# Patient Record
Sex: MALE | Race: WHITE | NOT HISPANIC OR LATINO | Employment: OTHER | URBAN - METROPOLITAN AREA
[De-identification: names, ages, dates, MRNs, and addresses within clinical notes are randomized per-mention and may not be internally consistent; named-entity substitution may affect disease eponyms.]

---

## 2023-02-24 LAB
ERYTHROCYTE DISTRIBUTION WIDTH (RATIO) BY AUTOMATED COUNT: 19.9 % (ref 11.5–14.5)
ERYTHROCYTE MEAN CORPUSCULAR HEMOGLOBIN CONCENTRATION (G/DL) BY AUTOMATED: 28.5 G/DL (ref 32–36)
ERYTHROCYTE MEAN CORPUSCULAR VOLUME (FL) BY AUTOMATED COUNT: 77 FL (ref 80–100)
ERYTHROCYTES (10*6/UL) IN BLOOD BY AUTOMATED COUNT: 4.85 X10E12/L (ref 4.5–5.9)
HEMATOCRIT (%) IN BLOOD BY AUTOMATED COUNT: 37.5 % (ref 41–52)
HEMOGLOBIN (G/DL) IN BLOOD: 10.7 G/DL (ref 13.5–17.5)
LEUKOCYTES (10*3/UL) IN BLOOD BY AUTOMATED COUNT: 5.8 X10E9/L (ref 4.4–11.3)
NRBC (PER 100 WBCS) BY AUTOMATED COUNT: 0 /100 WBC (ref 0–0)
PLATELETS (10*3/UL) IN BLOOD AUTOMATED COUNT: 267 X10E9/L (ref 150–450)

## 2023-09-21 PROBLEM — Z87.11 HISTORY OF GASTRIC ULCER: Status: ACTIVE | Noted: 2023-09-21

## 2023-09-21 PROBLEM — I25.10 CORONARY ATHEROSCLEROSIS: Status: ACTIVE | Noted: 2023-09-21

## 2023-09-21 PROBLEM — Q27.30 ARTERIOVENOUS MALFORMATION (AVM) (HHS-HCC): Status: ACTIVE | Noted: 2023-09-21

## 2023-09-21 PROBLEM — E78.5 DYSLIPIDEMIA: Status: ACTIVE | Noted: 2023-09-21

## 2023-09-21 PROBLEM — Z95.818 PRESENCE OF WATCHMAN LEFT ATRIAL APPENDAGE CLOSURE DEVICE: Status: ACTIVE | Noted: 2023-09-21

## 2023-09-21 PROBLEM — Z91.89 AT RISK FOR STROKE: Status: ACTIVE | Noted: 2023-09-21

## 2023-09-21 PROBLEM — J44.9 COPD (CHRONIC OBSTRUCTIVE PULMONARY DISEASE) (MULTI): Status: ACTIVE | Noted: 2023-09-21

## 2023-09-21 PROBLEM — Z87.19 HISTORY OF GI BLEED: Status: ACTIVE | Noted: 2023-09-21

## 2023-09-21 PROBLEM — Z92.89 HISTORY OF BLOOD TRANSFUSION: Status: ACTIVE | Noted: 2023-09-21

## 2023-09-21 PROBLEM — I48.91 A-FIB (MULTI): Status: ACTIVE | Noted: 2023-09-21

## 2023-09-21 RX ORDER — ALBUTEROL SULFATE 90 UG/1
2 AEROSOL, METERED RESPIRATORY (INHALATION) EVERY 4 HOURS PRN
COMMUNITY
Start: 2020-02-28

## 2023-09-21 RX ORDER — FLUTICASONE PROPIONATE AND SALMETEROL 100; 50 UG/1; UG/1
1 POWDER RESPIRATORY (INHALATION) 2 TIMES DAILY
COMMUNITY
Start: 2020-02-28

## 2023-09-21 RX ORDER — NITROGLYCERIN 0.4 MG/1
0.4 TABLET SUBLINGUAL EVERY 5 MIN PRN
COMMUNITY
Start: 2021-12-16 | End: 2023-12-21 | Stop reason: SDUPTHER

## 2023-09-21 RX ORDER — ROSUVASTATIN CALCIUM 40 MG/1
1 TABLET, COATED ORAL DAILY
COMMUNITY
End: 2023-12-21 | Stop reason: SDUPTHER

## 2023-09-21 RX ORDER — CLOPIDOGREL BISULFATE 75 MG/1
1 TABLET ORAL DAILY
COMMUNITY
Start: 2022-01-21 | End: 2023-12-21 | Stop reason: SDUPTHER

## 2023-09-21 RX ORDER — ISOSORBIDE MONONITRATE 30 MG/1
30 TABLET, EXTENDED RELEASE ORAL EVERY MORNING
COMMUNITY
End: 2023-10-20 | Stop reason: WASHOUT

## 2023-09-21 RX ORDER — ASPIRIN 81 MG/1
1 TABLET ORAL DAILY
COMMUNITY
Start: 2020-07-07 | End: 2023-12-21 | Stop reason: WASHOUT

## 2023-09-21 RX ORDER — PROBENECID AND COLCHICINE .5; 5 MG/1; MG/1
1 TABLET ORAL 2 TIMES DAILY
COMMUNITY

## 2023-09-21 RX ORDER — PANTOPRAZOLE SODIUM 40 MG/1
TABLET, DELAYED RELEASE ORAL
COMMUNITY
Start: 2023-02-22

## 2023-09-21 RX ORDER — METOPROLOL SUCCINATE 100 MG/1
1 TABLET, EXTENDED RELEASE ORAL DAILY
COMMUNITY
End: 2023-12-21 | Stop reason: DRUGHIGH

## 2023-09-21 RX ORDER — DEXTROMETHORPHAN HYDROBROMIDE, GUAIFENESIN 5; 100 MG/5ML; MG/5ML
650 LIQUID ORAL AS NEEDED
COMMUNITY
Start: 2020-06-05 | End: 2023-12-21 | Stop reason: WASHOUT

## 2023-09-21 RX ORDER — ACETAMINOPHEN 160 MG/5ML
1 SUSPENSION, ORAL (FINAL DOSE FORM) ORAL 2 TIMES DAILY
COMMUNITY
Start: 2020-06-05

## 2023-09-21 RX ORDER — LISINOPRIL 10 MG/1
1 TABLET ORAL DAILY
COMMUNITY
End: 2023-12-21 | Stop reason: SDUPTHER

## 2023-09-21 RX ORDER — MULTIVIT-MIN/FA/LYCOPEN/LUTEIN .4-300-25
1 TABLET ORAL DAILY
COMMUNITY
Start: 2020-06-05

## 2023-09-21 RX ORDER — ACETAMINOPHEN 325 MG/1
650 TABLET ORAL EVERY 6 HOURS PRN
COMMUNITY
Start: 2022-01-04

## 2023-09-22 LAB
CHOLESTEROL (MG/DL) IN SER/PLAS: 141 MG/DL (ref 0–199)
CHOLESTEROL IN HDL (MG/DL) IN SER/PLAS: 45.6 MG/DL
CHOLESTEROL/HDL RATIO: 3.1
LDL: 77 MG/DL (ref 0–99)
TRIGLYCERIDE (MG/DL) IN SER/PLAS: 90 MG/DL (ref 0–149)
VLDL: 18 MG/DL (ref 0–40)

## 2023-10-19 NOTE — PROGRESS NOTES
PCP: Magno Babb MD    Subjective   Rafi Lisa Jr. is a 73 y.o. male who is here for follow up of  CAD .  Since last visit, reports:    Mobility has been wobbly in last 3 days.  Denies passing out.   No patterns in onset.   No lightheadedness.    No CP/SOB.    Review of Systems:  Otherwise, limited cardiovascular review of systems is negative.    Past Medical History:  He has a past medical history of Abnormal findings on diagnostic imaging of other specified body structures, Personal history of other medical treatment, and Personal history of other medical treatment.    Surgical History:   He has a past surgical history that includes Other surgical history (2020); Other surgical history (2022); Other surgical history (2022); Other surgical history (2020); Other surgical history (2020); Other surgical history (2020); Other surgical history (2020); Other surgical history (2020); Other surgical history (2020); and Other surgical history (2020).    Family History:   Family History   Problem Relation Name Age of Onset    Other (coronary thrombosis) Father           in 1970s @63     Family History   Problem Relation Name Age of Onset    Other (coronary thrombosis) Father           in 1970s @63       Social History:   Tobacco Use: Not on file       Outpatient Medications:    Current Outpatient Medications:     acetaminophen (Tylenol Arthritis Pain) 650 mg ER tablet, Take 1 tablet (650 mg) by mouth if needed., Disp: , Rfl:     acetaminophen (Tylenol) 325 mg tablet, Take 2 tablets (650 mg) by mouth every 6 hours if needed for mild pain (1 - 3)., Disp: , Rfl:     albuterol 90 mcg/actuation inhaler, Inhale 2 puffs every 4 hours if needed., Disp: , Rfl:     aspirin 81 mg EC tablet, Take 1 tablet (81 mg) by mouth once daily., Disp: , Rfl:     clopidogrel (Plavix) 75 mg tablet, Take 1 tablet (75 mg) by mouth once daily., Disp: , Rfl:     coenzyme Q-10  200 mg capsule, Take 1 capsule (200 mg) by mouth 2 times a day., Disp: , Rfl:     fluticasone propion-salmeteroL (Advair Diskus) 100-50 mcg/dose diskus inhaler, Inhale 1 puff twice a day., Disp: , Rfl:     lisinopril 10 mg tablet, Take 1 tablet (10 mg) by mouth once daily., Disp: , Rfl:     metoprolol succinate XL (Toprol-XL) 100 mg 24 hr tablet, Take 1 tablet (100 mg) by mouth once daily., Disp: , Rfl:     multivitamin with minerals iron-free (Centrum Silver), Take 1 tablet by mouth once daily., Disp: , Rfl:     nitroglycerin (Nitrostat) 0.4 mg SL tablet, Place 1 tablet (0.4 mg) under the tongue every 5 minutes if needed for chest pain (FOR UP TO 3 DOSES AS NEEDED FOR CHEST PAIN.CALL 911 IF PAIN PERSISTS.)., Disp: , Rfl:     pantoprazole (ProtoNix) 40 mg EC tablet, , Disp: , Rfl:     probenecid-colchicine 500-0.5 mg tablet, Take 1 tablet by mouth twice a day., Disp: , Rfl:     rosuvastatin (Crestor) 40 mg tablet, Take 1 tablet (40 mg) by mouth once daily., Disp: , Rfl:      Allergies:  Sulfa (sulfonamide antibiotics) and Adhesive tape-silicones       Objective   Vital Signs:  /70 (BP Location: Left arm, Patient Position: Sitting, BP Cuff Size: Adult)   Pulse 63   Ht 1.829 m (6')   Wt 83.3 kg (183 lb 9.6 oz)   SpO2 96%   BMI 24.90 kg/m²     Physical Exam:  General: no acute distress  HEENT: EOMI, no scleral icterus.  Lungs: Clear to auscultation bilaterally without wheezing, rales, or rhonchi.  Cardiovascular: Regular rhythm and rate. Normal S1 and S2. No murmurs, rubs, or gallops are appreciated. JVP normal.  no bruits  Abdomen: Soft, nontender, nondistended. Bowel sounds present.  Extremities: palpable distal pulses.  Neurologic: abnormality of coordination -- gait unsteady, staggers while walking straight line.    Pertinent Recent Cardiovascular Studies (personally reviewed):  EKG bradycardic    Laboratory values:  CMP:  Recent Labs     01/05/22  0631 01/04/22  1132 01/03/22  0744 05/15/20  0424  "05/11/20  1350 03/04/20  1159 12/31/19  0457 12/30/19  0339 12/29/19  0316    139 138 139 140 140 141 140 137   K 4.8 5.4* 4.6 4.5 4.5 4.7 4.3 4.2 3.4*    103 104 105 107 103 102 101 99   CO2 22 26 23 24 29 28 25 28 27   ANIONGAP 16 15 16 15 9* 14 18 15 14   BUN 25* 30* 35* 24* 24* 23 21 20 16   CREATININE 1.39* 1.57* 1.99* 1.26 1.55* 1.43* 1.25 1.33* 1.17   MG  --   --   --  1.98  --   --  2.07  --  1.63     Recent Labs     01/05/22  0631 01/04/22  1132 05/15/20  0424 12/31/19  0457 12/30/19  0339 12/29/19  0316 12/27/19  1634   ALBUMIN 3.9 4.5 3.8 3.6  3.6 3.7   < > 3.7   ALKPHOS  --  89  --  65  --   --  60   ALT  --  20  --  5*  --   --  18   AST  --  27  --  32  --   --  35   BILITOT  --  0.6  --  0.4  --   --  0.6    < > = values in this interval not displayed.     CBC:  Recent Labs     02/24/23  1158 01/05/22  0631 01/04/22  1132 01/03/22 0744 05/15/20  0424 05/11/20  1350 03/04/20  1159 12/31/19 0457   WBC 5.8 7.6 9.1 9.9 6.3 5.9 6.8 6.5   HGB 10.7* 12.1* 14.2 14.7 12.9* 13.0* 13.3* 11.8*   HCT 37.5* 39.5* 44.6 45.2 40.1* 43.3 43.9 40.3*    154 224 231 178 233 275 181   MCV 77* 101* 98 96 85 91 93 101*     COAG:   Recent Labs     01/03/22  0744 05/15/20  0424 05/11/20  1350 03/04/20  1159 12/31/19  0457 12/29/19  0316 12/28/19  0204 12/27/19  1634   INR 1.0 1.1 1.3* 1.4* 1.1 1.3* 1.2* 1.1     ABO:   Recent Labs     05/11/20  1350   ABO AB     HEME/ENDO:  Recent Labs     01/03/22  0744   HGBA1C 6.4*      CARDIAC:   Recent Labs     12/28/19  1626   *     Recent Labs     09/22/23  1241 02/09/22  1133 01/03/22  0744   CHOL 141 137 194   LDLF 77 75 93   HDL 45.6 41.0 57.1   TRIG 90 104 219*     MICRO: No results for input(s): \"ESR\", \"CRP\", \"PROCAL\" in the last 07148 hours.  No results found for the last 90 days.         I have personally reviewed most recent PCP, cardiology, vascular, and/or podiatry documentation.      Assessment/Plan   73 y.o. male with  CAD s/p multiple stents " (last laser-assisted PCI of Cx 1/22)  in the background of dyslipidemia, hypertension, and AF s/p RFA 12/19 c/b pericardial effusion s/p tap, [facility] 5/2020. HCV, GIB s/p clipping via push endoscopy .    No cardiac complaints but + abnormality in gait and imbalance.   No other neurologic complaints.   + smoking - has not had CT chest for nodules.    Plan:  - MRI head and MRA head/neck to assess for intrinsic neurological cause  - CT chest would be helpful - defer to PCP  - decrease metop in 1/2 to see if bradycardia contributing  - bump PCP follow up to early November - communicated    Follow up with us 3 months.           SIGNATURE: James Hunt MD PATIENT NAME: Rafi Lisa .   DATE/TIME: October 20, 2023 2:31 PM MRN: [id number]

## 2023-10-20 ENCOUNTER — OFFICE VISIT (OUTPATIENT)
Dept: CARDIOLOGY | Facility: CLINIC | Age: 73
End: 2023-10-20
Payer: MEDICARE

## 2023-10-20 VITALS
SYSTOLIC BLOOD PRESSURE: 132 MMHG | BODY MASS INDEX: 24.87 KG/M2 | DIASTOLIC BLOOD PRESSURE: 70 MMHG | HEIGHT: 72 IN | HEART RATE: 63 BPM | WEIGHT: 183.6 LBS | OXYGEN SATURATION: 96 %

## 2023-10-20 DIAGNOSIS — R26.89 IMBALANCE: Primary | ICD-10-CM

## 2023-10-20 DIAGNOSIS — I48.20 CHRONIC ATRIAL FIBRILLATION (MULTI): ICD-10-CM

## 2023-10-20 PROCEDURE — 93000 ELECTROCARDIOGRAM COMPLETE: CPT | Performed by: INTERNAL MEDICINE

## 2023-10-20 PROCEDURE — 1159F MED LIST DOCD IN RCRD: CPT | Performed by: INTERNAL MEDICINE

## 2023-10-20 PROCEDURE — 99215 OFFICE O/P EST HI 40 MIN: CPT | Performed by: INTERNAL MEDICINE

## 2023-10-20 NOTE — LETTER
October 20, 2023     Magno Babb MD  [address] [facility] [address]    Patient: Rafi Lisa Jr.   YOB: 1950   Date of Visit: 10/20/2023       Dear Dr. Magno Babb MD:    Thank you for referring Rafi Lisa to me for evaluation. Below are the relevant portions of my assessment and plan of care.    Assessment / Plan:  73 y.o. male with  CAD s/p multiple stents (last laser-assisted PCI of Cx 1/22)  in the background of dyslipidemia, hypertension, and AF s/p RFA 12/19 c/b pericardial effusion s/p tap, [facility] 5/2020. HCV, GIB s/p clipping via push endoscopy .    No cardiac complaints but + abnormality in gait and imbalance.   No other neurologic complaints.   + smoking - has not had CT chest for nodules.    Plan:  - MRI head and MRA head/neck to assess for intrinsic neurological cause  - CT chest would be helpful - defer to PCP  - decrease metop in 1/2 to see if bradycardia contributing  - bump PCP follow up to early November    Follow up with us 3 months.        If you have questions, please do not hesitate to call me. I look forward to following Rafi along with you.         Sincerely,        James Hunt MD        CC: No Recipients

## 2023-10-20 NOTE — PATIENT INSTRUCTIONS
Cut metop to 1/2 (50 mg daily)  MRI head and neck, follow up with Dr. Babb.  Lung imaging may be useful.

## 2023-12-05 ENCOUNTER — LAB (OUTPATIENT)
Dept: LAB | Facility: LAB | Age: 73
End: 2023-12-05
Payer: MEDICARE

## 2023-12-05 DIAGNOSIS — D64.9 ANEMIA, UNSPECIFIED: Primary | ICD-10-CM

## 2023-12-05 DIAGNOSIS — R26.89 IMBALANCE: ICD-10-CM

## 2023-12-05 LAB
CREAT SERPL-MCNC: 1.6 MG/DL (ref 0.5–1.3)
ERYTHROCYTE [DISTWIDTH] IN BLOOD BY AUTOMATED COUNT: 21.2 % (ref 11.5–14.5)
FERRITIN SERPL-MCNC: 11 NG/ML (ref 20–300)
GFR SERPL CREATININE-BSD FRML MDRD: 45 ML/MIN/1.73M*2
HCT VFR BLD AUTO: 35 % (ref 41–52)
HGB BLD-MCNC: 10 G/DL (ref 13.5–17.5)
IRON SATN MFR SERPL: ABNORMAL %
IRON SERPL-MCNC: 28 UG/DL (ref 35–150)
MCH RBC QN AUTO: 21.6 PG (ref 26–34)
MCHC RBC AUTO-ENTMCNC: 28.6 G/DL (ref 32–36)
MCV RBC AUTO: 76 FL (ref 80–100)
NRBC BLD-RTO: 0 /100 WBCS (ref 0–0)
PLATELET # BLD AUTO: 251 X10*3/UL (ref 150–450)
RBC # BLD AUTO: 4.62 X10*6/UL (ref 4.5–5.9)
TIBC SERPL-MCNC: ABNORMAL UG/DL
UIBC SERPL-MCNC: >450 UG/DL (ref 110–370)
WBC # BLD AUTO: 6.5 X10*3/UL (ref 4.4–11.3)

## 2023-12-05 PROCEDURE — 83550 IRON BINDING TEST: CPT

## 2023-12-05 PROCEDURE — 83540 ASSAY OF IRON: CPT

## 2023-12-05 PROCEDURE — 85027 COMPLETE CBC AUTOMATED: CPT

## 2023-12-05 PROCEDURE — 82728 ASSAY OF FERRITIN: CPT

## 2023-12-05 PROCEDURE — 82565 ASSAY OF CREATININE: CPT

## 2023-12-05 PROCEDURE — 36415 COLL VENOUS BLD VENIPUNCTURE: CPT

## 2023-12-17 ENCOUNTER — HOSPITAL ENCOUNTER (OUTPATIENT)
Dept: RADIOLOGY | Facility: HOSPITAL | Age: 73
Discharge: HOME | End: 2023-12-17
Payer: MEDICARE

## 2023-12-17 DIAGNOSIS — R26.89 IMBALANCE: ICD-10-CM

## 2023-12-17 PROCEDURE — 70547 MR ANGIOGRAPHY NECK W/O DYE: CPT | Performed by: RADIOLOGY

## 2023-12-17 PROCEDURE — 70544 MR ANGIOGRAPHY HEAD W/O DYE: CPT | Performed by: RADIOLOGY

## 2023-12-17 PROCEDURE — 70547 MR ANGIOGRAPHY NECK W/O DYE: CPT

## 2023-12-17 PROCEDURE — 70544 MR ANGIOGRAPHY HEAD W/O DYE: CPT | Mod: 59

## 2023-12-17 PROCEDURE — 70551 MRI BRAIN STEM W/O DYE: CPT | Performed by: RADIOLOGY

## 2023-12-17 PROCEDURE — 70551 MRI BRAIN STEM W/O DYE: CPT

## 2023-12-18 ENCOUNTER — TELEPHONE (OUTPATIENT)
Dept: CARDIOLOGY | Facility: CLINIC | Age: 73
End: 2023-12-18
Payer: MEDICARE

## 2023-12-18 PROBLEM — E88.819 INSULIN RESISTANCE: Status: ACTIVE | Noted: 2023-07-11

## 2023-12-18 PROBLEM — D50.9 IRON DEFICIENCY ANEMIA: Status: ACTIVE | Noted: 2018-02-12

## 2023-12-18 PROBLEM — K31.811 AVM (ARTERIOVENOUS MALFORMATION) OF DUODENUM, ACQUIRED WITH HEMORRHAGE: Status: ACTIVE | Noted: 2019-01-15

## 2023-12-18 PROBLEM — M17.0 LOCALIZED OSTEOARTHRITIS OF KNEES, BILATERAL: Status: ACTIVE | Noted: 2023-07-11

## 2023-12-18 PROBLEM — K57.30 DIVERTICULAR DISEASE OF COLON: Status: ACTIVE | Noted: 2023-07-11

## 2023-12-18 PROBLEM — N52.9 ERECTILE DYSFUNCTION: Status: ACTIVE | Noted: 2018-05-14

## 2023-12-18 PROBLEM — I48.91 ATRIAL FIBRILLATION (MULTI): Status: ACTIVE | Noted: 2018-06-17

## 2023-12-18 PROBLEM — R73.03 PREDIABETES: Status: ACTIVE | Noted: 2018-07-13

## 2023-12-18 PROBLEM — G89.29 CHRONIC NECK PAIN: Status: ACTIVE | Noted: 2019-01-28

## 2023-12-18 PROBLEM — R79.89 LOW TESTOSTERONE IN MALE: Status: ACTIVE | Noted: 2018-10-29

## 2023-12-18 PROBLEM — R42 DIZZINESS: Status: ACTIVE | Noted: 2023-11-20

## 2023-12-18 PROBLEM — E78.5 DYSLIPIDEMIA: Status: RESOLVED | Noted: 2023-09-21 | Resolved: 2023-12-18

## 2023-12-18 PROBLEM — K92.2 GASTROINTESTINAL HEMORRHAGE: Status: ACTIVE | Noted: 2019-03-25

## 2023-12-18 PROBLEM — M54.12 CERVICAL RADICULOPATHY: Status: ACTIVE | Noted: 2017-10-11

## 2023-12-18 PROBLEM — R06.83 SNORING: Status: ACTIVE | Noted: 2018-06-18

## 2023-12-18 PROBLEM — N18.31 STAGE 3A CHRONIC KIDNEY DISEASE (MULTI): Status: ACTIVE | Noted: 2023-07-11

## 2023-12-18 PROBLEM — M54.2 CHRONIC NECK PAIN: Status: ACTIVE | Noted: 2019-01-28

## 2023-12-18 NOTE — TELEPHONE ENCOUNTER
----- Message from James Hunt MD sent at 12/18/2023  4:56 PM EST -----  Regarding: Results reviewed  Please let the patient know the MRI head and neck results are normal.  Nonspecific changes can be expected with age.    Thank you!  ----- Message -----  From: Interface, Radiology Results In  Sent: 12/18/2023   1:42 PM EST  To: James Hunt MD

## 2023-12-21 ENCOUNTER — OFFICE VISIT (OUTPATIENT)
Dept: CARDIOLOGY | Facility: CLINIC | Age: 73
End: 2023-12-21
Payer: MEDICARE

## 2023-12-21 VITALS
BODY MASS INDEX: 25.44 KG/M2 | HEIGHT: 72 IN | SYSTOLIC BLOOD PRESSURE: 140 MMHG | WEIGHT: 187.8 LBS | HEART RATE: 65 BPM | OXYGEN SATURATION: 95 % | DIASTOLIC BLOOD PRESSURE: 80 MMHG

## 2023-12-21 DIAGNOSIS — R26.89 IMBALANCE: ICD-10-CM

## 2023-12-21 DIAGNOSIS — I10 ESSENTIAL HYPERTENSION, BENIGN: ICD-10-CM

## 2023-12-21 DIAGNOSIS — E78.2 MIXED HYPERLIPIDEMIA: ICD-10-CM

## 2023-12-21 DIAGNOSIS — I48.20 CHRONIC ATRIAL FIBRILLATION (MULTI): ICD-10-CM

## 2023-12-21 DIAGNOSIS — I25.10 CORONARY ARTERY DISEASE INVOLVING NATIVE CORONARY ARTERY OF NATIVE HEART WITHOUT ANGINA PECTORIS: Primary | ICD-10-CM

## 2023-12-21 PROCEDURE — 3079F DIAST BP 80-89 MM HG: CPT | Performed by: INTERNAL MEDICINE

## 2023-12-21 PROCEDURE — 99406 BEHAV CHNG SMOKING 3-10 MIN: CPT | Performed by: INTERNAL MEDICINE

## 2023-12-21 PROCEDURE — 99214 OFFICE O/P EST MOD 30 MIN: CPT | Performed by: INTERNAL MEDICINE

## 2023-12-21 PROCEDURE — 3077F SYST BP >= 140 MM HG: CPT | Performed by: INTERNAL MEDICINE

## 2023-12-21 PROCEDURE — 1159F MED LIST DOCD IN RCRD: CPT | Performed by: INTERNAL MEDICINE

## 2023-12-21 RX ORDER — LISINOPRIL 10 MG/1
10 TABLET ORAL DAILY
Qty: 90 TABLET | Refills: 3 | Status: SHIPPED | OUTPATIENT
Start: 2023-12-21 | End: 2024-12-20

## 2023-12-21 RX ORDER — ROSUVASTATIN CALCIUM 40 MG/1
40 TABLET, COATED ORAL DAILY
Qty: 90 TABLET | Refills: 3 | Status: SHIPPED | OUTPATIENT
Start: 2023-12-21 | End: 2024-12-20

## 2023-12-21 RX ORDER — NITROGLYCERIN 0.4 MG/1
0.4 TABLET SUBLINGUAL EVERY 5 MIN PRN
Qty: 90 TABLET | Refills: 3 | Status: SHIPPED | OUTPATIENT
Start: 2023-12-21 | End: 2024-01-20

## 2023-12-21 RX ORDER — METOPROLOL SUCCINATE 50 MG/1
50 TABLET, EXTENDED RELEASE ORAL DAILY
COMMUNITY
End: 2023-12-21 | Stop reason: SDUPTHER

## 2023-12-21 RX ORDER — METOPROLOL SUCCINATE 50 MG/1
50 TABLET, EXTENDED RELEASE ORAL DAILY
Qty: 90 TABLET | Refills: 3 | Status: SHIPPED | OUTPATIENT
Start: 2023-12-21 | End: 2024-12-20

## 2023-12-21 RX ORDER — CLOPIDOGREL BISULFATE 75 MG/1
75 TABLET ORAL DAILY
Qty: 90 TABLET | Refills: 3 | Status: SHIPPED | OUTPATIENT
Start: 2023-12-21 | End: 2024-12-20

## 2023-12-21 RX ORDER — EZETIMIBE 10 MG/1
10 TABLET ORAL DAILY
Qty: 90 TABLET | Refills: 3 | Status: SHIPPED | OUTPATIENT
Start: 2023-12-21 | End: 2024-12-20

## 2023-12-21 NOTE — PATIENT INSTRUCTIONS
For your upper GI schedule for 1/5/24:  - stop Plavix 7 days prior  - keep baby aspirin  - after procedure, switch from aspirin to Plavix ONLY (monotherapy)    If other procedures needed in future, refer to our office for instructions.    Goal Blood pressure at home <130/80, your heart rate is great today.  Add Zetia to bring LDL <55. Repeat cholesterol test with PCP next year.    Follow up with us in 1 year. Call if any changes.

## 2023-12-21 NOTE — PROGRESS NOTES
PCP: Magno Babb MD    Subjective   Rafi Lisa Jr. is a 73 y.o. male who is here for follow up of  CAD .  Since last visit, feels better since last visit. No further issues with walking.    Review of Systems:  Otherwise, limited cardiovascular review of systems is negative.    Past Medical History:  He has a past medical history of Abnormal findings on diagnostic imaging of other specified body structures, Personal history of other medical treatment, and Personal history of other medical treatment.    Surgical History:   He has a past surgical history that includes Other surgical history (2020); Other surgical history (2022); Other surgical history (2022); Other surgical history (2020); Other surgical history (2020); Other surgical history (2020); Other surgical history (2020); Other surgical history (2020); Other surgical history (2020); Other surgical history (2020); MR angio head wo IV contrast (2023); MR angio neck wo IV contrast (2023); MR angio head wo IV contrast (2023); and MR angio neck wo IV contrast (2023).    Family History:   Family History   Problem Relation Name Age of Onset    Other (coronary thrombosis) Father           in 1970s @63     Family History   Problem Relation Name Age of Onset    Other (coronary thrombosis) Father           in 1970s @63       Social History:   Tobacco Use: High Risk (2023)    Patient History     Smoking Tobacco Use: Every Day     Smokeless Tobacco Use: Former     Passive Exposure: Not on file   1/2 ppd currently    Outpatient Medications:    Current Outpatient Medications:     acetaminophen (Tylenol Arthritis Pain) 650 mg ER tablet, Take 1 tablet (650 mg) by mouth if needed., Disp: , Rfl:     acetaminophen (Tylenol) 325 mg tablet, Take 2 tablets (650 mg) by mouth every 6 hours if needed for mild pain (1 - 3)., Disp: , Rfl:     albuterol 90 mcg/actuation inhaler,  Inhale 2 puffs every 4 hours if needed., Disp: , Rfl:     aspirin 81 mg EC tablet, Take 1 tablet (81 mg) by mouth once daily., Disp: , Rfl:     clopidogrel (Plavix) 75 mg tablet, Take 1 tablet (75 mg) by mouth once daily., Disp: , Rfl:     coenzyme Q-10 200 mg capsule, Take 1 capsule (200 mg) by mouth 2 times a day., Disp: , Rfl:     fluticasone propion-salmeteroL (Advair Diskus) 100-50 mcg/dose diskus inhaler, Inhale 1 puff twice a day., Disp: , Rfl:     lisinopril 10 mg tablet, Take 1 tablet (10 mg) by mouth once daily., Disp: , Rfl:     metoprolol succinate XL (Toprol-XL) 50 mg 24 hr tablet, Take 1 tablet (50 mg) by mouth once daily. Do not crush or chew., Disp: , Rfl:     multivitamin with minerals iron-free (Centrum Silver), Take 1 tablet by mouth once daily., Disp: , Rfl:     nitroglycerin (Nitrostat) 0.4 mg SL tablet, Place 1 tablet (0.4 mg) under the tongue every 5 minutes if needed for chest pain (FOR UP TO 3 DOSES AS NEEDED FOR CHEST PAIN.CALL 911 IF PAIN PERSISTS.)., Disp: , Rfl:     pantoprazole (ProtoNix) 40 mg EC tablet, , Disp: , Rfl:     probenecid-colchicine 500-0.5 mg tablet, Take 1 tablet by mouth twice a day., Disp: , Rfl:     rosuvastatin (Crestor) 40 mg tablet, Take 1 tablet (40 mg) by mouth once daily., Disp: , Rfl:      Allergies:  Sulfa (sulfonamide antibiotics) and Adhesive tape-silicones       Objective   Vital Signs:  /80 (BP Location: Left arm, Patient Position: Sitting, BP Cuff Size: Adult)   Pulse 65   Ht 1.829 m (6')   Wt 85.2 kg (187 lb 12.8 oz)   SpO2 95%   BMI 25.47 kg/m²     Physical Exam:  General: no acute distress  HEENT: EOMI, no scleral icterus.  Lungs: Clear to auscultation bilaterally without wheezing, rales, or rhonchi.  Cardiovascular: Regular rhythm and rate. Normal S1 and S2. No murmurs, rubs, or gallops are appreciated. JVP normal.  no bruits  Abdomen: Soft, nontender, nondistended. Bowel sounds present.  Extremities: palpable distal pulses.  Neurologic:  Alert and oriented x3.    Pertinent Recent Cardiovascular Studies (personally reviewed):  N/A    Reviewed MRI/MRA head and neck read -- no acute pathologies.    Laboratory values:  CMP:  Recent Labs     12/05/23  1246 01/05/22  0631 01/04/22  1132 01/03/22  0744 05/15/20  0424 05/11/20  1350 03/04/20  1159 12/31/19 0457 12/30/19 0339 12/29/19 0316   NA  --  138 139 138 139 140 140 141 140 137   K  --  4.8 5.4* 4.6 4.5 4.5 4.7 4.3 4.2 3.4*   CL  --  105 103 104 105 107 103 102 101 99   CO2  --  22 26 23 24 29 28 25 28 27   ANIONGAP  --  16 15 16 15 9* 14 18 15 14   BUN  --  25* 30* 35* 24* 24* 23 21 20 16   CREATININE 1.60* 1.39* 1.57* 1.99* 1.26 1.55* 1.43* 1.25 1.33* 1.17   EGFR 45*  --   --   --   --   --   --   --   --   --    MG  --   --   --   --  1.98  --   --  2.07  --  1.63     Recent Labs     01/05/22  0631 01/04/22  1132 05/15/20  0424 12/31/19  0457 12/30/19  0339 12/29/19  0316 12/27/19  1634   ALBUMIN 3.9 4.5 3.8 3.6  3.6 3.7   < > 3.7   ALKPHOS  --  89  --  65  --   --  60   ALT  --  20  --  5*  --   --  18   AST  --  27  --  32  --   --  35   BILITOT  --  0.6  --  0.4  --   --  0.6    < > = values in this interval not displayed.     CBC:  Recent Labs     12/05/23  1246 02/24/23  1158 01/05/22  0631 01/04/22  1132 01/03/22  0744 05/15/20  0424 05/11/20  1350 03/04/20  1159   WBC 6.5 5.8 7.6 9.1 9.9 6.3 5.9 6.8   HGB 10.0* 10.7* 12.1* 14.2 14.7 12.9* 13.0* 13.3*   HCT 35.0* 37.5* 39.5* 44.6 45.2 40.1* 43.3 43.9    267 154 224 231 178 233 275   MCV 76* 77* 101* 98 96 85 91 93     COAG:   Recent Labs     01/03/22  0744 05/15/20  0424 05/11/20  1350 03/04/20  1159 12/31/19  0457 12/29/19  0316 12/28/19  0204 12/27/19  1634   INR 1.0 1.1 1.3* 1.4* 1.1 1.3* 1.2* 1.1     ABO:   Recent Labs     05/11/20  1350   ABO AB     HEME/ENDO:  Recent Labs     12/05/23  1246 01/03/22  0744   FERRITIN 11*  --    HGBA1C  --  6.4*      CARDIAC:   Recent Labs     12/28/19  1626   *     Recent Labs      "09/22/23  1241 02/09/22  1133 01/03/22  0744   CHOL 141 137 194   LDLF 77 75 93   HDL 45.6 41.0 57.1   TRIG 90 104 219*     MICRO: No results for input(s): \"ESR\", \"CRP\", \"PROCAL\" in the last 90330 hours.  No results found for the last 90 days.         I have personally reviewed most recent PCP, cardiology, vascular, and/or podiatry documentation.      Assessment/Plan   73 y.o. male with  CAD s/p multiple stents (last laser-assisted PCI of Cx 1/22)  in the background of dyslipidemia, hypertension, and AF s/p RFA 12/19 c/b pericardial effusion s/p tap, [facility] 5/2020. HCV, GIB s/p clipping via push endoscopy .    Gait improved since last visit, independent of BB change.  HR is better with BB decrease.  Plan for upper GI repeat 1/5/24.     Plan:  - cont BB as current dose  - discontinue aspirin after upper GI - transition to Plavix as monotherapy d/t risk profile from CAD       - instructions given for Plavix cessation deejay-procedure for upper GI  - added Zetia for goal LDL <55       - FLP with PCP 2024  - smoking cessation counseling 5 minutes  - cardiac medications renewed and sent 1 year supply  - follow up with myself 1 year           SIGNATURE: James Hunt MD PATIENT NAME: Rafi Lisa .   DATE/TIME: December 21, 2023 10:12 AM MRN: [id number]     "

## 2023-12-21 NOTE — LETTER
2023     Magno Babb MD  [address] [facility] [address]    Patient: Rafi Lisa Jr.   YOB: 1950   Date of Visit: 2023       Dear Dr. Magno Babb MD:    Thank you for referring Rafi Lisa to me for evaluation. Below are my notes for this consultation.  If you have questions, please do not hesitate to call me. I look forward to following your patient along with you.       Sincerely,     James Hunt MD      CC: No Recipients  ______________________________________________________________________________________    PCP: Magno Babb MD    Subjective  Rafi Lisa Jr. is a 73 y.o. male who is here for follow up of  CAD .  Since last visit, feels better since last visit. No further issues with walking.    Review of Systems:  Otherwise, limited cardiovascular review of systems is negative.    Past Medical History:  He has a past medical history of Abnormal findings on diagnostic imaging of other specified body structures, Personal history of other medical treatment, and Personal history of other medical treatment.    Surgical History:   He has a past surgical history that includes Other surgical history (2020); Other surgical history (2022); Other surgical history (2022); Other surgical history (2020); Other surgical history (2020); Other surgical history (2020); Other surgical history (2020); Other surgical history (2020); Other surgical history (2020); Other surgical history (2020); MR angio head wo IV contrast (2023); MR angio neck wo IV contrast (2023); MR angio head wo IV contrast (2023); and MR angio neck wo IV contrast (2023).    Family History:   Family History   Problem Relation Name Age of Onset   • Other (coronary thrombosis) Father           in 1970s @63     Family History   Problem Relation Name Age of Onset   • Other (coronary thrombosis) Father           in  1970s @63       Social History:   Tobacco Use: High Risk (12/21/2023)    Patient History    • Smoking Tobacco Use: Every Day    • Smokeless Tobacco Use: Former    • Passive Exposure: Not on file   1/2 ppd currently    Outpatient Medications:    Current Outpatient Medications:   •  acetaminophen (Tylenol Arthritis Pain) 650 mg ER tablet, Take 1 tablet (650 mg) by mouth if needed., Disp: , Rfl:   •  acetaminophen (Tylenol) 325 mg tablet, Take 2 tablets (650 mg) by mouth every 6 hours if needed for mild pain (1 - 3)., Disp: , Rfl:   •  albuterol 90 mcg/actuation inhaler, Inhale 2 puffs every 4 hours if needed., Disp: , Rfl:   •  aspirin 81 mg EC tablet, Take 1 tablet (81 mg) by mouth once daily., Disp: , Rfl:   •  clopidogrel (Plavix) 75 mg tablet, Take 1 tablet (75 mg) by mouth once daily., Disp: , Rfl:   •  coenzyme Q-10 200 mg capsule, Take 1 capsule (200 mg) by mouth 2 times a day., Disp: , Rfl:   •  fluticasone propion-salmeteroL (Advair Diskus) 100-50 mcg/dose diskus inhaler, Inhale 1 puff twice a day., Disp: , Rfl:   •  lisinopril 10 mg tablet, Take 1 tablet (10 mg) by mouth once daily., Disp: , Rfl:   •  metoprolol succinate XL (Toprol-XL) 50 mg 24 hr tablet, Take 1 tablet (50 mg) by mouth once daily. Do not crush or chew., Disp: , Rfl:   •  multivitamin with minerals iron-free (Centrum Silver), Take 1 tablet by mouth once daily., Disp: , Rfl:   •  nitroglycerin (Nitrostat) 0.4 mg SL tablet, Place 1 tablet (0.4 mg) under the tongue every 5 minutes if needed for chest pain (FOR UP TO 3 DOSES AS NEEDED FOR CHEST PAIN.CALL 911 IF PAIN PERSISTS.)., Disp: , Rfl:   •  pantoprazole (ProtoNix) 40 mg EC tablet, , Disp: , Rfl:   •  probenecid-colchicine 500-0.5 mg tablet, Take 1 tablet by mouth twice a day., Disp: , Rfl:   •  rosuvastatin (Crestor) 40 mg tablet, Take 1 tablet (40 mg) by mouth once daily., Disp: , Rfl:      Allergies:  Sulfa (sulfonamide antibiotics) and Adhesive tape-silicones       Objective  Vital  Signs:  /80 (BP Location: Left arm, Patient Position: Sitting, BP Cuff Size: Adult)   Pulse 65   Ht 1.829 m (6')   Wt 85.2 kg (187 lb 12.8 oz)   SpO2 95%   BMI 25.47 kg/m²     Physical Exam:  General: no acute distress  HEENT: EOMI, no scleral icterus.  Lungs: Clear to auscultation bilaterally without wheezing, rales, or rhonchi.  Cardiovascular: Regular rhythm and rate. Normal S1 and S2. No murmurs, rubs, or gallops are appreciated. JVP normal.  no bruits  Abdomen: Soft, nontender, nondistended. Bowel sounds present.  Extremities: palpable distal pulses.  Neurologic: Alert and oriented x3.    Pertinent Recent Cardiovascular Studies (personally reviewed):  N/A    Reviewed MRI/MRA head and neck read -- no acute pathologies.    Laboratory values:  CMP:  Recent Labs     12/05/23  1246 01/05/22  0631 01/04/22  1132 01/03/22  0744 05/15/20  0424 05/11/20  1350 03/04/20  1159 12/31/19  0457 12/30/19  0339 12/29/19  0316   NA  --  138 139 138 139 140 140 141 140 137   K  --  4.8 5.4* 4.6 4.5 4.5 4.7 4.3 4.2 3.4*   CL  --  105 103 104 105 107 103 102 101 99   CO2  --  22 26 23 24 29 28 25 28 27   ANIONGAP  --  16 15 16 15 9* 14 18 15 14   BUN  --  25* 30* 35* 24* 24* 23 21 20 16   CREATININE 1.60* 1.39* 1.57* 1.99* 1.26 1.55* 1.43* 1.25 1.33* 1.17   EGFR 45*  --   --   --   --   --   --   --   --   --    MG  --   --   --   --  1.98  --   --  2.07  --  1.63     Recent Labs     01/05/22  0631 01/04/22  1132 05/15/20  0424 12/31/19  0457 12/30/19  0339 12/29/19  0316 12/27/19  1634   ALBUMIN 3.9 4.5 3.8 3.6  3.6 3.7   < > 3.7   ALKPHOS  --  89  --  65  --   --  60   ALT  --  20  --  5*  --   --  18   AST  --  27  --  32  --   --  35   BILITOT  --  0.6  --  0.4  --   --  0.6    < > = values in this interval not displayed.     CBC:  Recent Labs     12/05/23  1246 02/24/23  1158 01/05/22  0631 01/04/22  1132 01/03/22  0744 05/15/20  0424 05/11/20  1350 03/04/20  1159   WBC 6.5 5.8 7.6 9.1 9.9 6.3 5.9 6.8   HGB 10.0*  "10.7* 12.1* 14.2 14.7 12.9* 13.0* 13.3*   HCT 35.0* 37.5* 39.5* 44.6 45.2 40.1* 43.3 43.9    267 154 224 231 178 233 275   MCV 76* 77* 101* 98 96 85 91 93     COAG:   Recent Labs     01/03/22  0744 05/15/20  0424 05/11/20  1350 03/04/20  1159 12/31/19  0457 12/29/19  0316 12/28/19  0204 12/27/19  1634   INR 1.0 1.1 1.3* 1.4* 1.1 1.3* 1.2* 1.1     ABO:   Recent Labs     05/11/20  1350   ABO AB     HEME/ENDO:  Recent Labs     12/05/23  1246 01/03/22  0744   FERRITIN 11*  --    HGBA1C  --  6.4*      CARDIAC:   Recent Labs     12/28/19  1626   *     Recent Labs     09/22/23  1241 02/09/22  1133 01/03/22  0744   CHOL 141 137 194   LDLF 77 75 93   HDL 45.6 41.0 57.1   TRIG 90 104 219*     MICRO: No results for input(s): \"ESR\", \"CRP\", \"PROCAL\" in the last 82417 hours.  No results found for the last 90 days.         I have personally reviewed most recent PCP, cardiology, vascular, and/or podiatry documentation.      Assessment/Plan  73 y.o. male with  CAD s/p multiple stents (last laser-assisted PCI of Cx 1/22)  in the background of dyslipidemia, hypertension, and AF s/p RFA 12/19 c/b pericardial effusion s/p tap, Watchman 5/2020. HCV, GIB s/p clipping via push endoscopy .    Gait improved since last visit, independent of BB change.  HR is better with BB decrease.  Plan for upper GI repeat 1/5/24.     Plan:  - cont BB as current dose  - discontinue aspirin after upper GI - transition to Plavix as monotherapy d/t risk profile from CAD       - instructions given for Plavix cessation deejay-procedure for upper GI  - added Zetia for goal LDL <55       - FLP with PCP 2024  - cardiac medications renewed and sent 1 year supply  - follow up with myself 1 year           SIGNATURE: James Hunt MD PATIENT NAME: Rafi Lisa .   DATE/TIME: December 21, 2023 10:12 AM MRN: [id number]     "

## 2023-12-27 ENCOUNTER — APPOINTMENT (OUTPATIENT)
Dept: RADIOLOGY | Facility: CLINIC | Age: 73
End: 2023-12-27
Payer: MEDICARE

## 2024-01-17 ENCOUNTER — LAB (OUTPATIENT)
Dept: LAB | Facility: LAB | Age: 74
End: 2024-01-17
Payer: MEDICARE

## 2024-01-17 DIAGNOSIS — D64.9 ANEMIA, UNSPECIFIED: ICD-10-CM

## 2024-01-17 DIAGNOSIS — K90.9 INTESTINAL MALABSORPTION, UNSPECIFIED (HHS-HCC): ICD-10-CM

## 2024-01-17 DIAGNOSIS — D50.9 IRON DEFICIENCY ANEMIA, UNSPECIFIED: Primary | ICD-10-CM

## 2024-01-17 LAB
BASOPHILS # BLD AUTO: 0.03 X10*3/UL (ref 0–0.1)
BASOPHILS NFR BLD AUTO: 0.6 %
DACRYOCYTES BLD QL SMEAR: NORMAL
EOSINOPHIL # BLD AUTO: 0.27 X10*3/UL (ref 0–0.4)
EOSINOPHIL NFR BLD AUTO: 5 %
ERYTHROCYTE [DISTWIDTH] IN BLOOD BY AUTOMATED COUNT: 26.7 % (ref 11.5–14.5)
FERRITIN SERPL-MCNC: 74 NG/ML (ref 20–300)
HCT VFR BLD AUTO: 43 % (ref 41–52)
HGB BLD-MCNC: 13 G/DL (ref 13.5–17.5)
IMM GRANULOCYTES # BLD AUTO: 0.01 X10*3/UL (ref 0–0.5)
IMM GRANULOCYTES NFR BLD AUTO: 0.2 % (ref 0–0.9)
IRON SATN MFR SERPL: 21 % (ref 25–45)
IRON SERPL-MCNC: 78 UG/DL (ref 35–150)
LYMPHOCYTES # BLD AUTO: 1.26 X10*3/UL (ref 0.8–3)
LYMPHOCYTES NFR BLD AUTO: 23.3 %
MCH RBC QN AUTO: 25.6 PG (ref 26–34)
MCHC RBC AUTO-ENTMCNC: 30.2 G/DL (ref 32–36)
MCV RBC AUTO: 85 FL (ref 80–100)
MONOCYTES # BLD AUTO: 0.49 X10*3/UL (ref 0.05–0.8)
MONOCYTES NFR BLD AUTO: 9.1 %
NEUTROPHILS # BLD AUTO: 3.34 X10*3/UL (ref 1.6–5.5)
NEUTROPHILS NFR BLD AUTO: 61.8 %
NRBC BLD-RTO: 0 /100 WBCS (ref 0–0)
OVALOCYTES BLD QL SMEAR: NORMAL
PLATELET # BLD AUTO: 216 X10*3/UL (ref 150–450)
RBC # BLD AUTO: 5.08 X10*6/UL (ref 4.5–5.9)
RBC MORPH BLD: NORMAL
TIBC SERPL-MCNC: 378 UG/DL (ref 240–445)
UIBC SERPL-MCNC: 300 UG/DL (ref 110–370)
WBC # BLD AUTO: 5.4 X10*3/UL (ref 4.4–11.3)

## 2024-01-17 PROCEDURE — 82728 ASSAY OF FERRITIN: CPT

## 2024-01-17 PROCEDURE — 36415 COLL VENOUS BLD VENIPUNCTURE: CPT

## 2024-01-17 PROCEDURE — 83550 IRON BINDING TEST: CPT

## 2024-01-17 PROCEDURE — 85025 COMPLETE CBC W/AUTO DIFF WBC: CPT

## 2024-01-17 PROCEDURE — 83540 ASSAY OF IRON: CPT

## 2024-01-29 ENCOUNTER — APPOINTMENT (OUTPATIENT)
Dept: RADIOLOGY | Facility: HOSPITAL | Age: 74
End: 2024-01-29
Payer: MEDICARE

## 2024-12-08 DIAGNOSIS — I48.20 CHRONIC ATRIAL FIBRILLATION (MULTI): ICD-10-CM

## 2024-12-08 DIAGNOSIS — E78.2 MIXED HYPERLIPIDEMIA: ICD-10-CM

## 2024-12-08 DIAGNOSIS — I10 ESSENTIAL HYPERTENSION, BENIGN: ICD-10-CM

## 2024-12-10 DIAGNOSIS — I25.10 CORONARY ARTERY DISEASE INVOLVING NATIVE CORONARY ARTERY OF NATIVE HEART WITHOUT ANGINA PECTORIS: ICD-10-CM

## 2024-12-11 RX ORDER — METOPROLOL SUCCINATE 50 MG/1
50 TABLET, EXTENDED RELEASE ORAL DAILY
Qty: 30 TABLET | Refills: 0 | Status: SHIPPED | OUTPATIENT
Start: 2024-12-11 | End: 2025-01-10

## 2024-12-11 RX ORDER — EZETIMIBE 10 MG/1
10 TABLET ORAL DAILY
Qty: 30 TABLET | Refills: 0 | Status: SHIPPED | OUTPATIENT
Start: 2024-12-11 | End: 2025-01-10

## 2024-12-19 ENCOUNTER — APPOINTMENT (OUTPATIENT)
Dept: CARDIOLOGY | Facility: CLINIC | Age: 74
End: 2024-12-19
Payer: MEDICARE

## 2024-12-19 VITALS
WEIGHT: 181.4 LBS | SYSTOLIC BLOOD PRESSURE: 150 MMHG | DIASTOLIC BLOOD PRESSURE: 88 MMHG | OXYGEN SATURATION: 94 % | BODY MASS INDEX: 24.57 KG/M2 | HEART RATE: 61 BPM | HEIGHT: 72 IN

## 2024-12-19 DIAGNOSIS — E78.5 DYSLIPIDEMIA: ICD-10-CM

## 2024-12-19 DIAGNOSIS — I73.9 PERIPHERAL VASCULAR DISEASE, UNSPECIFIED (CMS-HCC): ICD-10-CM

## 2024-12-19 DIAGNOSIS — I10 ESSENTIAL HYPERTENSION, BENIGN: ICD-10-CM

## 2024-12-19 DIAGNOSIS — E78.2 MIXED HYPERLIPIDEMIA: ICD-10-CM

## 2024-12-19 DIAGNOSIS — I48.20 CHRONIC ATRIAL FIBRILLATION (MULTI): ICD-10-CM

## 2024-12-19 DIAGNOSIS — I70.1 RENAL ARTERY STENOSIS (CMS-HCC): ICD-10-CM

## 2024-12-19 DIAGNOSIS — I25.10 CORONARY ARTERY DISEASE INVOLVING NATIVE CORONARY ARTERY OF NATIVE HEART WITHOUT ANGINA PECTORIS: ICD-10-CM

## 2024-12-19 LAB
ATRIAL RATE: 49 BPM
P AXIS: 79 DEGREES
P OFFSET: 165 MS
P ONSET: 116 MS
PR INTERVAL: 190 MS
Q ONSET: 211 MS
QRS COUNT: 8 BEATS
QRS DURATION: 96 MS
QT INTERVAL: 442 MS
QTC CALCULATION(BAZETT): 399 MS
QTC FREDERICIA: 413 MS
R AXIS: -76 DEGREES
T AXIS: 61 DEGREES
T OFFSET: 432 MS
VENTRICULAR RATE: 49 BPM

## 2024-12-19 PROCEDURE — 93005 ELECTROCARDIOGRAM TRACING: CPT | Performed by: INTERNAL MEDICINE

## 2024-12-19 PROCEDURE — 1159F MED LIST DOCD IN RCRD: CPT | Performed by: INTERNAL MEDICINE

## 2024-12-19 PROCEDURE — 99214 OFFICE O/P EST MOD 30 MIN: CPT | Performed by: INTERNAL MEDICINE

## 2024-12-19 PROCEDURE — 3079F DIAST BP 80-89 MM HG: CPT | Performed by: INTERNAL MEDICINE

## 2024-12-19 PROCEDURE — 99406 BEHAV CHNG SMOKING 3-10 MIN: CPT | Performed by: INTERNAL MEDICINE

## 2024-12-19 PROCEDURE — 3077F SYST BP >= 140 MM HG: CPT | Performed by: INTERNAL MEDICINE

## 2024-12-19 PROCEDURE — G2211 COMPLEX E/M VISIT ADD ON: HCPCS | Performed by: INTERNAL MEDICINE

## 2024-12-19 RX ORDER — TURMERIC 400 MG
1 CAPSULE ORAL DAILY
COMMUNITY

## 2024-12-19 RX ORDER — LISINOPRIL 20 MG/1
20 TABLET ORAL DAILY
Qty: 90 TABLET | Refills: 3 | Status: SHIPPED | OUTPATIENT
Start: 2024-12-19 | End: 2025-12-19

## 2024-12-19 RX ORDER — CLOPIDOGREL BISULFATE 75 MG/1
75 TABLET ORAL DAILY
Qty: 90 TABLET | Refills: 3 | OUTPATIENT
Start: 2024-12-19

## 2024-12-19 RX ORDER — EZETIMIBE 10 MG/1
10 TABLET ORAL DAILY
Qty: 90 TABLET | Refills: 3 | Status: SHIPPED | OUTPATIENT
Start: 2024-12-19 | End: 2025-12-19

## 2024-12-19 RX ORDER — CLOPIDOGREL BISULFATE 75 MG/1
75 TABLET ORAL DAILY
Qty: 90 TABLET | Refills: 3 | Status: SHIPPED | OUTPATIENT
Start: 2024-12-19 | End: 2025-12-19

## 2024-12-19 RX ORDER — OMEPRAZOLE 40 MG/1
40 CAPSULE, DELAYED RELEASE ORAL
COMMUNITY

## 2024-12-19 RX ORDER — METOPROLOL SUCCINATE 50 MG/1
50 TABLET, EXTENDED RELEASE ORAL DAILY
Qty: 90 TABLET | Refills: 3 | Status: SHIPPED | OUTPATIENT
Start: 2024-12-19 | End: 2025-12-19

## 2024-12-19 RX ORDER — ROSUVASTATIN CALCIUM 40 MG/1
40 TABLET, COATED ORAL DAILY
Qty: 90 TABLET | Refills: 3 | Status: SHIPPED | OUTPATIENT
Start: 2024-12-19 | End: 2025-12-19

## 2024-12-19 RX ORDER — NITROGLYCERIN 0.4 MG/1
0.4 TABLET SUBLINGUAL EVERY 5 MIN PRN
Qty: 90 TABLET | Refills: 3 | Status: SHIPPED | OUTPATIENT
Start: 2024-12-19 | End: 2025-01-18

## 2024-12-19 NOTE — PATIENT INSTRUCTIONS
Increase lisinopril to 20 mg daily.    Your blood pressure is high. Get a blood pressure cuff (upper arm is better).    Keep track of your blood pressure and heart rate.  The best way to check it is 1 hour after you take your morning medications, sit at your kitchen table for 5 minutes, then check it.    The goal blood pressure is <130 mmHg in the top and <80 mmHg in the bottom.   Heart rate goal is about 60-80 beats per minute.     If you're consistently high, give us a call at our office ([phone]) as we may need to think about adjusting your blood pressure or heart rate pill.    Smoking cessation is contingent on HABIT breaking - yours is more associated with coffee. Recognize what triggers you and break that bond. Best for you and wife to quit together.

## 2024-12-19 NOTE — PROGRESS NOTES
PCP: Magno Babb MD    Subjective   Rafi Lisa Jr. is a 74 y.o. male who is here for follow up of  CAD .  Since last visit, feels better since last visit. No further issues with walking. No chest pain.     Review of Systems:  Otherwise, limited cardiovascular review of systems is negative.    Past Medical History:  He has a past medical history of Abnormal findings on diagnostic imaging of other specified body structures, Personal history of other medical treatment, and Personal history of other medical treatment.    Surgical History:   He has a past surgical history that includes Other surgical history (2020); Other surgical history (2022); Other surgical history (2022); Other surgical history (2020); Other surgical history (2020); Other surgical history (2020); Other surgical history (2020); Other surgical history (2020); Other surgical history (2020); Other surgical history (2020); MR angio head wo IV contrast (2023); MR angio neck wo IV contrast (2023); MR angio head wo IV contrast (2023); and MR angio neck wo IV contrast (2023).    Family History:   Family History   Problem Relation Name Age of Onset    Other (coronary thrombosis) Father           in 1970s @63     Family History   Problem Relation Name Age of Onset    Other (coronary thrombosis) Father           in 1970s @63       Social History:   Tobacco Use: High Risk (2024)    Patient History     Smoking Tobacco Use: Every Day     Smokeless Tobacco Use: Former     Passive Exposure: Not on file   1/2 ppd currently    Outpatient Medications:    Current Outpatient Medications:     acetaminophen (Tylenol) 325 mg tablet, Take 2 tablets (650 mg) by mouth every 6 hours if needed for mild pain (1 - 3)., Disp: , Rfl:     albuterol 90 mcg/actuation inhaler, Inhale 2 puffs every 4 hours if needed., Disp: , Rfl:     clopidogrel (Plavix) 75 mg tablet, Take 1 tablet  (75 mg) by mouth once daily., Disp: 90 tablet, Rfl: 3    coenzyme Q-10 200 mg capsule, Take 1 capsule (200 mg) by mouth 2 times a day., Disp: , Rfl:     ezetimibe (Zetia) 10 mg tablet, Take 1 tablet (10 mg) by mouth once daily., Disp: 30 tablet, Rfl: 0    lisinopril 10 mg tablet, Take 1 tablet (10 mg) by mouth once daily., Disp: 90 tablet, Rfl: 3    metoprolol succinate XL (Toprol-XL) 50 mg 24 hr tablet, Take 1 tablet (50 mg) by mouth once daily. DO NOT CRUSH OR CHEW, Disp: 30 tablet, Rfl: 0    multivitamin with minerals iron-free (Centrum Silver), Take 1 tablet by mouth once daily., Disp: , Rfl:     omeprazole (PriLOSEC) 40 mg DR capsule, Take 1 capsule (40 mg) by mouth once daily in the morning. Take before meals., Disp: , Rfl:     pantoprazole (ProtoNix) 40 mg EC tablet, , Disp: , Rfl:     probenecid-colchicine 500-0.5 mg tablet, Take 1 tablet by mouth twice a day., Disp: , Rfl:     rosuvastatin (Crestor) 40 mg tablet, Take 1 tablet (40 mg) by mouth once daily., Disp: 90 tablet, Rfl: 3    turmeric 400 mg capsule, Take 1 capsule by mouth once daily., Disp: , Rfl:     nitroglycerin (Nitrostat) 0.4 mg SL tablet, Place 1 tablet (0.4 mg) under the tongue every 5 minutes if needed for chest pain (FOR UP TO 3 DOSES AS NEEDED FOR CHEST PAIN.CALL 911 IF PAIN PERSISTS.)., Disp: 90 tablet, Rfl: 3     Allergies:  Sulfa (sulfonamide antibiotics) and Adhesive tape-silicones       Objective   Vital Signs:  /88 (BP Location: Left arm, Patient Position: Sitting, BP Cuff Size: Adult)   Pulse 61   Ht 1.829 m (6')   Wt 82.3 kg (181 lb 6.4 oz)   SpO2 94%   BMI 24.60 kg/m²     Physical Exam:  General: no acute distress  HEENT: EOMI, no scleral icterus.  Lungs: Clear to auscultation bilaterally without wheezing, rales, or rhonchi.  Cardiovascular: Regular rhythm and rate. Normal S1 and S2. No murmurs, rubs, or gallops are appreciated. JVP normal.  no bruits  Abdomen: Soft, nontender, nondistended. Bowel sounds  present.  Extremities: palpable distal pulses.  Neurologic: Alert and oriented x3.    Pertinent Recent Cardiovascular Studies (personally reviewed):  EKG 12/19/24: sinus bradycardia 49 bpm with PAC    Laboratory values:  CMP:  Recent Labs     12/05/23  1246 01/05/22  0631 01/04/22  1132 01/03/22  0744 05/15/20  0424 05/11/20  1350 03/04/20  1159 12/31/19  0457 12/30/19  0339 12/29/19  0316   NA  --  138 139 138 139 140 140 141 140 137   K  --  4.8 5.4* 4.6 4.5 4.5 4.7 4.3 4.2 3.4*   CL  --  105 103 104 105 107 103 102 101 99   CO2  --  22 26 23 24 29 28 25 28 27   ANIONGAP  --  16 15 16 15 9* 14 18 15 14   BUN  --  25* 30* 35* 24* 24* 23 21 20 16   CREATININE 1.60* 1.39* 1.57* 1.99* 1.26 1.55* 1.43* 1.25 1.33* 1.17   EGFR 45*  --   --   --   --   --   --   --   --   --    MG  --   --   --   --  1.98  --   --  2.07  --  1.63     Recent Labs     01/05/22  0631 01/04/22 1132 05/15/20  0424 12/31/19  0457 12/30/19  0339 12/29/19  0316 12/27/19  1634   ALBUMIN 3.9 4.5 3.8 3.6  3.6 3.7   < > 3.7   ALKPHOS  --  89  --  65  --   --  60   ALT  --  20  --  5*  --   --  18   AST  --  27  --  32  --   --  35   BILITOT  --  0.6  --  0.4  --   --  0.6    < > = values in this interval not displayed.     CBC:  Recent Labs     01/17/24  1258 12/05/23  1246 02/24/23  1158 01/05/22  0631 01/04/22  1132 01/03/22  0744 05/15/20  0424 05/11/20  1350   WBC 5.4 6.5 5.8 7.6 9.1 9.9 6.3 5.9   HGB 13.0* 10.0* 10.7* 12.1* 14.2 14.7 12.9* 13.0*   HCT 43.0 35.0* 37.5* 39.5* 44.6 45.2 40.1* 43.3    251 267 154 224 231 178 233   MCV 85 76* 77* 101* 98 96 85 91     COAG:   Recent Labs     01/03/22  0744 05/15/20  0424 05/11/20  1350 03/04/20  1159 12/31/19  0457 12/29/19  0316 12/28/19  0204 12/27/19  1634   INR 1.0 1.1 1.3* 1.4* 1.1 1.3* 1.2* 1.1     ABO:   Recent Labs     05/11/20  1350   ABO AB     HEME/ENDO:  Recent Labs     01/17/24  1258 12/05/23  1246 01/03/22  0744   FERRITIN 74 11*  --    IRONSAT 21*  --   --    HGBA1C  --   --   6.4*      CARDIAC:   Recent Labs     12/28/19  1626   *     Recent Labs     09/22/23  1241 02/09/22  1133 01/03/22  0744   CHOL 141 137 194   LDLF 77 75 93   HDL 45.6 41.0 57.1   TRIG 90 104 219*      I have personally reviewed most recent PCP, cardiology, vascular, and/or podiatry documentation.      Assessment/Plan   74 y.o. male with  CAD s/p multiple stents (last laser-assisted PCI of Cx 1/22)  in the background of dyslipidemia, hypertension, and AF s/p RFA 12/19 c/b pericardial effusion s/p tap, Watchman 5/2020. CKD (Cr 1.6), HCV, GIB s/p clipping via push endoscopy .    At previous visits, we decreased BB d/t bradycardia. Has HTN.    Plan:  HTN  Increase lisinopril to 20 mg daily  Check home BP  RFP once BP goal <130/80 reached (may need to increase ACE further)  Would obtain renal artery duplex d/t CKD and known CAD/DLD/HTN  GDMT  Continue Plavix as monotherapy d/t risk profile  Lipids are due this year - can draw with RFP  Smoking cessation counseling 5 minutes  Renewed Plavix, Zetia, metoprolol, rosuvastatin  Follow up 1 year unless if #2 abnormal         James Hunt MD, FACC, FSCAI, RPVI  Co-Director, Vascular Center, and  Co-Director, Pulmonary Embolism Response Team,   [facility] [place]                                 of Medicine,                                                                 [facility] [employer]

## 2024-12-19 NOTE — LETTER
2024     Magno Babb MD  [id number] [facility] [id number]    Patient: Rafi Lisa Jr.   YOB: 1950   Date of Visit: 2024       Dear Dr. Magno Babb MD:    Thank you for referring Rafi Lisa to me for evaluation. Below are my notes for this consultation.  If you have questions, please do not hesitate to call me. I look forward to following your patient along with you.       Sincerely,     James Hunt MD      CC: No Recipients  ______________________________________________________________________________________    PCP: Magno Babb MD    Subjective  Rafi Lisa Jr. is a 74 y.o. male who is here for follow up of  CAD .  Since last visit, feels better since last visit. No further issues with walking. No chest pain.     Review of Systems:  Otherwise, limited cardiovascular review of systems is negative.    Past Medical History:  He has a past medical history of Abnormal findings on diagnostic imaging of other specified body structures, Personal history of other medical treatment, and Personal history of other medical treatment.    Surgical History:   He has a past surgical history that includes Other surgical history (2020); Other surgical history (2022); Other surgical history (2022); Other surgical history (2020); Other surgical history (2020); Other surgical history (2020); Other surgical history (2020); Other surgical history (2020); Other surgical history (2020); Other surgical history (2020); MR angio head wo IV contrast (2023); MR angio neck wo IV contrast (2023); MR angio head wo IV contrast (2023); and MR angio neck wo IV contrast (2023).    Family History:   Family History   Problem Relation Name Age of Onset   • Other (coronary thrombosis) Father           in 1970s @63     Family History   Problem Relation Name Age of Onset   • Other (coronary thrombosis) Father            in 1970s @63       Social History:   Tobacco Use: High Risk (2024)    Patient History    • Smoking Tobacco Use: Every Day    • Smokeless Tobacco Use: Former    • Passive Exposure: Not on file    ppd currently    Outpatient Medications:    Current Outpatient Medications:   •  acetaminophen (Tylenol) 325 mg tablet, Take 2 tablets (650 mg) by mouth every 6 hours if needed for mild pain (1 - 3)., Disp: , Rfl:   •  albuterol 90 mcg/actuation inhaler, Inhale 2 puffs every 4 hours if needed., Disp: , Rfl:   •  clopidogrel (Plavix) 75 mg tablet, Take 1 tablet (75 mg) by mouth once daily., Disp: 90 tablet, Rfl: 3  •  coenzyme Q-10 200 mg capsule, Take 1 capsule (200 mg) by mouth 2 times a day., Disp: , Rfl:   •  ezetimibe (Zetia) 10 mg tablet, Take 1 tablet (10 mg) by mouth once daily., Disp: 30 tablet, Rfl: 0  •  lisinopril 10 mg tablet, Take 1 tablet (10 mg) by mouth once daily., Disp: 90 tablet, Rfl: 3  •  metoprolol succinate XL (Toprol-XL) 50 mg 24 hr tablet, Take 1 tablet (50 mg) by mouth once daily. DO NOT CRUSH OR CHEW, Disp: 30 tablet, Rfl: 0  •  multivitamin with minerals iron-free (Centrum Silver), Take 1 tablet by mouth once daily., Disp: , Rfl:   •  omeprazole (PriLOSEC) 40 mg DR capsule, Take 1 capsule (40 mg) by mouth once daily in the morning. Take before meals., Disp: , Rfl:   •  pantoprazole (ProtoNix) 40 mg EC tablet, , Disp: , Rfl:   •  probenecid-colchicine 500-0.5 mg tablet, Take 1 tablet by mouth twice a day., Disp: , Rfl:   •  rosuvastatin (Crestor) 40 mg tablet, Take 1 tablet (40 mg) by mouth once daily., Disp: 90 tablet, Rfl: 3  •  turmeric 400 mg capsule, Take 1 capsule by mouth once daily., Disp: , Rfl:   •  nitroglycerin (Nitrostat) 0.4 mg SL tablet, Place 1 tablet (0.4 mg) under the tongue every 5 minutes if needed for chest pain (FOR UP TO 3 DOSES AS NEEDED FOR CHEST PAIN.CALL 911 IF PAIN PERSISTS.)., Disp: 90 tablet, Rfl: 3     Allergies:  Sulfa (sulfonamide antibiotics) and  Adhesive tape-silicones       Objective  Vital Signs:  /88 (BP Location: Left arm, Patient Position: Sitting, BP Cuff Size: Adult)   Pulse 61   Ht 1.829 m (6')   Wt 82.3 kg (181 lb 6.4 oz)   SpO2 94%   BMI 24.60 kg/m²     Physical Exam:  General: no acute distress  HEENT: EOMI, no scleral icterus.  Lungs: Clear to auscultation bilaterally without wheezing, rales, or rhonchi.  Cardiovascular: Regular rhythm and rate. Normal S1 and S2. No murmurs, rubs, or gallops are appreciated. JVP normal.  no bruits  Abdomen: Soft, nontender, nondistended. Bowel sounds present.  Extremities: palpable distal pulses.  Neurologic: Alert and oriented x3.    Pertinent Recent Cardiovascular Studies (personally reviewed):  EKG 12/19/24: sinus bradycardia 49 bpm with PAC    Laboratory values:  CMP:  Recent Labs     12/05/23  1246 01/05/22  0631 01/04/22  1132 01/03/22  0744 05/15/20  0424 05/11/20  1350 03/04/20  1159 12/31/19  0457 12/30/19  0339 12/29/19  0316   NA  --  138 139 138 139 140 140 141 140 137   K  --  4.8 5.4* 4.6 4.5 4.5 4.7 4.3 4.2 3.4*   CL  --  105 103 104 105 107 103 102 101 99   CO2  --  22 26 23 24 29 28 25 28 27   ANIONGAP  --  16 15 16 15 9* 14 18 15 14   BUN  --  25* 30* 35* 24* 24* 23 21 20 16   CREATININE 1.60* 1.39* 1.57* 1.99* 1.26 1.55* 1.43* 1.25 1.33* 1.17   EGFR 45*  --   --   --   --   --   --   --   --   --    MG  --   --   --   --  1.98  --   --  2.07  --  1.63     Recent Labs     01/05/22  0631 01/04/22  1132 05/15/20  0424 12/31/19  0457 12/30/19  0339 12/29/19  0316 12/27/19  1634   ALBUMIN 3.9 4.5 3.8 3.6  3.6 3.7   < > 3.7   ALKPHOS  --  89  --  65  --   --  60   ALT  --  20  --  5*  --   --  18   AST  --  27  --  32  --   --  35   BILITOT  --  0.6  --  0.4  --   --  0.6    < > = values in this interval not displayed.     CBC:  Recent Labs     01/17/24  1258 12/05/23  1246 02/24/23  1158 01/05/22  0631 01/04/22  1132 01/03/22  0744 05/15/20  0424 05/11/20  1350   WBC 5.4 6.5 5.8 7.6 9.1  9.9 6.3 5.9   HGB 13.0* 10.0* 10.7* 12.1* 14.2 14.7 12.9* 13.0*   HCT 43.0 35.0* 37.5* 39.5* 44.6 45.2 40.1* 43.3    251 267 154 224 231 178 233   MCV 85 76* 77* 101* 98 96 85 91     COAG:   Recent Labs     01/03/22  0744 05/15/20  0424 05/11/20  1350 03/04/20  1159 12/31/19  0457 12/29/19  0316 12/28/19  0204 12/27/19  1634   INR 1.0 1.1 1.3* 1.4* 1.1 1.3* 1.2* 1.1     ABO:   Recent Labs     05/11/20  1350   ABO AB     HEME/ENDO:  Recent Labs     01/17/24  1258 12/05/23  1246 01/03/22  0744   FERRITIN 74 11*  --    IRONSAT 21*  --   --    HGBA1C  --   --  6.4*      CARDIAC:   Recent Labs     12/28/19  1626   *     Recent Labs     09/22/23  1241 02/09/22  1133 01/03/22  0744   CHOL 141 137 194   LDLF 77 75 93   HDL 45.6 41.0 57.1   TRIG 90 104 219*      I have personally reviewed most recent PCP, cardiology, vascular, and/or podiatry documentation.      Assessment/Plan  74 y.o. male with  CAD s/p multiple stents (last laser-assisted PCI of Cx 1/22)  in the background of dyslipidemia, hypertension, and AF s/p RFA 12/19 c/b pericardial effusion s/p tap, Watchman 5/2020. CKD (Cr 1.6), HCV, GIB s/p clipping via push endoscopy .    At previous visits, we decreased BB d/t bradycardia. Has HTN.    Plan:  HTN  Increase lisinopril to 20 mg daily  Check home BP  RFP once BP goal <130/80 reached (may need to increase ACE further)  Would obtain renal artery duplex d/t CKD and known CAD/DLD/HTN  GDMT  Continue Plavix as monotherapy d/t risk profile  Lipids are due this year - can draw with RFP  Smoking cessation counseling 5 minutes  Renewed Plavix, Zetia, metoprolol, rosuvastatin  Follow up 1 year unless if #2 abnormal         James Hunt MD, FACC, FSCAI, RPVI  Co-Director, Vascular Center, and  Co-Director, Pulmonary Embolism Response Team,   [facility] [place]                                 of Medicine,                                                                  [facility]

## 2024-12-31 ENCOUNTER — TELEPHONE (OUTPATIENT)
Dept: CARDIOLOGY | Facility: CLINIC | Age: 74
End: 2024-12-31
Payer: MEDICARE

## 2024-12-31 NOTE — TELEPHONE ENCOUNTER
Called pt at the request of Dr Hunt. Lisinopril was increased to 20mg once daily at last OV. She would like to see if the increased dose has improved BP. Dr Hunt would like labs (RFP and lipid panel) done once the BP is better controlled ( <130/80).     I called pt and left a detailed VM, requested call back with BP readings.     Per Dr Hunt, IF BP's are usually higher than 130/80, she would like to increase Lisinopril to 40mg once daily.

## 2025-01-06 NOTE — TELEPHONE ENCOUNTER
Pt called back today, he just bought a bp cuff yesterday. He will start monitoring BP today and call Hoa back on Friday with readings. Hold off on blood work for now.

## 2025-01-16 ENCOUNTER — APPOINTMENT (OUTPATIENT)
Dept: VASCULAR MEDICINE | Facility: HOSPITAL | Age: 75
End: 2025-01-16
Payer: MEDICARE

## 2025-01-22 ENCOUNTER — APPOINTMENT (OUTPATIENT)
Dept: VASCULAR MEDICINE | Facility: HOSPITAL | Age: 75
End: 2025-01-22
Payer: MEDICARE

## 2025-03-10 DIAGNOSIS — I10 ESSENTIAL HYPERTENSION, BENIGN: ICD-10-CM

## 2025-03-11 ENCOUNTER — HOSPITAL ENCOUNTER (OUTPATIENT)
Dept: VASCULAR MEDICINE | Facility: HOSPITAL | Age: 75
Discharge: HOME | End: 2025-03-11
Payer: MEDICARE

## 2025-03-11 DIAGNOSIS — I10 ESSENTIAL HYPERTENSION, BENIGN: ICD-10-CM

## 2025-03-11 DIAGNOSIS — I15.0 RENOVASCULAR HYPERTENSION: ICD-10-CM

## 2025-03-11 PROCEDURE — 93975 VASCULAR STUDY: CPT | Performed by: SURGERY

## 2025-03-11 PROCEDURE — 93975 VASCULAR STUDY: CPT

## 2025-03-19 RX ORDER — LISINOPRIL 10 MG/1
10 TABLET ORAL DAILY
Qty: 90 TABLET | Refills: 3 | OUTPATIENT
Start: 2025-03-19

## 2025-03-21 ENCOUNTER — HOSPITAL ENCOUNTER (OUTPATIENT)
Facility: HOSPITAL | Age: 75
Setting detail: OBSERVATION
DRG: 287 | End: 2025-03-21
Attending: GENERAL PRACTICE | Admitting: INTERNAL MEDICINE
Payer: COMMERCIAL

## 2025-03-21 ENCOUNTER — APPOINTMENT (OUTPATIENT)
Dept: CARDIOLOGY | Facility: HOSPITAL | Age: 75
DRG: 287 | End: 2025-03-21
Payer: COMMERCIAL

## 2025-03-21 ENCOUNTER — APPOINTMENT (OUTPATIENT)
Dept: RADIOLOGY | Facility: HOSPITAL | Age: 75
DRG: 287 | End: 2025-03-21
Payer: COMMERCIAL

## 2025-03-21 DIAGNOSIS — R07.89 OTHER CHEST PAIN: ICD-10-CM

## 2025-03-21 DIAGNOSIS — R07.9 CHEST PAIN, UNSPECIFIED TYPE: Primary | ICD-10-CM

## 2025-03-21 DIAGNOSIS — I20.0 UNSTABLE ANGINA (MULTI): ICD-10-CM

## 2025-03-21 DIAGNOSIS — R09.89 OTHER SPECIFIED SYMPTOMS AND SIGNS INVOLVING THE CIRCULATORY AND RESPIRATORY SYSTEMS: ICD-10-CM

## 2025-03-21 DIAGNOSIS — I25.85 CHRONIC CORONARY MICROVASCULAR DYSFUNCTION: ICD-10-CM

## 2025-03-21 LAB
ALBUMIN SERPL BCP-MCNC: 4.7 G/DL (ref 3.4–5)
ALP SERPL-CCNC: 89 U/L (ref 33–136)
ALT SERPL W P-5'-P-CCNC: 18 U/L (ref 10–52)
ANION GAP SERPL CALC-SCNC: 13 MMOL/L (ref 10–20)
AST SERPL W P-5'-P-CCNC: 32 U/L (ref 9–39)
BASOPHILS # BLD AUTO: 0.02 X10*3/UL (ref 0–0.1)
BASOPHILS NFR BLD AUTO: 0.4 %
BILIRUB SERPL-MCNC: 0.7 MG/DL (ref 0–1.2)
BUN SERPL-MCNC: 25 MG/DL (ref 6–23)
CALCIUM SERPL-MCNC: 9.8 MG/DL (ref 8.6–10.3)
CARDIAC TROPONIN I PNL SERPL HS: 6 NG/L (ref 0–20)
CARDIAC TROPONIN I PNL SERPL HS: 6 NG/L (ref 0–20)
CHLORIDE SERPL-SCNC: 101 MMOL/L (ref 98–107)
CO2 SERPL-SCNC: 26 MMOL/L (ref 21–32)
CREAT SERPL-MCNC: 1.63 MG/DL (ref 0.5–1.3)
EGFRCR SERPLBLD CKD-EPI 2021: 44 ML/MIN/1.73M*2
EOSINOPHIL # BLD AUTO: 0.09 X10*3/UL (ref 0–0.4)
EOSINOPHIL NFR BLD AUTO: 2 %
ERYTHROCYTE [DISTWIDTH] IN BLOOD BY AUTOMATED COUNT: 13.4 % (ref 11.5–14.5)
GLUCOSE SERPL-MCNC: 93 MG/DL (ref 74–99)
HCT VFR BLD AUTO: 45.2 % (ref 41–52)
HGB BLD-MCNC: 13.9 G/DL (ref 13.5–17.5)
IMM GRANULOCYTES # BLD AUTO: 0.01 X10*3/UL (ref 0–0.5)
IMM GRANULOCYTES NFR BLD AUTO: 0.2 % (ref 0–0.9)
LYMPHOCYTES # BLD AUTO: 0.53 X10*3/UL (ref 0.8–3)
LYMPHOCYTES NFR BLD AUTO: 11.8 %
MCH RBC QN AUTO: 30.3 PG (ref 26–34)
MCHC RBC AUTO-ENTMCNC: 30.8 G/DL (ref 32–36)
MCV RBC AUTO: 99 FL (ref 80–100)
MONOCYTES # BLD AUTO: 0.75 X10*3/UL (ref 0.05–0.8)
MONOCYTES NFR BLD AUTO: 16.7 %
NEUTROPHILS # BLD AUTO: 3.09 X10*3/UL (ref 1.6–5.5)
NEUTROPHILS NFR BLD AUTO: 68.9 %
NRBC BLD-RTO: 0 /100 WBCS (ref 0–0)
PLATELET # BLD AUTO: 169 X10*3/UL (ref 150–450)
POTASSIUM SERPL-SCNC: 4.7 MMOL/L (ref 3.5–5.3)
PROT SERPL-MCNC: 8.3 G/DL (ref 6.4–8.2)
RBC # BLD AUTO: 4.59 X10*6/UL (ref 4.5–5.9)
SODIUM SERPL-SCNC: 135 MMOL/L (ref 136–145)
WBC # BLD AUTO: 4.5 X10*3/UL (ref 4.4–11.3)

## 2025-03-21 PROCEDURE — 71046 X-RAY EXAM CHEST 2 VIEWS: CPT | Mod: FOREIGN READ | Performed by: RADIOLOGY

## 2025-03-21 PROCEDURE — 99285 EMERGENCY DEPT VISIT HI MDM: CPT | Mod: 25 | Performed by: GENERAL PRACTICE

## 2025-03-21 PROCEDURE — 94640 AIRWAY INHALATION TREATMENT: CPT

## 2025-03-21 PROCEDURE — 2500000002 HC RX 250 W HCPCS SELF ADMINISTERED DRUGS (ALT 637 FOR MEDICARE OP, ALT 636 FOR OP/ED): Performed by: NURSE PRACTITIONER

## 2025-03-21 PROCEDURE — 71046 X-RAY EXAM CHEST 2 VIEWS: CPT

## 2025-03-21 PROCEDURE — 99223 1ST HOSP IP/OBS HIGH 75: CPT

## 2025-03-21 PROCEDURE — 84075 ASSAY ALKALINE PHOSPHATASE: CPT | Performed by: NURSE PRACTITIONER

## 2025-03-21 PROCEDURE — 93005 ELECTROCARDIOGRAM TRACING: CPT

## 2025-03-21 PROCEDURE — G0378 HOSPITAL OBSERVATION PER HR: HCPCS

## 2025-03-21 PROCEDURE — 85025 COMPLETE CBC W/AUTO DIFF WBC: CPT | Performed by: NURSE PRACTITIONER

## 2025-03-21 PROCEDURE — 84484 ASSAY OF TROPONIN QUANT: CPT | Performed by: NURSE PRACTITIONER

## 2025-03-21 PROCEDURE — 36415 COLL VENOUS BLD VENIPUNCTURE: CPT | Performed by: NURSE PRACTITIONER

## 2025-03-21 RX ORDER — CLOPIDOGREL BISULFATE 75 MG/1
75 TABLET ORAL DAILY
Status: DISCONTINUED | OUTPATIENT
Start: 2025-03-22 | End: 2025-03-25 | Stop reason: HOSPADM

## 2025-03-21 RX ORDER — ONDANSETRON HYDROCHLORIDE 2 MG/ML
4 INJECTION, SOLUTION INTRAVENOUS EVERY 8 HOURS PRN
Status: DISCONTINUED | OUTPATIENT
Start: 2025-03-21 | End: 2025-03-25 | Stop reason: HOSPADM

## 2025-03-21 RX ORDER — NITROGLYCERIN 0.4 MG/1
0.4 TABLET SUBLINGUAL EVERY 5 MIN PRN
Status: DISCONTINUED | OUTPATIENT
Start: 2025-03-21 | End: 2025-03-25 | Stop reason: HOSPADM

## 2025-03-21 RX ORDER — ACETAMINOPHEN 160 MG/5ML
650 SOLUTION ORAL EVERY 4 HOURS PRN
Status: DISCONTINUED | OUTPATIENT
Start: 2025-03-21 | End: 2025-03-25 | Stop reason: HOSPADM

## 2025-03-21 RX ORDER — IPRATROPIUM BROMIDE AND ALBUTEROL SULFATE 2.5; .5 MG/3ML; MG/3ML
3 SOLUTION RESPIRATORY (INHALATION) EVERY 20 MIN
Status: COMPLETED | OUTPATIENT
Start: 2025-03-21 | End: 2025-03-21

## 2025-03-21 RX ORDER — IBUPROFEN 200 MG
1 TABLET ORAL DAILY
Status: DISCONTINUED | OUTPATIENT
Start: 2025-03-22 | End: 2025-03-25 | Stop reason: HOSPADM

## 2025-03-21 RX ORDER — ACETAMINOPHEN 650 MG/1
650 SUPPOSITORY RECTAL EVERY 4 HOURS PRN
Status: DISCONTINUED | OUTPATIENT
Start: 2025-03-21 | End: 2025-03-25 | Stop reason: HOSPADM

## 2025-03-21 RX ORDER — ENOXAPARIN SODIUM 100 MG/ML
40 INJECTION SUBCUTANEOUS EVERY 24 HOURS
Status: DISCONTINUED | OUTPATIENT
Start: 2025-03-21 | End: 2025-03-25 | Stop reason: HOSPADM

## 2025-03-21 RX ORDER — GUAIFENESIN 600 MG/1
600 TABLET, EXTENDED RELEASE ORAL 2 TIMES DAILY PRN
Status: DISCONTINUED | OUTPATIENT
Start: 2025-03-21 | End: 2025-03-25 | Stop reason: HOSPADM

## 2025-03-21 RX ORDER — ACETAMINOPHEN 325 MG/1
650 TABLET ORAL EVERY 4 HOURS PRN
Status: DISCONTINUED | OUTPATIENT
Start: 2025-03-21 | End: 2025-03-25 | Stop reason: HOSPADM

## 2025-03-21 RX ORDER — ONDANSETRON 4 MG/1
4 TABLET, FILM COATED ORAL EVERY 8 HOURS PRN
Status: DISCONTINUED | OUTPATIENT
Start: 2025-03-21 | End: 2025-03-25 | Stop reason: HOSPADM

## 2025-03-21 RX ADMIN — IPRATROPIUM BROMIDE AND ALBUTEROL SULFATE 3 ML: .5; 3 SOLUTION RESPIRATORY (INHALATION) at 21:59

## 2025-03-21 RX ADMIN — IPRATROPIUM BROMIDE AND ALBUTEROL SULFATE 3 ML: .5; 3 SOLUTION RESPIRATORY (INHALATION) at 22:09

## 2025-03-21 RX ADMIN — IPRATROPIUM BROMIDE AND ALBUTEROL SULFATE 3 ML: .5; 3 SOLUTION RESPIRATORY (INHALATION) at 21:49

## 2025-03-21 ASSESSMENT — HEART SCORE
RISK FACTORS: >2 RISK FACTORS OR HX OF ATHEROSCLEROTIC DISEASE
HISTORY: SLIGHTLY SUSPICIOUS
TROPONIN: LESS THAN OR EQUAL TO NORMAL LIMIT
ECG: NON-SPECIFIC REPOLARIZATION DISTURBANCE
HEART SCORE: 5
AGE: 65+

## 2025-03-21 ASSESSMENT — COLUMBIA-SUICIDE SEVERITY RATING SCALE - C-SSRS
1. IN THE PAST MONTH, HAVE YOU WISHED YOU WERE DEAD OR WISHED YOU COULD GO TO SLEEP AND NOT WAKE UP?: NO
6. HAVE YOU EVER DONE ANYTHING, STARTED TO DO ANYTHING, OR PREPARED TO DO ANYTHING TO END YOUR LIFE?: NO
2. HAVE YOU ACTUALLY HAD ANY THOUGHTS OF KILLING YOURSELF?: NO

## 2025-03-21 ASSESSMENT — PAIN DESCRIPTION - LOCATION: LOCATION: CHEST

## 2025-03-21 ASSESSMENT — PAIN SCALES - GENERAL: PAINLEVEL_OUTOF10: 5 - MODERATE PAIN

## 2025-03-21 ASSESSMENT — PAIN DESCRIPTION - DIRECTION: RADIATING_TOWARDS: LEFT ARM/SHOULDER

## 2025-03-21 ASSESSMENT — PAIN - FUNCTIONAL ASSESSMENT: PAIN_FUNCTIONAL_ASSESSMENT: 0-10

## 2025-03-21 ASSESSMENT — PAIN DESCRIPTION - DESCRIPTORS: DESCRIPTORS: STABBING

## 2025-03-21 NOTE — ED TRIAGE NOTES
Pt c/o CP and left arm/shoulder pain for a couple months. Called Dr Hunt to set up stress test per pts PCP. When called Dr Hunt office they said to come to ER for evaluation

## 2025-03-21 NOTE — ED TRIAGE NOTES
TRIAGE NOTE   I saw the patient as the Clinician in Triage and performed a brief history and physical exam, established acuity, and ordered appropriate tests to develop basic plan of care. Patient will be seen by an [facility], resident and/or physician who will independently evaluate the patient. Please see subsequent provider notes for further details and disposition.     Brief HPI: In brief, Rafi Lisa Jr. is a 74 y.o. male with significant PMH for HTN, HLD, CAD with 5 stents on Plavix, Watchman placement, GI bleed, hepatitis C, prediabetes, COPD and CKD presenting to ED today from home with family for evaluation of chest pain.  For the last 4 months the patient has had intermittent pain in the left bicep that radiates into the shoulder and neck, also has experienced intermittent sharp stabbing central chest pain.  PCP suggested stress test, while attempting to schedule stress test with cardiology he was advised to come to the ER for further evaluation.  Mild pain in the left arm that radiates into the neck at this time.  Denies fever/chills, cough/cold symptoms, shortness of breath, nausea/vomiting, abdominal pain, urinary symptoms, change in bowel habits or any other complaints.  Smoker, no EtOH or IV drugs.  PCP is Dr. Babb.     Focused Physical exam:   General: 74-year-old male, awake alert, oriented x 3.  Well-nourished and hydrated.  Nontoxic looking.  Skin: Pink, warm and dry.  Cardiac: Regular rate and rhythm.  Pulmonary: Clear bilaterally.    Plan/MDM:     74 y.o. male with significant PMH for HTN, HLD, CAD with 5 stents on Plavix, Watchman placement, GI bleed, hepatitis C, prediabetes, COPD and CKD he is evaluated at the bedside for intermittent left chest pain and left arm pain that radiates into the neck over the last 4 months.  Vital signs within normal limits.  Afebrile.  Lungs clear, abdomen soft and nontender.  IV established, will check basic labs, EKG and chest x-ray in preparation for further  evaluation in the main ED.  Patient is agreeable to this plan.    Please see subsequent provider note for further details and disposition

## 2025-03-22 LAB
CHOLEST SERPL-MCNC: 91 MG/DL (ref 0–199)
CHOLESTEROL/HDL RATIO: 2.5
HDLC SERPL-MCNC: 36.6 MG/DL
HOLD SPECIMEN: NORMAL
LDLC SERPL CALC-MCNC: 39 MG/DL
NON HDL CHOLESTEROL: 54 MG/DL (ref 0–149)
TRIGL SERPL-MCNC: 75 MG/DL (ref 0–149)
VLDL: 15 MG/DL (ref 0–40)

## 2025-03-22 PROCEDURE — 99223 1ST HOSP IP/OBS HIGH 75: CPT | Performed by: INTERNAL MEDICINE

## 2025-03-22 PROCEDURE — 2500000001 HC RX 250 WO HCPCS SELF ADMINISTERED DRUGS (ALT 637 FOR MEDICARE OP)

## 2025-03-22 PROCEDURE — 2500000002 HC RX 250 W HCPCS SELF ADMINISTERED DRUGS (ALT 637 FOR MEDICARE OP, ALT 636 FOR OP/ED): Performed by: PHYSICIAN ASSISTANT

## 2025-03-22 PROCEDURE — 2500000004 HC RX 250 GENERAL PHARMACY W/ HCPCS (ALT 636 FOR OP/ED)

## 2025-03-22 PROCEDURE — 96372 THER/PROPH/DIAG INJ SC/IM: CPT

## 2025-03-22 PROCEDURE — G0378 HOSPITAL OBSERVATION PER HR: HCPCS

## 2025-03-22 PROCEDURE — 36415 COLL VENOUS BLD VENIPUNCTURE: CPT

## 2025-03-22 PROCEDURE — S4991 NICOTINE PATCH NONLEGEND: HCPCS

## 2025-03-22 PROCEDURE — 2500000001 HC RX 250 WO HCPCS SELF ADMINISTERED DRUGS (ALT 637 FOR MEDICARE OP): Performed by: NURSE PRACTITIONER

## 2025-03-22 PROCEDURE — 2500000001 HC RX 250 WO HCPCS SELF ADMINISTERED DRUGS (ALT 637 FOR MEDICARE OP): Performed by: PHYSICIAN ASSISTANT

## 2025-03-22 PROCEDURE — 2500000002 HC RX 250 W HCPCS SELF ADMINISTERED DRUGS (ALT 637 FOR MEDICARE OP, ALT 636 FOR OP/ED)

## 2025-03-22 PROCEDURE — 80061 LIPID PANEL: CPT

## 2025-03-22 PROCEDURE — 2500000002 HC RX 250 W HCPCS SELF ADMINISTERED DRUGS (ALT 637 FOR MEDICARE OP, ALT 636 FOR OP/ED): Performed by: NURSE PRACTITIONER

## 2025-03-22 RX ORDER — LISINOPRIL 20 MG/1
20 TABLET ORAL DAILY
Status: DISCONTINUED | OUTPATIENT
Start: 2025-03-22 | End: 2025-03-25 | Stop reason: HOSPADM

## 2025-03-22 RX ORDER — ROSUVASTATIN CALCIUM 10 MG/1
40 TABLET, COATED ORAL DAILY
Status: DISCONTINUED | OUTPATIENT
Start: 2025-03-22 | End: 2025-03-25 | Stop reason: HOSPADM

## 2025-03-22 RX ORDER — PROBENECID AND COLCHICINE .5; 5 MG/1; MG/1
1 TABLET ORAL 2 TIMES DAILY
Status: DISCONTINUED | OUTPATIENT
Start: 2025-03-22 | End: 2025-03-22

## 2025-03-22 RX ORDER — PROBENECID 500 MG/1
500 TABLET, FILM COATED ORAL 2 TIMES DAILY
Status: DISCONTINUED | OUTPATIENT
Start: 2025-03-22 | End: 2025-03-25 | Stop reason: HOSPADM

## 2025-03-22 RX ORDER — METOPROLOL SUCCINATE 50 MG/1
50 TABLET, EXTENDED RELEASE ORAL DAILY
Status: DISCONTINUED | OUTPATIENT
Start: 2025-03-22 | End: 2025-03-25 | Stop reason: HOSPADM

## 2025-03-22 RX ORDER — COLCHICINE 0.6 MG/1
0.6 TABLET ORAL 2 TIMES DAILY
Status: DISCONTINUED | OUTPATIENT
Start: 2025-03-22 | End: 2025-03-25 | Stop reason: HOSPADM

## 2025-03-22 RX ORDER — PANTOPRAZOLE SODIUM 40 MG/1
40 TABLET, DELAYED RELEASE ORAL
Status: DISCONTINUED | OUTPATIENT
Start: 2025-03-23 | End: 2025-03-25 | Stop reason: HOSPADM

## 2025-03-22 RX ORDER — EZETIMIBE 10 MG/1
10 TABLET ORAL DAILY
Status: DISCONTINUED | OUTPATIENT
Start: 2025-03-22 | End: 2025-03-25 | Stop reason: HOSPADM

## 2025-03-22 RX ADMIN — LISINOPRIL 20 MG: 20 TABLET ORAL at 12:36

## 2025-03-22 RX ADMIN — ENOXAPARIN SODIUM 40 MG: 40 INJECTION SUBCUTANEOUS at 01:41

## 2025-03-22 RX ADMIN — NICOTINE 1 PATCH: 21 PATCH, EXTENDED RELEASE TRANSDERMAL at 09:36

## 2025-03-22 RX ADMIN — CLOPIDOGREL 75 MG: 75 TABLET ORAL at 09:36

## 2025-03-22 RX ADMIN — EZETIMIBE 10 MG: 10 TABLET ORAL at 12:36

## 2025-03-22 RX ADMIN — PROBENECID 500 MG: 500 TABLET, FILM COATED ORAL at 21:39

## 2025-03-22 RX ADMIN — ROSUVASTATIN CALCIUM 40 MG: 10 TABLET, FILM COATED ORAL at 12:35

## 2025-03-22 RX ADMIN — COLCHICINE 0.6 MG: 0.6 TABLET, FILM COATED ORAL at 21:39

## 2025-03-22 SDOH — SOCIAL STABILITY: SOCIAL INSECURITY: HAVE YOU HAD ANY THOUGHTS OF HARMING ANYONE ELSE?: NO

## 2025-03-22 SDOH — SOCIAL STABILITY: SOCIAL INSECURITY
WITHIN THE LAST YEAR, HAVE YOU BEEN KICKED, HIT, SLAPPED, OR OTHERWISE PHYSICALLY HURT BY YOUR PARTNER OR EX-PARTNER?: NO

## 2025-03-22 SDOH — ECONOMIC STABILITY: FOOD INSECURITY: WITHIN THE PAST 12 MONTHS, THE FOOD YOU BOUGHT JUST DIDN'T LAST AND YOU DIDN'T HAVE MONEY TO GET MORE.: NEVER TRUE

## 2025-03-22 SDOH — SOCIAL STABILITY: SOCIAL INSECURITY
WITHIN THE LAST YEAR, HAVE YOU BEEN RAPED OR FORCED TO HAVE ANY KIND OF SEXUAL ACTIVITY BY YOUR PARTNER OR EX-PARTNER?: NO

## 2025-03-22 SDOH — ECONOMIC STABILITY: FOOD INSECURITY: WITHIN THE PAST 12 MONTHS, YOU WORRIED THAT YOUR FOOD WOULD RUN OUT BEFORE YOU GOT THE MONEY TO BUY MORE.: NEVER TRUE

## 2025-03-22 SDOH — SOCIAL STABILITY: SOCIAL INSECURITY: WITHIN THE LAST YEAR, HAVE YOU BEEN AFRAID OF YOUR PARTNER OR EX-PARTNER?: NO

## 2025-03-22 SDOH — SOCIAL STABILITY: SOCIAL INSECURITY: WITHIN THE LAST YEAR, HAVE YOU BEEN HUMILIATED OR EMOTIONALLY ABUSED IN OTHER WAYS BY YOUR PARTNER OR EX-PARTNER?: NO

## 2025-03-22 SDOH — SOCIAL STABILITY: SOCIAL INSECURITY: HAS ANYONE EVER THREATENED TO HURT YOUR FAMILY OR YOUR PETS?: NO

## 2025-03-22 SDOH — ECONOMIC STABILITY: INCOME INSECURITY: IN THE PAST 12 MONTHS HAS THE ELECTRIC, GAS, OIL, OR WATER COMPANY THREATENED TO SHUT OFF SERVICES IN YOUR HOME?: NO

## 2025-03-22 SDOH — SOCIAL STABILITY: SOCIAL INSECURITY: HAVE YOU HAD THOUGHTS OF HARMING ANYONE ELSE?: NO

## 2025-03-22 SDOH — SOCIAL STABILITY: SOCIAL INSECURITY: WERE YOU ABLE TO COMPLETE ALL THE BEHAVIORAL HEALTH SCREENINGS?: YES

## 2025-03-22 SDOH — SOCIAL STABILITY: SOCIAL INSECURITY: DO YOU FEEL UNSAFE GOING BACK TO THE PLACE WHERE YOU ARE LIVING?: NO

## 2025-03-22 SDOH — SOCIAL STABILITY: SOCIAL INSECURITY: DOES ANYONE TRY TO KEEP YOU FROM HAVING/CONTACTING OTHER FRIENDS OR DOING THINGS OUTSIDE YOUR HOME?: NO

## 2025-03-22 SDOH — SOCIAL STABILITY: SOCIAL INSECURITY: ABUSE: ADULT

## 2025-03-22 SDOH — SOCIAL STABILITY: SOCIAL INSECURITY: ARE THERE ANY APPARENT SIGNS OF INJURIES/BEHAVIORS THAT COULD BE RELATED TO ABUSE/NEGLECT?: NO

## 2025-03-22 SDOH — SOCIAL STABILITY: SOCIAL INSECURITY: ARE YOU OR HAVE YOU BEEN THREATENED OR ABUSED PHYSICALLY, EMOTIONALLY, OR SEXUALLY BY ANYONE?: NO

## 2025-03-22 SDOH — SOCIAL STABILITY: SOCIAL INSECURITY: DO YOU FEEL ANYONE HAS EXPLOITED OR TAKEN ADVANTAGE OF YOU FINANCIALLY OR OF YOUR PERSONAL PROPERTY?: NO

## 2025-03-22 ASSESSMENT — LIFESTYLE VARIABLES
AUDIT-C TOTAL SCORE: 0
HOW OFTEN DO YOU HAVE A DRINK CONTAINING ALCOHOL: NEVER
HOW OFTEN DO YOU HAVE 6 OR MORE DRINKS ON ONE OCCASION: NEVER
AUDIT-C TOTAL SCORE: 0
SKIP TO QUESTIONS 9-10: 1
HOW MANY STANDARD DRINKS CONTAINING ALCOHOL DO YOU HAVE ON A TYPICAL DAY: PATIENT DOES NOT DRINK

## 2025-03-22 ASSESSMENT — COGNITIVE AND FUNCTIONAL STATUS - GENERAL
DAILY ACTIVITIY SCORE: 18
TURNING FROM BACK TO SIDE WHILE IN FLAT BAD: A LITTLE
PERSONAL GROOMING: A LITTLE
CLIMB 3 TO 5 STEPS WITH RAILING: A LITTLE
DRESSING REGULAR LOWER BODY CLOTHING: A LITTLE
DRESSING REGULAR LOWER BODY CLOTHING: A LITTLE
STANDING UP FROM CHAIR USING ARMS: A LITTLE
MOBILITY SCORE: 18
HELP NEEDED FOR BATHING: A LITTLE
MOVING TO AND FROM BED TO CHAIR: A LITTLE
MOVING FROM LYING ON BACK TO SITTING ON SIDE OF FLAT BED WITH BEDRAILS: A LITTLE
EATING MEALS: A LITTLE
PERSONAL GROOMING: A LITTLE
DRESSING REGULAR UPPER BODY CLOTHING: A LITTLE
MOVING FROM LYING ON BACK TO SITTING ON SIDE OF FLAT BED WITH BEDRAILS: A LITTLE
PATIENT BASELINE BEDBOUND: NO
MOBILITY SCORE: 18
HELP NEEDED FOR BATHING: A LITTLE
CLIMB 3 TO 5 STEPS WITH RAILING: A LITTLE
TURNING FROM BACK TO SIDE WHILE IN FLAT BAD: A LITTLE
DAILY ACTIVITIY SCORE: 18
WALKING IN HOSPITAL ROOM: A LITTLE
DRESSING REGULAR UPPER BODY CLOTHING: A LITTLE
DAILY ACTIVITIY SCORE: 24
STANDING UP FROM CHAIR USING ARMS: A LITTLE
MOVING TO AND FROM BED TO CHAIR: A LITTLE
MOBILITY SCORE: 24
WALKING IN HOSPITAL ROOM: A LITTLE
TOILETING: A LITTLE
EATING MEALS: A LITTLE
TOILETING: A LITTLE

## 2025-03-22 ASSESSMENT — ACTIVITIES OF DAILY LIVING (ADL)
FEEDING YOURSELF: INDEPENDENT
ADEQUATE_TO_COMPLETE_ADL: YES
DRESSING YOURSELF: INDEPENDENT
LACK_OF_TRANSPORTATION: NO
HEARING - LEFT EAR: FUNCTIONAL
HEARING - RIGHT EAR: FUNCTIONAL
BATHING: INDEPENDENT
PATIENT'S MEMORY ADEQUATE TO SAFELY COMPLETE DAILY ACTIVITIES?: YES
GROOMING: INDEPENDENT
TOILETING: INDEPENDENT
WALKS IN HOME: INDEPENDENT
ASSISTIVE_DEVICE: EYEGLASSES
JUDGMENT_ADEQUATE_SAFELY_COMPLETE_DAILY_ACTIVITIES: YES

## 2025-03-22 ASSESSMENT — PATIENT HEALTH QUESTIONNAIRE - PHQ9
1. LITTLE INTEREST OR PLEASURE IN DOING THINGS: NOT AT ALL
2. FEELING DOWN, DEPRESSED OR HOPELESS: NOT AT ALL
SUM OF ALL RESPONSES TO PHQ9 QUESTIONS 1 & 2: 0

## 2025-03-22 ASSESSMENT — PAIN SCALES - GENERAL
PAINLEVEL_OUTOF10: 0 - NO PAIN

## 2025-03-22 ASSESSMENT — PAIN - FUNCTIONAL ASSESSMENT: PAIN_FUNCTIONAL_ASSESSMENT: 0-10

## 2025-03-22 NOTE — H&P (VIEW-ONLY)
Consults  History Of Present Illness:    Rafi Lisa Jr. is a 74 y.o. male presenting with left arm and chest pain over with past 4 months or so.  He has H/O 5 stents most recently 1/4/22 of Lcx in left dominant system with JAYJAY by Dr. James Hunt.  He describes intermittent ldf chest and left arm pain  which goes up to his cervical spine lasting only seconds.  He called the office yesterday and was told to come to the ER.  He has been pain free since arrival.     Last Recorded Vitals:  Vitals:    03/22/25 0000 03/22/25 0116 03/22/25 0510 03/22/25 1039   BP:   127/63 116/62   BP Location:   Right arm Right arm   Patient Position:   Lying Lying   Pulse: 82 74 53 78   Resp: 20 (!) 23 17 16   Temp:   36.5 °C (97.7 °F) 36.4 °C (97.5 °F)   TempSrc:   Temporal Temporal   SpO2:   90% 94%   Weight:       Height:           Last Labs:  CBC - 3/21/2025:  5:21 PM  4.5 13.9 169    45.2      CMP - 3/21/2025:  5:21 PM  9.8 8.3 32 --- 0.7   _ 4.7 18 89      PTT - No results in last year.  _   _ _     Troponin I, High Sensitivity   Date/Time Value Ref Range Status   03/21/2025 09:37 PM 6 0 - 20 ng/L Final   03/21/2025 05:21 PM 6 0 - 20 ng/L Final     BNP   Date/Time Value Ref Range Status   12/28/2019 04:26  (H) 0 - 99 pg/mL Final     Comment:     .  <100 pg/mL - Heart failure unlikely  100-299 pg/mL - Intermediate probability of acute heart  .               failure exacerbation. Correlate with clinical  .               context and patient history.    >=300 pg/mL - Heart Failure likely. Correlate with clinical  .               context and patient history.  BNP testing is performed using different testing   methodology at [facility] than at other   [facility] hospitals. Direct result comparisons should   only be made within the same method.       Hemoglobin A1C   Date/Time Value Ref Range Status   01/03/2022 07:44 AM 6.4 (A) % Final     Comment:          Diagnosis of Diabetes-Adults   Non-Diabetic: < or = 5.6%    "Increased risk for developing diabetes: 5.7-6.4%   Diagnostic of diabetes: > or = 6.5%  .       Monitoring of Diabetes                Age (y)     Therapeutic Goal (%)   Adults:          >18           <7.0   Pediatrics:    13-18           <7.5                   7-12           <8.0                   0- 6            7.5-8.5   American Diabetes Association. Diabetes Care 33(S1), Jan 2010.       LDL Calculated   Date/Time Value Ref Range Status   03/22/2025 06:35 AM 39 <=99 mg/dL Final     Comment:                                 Near   Borderline      AGE      Desirable  Optimal    High     High     Very High     0-19 Y     0 - 109     ---    110-129   >/= 130     ----    20-24 Y     0 - 119     ---    120-159   >/= 160     ----      >24 Y     0 -  99   100-129  130-159   160-189     >/=190       VLDL   Date/Time Value Ref Range Status   03/22/2025 06:35 AM 15 0 - 40 mg/dL Final   09/22/2023 12:41 PM 18 0 - 40 mg/dL Final   02/09/2022 11:33 AM 21 0 - 40 mg/dL Final   01/03/2022 07:44 AM 44 (H) 0 - 40 mg/dL Final      Last I/O:  No intake/output data recorded.    Past Cardiology Tests (Last 3 Years):  EKG:  ECG 12 lead (Clinic Performed) 12/19/2024      ECG 12 lead (Clinic Performed) 10/20/2023    Echo:  No results found for this or any previous visit from the past 1095 days.    Ejection Fractions:  No results found for: \"EF\"  Cath:  No results found for this or any previous visit from the past 1095 days.    Stress Test:  No results found for this or any previous visit from the past 1095 days.    Cardiac Imaging:  No results found for this or any previous visit from the past 1095 days.      Past Medical History:  He has a past medical history of Abnormal findings on diagnostic imaging of other specified body structures, Personal history of other medical treatment, and Personal history of other medical treatment.    Past Surgical History:  He has a past surgical history that includes Other surgical history (01/30/2020); " Other surgical history (2022); Other surgical history (2022); Other surgical history (2020); Other surgical history (2020); Other surgical history (2020); Other surgical history (2020); Other surgical history (2020); Other surgical history (2020); Other surgical history (2020); MR angio head wo IV contrast (2023); MR angio neck wo IV contrast (2023); MR angio head wo IV contrast (2023); and MR angio neck wo IV contrast (2023).      Social History:  He reports that he has been smoking cigarettes. He has a 64 pack-year smoking history. He has quit using smokeless tobacco. No history on file for alcohol use and drug use.    Family History:  Family History   Problem Relation Name Age of Onset    Other (coronary thrombosis) Father           in 1970s @63        Allergies:  Sulfa (sulfonamide antibiotics) and Adhesive tape-silicones    Inpatient Medications:  Scheduled medications   Medication Dose Route Frequency    clopidogrel  75 mg oral Daily    enoxaparin  40 mg subcutaneous q24h    ezetimibe  10 mg oral Daily    lisinopril  20 mg oral Daily    metoprolol succinate XL  50 mg oral Daily    nicotine  1 patch transdermal Daily    [START ON 3/23/2025] pantoprazole  40 mg oral Daily before breakfast    probenecid-colchicine  1 tablet oral BID    psyllium  1 packet oral Daily    rosuvastatin  40 mg oral Daily     PRN medications   Medication    acetaminophen    Or    acetaminophen    Or    acetaminophen    guaiFENesin    nitroglycerin    ondansetron    Or    ondansetron    oxygen     Continuous Medications   Medication Dose Last Rate     Outpatient Medications:  Current Outpatient Medications   Medication Instructions    acetaminophen (TYLENOL) 650 mg, Every 6 hours PRN    albuterol 90 mcg/actuation inhaler 2 puffs, Every 4 hours PRN    clopidogrel (PLAVIX) 75 mg, oral, Daily    coenzyme Q-10 200 mg capsule 1 capsule, 2 times daily     ezetimibe (ZETIA) 10 mg, oral, Daily    lisinopril 20 mg, oral, Daily    metoprolol succinate XL (TOPROL-XL) 50 mg, oral, Daily, DO NOT CRUSH OR CHEW    multivitamin with minerals iron-free (Centrum Silver) 1 tablet, Daily    nitroglycerin (NITROSTAT) 0.4 mg, sublingual, Every 5 min PRN    omeprazole (PRILOSEC) 40 mg, Daily before breakfast    pantoprazole (ProtoNix) 40 mg EC tablet     probenecid-colchicine 500-0.5 mg tablet 1 tablet, 2 times daily    rosuvastatin (CRESTOR) 40 mg, oral, Daily    turmeric 400 mg capsule 1 capsule, Daily       Physical Exam:  GEN:  WD WN 75 yo wm sitting comfortably in the bedside chair  HEENT: Atraumatic, normocephalic  COR: Reg  LUNGS: Clear  EXT: Warm     Assessment/Plan   1.) Atypical transient chest, neck and left arm pain with negative EKG and troponin.  I offered the patient cardiac catheterization but he prefers Lexiscan which is reasonable  2.) H/O ASHD S/P 5 stents  3.) HTN  4.) HLD  REC:   Could be discharged home to call on Monday to schedule out patient Lexiscan  Thank you for the consultation                              JLS        Code Status:  Full Code    I spent 30 minutes in the professional and overall care of this patient.        Pola Santana MD

## 2025-03-22 NOTE — H&P
History Of Present Illness  Rafi Lisa Jr. is a 74 y.o. male with significant prior medical history of HTN, HLD, CAD s/p multiple stents (last laser-assisted PCI of Cx 1/22) on Plavix, watchman placement, prediabetes, COPD who presented to ED for evaluation of intermittent pain that begins at left biceps and radiates up into trapezius and jaw with occasional associated chest discomfort for the past several months.  Denies having any symptoms today, last episode of chest discomfort was last week that resolved on its own.  He states he had called Dr. Hunt's office to schedule a stress test at which point he was advised to come to the hospital.  He reports arm/jaw symptoms rarely are accompanied by chest comfort, but episodes of chest discomfort always include radiation to left arm.  States symptoms come on at rest and alleviated by NSAID.  Denies symptoms with activity, associated shortness of breath, dizziness, lightheadedness, diaphoresis.  Denies trauma to cervical spine, numbness/tingling sensation, weakness of left arm.  Denies fevers, chills, abdominal pain, changes in urinary/bowel habits.  Continues to smoke a pack of cigarettes a day.    ED course: Vitals within normal range.  Per radiology, imaging shows no acute cardiopulmonary process.  ECG shows no evidence of ischemia.  Labs unremarkable.     Past Medical History  Past Medical History:   Diagnosis Date    Abnormal findings on diagnostic imaging of other specified body structures     Abnormal CT of the chest    Personal history of other medical treatment     History of echocardiogram    Personal history of other medical treatment     History of nuclear stress test       Surgical History  Past Surgical History:   Procedure Laterality Date    MR HEAD ANGIO WO IV CONTRAST  12/17/2023    MR HEAD ANGIO WO IV CONTRAST 12/17/2023 [facility] [place] [facility] MRI    MR HEAD ANGIO WO IV CONTRAST  12/18/2023    MR HEAD ANGIO WO IV CONTRAST    MR NECK ANGIO WO IV CONTRAST  12/17/2023     MR NECK ANGIO WO IV CONTRAST 2023 [facility] MRI    MR NECK ANGIO WO IV CONTRAST  2023    MR NECK ANGIO WO IV CONTRAST    OTHER SURGICAL HISTORY  2020    Radiofrequency ablation    OTHER SURGICAL HISTORY  2022    Cardiac catheterization with stent placement    OTHER SURGICAL HISTORY  2022    Cardiac catheterization    OTHER SURGICAL HISTORY  2020    Atrial appendage closure    OTHER SURGICAL HISTORY  2020    Esophagogastroduodenoscopy    OTHER SURGICAL HISTORY  2020    Colonoscopy    OTHER SURGICAL HISTORY  2020    Cardiac catheterization with stent placement    OTHER SURGICAL HISTORY  2020    Tonsillectomy    OTHER SURGICAL HISTORY  2020    Bunionectomy    OTHER SURGICAL HISTORY  2020    Lithotripsy        Social History  He reports that he has been smoking cigarettes. He has a 64 pack-year smoking history. He has quit using smokeless tobacco. No history on file for alcohol use and drug use.    Family History  Family History   Problem Relation Name Age of Onset    Other (coronary thrombosis) Father           in 1970s @63        Allergies  Sulfa (sulfonamide antibiotics) and Adhesive tape-silicones    Review of Systems  10 point ROS negative except as specified in HPI    Physical Exam  HENT:      Head: Atraumatic.      Nose: Nose normal.   Eyes:      Conjunctiva/sclera: Conjunctivae normal.   Cardiovascular:      Rate and Rhythm: Normal rate and regular rhythm.   Pulmonary:      Effort: Pulmonary effort is normal.      Breath sounds: Wheezing and rhonchi present.      Comments: Wheezing, greater in right fields  Abdominal:      General: Abdomen is flat.      Palpations: Abdomen is soft.   Musculoskeletal:         General: Normal range of motion.      Cervical back: Normal range of motion.      Comments: 5/5 strength in upper extremities   Skin:     General: Skin is warm and dry.   Neurological:      Mental Status: He is alert. Mental status is at  baseline.   Psychiatric:         Behavior: Behavior normal.          Last Recorded Vitals  Blood pressure 109/59, pulse 64, temperature 36.4 °C (97.5 °F), resp. rate 18, height 1.829 m (6'), weight 80.7 kg (178 lb), SpO2 96%.    Relevant Results    Results for orders placed or performed during the hospital encounter of 03/21/25 (from the past 24 hours)   CBC and Auto Differential   Result Value Ref Range    WBC 4.5 4.4 - 11.3 x10*3/uL    nRBC 0.0 0.0 - 0.0 /100 WBCs    RBC 4.59 4.50 - 5.90 x10*6/uL    Hemoglobin 13.9 13.5 - 17.5 g/dL    Hematocrit 45.2 41.0 - 52.0 %    MCV 99 80 - 100 fL    MCH 30.3 26.0 - 34.0 pg    MCHC 30.8 (L) 32.0 - 36.0 g/dL    RDW 13.4 11.5 - 14.5 %    Platelets 169 150 - 450 x10*3/uL    Neutrophils % 68.9 40.0 - 80.0 %    Immature Granulocytes %, Automated 0.2 0.0 - 0.9 %    Lymphocytes % 11.8 13.0 - 44.0 %    Monocytes % 16.7 2.0 - 10.0 %    Eosinophils % 2.0 0.0 - 6.0 %    Basophils % 0.4 0.0 - 2.0 %    Neutrophils Absolute 3.09 1.60 - 5.50 x10*3/uL    Immature Granulocytes Absolute, Automated 0.01 0.00 - 0.50 x10*3/uL    Lymphocytes Absolute 0.53 (L) 0.80 - 3.00 x10*3/uL    Monocytes Absolute 0.75 0.05 - 0.80 x10*3/uL    Eosinophils Absolute 0.09 0.00 - 0.40 x10*3/uL    Basophils Absolute 0.02 0.00 - 0.10 x10*3/uL   Comprehensive metabolic panel   Result Value Ref Range    Glucose 93 74 - 99 mg/dL    Sodium 135 (L) 136 - 145 mmol/L    Potassium 4.7 3.5 - 5.3 mmol/L    Chloride 101 98 - 107 mmol/L    Bicarbonate 26 21 - 32 mmol/L    Anion Gap 13 10 - 20 mmol/L    Urea Nitrogen 25 (H) 6 - 23 mg/dL    Creatinine 1.63 (H) 0.50 - 1.30 mg/dL    eGFR 44 (L) >60 mL/min/1.73m*2    Calcium 9.8 8.6 - 10.3 mg/dL    Albumin 4.7 3.4 - 5.0 g/dL    Alkaline Phosphatase 89 33 - 136 U/L    Total Protein 8.3 (H) 6.4 - 8.2 g/dL    AST 32 9 - 39 U/L    Bilirubin, Total 0.7 0.0 - 1.2 mg/dL    ALT 18 10 - 52 U/L   Troponin I, High Sensitivity   Result Value Ref Range    Troponin I, High Sensitivity 6 0 - 20  ng/L   Troponin I, High Sensitivity   Result Value Ref Range    Troponin I, High Sensitivity 6 0 - 20 ng/L     XR chest 2 views    Result Date: 3/21/2025  STUDY: Chest Radiographs;  3/21/2025 17:44 INDICATION: Chest pain. COMPARISON: 12/27/2019 XR Chest ACCESSION NUMBER(S): [id number] ORDERING CLINICIAN: DIAMOND MARINELLI TECHNIQUE:  Frontal and lateral chest. FINDINGS: CARDIOMEDIASTINAL SILHOUETTE: Cardiomediastinal silhouette is normal in size and configuration.  LUNGS: Lungs are clear.  There is no consolidation, effusion or pneumothorax but there is a large retrocardiac hiatal hernia.  ABDOMEN: No remarkable upper abdominal findings.  BONES: No acute osseous changes.    No acute cardiopulmonary disease.  There is a retrocardiac hiatal hernia but the lungs otherwise appear clear.. Signed by Mikey Dick MD        Assessment/Plan   Assessment & Plan  Chest pain, unspecified type    74-year-old male with past medical history of HTN, HLD and CAD with 5 stents on Plavix presenting to ED for ongoing intermittent chest pain with radiation to arm for past 4 months.  Labs and EKG without evidence of cardiac ischemia/injury.  Patient admitted to observation with telemetry under Dr. Oliveira for further evaluation and care.    #Chest pain  #Tobacco use  - Continue Plavix, statin  - Cardio consult  - Vitals every 4 hours, telemetry  - Cardiac diet  - ECG per ACS symptoms  - Follow lipid panel  - Nicotine patch    Chronic conditions  #CAD  #Hypertension  #HLD  #COPD  #GERD  - Continue home meds when reconciled       I spent 45 minutes in the professional and overall care of this patient.      Jay Alvarado PA-C

## 2025-03-22 NOTE — CARE PLAN
The patient's goals for the shift include pain control.    The clinical goals for the shift include patient will have decreased pain during shift.    Over the shift, the patient did not make progress toward the following goals. Barriers to progression include chest pain, radiates to shoulder neck and jaw. Recommendations to address these barriers include consult cardiology and pain medicine.

## 2025-03-22 NOTE — ED PROVIDER NOTES
Emergency Department Provider Note        History of Present Illness     History provided by: Patient  Limitations to History: None    HPI:  Rafi Lisa Jr. is a 74 y.o. male With past medical history of atrial fibrillation, dyslipidemia, hypertension, CAD with multiple previous stents to the emergency department today due to intermittent episodes of chest pain and left arm pain for the past few months.  Patient states he was trying to call and arrange stress test today and they advised him to come into the emergency department for evaluation.  Patient states he has not had any chest pain today he did note that he is coughing up some increased amount of phlegm that is yellow-tinged.  He is still an everyday smoker.    Physical Exam   Triage vitals:  T 36.4 °C (97.5 °F)  HR 74  /83  RR 18  O2 94 %      Physical Exam  Vitals and nursing note reviewed.   Constitutional:       General: He is not in acute distress.     Appearance: He is well-developed.   HENT:      Head: Normocephalic and atraumatic.   Eyes:      Conjunctiva/sclera: Conjunctivae normal.   Cardiovascular:      Rate and Rhythm: Normal rate and regular rhythm.      Heart sounds: No murmur heard.  Pulmonary:      Effort: Pulmonary effort is normal. No respiratory distress.      Breath sounds: Examination of the right-upper field reveals wheezing and rhonchi. Examination of the left-upper field reveals wheezing and rhonchi. Examination of the right-lower field reveals wheezing and rhonchi. Examination of the left-lower field reveals wheezing and rhonchi. Wheezing and rhonchi present.   Abdominal:      Palpations: Abdomen is soft.      Tenderness: There is no abdominal tenderness.   Musculoskeletal:         General: No swelling.      Cervical back: Neck supple.   Skin:     General: Skin is warm and dry.      Capillary Refill: Capillary refill takes less than 2 seconds.   Neurological:      Mental Status: He is alert.   Psychiatric:         Mood  and Affect: Mood normal.        Medical Decision Making & ED Course   Medical Decision Makin y.o. male With past medical history of atrial fibrillation, dyslipidemia, hypertension, CAD with multiple previous stents to the emergency department today due to intermittent episodes of chest pain and left arm pain for the past few months.  Patient states he was trying to call and arrange stress test today and they advised him to come into the emergency department for evaluation.  Patient states he has not had any chest pain today he did note that he is coughing up some increased amount of phlegm that is yellow-tinged.  He is still an everyday smoker.  Initial troponin was not elevated, repeat troponin also not elevated.  His CMP was stable with chronic kidney disease, no electrolyte abnormality or liver pathology.  CBC showed no leukocytosis, anemia, or thrombocytopenia.  Given patient's elevated heart score and no recent stress testing or catheterization we did discuss possibility of admission.  He would prefer to be evaluated cardiology.  He will be admitted for this.  Did speak with the hospital attending who agreed to accept the patient to the floor.  ----  Scoring Tools Utilized: HEART Score: 5       Differential diagnoses considered include but are not limited to: ACS, NSTEMI, arrhythmia, STEMI, heart failure, electrolyte derangement, pneumonia, PE, anemia.     Social Determinants of Health which Significantly Impact Care: None identified     EKG Independent Interpretation: EKG interpreted by myself. Please see ED Course for full interpretation.    Independent Result Review and Interpretation: Relevant laboratory and radiographic results were reviewed and independently interpreted by myself.  As necessary, they are commented on in the ED Course.    Chronic conditions affecting the patient's care: As documented above in MDM    The patient was discussed with the following consultants/services:  Hospitalist/Admitting Provider who accepted the patient for admission    Care Considerations: As documented above in [facility]    ED Course:  ED Course as of 03/21/25 2257   Fri Mar 21, 2025   2133 XR chest 2 views  No acute cardiopulmonary disease.  There is a retrocardiac hiatal  hernia but the lungs otherwise appear clear..   [WS]   2137 ECG 12 lead  EKG, my read, 1551  Sinus rhythm with sinus arrhythmia, right axis deviation, no acute ST elevation or depression.  Ventricular rate-75 BPM [WS]   2137 Comprehensive metabolic panel(!)  No significant electrolyte abnormalities, BUN and creatinine slightly elevated around his baseline, no evidence of liver pathology [WS]   2137 Troponin I, High Sensitivity  Not elevated, will be repeated. [WS]   2137 CBC and Auto Differential(!)  CBC stable, no leukocytosis, anemia, or thrombocytopenia [WS]   2223 Troponin I, High Sensitivity  No significant elevation [WS]      ED Course User Index  [WS] MILEY Hassan         Diagnoses as of 03/21/25 2257   Chest pain, unspecified type     Disposition   As a result of their workup, the patient will require admission to the hospital.  The patient was informed of his diagnosis.  The patient was given the opportunity to ask questions and I answered them. The patient agreed to be admitted to the hospital.    Procedures   Procedures    This was a shared visit with an ED attending.  The patient was seen and discussed with the ED attending    MILEY Hassan  Emergency Medicine     MILEY Hassan  03/21/25 2258

## 2025-03-22 NOTE — CARE PLAN
The patient's goals for the shift include rest    The clinical goals for the shift include Patient will remain stable during this shift.      Problem: Pain - Adult  Goal: Verbalizes/displays adequate comfort level or baseline comfort level  Outcome: Progressing     Problem: Safety - Adult  Goal: Free from fall injury  Outcome: Progressing     Problem: Discharge Planning  Goal: Discharge to home or other facility with appropriate resources  Outcome: Progressing     Problem: Chronic Conditions and Co-morbidities  Goal: Patient's chronic conditions and co-morbidity symptoms are monitored and maintained or improved  Outcome: Progressing     Problem: Nutrition  Goal: Nutrient intake appropriate for maintaining nutritional needs  Outcome: Progressing

## 2025-03-22 NOTE — H&P
History Of Present Illness  Rafi Lisa Jr. is a 74 y.o. male with significant prior medical history of HTN, HLD, CAD s/p multiple stents (last laser-assisted PCI of Cx 1/22) on Plavix, watchman placement, prediabetes, COPD who presented to ED for evaluation of intermittent pain that begins at left biceps and radiates up into trapezius and jaw with occasional associated chest discomfort for the past several months.  Denies having any symptoms today, last episode of chest discomfort was last week that resolved on its own.  He states he had called Dr. Hunt's office to schedule a stress test at which point he was advised to come to the hospital.  He reports arm/jaw symptoms rarely are accompanied by chest comfort, but episodes of chest discomfort always include radiation to left arm.  States symptoms come on at rest and alleviated by NSAID.  Denies symptoms with activity, associated shortness of breath, dizziness, lightheadedness, diaphoresis.  Denies trauma to cervical spine, numbness/tingling sensation, weakness of left arm.  Denies fevers, chills, abdominal pain, changes in urinary/bowel habits.  Continues to smoke a pack of cigarettes a day.    ED course: Vitals within normal range.  Per radiology, imaging shows no acute cardiopulmonary process.  ECG shows no evidence of ischemia.  Labs unremarkable.     Past Medical History  Past Medical History:   Diagnosis Date    Abnormal findings on diagnostic imaging of other specified body structures     Abnormal CT of the chest    Personal history of other medical treatment     History of echocardiogram    Personal history of other medical treatment     History of nuclear stress test       Surgical History  Past Surgical History:   Procedure Laterality Date    MR HEAD ANGIO WO IV CONTRAST  12/17/2023    MR HEAD ANGIO WO IV CONTRAST 12/17/2023 [facility] [place] [facility]    MR HEAD ANGIO WO IV CONTRAST  12/18/2023    MR HEAD ANGIO WO IV CONTRAST    MR NECK ANGIO WO IV CONTRAST  12/17/2023     MR NECK ANGIO WO IV CONTRAST 2023 [facility] [place]    MR NECK ANGIO WO IV CONTRAST  2023    MR NECK ANGIO WO IV CONTRAST    OTHER SURGICAL HISTORY  2020    Radiofrequency ablation    OTHER SURGICAL HISTORY  2022    Cardiac catheterization with stent placement    OTHER SURGICAL HISTORY  2022    Cardiac catheterization    OTHER SURGICAL HISTORY  2020    Atrial appendage closure    OTHER SURGICAL HISTORY  2020    Esophagogastroduodenoscopy    OTHER SURGICAL HISTORY  2020    Colonoscopy    OTHER SURGICAL HISTORY  2020    Cardiac catheterization with stent placement    OTHER SURGICAL HISTORY  2020    Tonsillectomy    OTHER SURGICAL HISTORY  2020    Bunionectomy    OTHER SURGICAL HISTORY  2020    Lithotripsy        Social History  He reports that he has been smoking cigarettes. He has a 64 pack-year smoking history. He has quit using smokeless tobacco. No history on file for alcohol use and drug use.    Family History  Family History   Problem Relation Name Age of Onset    Other (coronary thrombosis) Father           in 1970s @63        Allergies  Sulfa (sulfonamide antibiotics) and Adhesive tape-silicones    Review of Systems  10 point ROS negative except as specified in HPI    Physical Exam  HENT:      Head: Atraumatic.      Nose: Nose normal.   Eyes:      Conjunctiva/sclera: Conjunctivae normal.   Cardiovascular:      Rate and Rhythm: Normal rate and regular rhythm.   Pulmonary:      Effort: Pulmonary effort is normal.      Breath sounds: Wheezing and rhonchi present.      Comments: Wheezing, greater in right fields  Abdominal:      General: Abdomen is flat.      Palpations: Abdomen is soft.   Musculoskeletal:         General: Normal range of motion.      Cervical back: Normal range of motion.      Comments: 5/5 strength in upper extremities   Skin:     General: Skin is warm and dry.   Neurological:      Mental Status: He is alert. Mental status is at  baseline.   Psychiatric:         Behavior: Behavior normal.          Last Recorded Vitals  Blood pressure 110/66, pulse 65, temperature 35.9 °C (96.6 °F), temperature source Temporal, resp. rate 16, height 1.829 m (6'), weight 80.7 kg (178 lb), SpO2 93%.    Relevant Results    Results for orders placed or performed during the hospital encounter of 03/21/25 (from the past 24 hours)   CBC and Auto Differential   Result Value Ref Range    WBC 4.5 4.4 - 11.3 x10*3/uL    nRBC 0.0 0.0 - 0.0 /100 WBCs    RBC 4.59 4.50 - 5.90 x10*6/uL    Hemoglobin 13.9 13.5 - 17.5 g/dL    Hematocrit 45.2 41.0 - 52.0 %    MCV 99 80 - 100 fL    MCH 30.3 26.0 - 34.0 pg    MCHC 30.8 (L) 32.0 - 36.0 g/dL    RDW 13.4 11.5 - 14.5 %    Platelets 169 150 - 450 x10*3/uL    Neutrophils % 68.9 40.0 - 80.0 %    Immature Granulocytes %, Automated 0.2 0.0 - 0.9 %    Lymphocytes % 11.8 13.0 - 44.0 %    Monocytes % 16.7 2.0 - 10.0 %    Eosinophils % 2.0 0.0 - 6.0 %    Basophils % 0.4 0.0 - 2.0 %    Neutrophils Absolute 3.09 1.60 - 5.50 x10*3/uL    Immature Granulocytes Absolute, Automated 0.01 0.00 - 0.50 x10*3/uL    Lymphocytes Absolute 0.53 (L) 0.80 - 3.00 x10*3/uL    Monocytes Absolute 0.75 0.05 - 0.80 x10*3/uL    Eosinophils Absolute 0.09 0.00 - 0.40 x10*3/uL    Basophils Absolute 0.02 0.00 - 0.10 x10*3/uL   Comprehensive metabolic panel   Result Value Ref Range    Glucose 93 74 - 99 mg/dL    Sodium 135 (L) 136 - 145 mmol/L    Potassium 4.7 3.5 - 5.3 mmol/L    Chloride 101 98 - 107 mmol/L    Bicarbonate 26 21 - 32 mmol/L    Anion Gap 13 10 - 20 mmol/L    Urea Nitrogen 25 (H) 6 - 23 mg/dL    Creatinine 1.63 (H) 0.50 - 1.30 mg/dL    eGFR 44 (L) >60 mL/min/1.73m*2    Calcium 9.8 8.6 - 10.3 mg/dL    Albumin 4.7 3.4 - 5.0 g/dL    Alkaline Phosphatase 89 33 - 136 U/L    Total Protein 8.3 (H) 6.4 - 8.2 g/dL    AST 32 9 - 39 U/L    Bilirubin, Total 0.7 0.0 - 1.2 mg/dL    ALT 18 10 - 52 U/L   Troponin I, High Sensitivity   Result Value Ref Range    Troponin I,  High Sensitivity 6 0 - 20 ng/L   Troponin I, High Sensitivity   Result Value Ref Range    Troponin I, High Sensitivity 6 0 - 20 ng/L   Lipid Panel   Result Value Ref Range    Cholesterol 91 0 - 199 mg/dL    HDL-Cholesterol 36.6 mg/dL    Cholesterol/HDL Ratio 2.5     LDL Calculated 39 <=99 mg/dL    VLDL 15 0 - 40 mg/dL    Triglycerides 75 0 - 149 mg/dL    Non HDL Cholesterol 54 0 - 149 mg/dL   Lavender Top   Result Value Ref Range    Extra Tube Hold for add-ons.      XR chest 2 views    Result Date: 3/21/2025  STUDY: Chest Radiographs;  3/21/2025 17:44 INDICATION: Chest pain. COMPARISON: 12/27/2019 XR Chest ACCESSION NUMBER(S): [id number] ORDERING CLINICIAN: DIAMOND MARINELLI TECHNIQUE:  Frontal and lateral chest. FINDINGS: CARDIOMEDIASTINAL SILHOUETTE: Cardiomediastinal silhouette is normal in size and configuration.  LUNGS: Lungs are clear.  There is no consolidation, effusion or pneumothorax but there is a large retrocardiac hiatal hernia.  ABDOMEN: No remarkable upper abdominal findings.  BONES: No acute osseous changes.    No acute cardiopulmonary disease.  There is a retrocardiac hiatal hernia but the lungs otherwise appear clear.. Signed by Mikey Dick MD        Assessment/Plan   Assessment & Plan  Chest pain, unspecified type    74-year-old male with past medical history of HTN, HLD and CAD with 5 stents on Plavix presenting to ED for ongoing intermittent chest pain with radiation to arm for past 4 months.  Labs and EKG without evidence of cardiac ischemia/injury.  Patient admitted to observation with telemetry under Dr. Oliveira for further evaluation and care.    #Chest pain  #Tobacco use  - Continue Plavix, statin  - Cardio consult  - Vitals every 4 hours, telemetry  - Cardiac diet  - ECG per ACS symptoms  - Follow lipid panel  - Nicotine patch    Chronic conditions  #CAD  #Hypertension  #HLD  #COPD  #GERD  - Continue home meds when reconciled       I spent 60 minutes in the professional and overall care of  this patient.  Patient fully evaluated on March 22 and Long discussion with patient.  Patient is now agreeable to cardiac catheterization.  Will monitor overnight and run it past cardiology.  Recheck labs in AM.    Silas Oliveira MD

## 2025-03-22 NOTE — CONSULTS
Consults  History Of Present Illness:    Rafi Lisa Jr. is a 74 y.o. male presenting with left arm and chest pain over with past 4 months or so.  He has H/O 5 stents most recently 1/4/22 of Lcx in left dominant system with JAYJAY by Dr. James Hunt.  He describes intermittent ldf chest and left arm pain  which goes up to his cervical spine lasting only seconds.  He called the office yesterday and was told to come to the ER.  He has been pain free since arrival.     Last Recorded Vitals:  Vitals:    03/22/25 0000 03/22/25 0116 03/22/25 0510 03/22/25 1039   BP:   127/63 116/62   BP Location:   Right arm Right arm   Patient Position:   Lying Lying   Pulse: 82 74 53 78   Resp: 20 (!) 23 17 16   Temp:   36.5 °C (97.7 °F) 36.4 °C (97.5 °F)   TempSrc:   Temporal Temporal   SpO2:   90% 94%   Weight:       Height:           Last Labs:  CBC - 3/21/2025:  5:21 PM  4.5 13.9 169    45.2      CMP - 3/21/2025:  5:21 PM  9.8 8.3 32 --- 0.7   _ 4.7 18 89      PTT - No results in last year.  _   _ _     Troponin I, High Sensitivity   Date/Time Value Ref Range Status   03/21/2025 09:37 PM 6 0 - 20 ng/L Final   03/21/2025 05:21 PM 6 0 - 20 ng/L Final     BNP   Date/Time Value Ref Range Status   12/28/2019 04:26  (H) 0 - 99 pg/mL Final     Comment:     .  <100 pg/mL - Heart failure unlikely  100-299 pg/mL - Intermediate probability of acute heart  .               failure exacerbation. Correlate with clinical  .               context and patient history.    >=300 pg/mL - Heart Failure likely. Correlate with clinical  .               context and patient history.  BNP testing is performed using different testing   methodology at [facility] than at other   [facility] hospitals. Direct result comparisons should   only be made within the same method.       Hemoglobin A1C   Date/Time Value Ref Range Status   01/03/2022 07:44 AM 6.4 (A) % Final     Comment:          Diagnosis of Diabetes-Adults   Non-Diabetic: < or = 5.6%    "Increased risk for developing diabetes: 5.7-6.4%   Diagnostic of diabetes: > or = 6.5%  .       Monitoring of Diabetes                Age (y)     Therapeutic Goal (%)   Adults:          >18           <7.0   Pediatrics:    13-18           <7.5                   7-12           <8.0                   0- 6            7.5-8.5   American Diabetes Association. Diabetes Care 33(S1), Jan 2010.       LDL Calculated   Date/Time Value Ref Range Status   03/22/2025 06:35 AM 39 <=99 mg/dL Final     Comment:                                 Near   Borderline      AGE      Desirable  Optimal    High     High     Very High     0-19 Y     0 - 109     ---    110-129   >/= 130     ----    20-24 Y     0 - 119     ---    120-159   >/= 160     ----      >24 Y     0 -  99   100-129  130-159   160-189     >/=190       VLDL   Date/Time Value Ref Range Status   03/22/2025 06:35 AM 15 0 - 40 mg/dL Final   09/22/2023 12:41 PM 18 0 - 40 mg/dL Final   02/09/2022 11:33 AM 21 0 - 40 mg/dL Final   01/03/2022 07:44 AM 44 (H) 0 - 40 mg/dL Final      Last I/O:  No intake/output data recorded.    Past Cardiology Tests (Last 3 Years):  EKG:  ECG 12 lead (Clinic Performed) 12/19/2024      ECG 12 lead (Clinic Performed) 10/20/2023    Echo:  No results found for this or any previous visit from the past 1095 days.    Ejection Fractions:  No results found for: \"EF\"  Cath:  No results found for this or any previous visit from the past 1095 days.    Stress Test:  No results found for this or any previous visit from the past 1095 days.    Cardiac Imaging:  No results found for this or any previous visit from the past 1095 days.      Past Medical History:  He has a past medical history of Abnormal findings on diagnostic imaging of other specified body structures, Personal history of other medical treatment, and Personal history of other medical treatment.    Past Surgical History:  He has a past surgical history that includes Other surgical history (01/30/2020); " Other surgical history (2022); Other surgical history (2022); Other surgical history (2020); Other surgical history (2020); Other surgical history (2020); Other surgical history (2020); Other surgical history (2020); Other surgical history (2020); Other surgical history (2020); MR angio head wo IV contrast (2023); MR angio neck wo IV contrast (2023); MR angio head wo IV contrast (2023); and MR angio neck wo IV contrast (2023).      Social History:  He reports that he has been smoking cigarettes. He has a 64 pack-year smoking history. He has quit using smokeless tobacco. No history on file for alcohol use and drug use.    Family History:  Family History   Problem Relation Name Age of Onset    Other (coronary thrombosis) Father           in 1970s @63        Allergies:  Sulfa (sulfonamide antibiotics) and Adhesive tape-silicones    Inpatient Medications:  Scheduled medications   Medication Dose Route Frequency    clopidogrel  75 mg oral Daily    enoxaparin  40 mg subcutaneous q24h    ezetimibe  10 mg oral Daily    lisinopril  20 mg oral Daily    metoprolol succinate XL  50 mg oral Daily    nicotine  1 patch transdermal Daily    [START ON 3/23/2025] pantoprazole  40 mg oral Daily before breakfast    probenecid-colchicine  1 tablet oral BID    psyllium  1 packet oral Daily    rosuvastatin  40 mg oral Daily     PRN medications   Medication    acetaminophen    Or    acetaminophen    Or    acetaminophen    guaiFENesin    nitroglycerin    ondansetron    Or    ondansetron    oxygen     Continuous Medications   Medication Dose Last Rate     Outpatient Medications:  Current Outpatient Medications   Medication Instructions    acetaminophen (TYLENOL) 650 mg, Every 6 hours PRN    albuterol 90 mcg/actuation inhaler 2 puffs, Every 4 hours PRN    clopidogrel (PLAVIX) 75 mg, oral, Daily    coenzyme Q-10 200 mg capsule 1 capsule, 2 times daily     ezetimibe (ZETIA) 10 mg, oral, Daily    lisinopril 20 mg, oral, Daily    metoprolol succinate XL (TOPROL-XL) 50 mg, oral, Daily, DO NOT CRUSH OR CHEW    multivitamin with minerals iron-free (Centrum Silver) 1 tablet, Daily    nitroglycerin (NITROSTAT) 0.4 mg, sublingual, Every 5 min PRN    omeprazole (PRILOSEC) 40 mg, Daily before breakfast    pantoprazole (ProtoNix) 40 mg EC tablet     probenecid-colchicine 500-0.5 mg tablet 1 tablet, 2 times daily    rosuvastatin (CRESTOR) 40 mg, oral, Daily    turmeric 400 mg capsule 1 capsule, Daily       Physical Exam:  GEN:  WD WN 75 yo wm sitting comfortably in the bedside chair  HEENT: Atraumatic, normocephalic  COR: Reg  LUNGS: Clear  EXT: Warm     Assessment/Plan   1.) Atypical transient chest, neck and left arm pain with negative EKG and troponin.  I offered the patient cardiac catheterization but he prefers Lexiscan which is reasonable  2.) H/O ASHD S/P 5 stents  3.) HTN  4.) HLD  REC:   Could be discharged home to call on Monday to schedule out patient Lexiscan  Thank you for the consultation                              JLS        Code Status:  Full Code    I spent 30 minutes in the professional and overall care of this patient.        Pola Santana MD

## 2025-03-23 VITALS
RESPIRATION RATE: 16 BRPM | HEART RATE: 55 BPM | OXYGEN SATURATION: 94 % | BODY MASS INDEX: 24.11 KG/M2 | WEIGHT: 178 LBS | TEMPERATURE: 96.8 F | SYSTOLIC BLOOD PRESSURE: 112 MMHG | HEIGHT: 72 IN | DIASTOLIC BLOOD PRESSURE: 59 MMHG

## 2025-03-23 LAB
ALBUMIN SERPL BCP-MCNC: 3.6 G/DL (ref 3.4–5)
ALP SERPL-CCNC: 67 U/L (ref 33–136)
ALT SERPL W P-5'-P-CCNC: 16 U/L (ref 10–52)
ANION GAP SERPL CALC-SCNC: 10 MMOL/L (ref 10–20)
AST SERPL W P-5'-P-CCNC: 26 U/L (ref 9–39)
BILIRUB SERPL-MCNC: 0.4 MG/DL (ref 0–1.2)
BUN SERPL-MCNC: 30 MG/DL (ref 6–23)
CALCIUM SERPL-MCNC: 9 MG/DL (ref 8.6–10.3)
CHLORIDE SERPL-SCNC: 105 MMOL/L (ref 98–107)
CO2 SERPL-SCNC: 28 MMOL/L (ref 21–32)
CREAT SERPL-MCNC: 1.3 MG/DL (ref 0.5–1.3)
EGFRCR SERPLBLD CKD-EPI 2021: 57 ML/MIN/1.73M*2
ERYTHROCYTE [DISTWIDTH] IN BLOOD BY AUTOMATED COUNT: 13.5 % (ref 11.5–14.5)
GLUCOSE SERPL-MCNC: 89 MG/DL (ref 74–99)
HCT VFR BLD AUTO: 39.5 % (ref 41–52)
HGB BLD-MCNC: 12.2 G/DL (ref 13.5–17.5)
MCH RBC QN AUTO: 30.7 PG (ref 26–34)
MCHC RBC AUTO-ENTMCNC: 30.9 G/DL (ref 32–36)
MCV RBC AUTO: 99 FL (ref 80–100)
NRBC BLD-RTO: 0 /100 WBCS (ref 0–0)
PLATELET # BLD AUTO: 156 X10*3/UL (ref 150–450)
POTASSIUM SERPL-SCNC: 4.3 MMOL/L (ref 3.5–5.3)
PROT SERPL-MCNC: 6.6 G/DL (ref 6.4–8.2)
RBC # BLD AUTO: 3.98 X10*6/UL (ref 4.5–5.9)
SODIUM SERPL-SCNC: 139 MMOL/L (ref 136–145)
WBC # BLD AUTO: 4.4 X10*3/UL (ref 4.4–11.3)

## 2025-03-23 PROCEDURE — G0378 HOSPITAL OBSERVATION PER HR: HCPCS

## 2025-03-23 PROCEDURE — 2500000001 HC RX 250 WO HCPCS SELF ADMINISTERED DRUGS (ALT 637 FOR MEDICARE OP): Performed by: NURSE PRACTITIONER

## 2025-03-23 PROCEDURE — 2500000001 HC RX 250 WO HCPCS SELF ADMINISTERED DRUGS (ALT 637 FOR MEDICARE OP)

## 2025-03-23 PROCEDURE — 80053 COMPREHEN METABOLIC PANEL: CPT | Performed by: INTERNAL MEDICINE

## 2025-03-23 PROCEDURE — 2500000002 HC RX 250 W HCPCS SELF ADMINISTERED DRUGS (ALT 637 FOR MEDICARE OP, ALT 636 FOR OP/ED): Performed by: NURSE PRACTITIONER

## 2025-03-23 PROCEDURE — 2500000002 HC RX 250 W HCPCS SELF ADMINISTERED DRUGS (ALT 637 FOR MEDICARE OP, ALT 636 FOR OP/ED)

## 2025-03-23 PROCEDURE — 85027 COMPLETE CBC AUTOMATED: CPT | Performed by: INTERNAL MEDICINE

## 2025-03-23 PROCEDURE — 2500000001 HC RX 250 WO HCPCS SELF ADMINISTERED DRUGS (ALT 637 FOR MEDICARE OP): Performed by: PHYSICIAN ASSISTANT

## 2025-03-23 PROCEDURE — 99232 SBSQ HOSP IP/OBS MODERATE 35: CPT | Performed by: INTERNAL MEDICINE

## 2025-03-23 PROCEDURE — S4991 NICOTINE PATCH NONLEGEND: HCPCS

## 2025-03-23 PROCEDURE — 36415 COLL VENOUS BLD VENIPUNCTURE: CPT | Performed by: INTERNAL MEDICINE

## 2025-03-23 PROCEDURE — 2500000002 HC RX 250 W HCPCS SELF ADMINISTERED DRUGS (ALT 637 FOR MEDICARE OP, ALT 636 FOR OP/ED): Performed by: PHYSICIAN ASSISTANT

## 2025-03-23 RX ADMIN — PROBENECID 500 MG: 500 TABLET, FILM COATED ORAL at 10:00

## 2025-03-23 RX ADMIN — COLCHICINE 0.6 MG: 0.6 TABLET, FILM COATED ORAL at 21:26

## 2025-03-23 RX ADMIN — CLOPIDOGREL 75 MG: 75 TABLET ORAL at 09:59

## 2025-03-23 RX ADMIN — METOPROLOL SUCCINATE 50 MG: 50 TABLET, EXTENDED RELEASE ORAL at 09:59

## 2025-03-23 RX ADMIN — PANTOPRAZOLE SODIUM 40 MG: 40 TABLET, DELAYED RELEASE ORAL at 06:23

## 2025-03-23 RX ADMIN — LISINOPRIL 20 MG: 20 TABLET ORAL at 10:00

## 2025-03-23 RX ADMIN — ROSUVASTATIN CALCIUM 40 MG: 10 TABLET, FILM COATED ORAL at 10:00

## 2025-03-23 RX ADMIN — COLCHICINE 0.6 MG: 0.6 TABLET, FILM COATED ORAL at 10:00

## 2025-03-23 RX ADMIN — NICOTINE 1 PATCH: 21 PATCH, EXTENDED RELEASE TRANSDERMAL at 09:59

## 2025-03-23 RX ADMIN — PSYLLIUM HUSK 1 PACKET: 3.4 POWDER ORAL at 09:59

## 2025-03-23 RX ADMIN — EZETIMIBE 10 MG: 10 TABLET ORAL at 10:00

## 2025-03-23 RX ADMIN — PROBENECID 500 MG: 500 TABLET, FILM COATED ORAL at 21:25

## 2025-03-23 RX ADMIN — ACETAMINOPHEN 650 MG: 325 TABLET, FILM COATED ORAL at 15:42

## 2025-03-23 ASSESSMENT — COGNITIVE AND FUNCTIONAL STATUS - GENERAL
STANDING UP FROM CHAIR USING ARMS: A LITTLE
DRESSING REGULAR LOWER BODY CLOTHING: A LITTLE
HELP NEEDED FOR BATHING: A LITTLE
PERSONAL GROOMING: A LITTLE
TOILETING: A LITTLE
WALKING IN HOSPITAL ROOM: A LITTLE
DAILY ACTIVITIY SCORE: 18
MOBILITY SCORE: 18
MOVING FROM LYING ON BACK TO SITTING ON SIDE OF FLAT BED WITH BEDRAILS: A LITTLE
CLIMB 3 TO 5 STEPS WITH RAILING: A LITTLE
EATING MEALS: A LITTLE
TURNING FROM BACK TO SIDE WHILE IN FLAT BAD: A LITTLE
MOVING TO AND FROM BED TO CHAIR: A LITTLE
DRESSING REGULAR UPPER BODY CLOTHING: A LITTLE
DAILY ACTIVITIY SCORE: 24
MOBILITY SCORE: 24

## 2025-03-23 ASSESSMENT — PAIN SCALES - GENERAL
PAINLEVEL_OUTOF10: 0 - NO PAIN
PAINLEVEL_OUTOF10: 7
PAINLEVEL_OUTOF10: 7
PAINLEVEL_OUTOF10: 0 - NO PAIN

## 2025-03-23 ASSESSMENT — PAIN - FUNCTIONAL ASSESSMENT
PAIN_FUNCTIONAL_ASSESSMENT: 0-10

## 2025-03-23 ASSESSMENT — PAIN DESCRIPTION - ORIENTATION: ORIENTATION: LEFT

## 2025-03-23 ASSESSMENT — PAIN DESCRIPTION - LOCATION: LOCATION: SHOULDER

## 2025-03-23 ASSESSMENT — PAIN SCALES - PAIN ASSESSMENT IN ADVANCED DEMENTIA (PAINAD): TOTALSCORE: MEDICATION (SEE MAR)

## 2025-03-23 NOTE — PROGRESS NOTES
25 1459   Discharge Planning   Living Arrangements Spouse/significant other   Support Systems Spouse/significant other   Assistance Needed None   Type of Residence Private residence   Number of Stairs to Enter Residence 4   Do you have animals or pets at home? Yes   Type of Animals or Pets 4 dogs   Home or Post Acute Services None   Expected Discharge Disposition Home   Does the patient need discharge transport arranged? No     TCC note:  Met with pt. at bedside, introduced self and role on the Transition of Care Team.  Verified name, , demographics, insurance, PCP is Dr. Richardson.  Emergency contact is Chanda puckett Phone: [phone]. Pt. lives with wife in a home with 2 TRINI. Pt. is Independent with ADLs, drives and is retired.  Pt. denies any recent falls and does not use an assistive device for ambulation. Pt. is not diabetic and does not use any home O2 or any other home services/supplies. Preferred pharmacy is [facility] mail order. No problems affording or obtaining medications. Pt. states that he has no discharge needs at this time. Pt will be having a Heart Cath tomorrow. Wife will transport home at the time of discharge. Transitions of Care will continue to follow until discharge. Teodora Villatoro, MSN, RN, TCC.

## 2025-03-23 NOTE — PROGRESS NOTES
Rafi Lisa Jr. is a 74 y.o. male on day 0 of admission presenting with Chest pain, unspecified type.      Subjective   Patient fully evaluated    for    Problem List Items Addressed This Visit       * (Principal) Chest pain, unspecified type - Primary    Relevant Orders    Transthoracic Echo (TTE) Complete     Patient seen resting in bed with head of bed elevated, no s/s or c/o acute difficulties at this time.  Vital signs for last 24 hours Temp:  [35.9 °C (96.6 °F)-36.6 °C (97.9 °F)] 36 °C (96.8 °F)  Heart Rate:  [56-71] 56  Resp:  [16-18] 18  BP: (101-130)/(64-70) 101/64    No intake/output data recorded.  Patient still requiring frequent cardiac and SPO2 monitoring. Continue aggressive pulmonary hygiene and oral hygiene. Off loading as tolerated for skin integrity. Medications and labs reviewed-   Results for orders placed or performed during the hospital encounter of 03/21/25 (from the past 24 hours)   CBC   Result Value Ref Range    WBC 4.4 4.4 - 11.3 x10*3/uL    nRBC 0.0 0.0 - 0.0 /100 WBCs    RBC 3.98 (L) 4.50 - 5.90 x10*6/uL    Hemoglobin 12.2 (L) 13.5 - 17.5 g/dL    Hematocrit 39.5 (L) 41.0 - 52.0 %    MCV 99 80 - 100 fL    MCH 30.7 26.0 - 34.0 pg    MCHC 30.9 (L) 32.0 - 36.0 g/dL    RDW 13.5 11.5 - 14.5 %    Platelets 156 150 - 450 x10*3/uL   Comprehensive Metabolic Panel   Result Value Ref Range    Glucose 89 74 - 99 mg/dL    Sodium 139 136 - 145 mmol/L    Potassium 4.3 3.5 - 5.3 mmol/L    Chloride 105 98 - 107 mmol/L    Bicarbonate 28 21 - 32 mmol/L    Anion Gap 10 10 - 20 mmol/L    Urea Nitrogen 30 (H) 6 - 23 mg/dL    Creatinine 1.30 0.50 - 1.30 mg/dL    eGFR 57 (L) >60 mL/min/1.73m*2    Calcium 9.0 8.6 - 10.3 mg/dL    Albumin 3.6 3.4 - 5.0 g/dL    Alkaline Phosphatase 67 33 - 136 U/L    Total Protein 6.6 6.4 - 8.2 g/dL    AST 26 9 - 39 U/L    Bilirubin, Total 0.4 0.0 - 1.2 mg/dL    ALT 16 10 - 52 U/L      Patient recently received an antibiotic (last 12 hours)       None           Patient is s/p 5  stent placement and still with chest pain, long discussion on goals of care with patient and family at bedside- patient agreeable to cardiac catheterization. Plan discussed with interdisciplinary team, seen by cardiology, all agree to plan. Smoking cessation educator consult placed, appreciate input.  Will continue current and repeat labs in the AM.     Discharge planning discussed with patient and care team. Therapy evaluations ordered. Anticipate HHC/SNF at discharge. Patient aware and agreeable to current plan, continue plan as above.     I spent a total of 50 minutes on the date of the service which included preparing to see the patient, face-to-face patient care, completing clinical documentation, obtaining and/or reviewing separately obtained history, performing a medically appropriate examination, counseling and educating the patient/family/caregiver, ordering medications, tests, or procedures, communicating with other HCPs (not separately reported), independently interpreting results (not separately reported), communicating results to the patient/family/caregiver, and care coordination (not separately reported).        Objective     Last Recorded Vitals  /64 (BP Location: Right arm, Patient Position: Sitting)   Pulse 56   Temp 36 °C (96.8 °F) (Temporal)   Resp 18   Wt 80.7 kg (178 lb)   SpO2 96%   Intake/Output last 3 Shifts:  No intake or output data in the 24 hours ending 03/23/25 1332    Admission Weight  Weight: 80.7 kg (178 lb) (03/21/25 1554)    Daily Weight  03/21/25 : 80.7 kg (178 lb)    Image Results  XR chest 2 views  Narrative: STUDY:  Chest Radiographs;  3/21/2025 17:44  INDICATION:  Chest pain.  COMPARISON:  12/27/2019 XR Chest  ACCESSION NUMBER(S):  [id number]  ORDERING CLINICIAN:  DIAMOND MARINELLI  TECHNIQUE:  Frontal and lateral chest.   FINDINGS:  CARDIOMEDIASTINAL SILHOUETTE:  Cardiomediastinal silhouette is normal in size and configuration.     LUNGS:  Lungs are clear.  There is  no consolidation, effusion or pneumothorax  but there is a large retrocardiac hiatal hernia.     ABDOMEN:  No remarkable upper abdominal findings.     BONES:  No acute osseous changes.  Impression: No acute cardiopulmonary disease.  There is a retrocardiac hiatal  hernia but the lungs otherwise appear clear..  Signed by Mikey Dick MD      Physical Exam    Relevant Results               Assessment/Plan                  Assessment & Plan  Chest pain, unspecified type          Silas Oliveira MD   Physician  Internal Medicine     H&P      Signed     Date of Service: 3/22/2025  5:19 PM     Signed       Expand All Collapse All    History Of Present Illness  Rafi Lisa Jr. is a 74 y.o. male with significant prior medical history of HTN, HLD, CAD s/p multiple stents (last laser-assisted PCI of Cx 1/22) on Plavix, watchman placement, prediabetes, COPD who presented to ED for evaluation of intermittent pain that begins at left biceps and radiates up into trapezius and jaw with occasional associated chest discomfort for the past several months.  Denies having any symptoms today, last episode of chest discomfort was last week that resolved on its own.  He states he had called Dr. Hunt's office to schedule a stress test at which point he was advised to come to the hospital.  He reports arm/jaw symptoms rarely are accompanied by chest comfort, but episodes of chest discomfort always include radiation to left arm.  States symptoms come on at rest and alleviated by NSAID.  Denies symptoms with activity, associated shortness of breath, dizziness, lightheadedness, diaphoresis.  Denies trauma to cervical spine, numbness/tingling sensation, weakness of left arm.  Denies fevers, chills, abdominal pain, changes in urinary/bowel habits.  Continues to smoke a pack of cigarettes a day.     ED course: Vitals within normal range.  Per radiology, imaging shows no acute cardiopulmonary process.  ECG shows no evidence of ischemia.  Labs  unremarkable.     Past Medical History  Medical History        Past Medical History:   Diagnosis Date    Abnormal findings on diagnostic imaging of other specified body structures       Abnormal CT of the chest    Personal history of other medical treatment       History of echocardiogram    Personal history of other medical treatment       History of nuclear stress test            Surgical History  Surgical History         Past Surgical History:   Procedure Laterality Date    MR HEAD ANGIO WO IV CONTRAST   2023     MR HEAD ANGIO WO IV CONTRAST 2023 [facility]    MR HEAD ANGIO WO IV CONTRAST   2023     MR HEAD ANGIO WO IV CONTRAST    MR NECK ANGIO WO IV CONTRAST   2023     MR NECK ANGIO WO IV CONTRAST 2023 [facility]    MR NECK ANGIO WO IV CONTRAST   2023     MR NECK ANGIO WO IV CONTRAST    OTHER SURGICAL HISTORY   2020     Radiofrequency ablation    OTHER SURGICAL HISTORY   2022     Cardiac catheterization with stent placement    OTHER SURGICAL HISTORY   2022     Cardiac catheterization    OTHER SURGICAL HISTORY   2020     Atrial appendage closure    OTHER SURGICAL HISTORY   2020     Esophagogastroduodenoscopy    OTHER SURGICAL HISTORY   2020     Colonoscopy    OTHER SURGICAL HISTORY   2020     Cardiac catheterization with stent placement    OTHER SURGICAL HISTORY   2020     Tonsillectomy    OTHER SURGICAL HISTORY   2020     Bunionectomy    OTHER SURGICAL HISTORY   2020     Lithotripsy            Social History  He reports that he has been smoking cigarettes. He has a 64 pack-year smoking history. He has quit using smokeless tobacco. No history on file for alcohol use and drug use.     Family History  Family History          Family History   Problem Relation Name Age of Onset    Other (coronary thrombosis) Father              in 1970s @63            Allergies  Sulfa (sulfonamide antibiotics) and Adhesive tape-silicones      Review of Systems  10 point ROS negative except as specified in HPI     Physical Exam  HENT:      Head: Atraumatic.      Nose: Nose normal.   Eyes:      Conjunctiva/sclera: Conjunctivae normal.   Cardiovascular:      Rate and Rhythm: Normal rate and regular rhythm.   Pulmonary:      Effort: Pulmonary effort is normal.      Breath sounds: Wheezing and rhonchi present.      Comments: Wheezing, greater in right fields  Abdominal:      General: Abdomen is flat.      Palpations: Abdomen is soft.   Musculoskeletal:         General: Normal range of motion.      Cervical back: Normal range of motion.      Comments: 5/5 strength in upper extremities   Skin:     General: Skin is warm and dry.   Neurological:      Mental Status: He is alert. Mental status is at baseline.   Psychiatric:         Behavior: Behavior normal.            Last Recorded Vitals  Blood pressure 110/66, pulse 65, temperature 35.9 °C (96.6 °F), temperature source Temporal, resp. rate 16, height 1.829 m (6'), weight 80.7 kg (178 lb), SpO2 93%.     Relevant Results           Results for orders placed or performed during the hospital encounter of 03/21/25 (from the past 24 hours)   CBC and Auto Differential   Result Value Ref Range     WBC 4.5 4.4 - 11.3 x10*3/uL     nRBC 0.0 0.0 - 0.0 /100 WBCs     RBC 4.59 4.50 - 5.90 x10*6/uL     Hemoglobin 13.9 13.5 - 17.5 g/dL     Hematocrit 45.2 41.0 - 52.0 %     MCV 99 80 - 100 fL     MCH 30.3 26.0 - 34.0 pg     MCHC 30.8 (L) 32.0 - 36.0 g/dL     RDW 13.4 11.5 - 14.5 %     Platelets 169 150 - 450 x10*3/uL     Neutrophils % 68.9 40.0 - 80.0 %     Immature Granulocytes %, Automated 0.2 0.0 - 0.9 %     Lymphocytes % 11.8 13.0 - 44.0 %     Monocytes % 16.7 2.0 - 10.0 %     Eosinophils % 2.0 0.0 - 6.0 %     Basophils % 0.4 0.0 - 2.0 %     Neutrophils Absolute 3.09 1.60 - 5.50 x10*3/uL     Immature Granulocytes Absolute, Automated 0.01 0.00 - 0.50 x10*3/uL     Lymphocytes Absolute 0.53 (L) 0.80 - 3.00 x10*3/uL      Monocytes Absolute 0.75 0.05 - 0.80 x10*3/uL     Eosinophils Absolute 0.09 0.00 - 0.40 x10*3/uL     Basophils Absolute 0.02 0.00 - 0.10 x10*3/uL   Comprehensive metabolic panel   Result Value Ref Range     Glucose 93 74 - 99 mg/dL     Sodium 135 (L) 136 - 145 mmol/L     Potassium 4.7 3.5 - 5.3 mmol/L     Chloride 101 98 - 107 mmol/L     Bicarbonate 26 21 - 32 mmol/L     Anion Gap 13 10 - 20 mmol/L     Urea Nitrogen 25 (H) 6 - 23 mg/dL     Creatinine 1.63 (H) 0.50 - 1.30 mg/dL     eGFR 44 (L) >60 mL/min/1.73m*2     Calcium 9.8 8.6 - 10.3 mg/dL     Albumin 4.7 3.4 - 5.0 g/dL     Alkaline Phosphatase 89 33 - 136 U/L     Total Protein 8.3 (H) 6.4 - 8.2 g/dL     AST 32 9 - 39 U/L     Bilirubin, Total 0.7 0.0 - 1.2 mg/dL     ALT 18 10 - 52 U/L   Troponin I, High Sensitivity   Result Value Ref Range     Troponin I, High Sensitivity 6 0 - 20 ng/L   Troponin I, High Sensitivity   Result Value Ref Range     Troponin I, High Sensitivity 6 0 - 20 ng/L   Lipid Panel   Result Value Ref Range     Cholesterol 91 0 - 199 mg/dL     HDL-Cholesterol 36.6 mg/dL     Cholesterol/HDL Ratio 2.5       LDL Calculated 39 <=99 mg/dL     VLDL 15 0 - 40 mg/dL     Triglycerides 75 0 - 149 mg/dL     Non HDL Cholesterol 54 0 - 149 mg/dL   Lavender Top   Result Value Ref Range     Extra Tube Hold for add-ons.        XR chest 2 views     Result Date: 3/21/2025  STUDY: Chest Radiographs;  3/21/2025 17:44 INDICATION: Chest pain. COMPARISON: 12/27/2019 XR Chest ACCESSION NUMBER(S): [id number] ORDERING CLINICIAN: DIAMOND MARINELLI TECHNIQUE:  Frontal and lateral chest. FINDINGS: CARDIOMEDIASTINAL SILHOUETTE: Cardiomediastinal silhouette is normal in size and configuration.  LUNGS: Lungs are clear.  There is no consolidation, effusion or pneumothorax but there is a large retrocardiac hiatal hernia.  ABDOMEN: No remarkable upper abdominal findings.  BONES: No acute osseous changes.     No acute cardiopulmonary disease.  There is a retrocardiac hiatal hernia  but the lungs otherwise appear clear.. Signed by Mikey Dick MD            Assessment/Plan     Assessment & Plan  Chest pain, unspecified type     74-year-old male with past medical history of HTN, HLD and CAD with 5 stents on Plavix presenting to ED for ongoing intermittent chest pain with radiation to arm for past 4 months.  Labs and EKG without evidence of cardiac ischemia/injury.  Patient admitted to observation with telemetry under Dr. Oliveira for further evaluation and care.     #Chest pain  #Tobacco use  - Continue Plavix, statin  - Cardio consult  - Vitals every 4 hours, telemetry  - Cardiac diet  - ECG per ACS symptoms  - Follow lipid panel  - Nicotine patch     Chronic conditions  #CAD  #Hypertension  #HLD  #COPD  #GERD  - Continue home meds when reconciled     I spent 60 minutes in the professional and overall care of this patient.  Patient fully evaluated on March 22 and Long discussion with patient.  Patient is now agreeable to cardiac catheterization.  Will monitor overnight and run it past cardiology.  Recheck labs in AM.     Silas Oliveira MD                 Heart catheterization planned for tomorrow.  No chest pain noted at this time.              Rianna Israel

## 2025-03-23 NOTE — CARE PLAN
The patient's goals for the shift include sleep, comfort and safety    The clinical goals for the shift include patient will not have any complaints of chest pain for entire shift.

## 2025-03-23 NOTE — PROGRESS NOTES
Subjective Data:  Agreeable to cardiac catheterization    Overnight Events:    Non reported     Objective Data:  Last Recorded Vitals:  Vitals:    03/23/25 0119 03/23/25 0557 03/23/25 0900 03/23/25 1046   BP: 104/64 111/66 101/64 101/64   BP Location: Right arm Left arm  Right arm   Patient Position: Lying Lying  Sitting   Pulse: 71 67  56   Resp: 16 16 18 18   Temp: 36.3 °C (97.3 °F) 36.6 °C (97.9 °F)  36 °C (96.8 °F)   TempSrc: Temporal Temporal  Temporal   SpO2: 94% 92%  96%   Weight:       Height:           Last Labs:  CBC - 3/23/2025:  7:05 AM  4.4 12.2 156    39.5      CMP - 3/23/2025:  7:05 AM  9.0 6.6 26 --- 0.4   _ 3.6 16 67      PTT - No results in last year.  _   _ _     TROPHS   Date/Time Value Ref Range Status   03/21/2025 09:37 PM 6 0 - 20 ng/L Final   03/21/2025 05:21 PM 6 0 - 20 ng/L Final     BNP   Date/Time Value Ref Range Status   12/28/2019 04:26  0 - 99 pg/mL Final     Comment:     .  <100 pg/mL - Heart failure unlikely  100-299 pg/mL - Intermediate probability of acute heart  .               failure exacerbation. Correlate with clinical  .               context and patient history.    >=300 pg/mL - Heart Failure likely. Correlate with clinical  .               context and patient history.  BNP testing is performed using different testing   methodology at [facility] than at other   [facility] hospitals. Direct result comparisons should   only be made within the same method.       HGBA1C   Date/Time Value Ref Range Status   01/03/2022 07:44 AM 6.4 % Final     Comment:          Diagnosis of Diabetes-Adults   Non-Diabetic: < or = 5.6%   Increased risk for developing diabetes: 5.7-6.4%   Diagnostic of diabetes: > or = 6.5%  .       Monitoring of Diabetes                Age (y)     Therapeutic Goal (%)   Adults:          >18           <7.0   Pediatrics:    13-18           <7.5                   7-12           <8.0                   0- 6            7.5-8.5   American Diabetes  "Association. Diabetes Care 33(S1), Jan 2010.       LDLCALC   Date/Time Value Ref Range Status   03/22/2025 06:35 AM 39 <=99 mg/dL Final     Comment:                                 Near   Borderline      AGE      Desirable  Optimal    High     High     Very High     0-19 Y     0 - 109     ---    110-129   >/= 130     ----    20-24 Y     0 - 119     ---    120-159   >/= 160     ----      >24 Y     0 -  99   100-129  130-159   160-189     >/=190       VLDL   Date/Time Value Ref Range Status   03/22/2025 06:35 AM 15 0 - 40 mg/dL Final   09/22/2023 12:41 PM 18 0 - 40 mg/dL Final   02/09/2022 11:33 AM 21 0 - 40 mg/dL Final   01/03/2022 07:44 AM 44 0 - 40 mg/dL Final      Last I/O:  No intake/output data recorded.    Past Cardiology Tests (Last 3 Years):  EKG:  ECG 12 lead (Clinic Performed) 12/19/2024      ECG 12 lead (Clinic Performed) 10/20/2023    Echo:  No results found for this or any previous visit from the past 1095 days.    Ejection Fractions:  No results found for: \"EF\"  Cath:  No results found for this or any previous visit from the past 1095 days.    Stress Test:  No results found for this or any previous visit from the past 1095 days.    Cardiac Imaging:  No results found for this or any previous visit from the past 1095 days.      Inpatient Medications:  Scheduled medications   Medication Dose Route Frequency    clopidogrel  75 mg oral Daily    colchicine  0.6 mg oral BID    enoxaparin  40 mg subcutaneous q24h    ezetimibe  10 mg oral Daily    lisinopril  20 mg oral Daily    metoprolol succinate XL  50 mg oral Daily    nicotine  1 patch transdermal Daily    pantoprazole  40 mg oral Daily before breakfast    probenecid  500 mg oral BID    psyllium  1 packet oral Daily    rosuvastatin  40 mg oral Daily     PRN medications   Medication    acetaminophen    Or    acetaminophen    Or    acetaminophen    guaiFENesin    nitroglycerin    ondansetron    Or    ondansetron    oxygen     Continuous Medications "   Medication Dose Last Rate       Physical Exam:  GEN: WD WN 73 yo wm in [place]  HEENT: Atraumatic, normocephalic  COR: Reg  LUNGS: Clear  ABD: Soft, active BS  EXT: Warm     Assessment/Plan   1.) Chest pain  2.) S/P 5 stents  PLAN  1.) Cardiac catheterization which I will discuss with Dr. James Hunt  2.) Continue tele monitor  Peripheral IV 03/21/25 20 G Left Antecubital (Active)   Site Assessment Clean;Dry;Intact 03/23/25 0800   Dressing Type Transparent 03/23/25 0800   Line Status Saline locked 03/23/25 0800   Dressing Status Clean;Dry;Occlusive 03/23/25 0800   Number of days: 2       Code Status:  Full Code    I spent 30 minutes in the professional and overall care of this patient.        Pola Santana MD

## 2025-03-23 NOTE — CARE PLAN
The patient's goals for the shift include rest    The clinical goals for the shift include PATIENT WILL BE FREE OF CHEST PAIN DURING SHIFT.    Over the shift, the patient did not make progress toward the following goals. Barriers to progression include CP RADIATING TO LEFT NECK AND LEFT JAW. Recommendations to address these barriers include MEDICINE AND CONSULTS.

## 2025-03-24 ENCOUNTER — TELEPHONE (OUTPATIENT)
Dept: CARDIOLOGY | Facility: CLINIC | Age: 75
End: 2025-03-24
Payer: MEDICARE

## 2025-03-24 ENCOUNTER — APPOINTMENT (OUTPATIENT)
Dept: RADIOLOGY | Facility: HOSPITAL | Age: 75
DRG: 287 | End: 2025-03-24
Payer: COMMERCIAL

## 2025-03-24 ENCOUNTER — APPOINTMENT (OUTPATIENT)
Dept: CARDIOLOGY | Facility: HOSPITAL | Age: 75
DRG: 287 | End: 2025-03-24
Payer: COMMERCIAL

## 2025-03-24 ENCOUNTER — APPOINTMENT (OUTPATIENT)
Dept: VASCULAR MEDICINE | Facility: HOSPITAL | Age: 75
DRG: 287 | End: 2025-03-24
Payer: COMMERCIAL

## 2025-03-24 PROBLEM — I20.0 UNSTABLE ANGINA (MULTI): Status: ACTIVE | Noted: 2025-03-21

## 2025-03-24 LAB
ALBUMIN SERPL BCP-MCNC: 3.9 G/DL (ref 3.4–5)
ALP SERPL-CCNC: 71 U/L (ref 33–136)
ALT SERPL W P-5'-P-CCNC: 18 U/L (ref 10–52)
ANION GAP SERPL CALC-SCNC: 13 MMOL/L (ref 10–20)
AORTIC VALVE MEAN GRADIENT: 4 MMHG
AORTIC VALVE PEAK VELOCITY: 1.29 M/S
AST SERPL W P-5'-P-CCNC: 27 U/L (ref 9–39)
ATRIAL RATE: 75 BPM
AV PEAK GRADIENT: 7 MMHG
AVA (PEAK VEL): 2.89 CM2
AVA (VTI): 3.06 CM2
BASOPHILS # BLD AUTO: 0.02 X10*3/UL (ref 0–0.1)
BASOPHILS NFR BLD AUTO: 0.4 %
BILIRUB SERPL-MCNC: 0.5 MG/DL (ref 0–1.2)
BUN SERPL-MCNC: 33 MG/DL (ref 6–23)
CALCIUM SERPL-MCNC: 9.1 MG/DL (ref 8.6–10.3)
CHLORIDE SERPL-SCNC: 105 MMOL/L (ref 98–107)
CO2 SERPL-SCNC: 26 MMOL/L (ref 21–32)
CREAT SERPL-MCNC: 1.39 MG/DL (ref 0.5–1.3)
EGFRCR SERPLBLD CKD-EPI 2021: 53 ML/MIN/1.73M*2
EJECTION FRACTION APICAL 4 CHAMBER: 54.5
EJECTION FRACTION: 58 %
EOSINOPHIL # BLD AUTO: 0.3 X10*3/UL (ref 0–0.4)
EOSINOPHIL NFR BLD AUTO: 6.2 %
ERYTHROCYTE [DISTWIDTH] IN BLOOD BY AUTOMATED COUNT: 13.4 % (ref 11.5–14.5)
GLUCOSE SERPL-MCNC: 92 MG/DL (ref 74–99)
HCT VFR BLD AUTO: 42.4 % (ref 41–52)
HGB BLD-MCNC: 12.8 G/DL (ref 13.5–17.5)
IMM GRANULOCYTES # BLD AUTO: 0.01 X10*3/UL (ref 0–0.5)
IMM GRANULOCYTES NFR BLD AUTO: 0.2 % (ref 0–0.9)
LEFT ATRIUM VOLUME AREA LENGTH INDEX BSA: 42.6 ML/M2
LEFT VENTRICLE INTERNAL DIMENSION DIASTOLE: 4 CM (ref 3.5–6)
LEFT VENTRICULAR OUTFLOW TRACT DIAMETER: 2.2 CM
LYMPHOCYTES # BLD AUTO: 1.24 X10*3/UL (ref 0.8–3)
LYMPHOCYTES NFR BLD AUTO: 25.6 %
MCH RBC QN AUTO: 30.1 PG (ref 26–34)
MCHC RBC AUTO-ENTMCNC: 30.2 G/DL (ref 32–36)
MCV RBC AUTO: 100 FL (ref 80–100)
MITRAL VALVE E/A RATIO: 1.48
MONOCYTES # BLD AUTO: 0.62 X10*3/UL (ref 0.05–0.8)
MONOCYTES NFR BLD AUTO: 12.8 %
NEUTROPHILS # BLD AUTO: 2.66 X10*3/UL (ref 1.6–5.5)
NEUTROPHILS NFR BLD AUTO: 54.8 %
NRBC BLD-RTO: 0 /100 WBCS (ref 0–0)
P AXIS: 52 DEGREES
P OFFSET: 182 MS
P ONSET: 121 MS
PLATELET # BLD AUTO: 168 X10*3/UL (ref 150–450)
POTASSIUM SERPL-SCNC: 4.5 MMOL/L (ref 3.5–5.3)
PR INTERVAL: 186 MS
PROT SERPL-MCNC: 7 G/DL (ref 6.4–8.2)
Q ONSET: 214 MS
QRS COUNT: 12 BEATS
QRS DURATION: 98 MS
QT INTERVAL: 382 MS
QTC CALCULATION(BAZETT): 426 MS
QTC FREDERICIA: 411 MS
R AXIS: 265 DEGREES
RBC # BLD AUTO: 4.25 X10*6/UL (ref 4.5–5.9)
RIGHT VENTRICLE FREE WALL PEAK S': 10.2 CM/S
RIGHT VENTRICLE PEAK SYSTOLIC PRESSURE: 29.4 MMHG
SODIUM SERPL-SCNC: 139 MMOL/L (ref 136–145)
T AXIS: 60 DEGREES
T OFFSET: 405 MS
TRICUSPID ANNULAR PLANE SYSTOLIC EXCURSION: 2.9 CM
VENTRICULAR RATE: 75 BPM
WBC # BLD AUTO: 4.9 X10*3/UL (ref 4.4–11.3)

## 2025-03-24 PROCEDURE — 2500000002 HC RX 250 W HCPCS SELF ADMINISTERED DRUGS (ALT 637 FOR MEDICARE OP, ALT 636 FOR OP/ED): Performed by: NURSE PRACTITIONER

## 2025-03-24 PROCEDURE — 7100000009 HC PHASE TWO TIME - INITIAL BASE CHARGE: Performed by: INTERNAL MEDICINE

## 2025-03-24 PROCEDURE — 93880 EXTRACRANIAL BILAT STUDY: CPT

## 2025-03-24 PROCEDURE — C8929 TTE W OR WO FOL WCON,DOPPLER: HCPCS

## 2025-03-24 PROCEDURE — 99222 1ST HOSP IP/OBS MODERATE 55: CPT | Performed by: NURSE PRACTITIONER

## 2025-03-24 PROCEDURE — 4A023N7 MEASUREMENT OF CARDIAC SAMPLING AND PRESSURE, LEFT HEART, PERCUTANEOUS APPROACH: ICD-10-PCS | Performed by: INTERNAL MEDICINE

## 2025-03-24 PROCEDURE — 74176 CT ABD & PELVIS W/O CONTRAST: CPT

## 2025-03-24 PROCEDURE — 2500000004 HC RX 250 GENERAL PHARMACY W/ HCPCS (ALT 636 FOR OP/ED): Performed by: INTERNAL MEDICINE

## 2025-03-24 PROCEDURE — B2111ZZ FLUOROSCOPY OF MULTIPLE CORONARY ARTERIES USING LOW OSMOLAR CONTRAST: ICD-10-PCS | Performed by: INTERNAL MEDICINE

## 2025-03-24 PROCEDURE — 2550000001 HC RX 255 CONTRASTS: Performed by: INTERNAL MEDICINE

## 2025-03-24 PROCEDURE — 2500000002 HC RX 250 W HCPCS SELF ADMINISTERED DRUGS (ALT 637 FOR MEDICARE OP, ALT 636 FOR OP/ED): Performed by: PHYSICIAN ASSISTANT

## 2025-03-24 PROCEDURE — 93306 TTE W/DOPPLER COMPLETE: CPT | Performed by: INTERNAL MEDICINE

## 2025-03-24 PROCEDURE — C1760 CLOSURE DEV, VASC: HCPCS | Performed by: INTERNAL MEDICINE

## 2025-03-24 PROCEDURE — 93458 L HRT ARTERY/VENTRICLE ANGIO: CPT | Performed by: INTERNAL MEDICINE

## 2025-03-24 PROCEDURE — 2500000002 HC RX 250 W HCPCS SELF ADMINISTERED DRUGS (ALT 637 FOR MEDICARE OP, ALT 636 FOR OP/ED)

## 2025-03-24 PROCEDURE — 99152 MOD SED SAME PHYS/QHP 5/>YRS: CPT | Performed by: INTERNAL MEDICINE

## 2025-03-24 PROCEDURE — 2500000001 HC RX 250 WO HCPCS SELF ADMINISTERED DRUGS (ALT 637 FOR MEDICARE OP)

## 2025-03-24 PROCEDURE — C1894 INTRO/SHEATH, NON-LASER: HCPCS | Performed by: INTERNAL MEDICINE

## 2025-03-24 PROCEDURE — 2720000007 HC OR 272 NO HCPCS: Performed by: INTERNAL MEDICINE

## 2025-03-24 PROCEDURE — 2500000001 HC RX 250 WO HCPCS SELF ADMINISTERED DRUGS (ALT 637 FOR MEDICARE OP): Performed by: INTERNAL MEDICINE

## 2025-03-24 PROCEDURE — 2500000001 HC RX 250 WO HCPCS SELF ADMINISTERED DRUGS (ALT 637 FOR MEDICARE OP): Performed by: NURSE PRACTITIONER

## 2025-03-24 PROCEDURE — 2500000005 HC RX 250 GENERAL PHARMACY W/O HCPCS: Performed by: INTERNAL MEDICINE

## 2025-03-24 PROCEDURE — 2500000002 HC RX 250 W HCPCS SELF ADMINISTERED DRUGS (ALT 637 FOR MEDICARE OP, ALT 636 FOR OP/ED): Performed by: INTERNAL MEDICINE

## 2025-03-24 PROCEDURE — 74176 CT ABD & PELVIS W/O CONTRAST: CPT | Performed by: STUDENT IN AN ORGANIZED HEALTH CARE EDUCATION/TRAINING PROGRAM

## 2025-03-24 PROCEDURE — 2500000001 HC RX 250 WO HCPCS SELF ADMINISTERED DRUGS (ALT 637 FOR MEDICARE OP): Performed by: PHYSICIAN ASSISTANT

## 2025-03-24 PROCEDURE — 36415 COLL VENOUS BLD VENIPUNCTURE: CPT | Performed by: INTERNAL MEDICINE

## 2025-03-24 PROCEDURE — 80053 COMPREHEN METABOLIC PANEL: CPT | Performed by: INTERNAL MEDICINE

## 2025-03-24 PROCEDURE — S4991 NICOTINE PATCH NONLEGEND: HCPCS

## 2025-03-24 PROCEDURE — 71250 CT THORAX DX C-: CPT | Performed by: STUDENT IN AN ORGANIZED HEALTH CARE EDUCATION/TRAINING PROGRAM

## 2025-03-24 PROCEDURE — 71250 CT THORAX DX C-: CPT

## 2025-03-24 PROCEDURE — 96373 THER/PROPH/DIAG INJ IA: CPT | Performed by: INTERNAL MEDICINE

## 2025-03-24 PROCEDURE — 1200000002 HC GENERAL ROOM WITH TELEMETRY DAILY

## 2025-03-24 PROCEDURE — 2780000003 HC OR 278 NO HCPCS: Performed by: INTERNAL MEDICINE

## 2025-03-24 PROCEDURE — 7100000010 HC PHASE TWO TIME - EACH INCREMENTAL 1 MINUTE: Performed by: INTERNAL MEDICINE

## 2025-03-24 PROCEDURE — C1887 CATHETER, GUIDING: HCPCS | Performed by: INTERNAL MEDICINE

## 2025-03-24 PROCEDURE — 93880 EXTRACRANIAL BILAT STUDY: CPT | Performed by: STUDENT IN AN ORGANIZED HEALTH CARE EDUCATION/TRAINING PROGRAM

## 2025-03-24 PROCEDURE — 85025 COMPLETE CBC W/AUTO DIFF WBC: CPT | Performed by: INTERNAL MEDICINE

## 2025-03-24 PROCEDURE — B2151ZZ FLUOROSCOPY OF LEFT HEART USING LOW OSMOLAR CONTRAST: ICD-10-PCS | Performed by: INTERNAL MEDICINE

## 2025-03-24 RX ORDER — MIDAZOLAM HYDROCHLORIDE 1 MG/ML
INJECTION, SOLUTION INTRAMUSCULAR; INTRAVENOUS AS NEEDED
Status: DISCONTINUED | OUTPATIENT
Start: 2025-03-24 | End: 2025-03-24 | Stop reason: HOSPADM

## 2025-03-24 RX ORDER — LIDOCAINE HYDROCHLORIDE 20 MG/ML
INJECTION, SOLUTION INFILTRATION; PERINEURAL AS NEEDED
Status: DISCONTINUED | OUTPATIENT
Start: 2025-03-24 | End: 2025-03-24 | Stop reason: HOSPADM

## 2025-03-24 RX ORDER — FENTANYL CITRATE 50 UG/ML
INJECTION, SOLUTION INTRAMUSCULAR; INTRAVENOUS AS NEEDED
Status: DISCONTINUED | OUTPATIENT
Start: 2025-03-24 | End: 2025-03-24 | Stop reason: HOSPADM

## 2025-03-24 RX ORDER — ASPIRIN 325 MG
325 TABLET ORAL ONCE
Status: COMPLETED | OUTPATIENT
Start: 2025-03-24 | End: 2025-03-24

## 2025-03-24 RX ORDER — IODIXANOL 320 MG/ML
INJECTION, SOLUTION INTRAVASCULAR AS NEEDED
Status: DISCONTINUED | OUTPATIENT
Start: 2025-03-24 | End: 2025-03-24 | Stop reason: HOSPADM

## 2025-03-24 RX ORDER — VERAPAMIL HYDROCHLORIDE 2.5 MG/ML
INJECTION, SOLUTION INTRAVENOUS AS NEEDED
Status: DISCONTINUED | OUTPATIENT
Start: 2025-03-24 | End: 2025-03-24 | Stop reason: HOSPADM

## 2025-03-24 RX ORDER — HEPARIN SODIUM 1000 [USP'U]/ML
INJECTION, SOLUTION INTRAVENOUS; SUBCUTANEOUS AS NEEDED
Status: DISCONTINUED | OUTPATIENT
Start: 2025-03-24 | End: 2025-03-24 | Stop reason: HOSPADM

## 2025-03-24 RX ORDER — NITROGLYCERIN 40 MG/100ML
INJECTION INTRAVENOUS AS NEEDED
Status: DISCONTINUED | OUTPATIENT
Start: 2025-03-24 | End: 2025-03-24 | Stop reason: HOSPADM

## 2025-03-24 RX ADMIN — PROBENECID 500 MG: 500 TABLET, FILM COATED ORAL at 09:29

## 2025-03-24 RX ADMIN — LISINOPRIL 20 MG: 20 TABLET ORAL at 09:36

## 2025-03-24 RX ADMIN — ROSUVASTATIN CALCIUM 40 MG: 10 TABLET, FILM COATED ORAL at 09:32

## 2025-03-24 RX ADMIN — PERFLUTREN 2.5 ML OF DILUTION: 6.52 INJECTION, SUSPENSION INTRAVENOUS at 09:25

## 2025-03-24 RX ADMIN — CLOPIDOGREL 75 MG: 75 TABLET ORAL at 08:00

## 2025-03-24 RX ADMIN — COLCHICINE 0.6 MG: 0.6 TABLET, FILM COATED ORAL at 09:31

## 2025-03-24 RX ADMIN — ASPIRIN 325 MG ORAL TABLET 325 MG: 325 PILL ORAL at 10:58

## 2025-03-24 RX ADMIN — COLCHICINE 0.6 MG: 0.6 TABLET, FILM COATED ORAL at 20:04

## 2025-03-24 RX ADMIN — NICOTINE 1 PATCH: 21 PATCH, EXTENDED RELEASE TRANSDERMAL at 09:42

## 2025-03-24 RX ADMIN — PROBENECID 500 MG: 500 TABLET, FILM COATED ORAL at 20:05

## 2025-03-24 RX ADMIN — EZETIMIBE 10 MG: 10 TABLET ORAL at 09:33

## 2025-03-24 ASSESSMENT — COGNITIVE AND FUNCTIONAL STATUS - GENERAL
DAILY ACTIVITIY SCORE: 24
MOBILITY SCORE: 24

## 2025-03-24 ASSESSMENT — PAIN SCALES - GENERAL
PAINLEVEL_OUTOF10: 0 - NO PAIN

## 2025-03-24 ASSESSMENT — PAIN - FUNCTIONAL ASSESSMENT
PAIN_FUNCTIONAL_ASSESSMENT: 0-10

## 2025-03-24 NOTE — PROGRESS NOTES
Occupational Therapy                 Therapy Communication Note    Patient Name: Rafi Lisa Jr.  MRN: [id number]  Department: [facility]  Room: [facility] Card *  Today's Date: 3/24/2025     Discipline: Occupational Therapy        OT SCREEN: Chart reviewed. Spoke with RN and pt. in room. Observed pt. ambulating ind.'ly in room without device. Pt. ind. with adls. No skilled OT needs. Discontinue OT services.

## 2025-03-24 NOTE — CONSULTS
Inpatient consult to Cardiothoracic Surgery  Consult performed by: MAURO Joe-CNP  Consult ordered by: Tony Oneill MD        [facility]   Cardiac Surgery   Inpatient Consult        History Of Present Illness  Rafi Lisa Jr. is a 74 y.o. male with a PMH significant for CAD (s/p PCI to the LAD, laser arthrectomy to the Lcx on 1/2022), HTN, HLD, COPD (Emphysemea), and nicotine use disorder   who presents to [facility] on 3/21/2025 for evaluation of his anginal complaints.    The patient was undergoing workup with cardiologist, Dr. James Hunt in workup for his anginal complaints. He ultimately was scheduled for a stress testing; however during his exam, he was referred to the ED and was subsequently admitted for inpatient workup.     On 3/24/25, he underwent a LHC with interventional cardiologist, Dr. Tony Oneill and his coronary anatomy revealed diffuse multivessel CAD. Cardiac surgery was subsequently consulted for bypass grafting evaluation.      Subjective   Past Medical History  He has a past medical history of Abnormal findings on diagnostic imaging of other specified body structures, Personal history of other medical treatment, and Personal history of other medical treatment.    Surgical History  He has a past surgical history that includes Other surgical history (01/30/2020); Other surgical history (01/20/2022); Other surgical history (01/20/2022); Other surgical history (06/04/2020); Other surgical history (06/04/2020); Other surgical history (06/04/2020); Other surgical history (06/04/2020); Other surgical history (06/04/2020); Other surgical history (06/04/2020); Other surgical history (06/04/2020); MR angio head wo IV contrast (12/17/2023); MR angio neck wo IV contrast (12/17/2023); MR angio head wo IV contrast (12/18/2023); and MR angio neck wo IV contrast (12/18/2023).     Social History  He reports that he has  been smoking cigarettes. He has a 64 pack-year smoking history. He has quit using smokeless tobacco. No history on file for alcohol use and drug use.    Family History  Family History   Problem Relation Name Age of Onset    Other (coronary thrombosis) Father           in 1970s @63        Allergies  Sulfa (sulfonamide antibiotics) and Adhesive tape-silicones    Review of Systems  Review of Systems   Constitutional: Negative for decreased appetite and malaise/fatigue.   HENT:  Negative for congestion.    Eyes:  Negative for blurred vision and double vision.   Cardiovascular:  Negative for chest pain, dyspnea on exertion, irregular heartbeat, leg swelling, orthopnea and palpitations.   Respiratory:  Negative for cough and shortness of breath.    Endocrine: Negative for cold intolerance and heat intolerance.   Skin:  Negative for nail changes, poor wound healing and rash.   Musculoskeletal:  Negative for arthritis, muscle cramps, muscle weakness, myalgias and stiffness.   Gastrointestinal:  Negative for bloating, nausea and vomiting.   Genitourinary:  Negative for frequency, hesitancy and urgency.   Neurological:  Negative for dizziness, focal weakness, light-headedness and weakness.   Psychiatric/Behavioral:  Negative for altered mental status.    Allergic/Immunologic: Negative for environmental allergies.           Objective   Physical Exam  Physical Exam  Vitals and nursing note reviewed.   Constitutional:       General: He is not in acute distress.     Appearance: Normal appearance. He is not ill-appearing.   HENT:      Head: Normocephalic and atraumatic.      Nose: Nose normal.      Mouth/Throat:      Mouth: Mucous membranes are moist.      Pharynx: Oropharynx is clear.   Eyes:      Extraocular Movements: Extraocular movements intact.      Conjunctiva/sclera: Conjunctivae normal.      Pupils: Pupils are equal, round, and reactive to light.   Cardiovascular:      Rate and Rhythm: Normal rate and regular rhythm.       Pulses: Normal pulses.      Heart sounds: Normal heart sounds, S1 normal and S2 normal. No murmur heard.     No systolic murmur is present.      No friction rub. No gallop.   Pulmonary:      Effort: Pulmonary effort is normal. No tachypnea or bradypnea.      Breath sounds: Examination of the right-lower field reveals decreased breath sounds. Examination of the left-lower field reveals decreased breath sounds. Decreased breath sounds present.   Abdominal:      General: Abdomen is flat. Bowel sounds are normal. There is no distension.      Palpations: Abdomen is soft.   Musculoskeletal:         General: Normal range of motion.      Cervical back: Normal range of motion and neck supple.      Right lower leg: No edema.      Left lower leg: No edema.   Skin:     General: Skin is warm and dry.      Capillary Refill: Capillary refill takes less than 2 seconds.      Findings: No lesion.      Nails: There is no clubbing.   Neurological:      General: No focal deficit present.      Mental Status: He is alert and oriented to person, place, and time. Mental status is at baseline.      Cranial Nerves: Cranial nerves 2-12 are intact.      Sensory: Sensation is intact.      Motor: Motor function is intact.   Psychiatric:         Attention and Perception: Attention and perception normal.         Mood and Affect: Mood normal.         Speech: Speech normal.         Behavior: Behavior normal.         Thought Content: Thought content normal.         Cognition and Memory: Cognition and memory normal.         Judgment: Judgment normal.          Last Recorded Vitals  /75 (BP Location: Left arm, Patient Position: Sitting)   Pulse 72   Temp 36 °C (96.8 °F) (Temporal)   Resp 16   Wt 80.7 kg (178 lb)   SpO2 95%     Relevant Results  Last Labs:  CBC - 3/24/2025:  6:40 AM  4.9 12.8 168    42.4      CMP - 3/24/2025:  6:40 AM  9.1 7.0 27 --- 0.5   _ 3.9 18 71      PTT - No results in last year.  _   _ _     Troponin I, High  Sensitivity   Date/Time Value Ref Range Status   03/21/2025 09:37 PM 6 0 - 20 ng/L Final   03/21/2025 05:21 PM 6 0 - 20 ng/L Final     BNP   Date/Time Value Ref Range Status   12/28/2019 04:26  (H) 0 - 99 pg/mL Final     Comment:     .  <100 pg/mL - Heart failure unlikely  100-299 pg/mL - Intermediate probability of acute heart  .               failure exacerbation. Correlate with clinical  .               context and patient history.    >=300 pg/mL - Heart Failure likely. Correlate with clinical  .               context and patient history.  BNP testing is performed using different testing   methodology at [facility] than at other   [facility] hospitals. Direct result comparisons should   only be made within the same method.       Hemoglobin A1C   Date/Time Value Ref Range Status   01/03/2022 07:44 AM 6.4 (A) % Final     Comment:          Diagnosis of Diabetes-Adults   Non-Diabetic: < or = 5.6%   Increased risk for developing diabetes: 5.7-6.4%   Diagnostic of diabetes: > or = 6.5%  .       Monitoring of Diabetes                Age (y)     Therapeutic Goal (%)   Adults:          >18           <7.0   Pediatrics:    13-18           <7.5                   7-12           <8.0                   0- 6            7.5-8.5   American Diabetes Association. Diabetes Care 33(S1), Jan 2010.       LDL Calculated   Date/Time Value Ref Range Status   03/22/2025 06:35 AM 39 <=99 mg/dL Final     Comment:                                 Near   Borderline      AGE      Desirable  Optimal    High     High     Very High     0-19 Y     0 - 109     ---    110-129   >/= 130     ----    20-24 Y     0 - 119     ---    120-159   >/= 160     ----      >24 Y     0 -  99   100-129  130-159   160-189     >/=190       VLDL   Date/Time Value Ref Range Status   03/22/2025 06:35 AM 15 0 - 40 mg/dL Final   09/22/2023 12:41 PM 18 0 - 40 mg/dL Final   02/09/2022 11:33 AM 21 0 - 40 mg/dL Final   01/03/2022 07:44 AM 44 (H) 0 - 40 mg/dL  Final      Last I/O:  No intake/output data recorded.    Radiographic Testing  EKG:  ECG 12 lead 03/21/2025       Echo:    Transthoracic ECHO 03/22/2025  No results found for this or any previous visit from the past 1095 days.  PHYSICIAN INTERPRETATION:  Left Ventricle: The left ventricular systolic function is normal, with a visually estimated ejection fraction of 55-60%. There is mild concentric left ventricular hypertrophy. There are no regional left ventricular wall motion abnormalities. The left ventricular cavity size is normal. There is mildly increased septal and mildly increased posterior left ventricular wall thickness. There is left ventricular concentric remodeling. Spectral Doppler shows a Grade II (pseudonormal pattern) of left ventricular diastolic filling with an elevated left atrial pressure.  Left Atrium: The left atrium is mildly dilated.  Right Ventricle: The right ventricle is mildly enlarged. There is normal right ventricular global systolic function.  Right Atrium: The right atrium is normal in size.  Aortic Valve: The aortic valve is trileaflet. There is mild aortic valve thickening. The aortic valve dimensionless index is 0.81. There is moderate aortic valve regurgitation. The peak instantaneous gradient of the aortic valve is 7 mmHg. The mean gradient of the aortic valve is 4 mmHg.  Mitral Valve: The mitral valve is mildly thickened. The peak instantaneous gradient of the mitral valve is 2 mmHg. There is mild mitral valve regurgitation.  Tricuspid Valve: The tricuspid valve is structurally normal. There is mild tricuspid regurgitation. The Doppler estimated RVSP is within normal limits at 29.4 mmHg.  Pulmonic Valve: The pulmonic valve is structurally normal. There is mild pulmonic valve regurgitation.  Pericardium: There is no pericardial effusion noted.  Aorta: The aortic root is abnormal. There is mild dilatation of the ascending aorta.  In comparison to the previous echocardiogram(s):  Compared with study dated 5/15/2020,.        CONCLUSIONS:   1. The left ventricular systolic function is normal, with a visually estimated ejection fraction of 55-60%.   2. Spectral Doppler shows a Grade II (pseudonormal pattern) of left ventricular diastolic filling with an elevated left atrial pressure.   3. Mildly enlarged right ventricle.   4. The left atrium is mildly dilated.   5. Right ventricular systolic pressure is within normal limits.   6. Moderate aortic valve regurgitation.    Chest Imaging:  Carotid duplex bilateral         Transthoracic Echo (TTE) Complete         XR chest 2 views   Final Result   No acute cardiopulmonary disease.  There is a retrocardiac hiatal   hernia but the lungs otherwise appear clear..   Signed by Mikey Dick MD      Cardiac Catheterization Procedure    (Results Pending)   CT chest wo IV contrast    (Results Pending)   CT abdomen pelvis wo IV contrast    (Results Pending)             Assessment & Plan       Multivessel CAD  - Patient has remote Hx of PCI to the LAD in 1998   --> most recently laser arterectomy to the Lcx on 1/2022  - Has been with anginal complaints & subsequently ordered an OP stress test, however when arrived for stress testing was send to the ED   - [facility] this admission revealed diffuse MV-CAD including ISR   - Cardiac surgery consulted for bypass grafting evaluation   - Plan for pre-op workup for surgical staging  - Continue on Clopidogrel 75mg, metoprolol succinate 50mg every day & rosuvastatin 20mg   --> Will need to hold [id number] five days piror to OR     Above patient and plan discussed with cardiac surgeon, Dr. Juan Luis Kline. Plan as above, will continue to follow and stage surgical intervention.           Anders Aaron, APRN-CNP

## 2025-03-24 NOTE — CARE PLAN
The patient's goals for the shift include rest    The clinical goals for the shift include PATIENT WILL BE FREE OF CHEST PAIN DURING SHIFT.

## 2025-03-24 NOTE — TELEPHONE ENCOUNTER
----- Message from James Hunt sent at 3/19/2025  3:32 PM EDT -----  Regarding: Results reviewed  No kidney artery blockages    Thank you!  ----- Message -----  From: Akil, Syngo - Cardiology Results In  Sent: 3/12/2025  11:56 PM EDT  To: James Hunt MD

## 2025-03-24 NOTE — PROGRESS NOTES
Rafi Lisa Jr. is a 74 y.o. male on day 0 of admission presenting with Chest pain, unspecified type.      Subjective   Patient fully evaluated    for    Problem List Items Addressed This Visit          Cardiac and Vasculature    Coronary artery disease    Relevant Medications    clopidogrel (Plavix) tablet 75 mg    nitroglycerin (Nitrostat) SL tablet 0.4 mg    metoprolol succinate XL (Toprol-XL) 24 hr tablet 50 mg    Other Relevant Orders    Carotid duplex bilateral (Completed)    * (Principal) Chest pain, unspecified type - Primary    Relevant Orders    Transthoracic Echo (TTE) Complete (Completed)    Carotid duplex bilateral (Completed)    Unstable angina (Multi)    Relevant Medications    clopidogrel (Plavix) tablet 75 mg    nitroglycerin (Nitrostat) SL tablet 0.4 mg    metoprolol succinate XL (Toprol-XL) 24 hr tablet 50 mg    Other Relevant Orders    Case Request Cath Lab: Left Heart Cath (Completed)    Cardiac Catheterization Procedure     Other Visit Diagnoses       Other specified symptoms and signs involving the circulatory and respiratory systems        Other chest pain              Patient seen resting in bed with head of bed elevated, no s/s or c/o acute difficulties at this time.  Vital signs for last 24 hours Temp:  [35.6 °C (96.1 °F)-36.3 °C (97.3 °F)] 35.6 °C (96.1 °F)  Heart Rate:  [48-72] 62  Resp:  [16] 16  BP: (111-134)/(59-76) 111/75    I/O this shift:  In: -   Out: 5 [Blood:5]  Patient still requiring frequent cardiac and SPO2 monitoring. Continue aggressive pulmonary hygiene and oral hygiene. Off loading as tolerated for skin integrity. Medications and labs reviewed-   Results for orders placed or performed during the hospital encounter of 03/21/25 (from the past 24 hours)   CBC and Auto Differential   Result Value Ref Range    WBC 4.9 4.4 - 11.3 x10*3/uL    nRBC 0.0 0.0 - 0.0 /100 WBCs    RBC 4.25 (L) 4.50 - 5.90 x10*6/uL    Hemoglobin 12.8 (L) 13.5 - 17.5 g/dL    Hematocrit 42.4 41.0 -  52.0 %     80 - 100 fL    MCH 30.1 26.0 - 34.0 pg    MCHC 30.2 (L) 32.0 - 36.0 g/dL    RDW 13.4 11.5 - 14.5 %    Platelets 168 150 - 450 x10*3/uL    Neutrophils % 54.8 40.0 - 80.0 %    Immature Granulocytes %, Automated 0.2 0.0 - 0.9 %    Lymphocytes % 25.6 13.0 - 44.0 %    Monocytes % 12.8 2.0 - 10.0 %    Eosinophils % 6.2 0.0 - 6.0 %    Basophils % 0.4 0.0 - 2.0 %    Neutrophils Absolute 2.66 1.60 - 5.50 x10*3/uL    Immature Granulocytes Absolute, Automated 0.01 0.00 - 0.50 x10*3/uL    Lymphocytes Absolute 1.24 0.80 - 3.00 x10*3/uL    Monocytes Absolute 0.62 0.05 - 0.80 x10*3/uL    Eosinophils Absolute 0.30 0.00 - 0.40 x10*3/uL    Basophils Absolute 0.02 0.00 - 0.10 x10*3/uL   Comprehensive Metabolic Panel   Result Value Ref Range    Glucose 92 74 - 99 mg/dL    Sodium 139 136 - 145 mmol/L    Potassium 4.5 3.5 - 5.3 mmol/L    Chloride 105 98 - 107 mmol/L    Bicarbonate 26 21 - 32 mmol/L    Anion Gap 13 10 - 20 mmol/L    Urea Nitrogen 33 (H) 6 - 23 mg/dL    Creatinine 1.39 (H) 0.50 - 1.30 mg/dL    eGFR 53 (L) >60 mL/min/1.73m*2    Calcium 9.1 8.6 - 10.3 mg/dL    Albumin 3.9 3.4 - 5.0 g/dL    Alkaline Phosphatase 71 33 - 136 U/L    Total Protein 7.0 6.4 - 8.2 g/dL    AST 27 9 - 39 U/L    Bilirubin, Total 0.5 0.0 - 1.2 mg/dL    ALT 18 10 - 52 U/L   Transthoracic Echo (TTE) Complete   Result Value Ref Range    AV pk tatum 1.29 m/s    AV mn grad 4 mmHg    LVOT diam 2.20 cm    MV E/A ratio 1.48     LA vol index A/L 42.6 ml/m2    Tricuspid annular plane systolic excursion 2.9 cm    LV EF 58 %    RV free wall pk S' 10.20 cm/s    LVIDd 4.00 cm    RVSP 29.4 mmHg    Aortic Valve Area by Continuity of VTI 3.06 cm2    Aortic Valve Area by Continuity of Peak Velocity 2.89 cm2    AV pk grad 7 mmHg    LV A4C EF 54.5    Carotid duplex bilateral   Result Value Ref Range    BSA 2.03 m2      Patient recently received an antibiotic (last 12 hours)       None           Patient is s/p 5 stent placement and still with chest pain,  long discussion on goals of care with patient and family at bedside- patient agreeable to cardiac catheterization. Plan discussed with interdisciplinary team, seen by cardiology, all agree to plan. Smoking cessation educator consult placed, appreciate input.  Will continue current and repeat labs in the AM.     Discharge planning discussed with patient and care team. Therapy evaluations ordered. Anticipate HHC/SNF at discharge. Patient aware and agreeable to current plan, continue plan as above.     I spent a total of 50 minutes on the date of the service which included preparing to see the patient, face-to-face patient care, completing clinical documentation, obtaining and/or reviewing separately obtained history, performing a medically appropriate examination, counseling and educating the patient/family/caregiver, ordering medications, tests, or procedures, communicating with other HCPs (not separately reported), independently interpreting results (not separately reported), communicating results to the patient/family/caregiver, and care coordination (not separately reported).        Objective     Last Recorded Vitals  /75 (BP Location: Left arm, Patient Position: Lying)   Pulse 62   Temp 35.6 °C (96.1 °F) (Temporal)   Resp 16   Wt 80.7 kg (178 lb)   SpO2 94%   Intake/Output last 3 Shifts:    Intake/Output Summary (Last 24 hours) at 3/24/2025 1739  Last data filed at 3/24/2025 1136  Gross per 24 hour   Intake --   Output 5 ml   Net -5 ml       Admission Weight  Weight: 80.7 kg (178 lb) (03/21/25 1554)    Daily Weight  03/21/25 : 80.7 kg (178 lb)    Image Results  Transthoracic Echo (TTE) [facility], [address] [place], [place] [address]            Tel [phone] and Fax [phone]    TRANSTHORACIC ECHOCARDIOGRAM REPORT       Patient Name:       KENYATTA SHY LEAVITT     Reading Physician:    [id number] Lopez Castelan DO  Study  Date:         3/24/2025           Ordering Provider:    [id number] HANNA MORROW  MRN/PID:            [id number]            Fellow:  Accession#:         [id number]        Nurse:  Date of Birth/Age:  1950 / 74      Sonographer:          Gabriel au RDCS  Gender assigned at  M                   Additional Staff:  Birth:  Height:             182.00 cm           Admit Date:           3/21/2025  Weight:             80.01 kg            Admission Status:     Inpatient -                                                                Routine  BSA / BMI:          2.01 m2 / 24.15     Encounter#:           [id number]                      kg/m2  Blood Pressure:     112/59 mmHg         Department Location:  [address]                                                                [facility]    Study Type:    TRANSTHORACIC ECHO (TTE) COMPLETE  Diagnosis/ICD: Other chest pain-R07.89; Chest pain, unspecified-R07.9  Indication:    Chest Pain  CPT Code:      Echo Complete w Full Doppler-74389    Patient History:  Pertinent History: PMH HTN, HLD, CAD s/p multiple stents (last laser-assisted                     PCI of Cx 1/22) on Plavix, watchman placement, prediabetes,                     COPD who presented to ED for evaluation of intermittent pain                     that begins at left biceps and radiates up into trapezius and                     jaw with occasional associated chest discomfort for the past                     several months.    Study Detail: The following Echo studies were performed: 2D, M-Mode, Doppler,                color flow and 3D. Technically challenging study due to prominent                lung artifact. Definity used as a contrast agent for endocardial                border definition. Total contrast used for this procedure was 2.5                mL via IV push. Unable  to obtain suprasternal notch and subcostal                view.       PHYSICIAN INTERPRETATION:  Left Ventricle: The left ventricular systolic function is normal, with a visually estimated ejection fraction of 55-60%. There is mild concentric left ventricular hypertrophy. There are no regional left ventricular wall motion abnormalities. The left ventricular cavity size is normal. There is mildly increased septal and mildly increased posterior left ventricular wall thickness. There is left ventricular concentric remodeling. Spectral Doppler shows a Grade II (pseudonormal pattern) of left ventricular diastolic filling with an elevated left atrial pressure.  Left Atrium: The left atrium is mildly dilated.  Right Ventricle: The right ventricle is mildly enlarged. There is normal right ventricular global systolic function.  Right Atrium: The right atrium is normal in size.  Aortic Valve: The aortic valve is trileaflet. There is mild aortic valve thickening. The aortic valve dimensionless index is 0.81. There is moderate aortic valve regurgitation. The peak instantaneous gradient of the aortic valve is 7 mmHg. The mean gradient of the aortic valve is 4 mmHg.  Mitral Valve: The mitral valve is mildly thickened. The peak instantaneous gradient of the mitral valve is 2 mmHg. There is mild mitral valve regurgitation.  Tricuspid Valve: The tricuspid valve is structurally normal. There is mild tricuspid regurgitation. The Doppler estimated RVSP is within normal limits at 29.4 mmHg.  Pulmonic Valve: The pulmonic valve is structurally normal. There is mild pulmonic valve regurgitation.  Pericardium: There is no pericardial effusion noted.  Aorta: The aortic root is abnormal. There is mild dilatation of the ascending aorta.  In comparison to the previous echocardiogram(s): Compared with study dated 5/15/2020,.       CONCLUSIONS:   1. The left ventricular systolic function is normal, with a visually estimated ejection fraction of  55-60%.   2. Spectral Doppler shows a Grade II (pseudonormal pattern) of left ventricular diastolic filling with an elevated left atrial pressure.   3. Mildly enlarged right ventricle.   4. The left atrium is mildly dilated.   5. Right ventricular systolic pressure is within normal limits.   6. Moderate aortic valve regurgitation.    QUANTITATIVE DATA SUMMARY:     2D MEASUREMENTS:          Normal Ranges:  LAs:             4.50 cm  (2.7-4.0cm)  IVSd:            1.30 cm  (0.6-1.1cm)  LVPWd:           1.20 cm  (0.6-1.1cm)  LVIDd:           4.00 cm  (3.9-5.9cm)  LVIDs:           2.40 cm  LV Mass Index:   87 g/m2  LVEDV Index:     54 ml/m2  LV % FS          40.0 %       LEFT ATRIUM:                  Normal Ranges:  LA Vol A4C:        79.7 ml    (22+/-6mL/m2)  LA Vol A2C:        84.8 ml  LA Vol BP:         85.7 ml  LA Vol Index A4C:  39.6ml/m2  LA Vol Index A2C:  42.1 ml/m2  LA Vol Index BP:   42.6 ml/m2  LA Area A4C:       24.0 cm2  LA Area A2C:       25.8 cm2  LA Major Axis A4C: 6.1 cm  LA Major Axis A2C: 6.7 cm  LA Volume Index:   42.6 ml/m2       RIGHT ATRIUM:                 Normal Ranges:  RA Vol A4C:        37.9 ml    (8.3-19.5ml)  RA Vol Index A4C:  18.8 ml/m2  RA Area A4C:       15.6 cm2  RA Major Axis A4C: 5.5 cm       M-MODE MEASUREMENTS:         Normal Ranges:  Ao Root:             3.60 cm (2.0-3.7cm)  LAs:                 3.70 cm (2.7-4.0cm)       AORTA MEASUREMENTS:         Normal Ranges:  Ao Sinus, d:        3.70 cm (2.1-3.5cm)  Ao STJ, d:          3.40 cm (1.7-3.4cm)  Asc Ao, d:          3.80 cm (2.1-3.4cm)       LV SYSTOLIC FUNCTION:                       Normal Ranges:  EF-A4C View:    54 % (>=55%)  EF-A2C View:    42 %  EF-Biplane:     49 %  EF-Visual:      58 %  LV EF Reported: 58 %       LV DIASTOLIC FUNCTION:             Normal Ranges:  MV Peak E:             0.68 m/s    (0.7-1.2 m/s)  MV Peak A:             0.46 m/s    (0.42-0.7 m/s)  E/A Ratio:             1.48        (1.0-2.2)  MV e'                   0.103 m/s   (>8.0)  MV lateral e'          0.11 m/s  MV medial e'           0.09 m/s  MV A Dur:              121.00 msec  E/e' Ratio:            6.63        (<8.0)  PulmV Sys Cheko:         41.10 cm/s  PulmV Michelle Cheko:        47.40 cm/s  PulmV S/D Cheko:         0.90  PulmV A Revs Cheko:      22.80 cm/s  PulmV A Revs Dur:      124.00 msec       MITRAL VALVE:          Normal Ranges:  MV Vmax:      0.78 m/s (<=1.3m/s)  MV peak P.5 mmHg (<5mmHg)  MV mean P.0 mmHg (<2mmHg)  MV DT:        351 msec (150-240msec)       AORTIC VALVE:                     Normal Ranges:  AoV Vmax:                1.29 m/s (<=1.7m/s)  AoV Peak P.7 mmHg (<20mmHg)  AoV Mean P.0 mmHg (1.7-11.5mmHg)  LVOT Max Cheko:            0.98 m/s (<=1.1m/s)  AoV VTI:                 29.80 cm (18-25cm)  LVOT VTI:                24.00 cm  LVOT Diameter:           2.20 cm  (1.8-2.4cm)  AoV Area, VTI:           3.06 cm2 (2.5-5.5cm2)  AoV Area,Vmax:           2.89 cm2 (2.5-4.5cm2)  AoV Dimensionless Index: 0.81       AORTIC INSUFFICIENCY:  AI Vmax:       4.62 m/s  AI Half-time:  533 msec  AI Decel Rate: 254.00 cm/s2       RIGHT VENTRICLE:  RV Basal 4.10 cm  RV Mid   3.30 cm  RV Major 8.3 cm  TAPSE:   28.7 mm  RV s'    0.10 m/s       TRICUSPID VALVE/RVSP:          Normal Ranges:  Peak TR Velocity:     2.57 m/s  RV Syst Pressure:     29 mmHg  (< 30mmHg)       PULMONIC VALVE:          Normal Ranges:  PV Max Cheko:     0.9 m/s  (0.6-0.9m/s)  PV Max PG:      3.2 mmHg       PULMONARY VEINS:  PulmV A Revs Dur: 124.00 msec  PulmV A Revs Cheko: 22.80 cm/s  PulmV Michelle Cheko:   47.40 cm/s  PulmV S/D Cheko:    0.90  PulmV Sys Cheko:    41.10 cm/s       [id number] Lopez Castelan DO  Electronically signed on 3/24/2025 at 4:52:35 PM       ** Final **  Carotid duplex bilateral  Preliminary Cardiology Report             [facility]  [address] [place] [address]  Tel [phone] and Fax [phone]           Preliminary Vascular Lab  Report     [facility] US CAROTID ARTERY DUPLEX BILATERAL       Patient Name:      KENYATTA LEAVITT Reading Physician:  [id number] Mariah Pate MD  Study Date:        3/24/2025       Ordering Physician: [id number] DREAD CRUZ  MRN/PID:           [id number]        Technologist:       Jennifer Mahoney RVT  Accession#:        [id number]    Technologist 2:  Date of Birth/Age: 1950       Encounter#:         [id number]  Gender:            M  Admission Status:  Inpatient       Location Performed: [facility]       Diagnosis/ICD: Other specified symptoms and signs involving the circulatory and                 respiratory systems-R09.89  Procedure/CPT: 14358 Cerebrovascular Carotid Duplex scan complete       PRELIMINARY CONCLUSIONS:  Right Carotid: Findings are consistent with less than 50% stenosis of the right proximal internal carotid artery. Laminar flow seen by color Doppler. Right external carotid artery appears patent with no evidence of stenosis. No evidence of hemodynamically significant stenosis of the right common carotid artery. The right vertebral artery is patent with antegrade flow. No evidence of hemodynamically significant stenosis in the right subclavian artery.  Left Carotid: Findings are consistent with less than 50% stenosis of the left proximal internal carotid artery. Laminar flow seen by color Doppler. Left external carotid artery appears patent with no evidence of stenosis. No evidence of hemodynamically significant stenosis of the left common carotid artery. The left vertebral artery is patent with antegrade flow. No evidence of hemodynamically significant stenosis in the left subclavian artery.     Imaging & Doppler Findings:  Right Plaque Morph: The proximal right internal carotid artery demonstrates calcified and heterogenous plaque. The distal right common carotid artery demonstrates heterogenous and calcified plaque.  Left Plaque  Morph: The proximal left internal carotid artery demonstrates intimal thickening and calcified plaque. The distal left common carotid artery demonstrates intimal thickening and calcified plaque.      Right                      Left    PSV     EDV                PSV     EDV  96 cm/s           CCA P    99 cm/s  75 cm/s           CCA D    61 cm/s  82 cm/s 30 cm/s   ICA P    69 cm/s 29 cm/s  89 cm/s 28 cm/s   ICA M    82 cm/s 35 cm/s  78 cm/s 38 cm/s   ICA D    80 cm/s 34 cm/s  96 cm/s            ECA     90 cm/s  36 cm/s 15 cm/s Vertebral  31 cm/s 8 cm/s  72 cm/s         Subclavian 94 cm/s                     Right Left  ICA/CCA Ratio  1.1  1.1            VASCULAR PRELIMINARY REPORT  completed by Jennifer Mahoney RVT on 3/24/2025 at 4:16:13 PM       ** Final **  ECG 12 lead  Sinus rhythm with marked sinus arrhythmia  Right superior axis deviation  Pulmonary disease pattern  Septal infarct (cited on or before 15-MAY-2020)  Abnormal ECG  When compared with ECG of 19-DEC-2024 14:01,  Premature atrial complexes are no longer Present  Vent. rate has increased BY  26 BPM      Physical Exam    Relevant Results               Assessment/Plan                  Assessment & Plan  Chest pain, unspecified type    Unstable angina (Multi)          Silas Oliveira MD   Physician  Internal Medicine     H&P      Signed     Date of Service: 3/22/2025  5:19 PM     Signed       Expand All Collapse All    History Of Present Illness  Rafi Lisa Jr. is a 74 y.o. male with significant prior medical history of HTN, HLD, CAD s/p multiple stents (last laser-assisted PCI of Cx 1/22) on Plavix, watchman placement, prediabetes, COPD who presented to ED for evaluation of intermittent pain that begins at left biceps and radiates up into trapezius and jaw with occasional associated chest discomfort for the past several months.  Denies having any symptoms today, last episode of chest discomfort was last week that resolved on its own.  He states he had  called Dr. Hunt's office to schedule a stress test at which point he was advised to come to the hospital.  He reports arm/jaw symptoms rarely are accompanied by chest comfort, but episodes of chest discomfort always include radiation to left arm.  States symptoms come on at rest and alleviated by NSAID.  Denies symptoms with activity, associated shortness of breath, dizziness, lightheadedness, diaphoresis.  Denies trauma to cervical spine, numbness/tingling sensation, weakness of left arm.  Denies fevers, chills, abdominal pain, changes in urinary/bowel habits.  Continues to smoke a pack of cigarettes a day.     ED course: Vitals within normal range.  Per radiology, imaging shows no acute cardiopulmonary process.  ECG shows no evidence of ischemia.  Labs unremarkable.     Past Medical History  Medical History        Past Medical History:   Diagnosis Date    Abnormal findings on diagnostic imaging of other specified body structures       Abnormal CT of the chest    Personal history of other medical treatment       History of echocardiogram    Personal history of other medical treatment       History of nuclear stress test            Surgical History  Surgical History         Past Surgical History:   Procedure Laterality Date    MR HEAD ANGIO WO IV CONTRAST   12/17/2023     MR HEAD ANGIO WO IV CONTRAST 12/17/2023 [facility] MRI    MR HEAD ANGIO WO IV CONTRAST   12/18/2023     MR HEAD ANGIO WO IV CONTRAST    MR NECK ANGIO WO IV CONTRAST   12/17/2023     MR NECK ANGIO WO IV CONTRAST 12/17/2023 [facility] MRI    MR NECK ANGIO WO IV CONTRAST   12/18/2023     MR NECK ANGIO WO IV CONTRAST    OTHER SURGICAL HISTORY   01/30/2020     Radiofrequency ablation    OTHER SURGICAL HISTORY   01/20/2022     Cardiac catheterization with stent placement    OTHER SURGICAL HISTORY   01/20/2022     Cardiac catheterization    OTHER SURGICAL HISTORY   06/04/2020     Atrial appendage closure    OTHER SURGICAL HISTORY   06/04/2020      Esophagogastroduodenoscopy    OTHER SURGICAL HISTORY   2020     Colonoscopy    OTHER SURGICAL HISTORY   2020     Cardiac catheterization with stent placement    OTHER SURGICAL HISTORY   2020     Tonsillectomy    OTHER SURGICAL HISTORY   2020     Bunionectomy    OTHER SURGICAL HISTORY   2020     Lithotripsy            Social History  He reports that he has been smoking cigarettes. He has a 64 pack-year smoking history. He has quit using smokeless tobacco. No history on file for alcohol use and drug use.     Family History  Family History          Family History   Problem Relation Name Age of Onset    Other (coronary thrombosis) Father              in 1970s @63            Allergies  Sulfa (sulfonamide antibiotics) and Adhesive tape-silicones     Review of Systems  10 point ROS negative except as specified in HPI     Physical Exam  HENT:      Head: Atraumatic.      Nose: Nose normal.   Eyes:      Conjunctiva/sclera: Conjunctivae normal.   Cardiovascular:      Rate and Rhythm: Normal rate and regular rhythm.   Pulmonary:      Effort: Pulmonary effort is normal.      Breath sounds: Wheezing and rhonchi present.      Comments: Wheezing, greater in right fields  Abdominal:      General: Abdomen is flat.      Palpations: Abdomen is soft.   Musculoskeletal:         General: Normal range of motion.      Cervical back: Normal range of motion.      Comments: 5/5 strength in upper extremities   Skin:     General: Skin is warm and dry.   Neurological:      Mental Status: He is alert. Mental status is at baseline.   Psychiatric:         Behavior: Behavior normal.            Last Recorded Vitals  Blood pressure 110/66, pulse 65, temperature 35.9 °C (96.6 °F), temperature source Temporal, resp. rate 16, height 1.829 m (6'), weight 80.7 kg (178 lb), SpO2 93%.     Relevant Results           Results for orders placed or performed during the hospital encounter of 25 (from the past 24 hours)   CBC  and Auto Differential   Result Value Ref Range     WBC 4.5 4.4 - 11.3 x10*3/uL     nRBC 0.0 0.0 - 0.0 /100 WBCs     RBC 4.59 4.50 - 5.90 x10*6/uL     Hemoglobin 13.9 13.5 - 17.5 g/dL     Hematocrit 45.2 41.0 - 52.0 %     MCV 99 80 - 100 fL     MCH 30.3 26.0 - 34.0 pg     MCHC 30.8 (L) 32.0 - 36.0 g/dL     RDW 13.4 11.5 - 14.5 %     Platelets 169 150 - 450 x10*3/uL     Neutrophils % 68.9 40.0 - 80.0 %     Immature Granulocytes %, Automated 0.2 0.0 - 0.9 %     Lymphocytes % 11.8 13.0 - 44.0 %     Monocytes % 16.7 2.0 - 10.0 %     Eosinophils % 2.0 0.0 - 6.0 %     Basophils % 0.4 0.0 - 2.0 %     Neutrophils Absolute 3.09 1.60 - 5.50 x10*3/uL     Immature Granulocytes Absolute, Automated 0.01 0.00 - 0.50 x10*3/uL     Lymphocytes Absolute 0.53 (L) 0.80 - 3.00 x10*3/uL     Monocytes Absolute 0.75 0.05 - 0.80 x10*3/uL     Eosinophils Absolute 0.09 0.00 - 0.40 x10*3/uL     Basophils Absolute 0.02 0.00 - 0.10 x10*3/uL   Comprehensive metabolic panel   Result Value Ref Range     Glucose 93 74 - 99 mg/dL     Sodium 135 (L) 136 - 145 mmol/L     Potassium 4.7 3.5 - 5.3 mmol/L     Chloride 101 98 - 107 mmol/L     Bicarbonate 26 21 - 32 mmol/L     Anion Gap 13 10 - 20 mmol/L     Urea Nitrogen 25 (H) 6 - 23 mg/dL     Creatinine 1.63 (H) 0.50 - 1.30 mg/dL     eGFR 44 (L) >60 mL/min/1.73m*2     Calcium 9.8 8.6 - 10.3 mg/dL     Albumin 4.7 3.4 - 5.0 g/dL     Alkaline Phosphatase 89 33 - 136 U/L     Total Protein 8.3 (H) 6.4 - 8.2 g/dL     AST 32 9 - 39 U/L     Bilirubin, Total 0.7 0.0 - 1.2 mg/dL     ALT 18 10 - 52 U/L   Troponin I, High Sensitivity   Result Value Ref Range     Troponin I, High Sensitivity 6 0 - 20 ng/L   Troponin I, High Sensitivity   Result Value Ref Range     Troponin I, High Sensitivity 6 0 - 20 ng/L   Lipid Panel   Result Value Ref Range     Cholesterol 91 0 - 199 mg/dL     HDL-Cholesterol 36.6 mg/dL     Cholesterol/HDL Ratio 2.5       LDL Calculated 39 <=99 mg/dL     VLDL 15 0 - 40 mg/dL     Triglycerides 75 0 -  149 mg/dL     Non HDL Cholesterol 54 0 - 149 mg/dL   Lavender Top   Result Value Ref Range     Extra Tube Hold for add-ons.        XR chest 2 views     Result Date: 3/21/2025  STUDY: Chest Radiographs;  3/21/2025 17:44 INDICATION: Chest pain. COMPARISON: 12/27/2019 XR Chest ACCESSION NUMBER(S): [id number] ORDERING CLINICIAN: DIAMOND MARINELLI TECHNIQUE:  Frontal and lateral chest. FINDINGS: CARDIOMEDIASTINAL SILHOUETTE: Cardiomediastinal silhouette is normal in size and configuration.  LUNGS: Lungs are clear.  There is no consolidation, effusion or pneumothorax but there is a large retrocardiac hiatal hernia.  ABDOMEN: No remarkable upper abdominal findings.  BONES: No acute osseous changes.     No acute cardiopulmonary disease.  There is a retrocardiac hiatal hernia but the lungs otherwise appear clear.. Signed by Mikey Dick MD            Assessment/Plan     Assessment & Plan  Chest pain, unspecified type     74-year-old male with past medical history of HTN, HLD and CAD with 5 stents on Plavix presenting to ED for ongoing intermittent chest pain with radiation to arm for past 4 months.  Labs and EKG without evidence of cardiac ischemia/injury.  Patient admitted to observation with telemetry under Dr. Oliveira for further evaluation and care.     #Chest pain  #Tobacco use  - Continue Plavix, statin  - Cardio consult  - Vitals every 4 hours, telemetry  - Cardiac diet  - ECG per ACS symptoms  - Follow lipid panel  - Nicotine patch     Chronic conditions  #CAD  #Hypertension  #HLD  #COPD  #GERD  - Continue home meds when reconciled     I spent 60 minutes in the professional and overall care of this patient.  Patient fully evaluated on March 22 and Long discussion with patient.  Patient is now agreeable to cardiac catheterization.  Will monitor overnight and run it past cardiology.  Recheck labs in AM.     Silas Oliveira MD                 Heart catheterization planned for tomorrow.  No chest pain noted at this  time.  Patient fully evaluated on March 24.  Cardiac catheterization results noted and patient will require CABG.  Workup in progress for clearance.  Recheck labs in AM.            Silas Oliveira MD

## 2025-03-24 NOTE — PROGRESS NOTES
Physical Therapy                 Therapy Communication Note    Patient Name: Raif Lisa Jr.  MRN: [id number]  Department: [facility]  Room: [facility] *  Today's Date: 3/24/2025     Discipline: Physical Therapy        PT SCREEN: Chart reviewed. Pt IND and has no concerns with managing ADLs/iADLS at home. No skilled PT needs

## 2025-03-24 NOTE — NURSING NOTE
Stable.  Right wrist with no bleeding or hematoma noted.  Transfer report called to RN on 3rd floor.  Patient to be transferred back to room 332 in stable condition with RN in attendance.

## 2025-03-24 NOTE — POST-PROCEDURE NOTE
Physician Transition of Care Summary  Invasive Cardiovascular Lab    Procedure Date: 3/24/2025  Attending:    * Tony Oneill - Primary  Resident/Fellow/Other Assistant: Surgeons and Role:  * No surgeons found with a matching role *    Indications:   Pre-op Diagnosis      * Unstable angina (Multi) [I20.0]    Post-procedure diagnosis:   Post-op Diagnosis     * Unstable angina (Multi) [I20.0]    Procedure(s):   Left Heart Cath  10593 - VT CATH PLMT L HRT & ARTS W/NJX & ANGIO IMG S&I        Procedure Findings:   LMT trifurcation, mild distal calcification.  LAD 80-90% ostial and proximal, severe concentric calcium with prior stent proximally.  Additional calcium and lesion more severe beyond the stent.  Ramus is  with collaterals, appears relatively small.  Dominant LCX with 40% mid ISR.  RCA nondom, severe diffuse disease and calcium 95% stenosis.  LVEDP normal.  LVEF 45-50%.    Conclusions: consider CABG, perhaps off pump LIMA to LAD due to very severe calcification of distal LMT extending into ostial and proximal LAD.  Will consult Dr. Kline.  If CABG is not a good option, high risk PCI/CHIP eval seems appropriate.    Description of the Procedure:   R radial 5/6 slender, tig, pig.    Complications:   None    Stents/Implants:   Implants       No implant documentation for this case.            Anticoagulation/Antiplatelet Plan:   ASA    Estimated Blood Loss:   5 mL    Anesthesia: Moderate Sedation Anesthesia Staff: No anesthesia staff entered.    Any Specimen(s) Removed:   No specimens collected during this procedure.    Disposition:   Pending consult.      Electronically signed by: Tony Oneill MD, 3/24/2025 11:46 AM

## 2025-03-25 VITALS
TEMPERATURE: 96.1 F | DIASTOLIC BLOOD PRESSURE: 65 MMHG | SYSTOLIC BLOOD PRESSURE: 110 MMHG | BODY MASS INDEX: 24.11 KG/M2 | OXYGEN SATURATION: 93 % | HEART RATE: 66 BPM | HEIGHT: 72 IN | WEIGHT: 178 LBS | RESPIRATION RATE: 18 BRPM

## 2025-03-25 LAB
ALBUMIN SERPL BCP-MCNC: 3.8 G/DL (ref 3.4–5)
ALP SERPL-CCNC: 70 U/L (ref 33–136)
ALT SERPL W P-5'-P-CCNC: 20 U/L (ref 10–52)
ANION GAP SERPL CALC-SCNC: 12 MMOL/L (ref 10–20)
APTT PPP: 32 SECONDS (ref 26–36)
ARTERIAL PATENCY WRIST A: ABNORMAL
AST SERPL W P-5'-P-CCNC: 28 U/L (ref 9–39)
BASE EXCESS BLDA CALC-SCNC: 1.5 MMOL/L (ref -2–3)
BASOPHILS # BLD AUTO: 0.03 X10*3/UL (ref 0–0.1)
BASOPHILS NFR BLD AUTO: 0.7 %
BILIRUB SERPL-MCNC: 0.5 MG/DL (ref 0–1.2)
BODY TEMPERATURE: 37 DEGREES CELSIUS
BUN SERPL-MCNC: 26 MG/DL (ref 6–23)
CALCIUM SERPL-MCNC: 9 MG/DL (ref 8.6–10.3)
CHLORIDE SERPL-SCNC: 107 MMOL/L (ref 98–107)
CO2 SERPL-SCNC: 25 MMOL/L (ref 21–32)
COHGB MFR BLDA: 1.4 %
CREAT SERPL-MCNC: 1.18 MG/DL (ref 0.5–1.3)
DO-HGB MFR BLDA: 4 % (ref 0–5)
EGFRCR SERPLBLD CKD-EPI 2021: 64 ML/MIN/1.73M*2
EOSINOPHIL # BLD AUTO: 0.25 X10*3/UL (ref 0–0.4)
EOSINOPHIL NFR BLD AUTO: 5.4 %
ERYTHROCYTE [DISTWIDTH] IN BLOOD BY AUTOMATED COUNT: 13.3 % (ref 11.5–14.5)
EST. AVERAGE GLUCOSE BLD GHB EST-MCNC: 131 MG/DL
GLUCOSE SERPL-MCNC: 85 MG/DL (ref 74–99)
HBA1C MFR BLD: 6.2 %
HCO3 BLDA-SCNC: 25.9 MMOL/L (ref 22–26)
HCT VFR BLD AUTO: 41.7 % (ref 41–52)
HGB BLD-MCNC: 12.8 G/DL (ref 13.5–17.5)
HGB BLDA-MCNC: 13.5 G/DL (ref 13.5–17.5)
IMM GRANULOCYTES # BLD AUTO: 0.01 X10*3/UL (ref 0–0.5)
IMM GRANULOCYTES NFR BLD AUTO: 0.2 % (ref 0–0.9)
INHALED O2 CONCENTRATION: 21 %
INR PPP: 1 (ref 0.9–1.1)
LYMPHOCYTES # BLD AUTO: 1.03 X10*3/UL (ref 0.8–3)
LYMPHOCYTES NFR BLD AUTO: 22.4 %
MCH RBC QN AUTO: 30.4 PG (ref 26–34)
MCHC RBC AUTO-ENTMCNC: 30.7 G/DL (ref 32–36)
MCV RBC AUTO: 99 FL (ref 80–100)
METHGB MFR BLDA: 0.4 % (ref 0–1.5)
MONOCYTES # BLD AUTO: 0.51 X10*3/UL (ref 0.05–0.8)
MONOCYTES NFR BLD AUTO: 11.1 %
NEUTROPHILS # BLD AUTO: 2.77 X10*3/UL (ref 1.6–5.5)
NEUTROPHILS NFR BLD AUTO: 60.2 %
NRBC BLD-RTO: 0 /100 WBCS (ref 0–0)
OXYHGB MFR BLDA: 94.2 % (ref 94–98)
PCO2 BLDA: 39 MM HG (ref 38–42)
PH BLDA: 7.43 PH (ref 7.38–7.42)
PLATELET # BLD AUTO: 165 X10*3/UL (ref 150–450)
PO2 BLDA: 72 MM HG (ref 85–95)
POTASSIUM SERPL-SCNC: 4.6 MMOL/L (ref 3.5–5.3)
PROT SERPL-MCNC: 7 G/DL (ref 6.4–8.2)
PROTHROMBIN TIME: 11.4 SECONDS (ref 9.8–12.4)
RBC # BLD AUTO: 4.21 X10*6/UL (ref 4.5–5.9)
SAO2 % BLDA: 96 % (ref 94–100)
SITE OF ARTERIAL PUNCTURE: ABNORMAL
SODIUM SERPL-SCNC: 139 MMOL/L (ref 136–145)
TSH SERPL-ACNC: 3.65 MIU/L (ref 0.44–3.98)
WBC # BLD AUTO: 4.6 X10*3/UL (ref 4.4–11.3)

## 2025-03-25 PROCEDURE — 94060 EVALUATION OF WHEEZING: CPT

## 2025-03-25 PROCEDURE — 82728 ASSAY OF FERRITIN: CPT | Mod: PARLAB | Performed by: NURSE PRACTITIONER

## 2025-03-25 PROCEDURE — 85610 PROTHROMBIN TIME: CPT | Performed by: NURSE PRACTITIONER

## 2025-03-25 PROCEDURE — 2500000001 HC RX 250 WO HCPCS SELF ADMINISTERED DRUGS (ALT 637 FOR MEDICARE OP): Performed by: INTERNAL MEDICINE

## 2025-03-25 PROCEDURE — 83036 HEMOGLOBIN GLYCOSYLATED A1C: CPT | Mod: PARLAB | Performed by: NURSE PRACTITIONER

## 2025-03-25 PROCEDURE — 82810 BLOOD GASES O2 SAT ONLY: CPT

## 2025-03-25 PROCEDURE — 85025 COMPLETE CBC W/AUTO DIFF WBC: CPT | Performed by: INTERNAL MEDICINE

## 2025-03-25 PROCEDURE — 2500000002 HC RX 250 W HCPCS SELF ADMINISTERED DRUGS (ALT 637 FOR MEDICARE OP, ALT 636 FOR OP/ED): Performed by: INTERNAL MEDICINE

## 2025-03-25 PROCEDURE — 80053 COMPREHEN METABOLIC PANEL: CPT | Performed by: INTERNAL MEDICINE

## 2025-03-25 PROCEDURE — 36600 WITHDRAWAL OF ARTERIAL BLOOD: CPT

## 2025-03-25 PROCEDURE — 2500000001 HC RX 250 WO HCPCS SELF ADMINISTERED DRUGS (ALT 637 FOR MEDICARE OP): Performed by: NURSE PRACTITIONER

## 2025-03-25 PROCEDURE — 99233 SBSQ HOSP IP/OBS HIGH 50: CPT | Performed by: NURSE PRACTITIONER

## 2025-03-25 PROCEDURE — 36415 COLL VENOUS BLD VENIPUNCTURE: CPT | Performed by: NURSE PRACTITIONER

## 2025-03-25 PROCEDURE — S4991 NICOTINE PATCH NONLEGEND: HCPCS | Performed by: INTERNAL MEDICINE

## 2025-03-25 PROCEDURE — 36415 COLL VENOUS BLD VENIPUNCTURE: CPT | Performed by: INTERNAL MEDICINE

## 2025-03-25 PROCEDURE — 84443 ASSAY THYROID STIM HORMONE: CPT | Performed by: NURSE PRACTITIONER

## 2025-03-25 RX ORDER — NAPROXEN SODIUM 220 MG/1
81 TABLET, FILM COATED ORAL DAILY
Start: 2025-03-25

## 2025-03-25 RX ORDER — NAPROXEN SODIUM 220 MG/1
81 TABLET, FILM COATED ORAL DAILY
Status: DISCONTINUED | OUTPATIENT
Start: 2025-03-25 | End: 2025-03-25 | Stop reason: HOSPADM

## 2025-03-25 RX ORDER — METOPROLOL TARTRATE 25 MG/1
12.5 TABLET, FILM COATED ORAL 2 TIMES DAILY
Status: DISCONTINUED | OUTPATIENT
Start: 2025-03-25 | End: 2025-03-25

## 2025-03-25 RX ORDER — IBUPROFEN 200 MG
1 TABLET ORAL DAILY
Qty: 30 PATCH | Refills: 0 | Status: SHIPPED | OUTPATIENT
Start: 2025-03-26 | End: 2025-04-25

## 2025-03-25 RX ADMIN — METOPROLOL SUCCINATE 50 MG: 50 TABLET, EXTENDED RELEASE ORAL at 09:12

## 2025-03-25 RX ADMIN — PSYLLIUM HUSK 1 PACKET: 3.4 POWDER ORAL at 09:12

## 2025-03-25 RX ADMIN — CLOPIDOGREL 75 MG: 75 TABLET ORAL at 09:12

## 2025-03-25 RX ADMIN — COLCHICINE 0.6 MG: 0.6 TABLET, FILM COATED ORAL at 09:12

## 2025-03-25 RX ADMIN — ROSUVASTATIN CALCIUM 40 MG: 10 TABLET, FILM COATED ORAL at 09:12

## 2025-03-25 RX ADMIN — PANTOPRAZOLE SODIUM 40 MG: 40 TABLET, DELAYED RELEASE ORAL at 05:44

## 2025-03-25 RX ADMIN — ASPIRIN 81 MG CHEWABLE TABLET 81 MG: 81 TABLET CHEWABLE at 18:03

## 2025-03-25 RX ADMIN — LISINOPRIL 20 MG: 20 TABLET ORAL at 09:12

## 2025-03-25 RX ADMIN — NICOTINE 1 PATCH: 21 PATCH, EXTENDED RELEASE TRANSDERMAL at 09:12

## 2025-03-25 RX ADMIN — EZETIMIBE 10 MG: 10 TABLET ORAL at 09:12

## 2025-03-25 RX ADMIN — PROBENECID 500 MG: 500 TABLET, FILM COATED ORAL at 09:13

## 2025-03-25 ASSESSMENT — ENCOUNTER SYMPTOMS
WEAKNESS: 0
FREQUENCY: 0
SHORTNESS OF BREATH: 0
BLURRED VISION: 0
PALPITATIONS: 0
POOR WOUND HEALING: 0
MYALGIAS: 0
STIFFNESS: 0
DECREASED APPETITE: 0
VOMITING: 0
MUSCLE CRAMPS: 0
DYSPNEA ON EXERTION: 0
BLOATING: 0
DIZZINESS: 0
NAUSEA: 0
IRREGULAR HEARTBEAT: 0
FOCAL WEAKNESS: 0
LIGHT-HEADEDNESS: 0
ORTHOPNEA: 0
ALTERED MENTAL STATUS: 0
COUGH: 0
NAIL CHANGES: 0
DOUBLE VISION: 0

## 2025-03-25 ASSESSMENT — PAIN SCALES - GENERAL: PAINLEVEL_OUTOF10: 0 - NO PAIN

## 2025-03-25 NOTE — CONSULTS
[facility]   Cardiac / Thoracic Surgery   Inpatient Consult        History Of Present Illness  Rafi Lisa Jr. is a 74 y.o. male with a PMH significant for HTN, HLD, CAD s/p stents x5 (on Plavix; last placed 01/03/2022), afib s/p LAAC, Hep C, COPD, CKD -St III (baseline 1.3-1.6), prediabetes, gout, 1pack/day smoker x64yr who presents to [facility] on 3/21/2025 with episodes of unstable angina x past several months(?). Reports pain originating in left bicep and radiating into trapezius, chest, and jaw. Chest pain self resolving but occurring at rest. Notes alleviation of pain w/ NSAID use. Thoracic consulted for CABG vs High-risk PCI evaluation.     Labs in ED significant for PT 15.6, INR 1.4 Hospital course largely unremarkable. Chest CT w/o contrast revealed extensive bilateral centrilobular emphysematous and fibrotic changes, hiatal hernia contained in stomach, and 4.2 cm aneurysmal dilatation of TAA. US of carotids revealed Right/Left pICA with <50% stenosis.  SPENCER on 3/22/2025 reveals LVEF 55-60% w/ grade II LV diastolic filling, mildly enlarged RV and LA, and moderate AVR. LHC performed on 3/24/25 showing LAD ostial and proximal 80-90%, Ramus  w/ collaterals, RCA 95%, LVEF 45-50%.      Subjective   Past Medical History  He has a past medical history of Abnormal findings on diagnostic imaging of other specified body structures, Personal history of other medical treatment, and Personal history of other medical treatment.    Surgical History  He has a past surgical history that includes Other surgical history (01/30/2020); Other surgical history (01/20/2022); Other surgical history (01/20/2022); Other surgical history (06/04/2020); Other surgical history (06/04/2020); Other surgical history (06/04/2020); Other surgical history (06/04/2020); Other surgical history (06/04/2020); Other surgical history (06/04/2020); Other surgical history  (2020); MR angio head wo IV contrast (2023); MR angio neck wo IV contrast (2023); MR angio head wo IV contrast (2023); and MR angio neck wo IV contrast (2023).     Social History  He reports that he has been smoking cigarettes. He has a 64 pack-year smoking history. He has quit using smokeless tobacco. No history on file for alcohol use and drug use.    Family History  Family History   Problem Relation Name Age of Onset    Other (coronary thrombosis) Father           in 1970s @63        Allergies  Sulfa (sulfonamide antibiotics) and Adhesive tape-silicones    Review of Systems  Review of Systems   All other systems reviewed and are negative.          Objective   Physical Exam  Physical Exam  Constitutional:       General: He is not in acute distress.     Appearance: Normal appearance. He is normal weight. He is not ill-appearing or diaphoretic.   HENT:      Mouth/Throat:      Mouth: Mucous membranes are dry.   Eyes:      Pupils: Pupils are equal, round, and reactive to light.   Cardiovascular:      Rate and Rhythm: Normal rate. Rhythm irregular.      Pulses: Normal pulses.      Heart sounds: Murmur heard.   Pulmonary:      Effort: Pulmonary effort is normal.      Breath sounds: Examination of the left-middle field reveals wheezing. Examination of the right-lower field reveals wheezing. Examination of the left-lower field reveals wheezing. Wheezing present.   Abdominal:      General: Abdomen is flat. Bowel sounds are normal.      Palpations: Abdomen is soft.   Musculoskeletal:         General: Normal range of motion.   Skin:     General: Skin is warm and dry.      Capillary Refill: Capillary refill takes 2 to 3 seconds.      Comments: Discoloration of lower extremities   Neurological:      General: No focal deficit present.      Mental Status: He is alert and oriented to person, place, and time.   Psychiatric:         Mood and Affect: Mood normal.         Behavior: Behavior normal.          Thought Content: Thought content normal.          Last Recorded Vitals  /67 (BP Location: Left arm, Patient Position: Lying)   Pulse 73   Temp 35.6 °C (96.1 °F) (Temporal)   Resp 18   Wt 80.7 kg (178 lb)   SpO2 95%     Relevant Results  Last Labs:  CBC - 3/25/2025:  7:20 AM  4.6 12.8 165    41.7      CMP - 3/25/2025:  7:20 AM  9.0 7.0 28 --- 0.5   _ 3.8 20 70      PTT - 3/25/2025: 12:48 PM  1.0   11.4 32     Troponin I, High Sensitivity   Date/Time Value Ref Range Status   03/21/2025 09:37 PM 6 0 - 20 ng/L Final   03/21/2025 05:21 PM 6 0 - 20 ng/L Final     BNP   Date/Time Value Ref Range Status   12/28/2019 04:26  (H) 0 - 99 pg/mL Final     Comment:     .  <100 pg/mL - Heart failure unlikely  100-299 pg/mL - Intermediate probability of acute heart  .               failure exacerbation. Correlate with clinical  .               context and patient history.    >=300 pg/mL - Heart Failure likely. Correlate with clinical  .               context and patient history.  BNP testing is performed using different testing   methodology at [facility] than at other   [facility] hospitals. Direct result comparisons should   only be made within the same method.       Hemoglobin A1C   Date/Time Value Ref Range Status   01/03/2022 07:44 AM 6.4 (A) % Final     Comment:          Diagnosis of Diabetes-Adults   Non-Diabetic: < or = 5.6%   Increased risk for developing diabetes: 5.7-6.4%   Diagnostic of diabetes: > or = 6.5%  .       Monitoring of Diabetes                Age (y)     Therapeutic Goal (%)   Adults:          >18           <7.0   Pediatrics:    13-18           <7.5                   7-12           <8.0                   0- 6            7.5-8.5   American Diabetes Association. Diabetes Care 33(S1), Jan 2010.       LDL Calculated   Date/Time Value Ref Range Status   03/22/2025 06:35 AM 39 <=99 mg/dL Final     Comment:                                 Near   Borderline      AGE      Desirable   Optimal    High     High     Very High     0-19 Y     0 - 109     ---    110-129   >/= 130     ----    20-24 Y     0 - 119     ---    120-159   >/= 160     ----      >24 Y     0 -  99   100-129  130-159   160-189     >/=190       VLDL   Date/Time Value Ref Range Status   03/22/2025 06:35 AM 15 0 - 40 mg/dL Final   09/22/2023 12:41 PM 18 0 - 40 mg/dL Final   02/09/2022 11:33 AM 21 0 - 40 mg/dL Final   01/03/2022 07:44 AM 44 (H) 0 - 40 mg/dL Final      Last I/O:  I/O last 3 completed shifts:  In: 590 (7.3 mL/kg) [P.O.:590]  Out: 5 (0.1 mL/kg) [Blood:5]  Weight: 80.7 kg     Radiographic Testing  EKG:  ECG 12 lead 03/21/2025      ECG 12 lead (Clinic Performed) 12/19/2024      ECG 12 lead (Clinic Performed) 10/20/2023    Echo:  Transthoracic Echo (TTE) Complete 03/24/2025    Ejection Fractions:  EF   Date/Time Value Ref Range Status   03/24/2025 09:25 AM 58 %      Cath:  No results found for this or any previous visit from the past 1095 days.    Stress Test:  No results found for this or any previous visit from the past 1095 days.    Cardiac Imaging:  No results found for this or any previous visit from the past 1095 days.    Chest Imaging:  CT chest wo IV contrast   Final Result   1. Extensive bilateral centrilobular emphysematous changes and   subpleural fibrotic changes. Advise pulmonology referral.   2. Few subcentimeter pulmonary nodules noted along with right lower   lobe ill-defined nodular density measuring 1.1 cm which warrant   attention on follow-up.   3. Borderline cardiomegaly. Severe coronary artery calcifications.   Aneurysmal dilatation of the ascending thoracic aorta measuring 4.2   cm.   4. Large stomach containing hiatal hernia.   5. Uncomplicated colonic diverticulosis.        MACRO:   None        Signed by: Abdias Samano 3/25/2025 7:37 AM   Dictation workstation:   [id number]      CT abdomen pelvis wo IV contrast   Final Result   1. Extensive bilateral centrilobular emphysematous changes and    subpleural fibrotic changes. Advise pulmonology referral.   2. Few subcentimeter pulmonary nodules noted along with right lower   lobe ill-defined nodular density measuring 1.1 cm which warrant   attention on follow-up.   3. Borderline cardiomegaly. Severe coronary artery calcifications.   Aneurysmal dilatation of the ascending thoracic aorta measuring 4.2   cm.   4. Large stomach containing hiatal hernia.   5. Uncomplicated colonic diverticulosis.        MACRO:   None        Signed by: Abdias Samano 3/25/2025 7:37 AM   Dictation workstation:   [id number]      Carotid duplex bilateral         Transthoracic Echo (TTE) Complete   Final Result      XR chest 2 views   Final Result   No acute cardiopulmonary disease.  There is a retrocardiac hiatal   hernia but the lungs otherwise appear clear..   Signed by Mikey Dick MD      Cardiac Catheterization Procedure    (Results Pending)   Vascular US lower extremity vein mapping bilateral    (Results Pending)   Vascular US upper extremity arterial duplex bilateral    (Results Pending)     Daily Interval Events  3/26/25- Patient received ultrasound of carotids and extremities and CT Scan. Plan for outpatient follow-up. Added 81mg Aspirin.     Assessment & Plan  # Acute Coronary Syndrome/UA  - MVD (LAD ostial and proximal 80-90%, Ramus  w/ collaterals, RCA 95%, LVEF 45-50%)  - History of CAD s/p Stents x5  - 81mg Aspirin added daily  - Continue Plavix, Metoprolol, and high-dose Rosuvastatin  - Nitroglycerin tablet SL x3 doses q5m if onset of chest pain.  - EKG PRN if experiencing chest pain   - Plan for outpatient follow-up with CABG pending evaluation of hiatal hernia.    #Hyperlipidemia  Home Medications: 40mg Rosuvastatin; 10mg Ezetimibe   - Continue as ordered    #Hypertension  Home Medications: Lisinopril 20mg  -Continue as ordered    #Hiatal Hernia    -Outpatient follow-up with Dr. Dominguez on 4/2/25    OZIEL MCMAHON, RN, APRN Student

## 2025-03-25 NOTE — DISCHARGE SUMMARY
Discharge Diagnosis  Chest pain, unspecified type    Issues Requiring Follow-Up  Patient fully evaluated  for   Assessment & Plan  Chest pain, unspecified type    Unstable angina (Multi)    Patient fully evaluated today. No acute complaints at this time. Vitals today include Temp:  [35.5 °C (95.9 °F)-36.9 °C (98.4 °F)] 35.6 °C (96.1 °F), Heart Rate:  [62-94] 94, Resp:  [16-18] 18, and BP: ()/(56-75) 101/66. Pertinent labs include WBC 4.6, Hbg 12.8, plts 165, Na 139, K 4.6, Cl 107, HCO3- 25, BUN 26, Cr 1.18, glucose 85.  PT/OT assessed and patient has no skilled needs. Patient recommend for CABG per cardiology in early April. Pt. Ok to discharge.     Patient with significant clinical improvement noted, patient medically cleared for discharge today. Patient seen resting in bed with head of bed elevated, no s/s or c/o acute difficulties at this time. Vital signs for last 24 hours:  Temp:  [35.5 °C (95.9 °F)-36.9 °C (98.4 °F)] 35.6 °C (96.1 °F)  Heart Rate:  [62-94] 73  Resp:  [16-18] 18  BP: ()/(56-75) 105/67 Medications and labs reviewed-   Results for orders placed or performed during the hospital encounter of 03/21/25 (from the past 24 hours)   Carotid duplex bilateral   Result Value Ref Range    BSA 2.03 m2   CBC and Auto Differential   Result Value Ref Range    WBC 4.6 4.4 - 11.3 x10*3/uL    nRBC 0.0 0.0 - 0.0 /100 WBCs    RBC 4.21 (L) 4.50 - 5.90 x10*6/uL    Hemoglobin 12.8 (L) 13.5 - 17.5 g/dL    Hematocrit 41.7 41.0 - 52.0 %    MCV 99 80 - 100 fL    MCH 30.4 26.0 - 34.0 pg    MCHC 30.7 (L) 32.0 - 36.0 g/dL    RDW 13.3 11.5 - 14.5 %    Platelets 165 150 - 450 x10*3/uL    Neutrophils % 60.2 40.0 - 80.0 %    Immature Granulocytes %, Automated 0.2 0.0 - 0.9 %    Lymphocytes % 22.4 13.0 - 44.0 %    Monocytes % 11.1 2.0 - 10.0 %    Eosinophils % 5.4 0.0 - 6.0 %    Basophils % 0.7 0.0 - 2.0 %    Neutrophils Absolute 2.77 1.60 - 5.50 x10*3/uL    Immature Granulocytes Absolute, Automated 0.01 0.00 - 0.50  x10*3/uL    Lymphocytes Absolute 1.03 0.80 - 3.00 x10*3/uL    Monocytes Absolute 0.51 0.05 - 0.80 x10*3/uL    Eosinophils Absolute 0.25 0.00 - 0.40 x10*3/uL    Basophils Absolute 0.03 0.00 - 0.10 x10*3/uL   Comprehensive Metabolic Panel   Result Value Ref Range    Glucose 85 74 - 99 mg/dL    Sodium 139 136 - 145 mmol/L    Potassium 4.6 3.5 - 5.3 mmol/L    Chloride 107 98 - 107 mmol/L    Bicarbonate 25 21 - 32 mmol/L    Anion Gap 12 10 - 20 mmol/L    Urea Nitrogen 26 (H) 6 - 23 mg/dL    Creatinine 1.18 0.50 - 1.30 mg/dL    eGFR 64 >60 mL/min/1.73m*2    Calcium 9.0 8.6 - 10.3 mg/dL    Albumin 3.8 3.4 - 5.0 g/dL    Alkaline Phosphatase 70 33 - 136 U/L    Total Protein 7.0 6.4 - 8.2 g/dL    AST 28 9 - 39 U/L    Bilirubin, Total 0.5 0.0 - 1.2 mg/dL    ALT 20 10 - 52 U/L   Pulmonary function testing   Result Value Ref Range    FVC - Predicted 4.21     FEV1 - Predicted 3.10     FVC - PRE 3.60     FEV1 - Pre 1.87     FVC - Post 4.28     FEV1 - Post 2.25    Blood Gas Arterial, COOX Unsolicited   Result Value Ref Range    POCT pH, Arterial 7.43 (H) 7.38 - 7.42 pH    POCT pCO2, Arterial 39 38 - 42 mm Hg    POCT pO2, Arterial 72 (L) 85 - 95 mm Hg    POCT SO2, Arterial 96 94 - 100 %    POCT Oxy Hemoglobin, Arterial 94.2 94.0 - 98.0 %    POCT Base Excess, Arterial 1.5 -2.0 - 3.0 mmol/L    POCT HCO3 Calculated, Arterial 25.9 22.0 - 26.0 mmol/L    POCT Hemoglobin, Arterial 13.5 13.5 - 17.5 g/dL    POCT Carboxyhemoglobin, Arterial 1.4 %    POCT Methemoglobin, Arterial 0.4 0.0 - 1.5 %    POCT Deoxy Hemoglobin, Arterial 4.0 0.0 - 5.0 %    Patient Temperature 37.0 degrees Celsius    FiO2 21 %    Site of Arterial Puncture LT RAD     Arturo's Test POS    Coagulation Screen   Result Value Ref Range    Protime 11.4 9.8 - 12.4 seconds    INR 1.0 0.9 - 1.1    aPTT 32 26 - 36 seconds      Patient recently received an antibiotic (last 12 hours)       None           No results found for the last 90 days.       Continue aggressive pulmonary  hygiene and oral hygiene. Off loading as tolerated for skin integrity.  Plan discussed with interdisciplinary team, ok to discharge, will continue current and repeat labs in the AM if patient still hospitalized. Patient aware and agreeable to current plan, continue plan as above.     I spent 30 minutes on the date of the service which included preparing to see the patient, face-to-face patient care, completing clinical documentation, obtaining and/or reviewing separately obtained history, performing a medically appropriate examination, counseling and educating the patient/family/caregiver, ordering medications, tests, or procedures, communicating with other HCPs (not separately reported), independently interpreting results (not separately reported), communicating results to the patient/family/caregiver, and care coordination (not separately reported    Discharge Meds     Medication List      ASK your doctor about these medications     acetaminophen 325 mg tablet; Commonly known as: Tylenol   albuterol 90 mcg/actuation inhaler   Centrum Silver 0.4 mg-300 mcg- 250 mcg; Generic drug: multivitamin with   minerals iron-free   clopidogrel 75 mg tablet; Commonly known as: Plavix; Take 1 tablet (75   mg) by mouth once daily.   coenzyme Q-10 200 mg capsule   ezetimibe 10 mg tablet; Commonly known as: Zetia; Take 1 tablet (10 mg)   by mouth once daily.   lisinopril 20 mg tablet; Take 1 tablet (20 mg) by mouth once daily.   metoprolol succinate XL 50 mg 24 hr tablet; Commonly known as:   Toprol-XL; Take 1 tablet (50 mg) by mouth once daily. DO NOT CRUSH OR CHEW   nitroglycerin 0.4 mg SL tablet; Commonly known as: Nitrostat; Place 1   tablet (0.4 mg) under the tongue every 5 minutes if needed for chest pain   (FOR UP TO 3 DOSES AS NEEDED FOR CHEST PAIN.CALL 911 IF PAIN PERSISTS.).   omeprazole 40 mg DR capsule; Commonly known as: PriLOSEC   pantoprazole 40 mg EC tablet; Commonly known as: ProtoNix   probenecid-colchicine  500-0.5 mg tablet   rosuvastatin 40 mg tablet; Commonly known as: Crestor; Take 1 tablet (40   mg) by mouth once daily.   turmeric 400 mg capsule       Test Results Pending At Discharge  Pending Labs       Order Current Status    Hemoglobin A1C In process            Hospital Course         Silas Oliveira MD   Physician  Internal Medicine     Progress Notes      Signed     Date of Service: 3/24/2025  5:38 PM     Signed       Expand All Collapse All    Rafi Lisa Jr. is a 74 y.o. male on day 0 of admission presenting with Chest pain, unspecified type.           Subjective  Patient fully evaluated    for    Problem List Items Addressed This Visit                  Cardiac and Vasculature     Coronary artery disease     Relevant Medications     clopidogrel (Plavix) tablet 75 mg     nitroglycerin (Nitrostat) SL tablet 0.4 mg     metoprolol succinate XL (Toprol-XL) 24 hr tablet 50 mg     Other Relevant Orders     Carotid duplex bilateral (Completed)     * (Principal) Chest pain, unspecified type - Primary     Relevant Orders     Transthoracic Echo (TTE) Complete (Completed)     Carotid duplex bilateral (Completed)     Unstable angina (Multi)     Relevant Medications     clopidogrel (Plavix) tablet 75 mg     nitroglycerin (Nitrostat) SL tablet 0.4 mg     metoprolol succinate XL (Toprol-XL) 24 hr tablet 50 mg     Other Relevant Orders     Case Request Cath Lab: Left Heart Cath (Completed)     Cardiac Catheterization Procedure      Other Visit Diagnoses         Other specified symptoms and signs involving the circulatory and respiratory systems         Other chest pain                 Patient seen resting in bed with head of bed elevated, no s/s or c/o acute difficulties at this time.  Vital signs for last 24 hours Temp:  [35.6 °C (96.1 °F)-36.3 °C (97.3 °F)] 35.6 °C (96.1 °F)  Heart Rate:  [48-72] 62  Resp:  [16] 16  BP: (111-134)/(59-76) 111/75    I/O this shift:  In: -   Out: 5 [Blood:5]  Patient still requiring  frequent cardiac and SPO2 monitoring. Continue aggressive pulmonary hygiene and oral hygiene. Off loading as tolerated for skin integrity. Medications and labs reviewed-         Results for orders placed or performed during the hospital encounter of 03/21/25 (from the past 24 hours)   CBC and Auto Differential   Result Value Ref Range     WBC 4.9 4.4 - 11.3 x10*3/uL     nRBC 0.0 0.0 - 0.0 /100 WBCs     RBC 4.25 (L) 4.50 - 5.90 x10*6/uL     Hemoglobin 12.8 (L) 13.5 - 17.5 g/dL     Hematocrit 42.4 41.0 - 52.0 %      80 - 100 fL     MCH 30.1 26.0 - 34.0 pg     MCHC 30.2 (L) 32.0 - 36.0 g/dL     RDW 13.4 11.5 - 14.5 %     Platelets 168 150 - 450 x10*3/uL     Neutrophils % 54.8 40.0 - 80.0 %     Immature Granulocytes %, Automated 0.2 0.0 - 0.9 %     Lymphocytes % 25.6 13.0 - 44.0 %     Monocytes % 12.8 2.0 - 10.0 %     Eosinophils % 6.2 0.0 - 6.0 %     Basophils % 0.4 0.0 - 2.0 %     Neutrophils Absolute 2.66 1.60 - 5.50 x10*3/uL     Immature Granulocytes Absolute, Automated 0.01 0.00 - 0.50 x10*3/uL     Lymphocytes Absolute 1.24 0.80 - 3.00 x10*3/uL     Monocytes Absolute 0.62 0.05 - 0.80 x10*3/uL     Eosinophils Absolute 0.30 0.00 - 0.40 x10*3/uL     Basophils Absolute 0.02 0.00 - 0.10 x10*3/uL   Comprehensive Metabolic Panel   Result Value Ref Range     Glucose 92 74 - 99 mg/dL     Sodium 139 136 - 145 mmol/L     Potassium 4.5 3.5 - 5.3 mmol/L     Chloride 105 98 - 107 mmol/L     Bicarbonate 26 21 - 32 mmol/L     Anion Gap 13 10 - 20 mmol/L     Urea Nitrogen 33 (H) 6 - 23 mg/dL     Creatinine 1.39 (H) 0.50 - 1.30 mg/dL     eGFR 53 (L) >60 mL/min/1.73m*2     Calcium 9.1 8.6 - 10.3 mg/dL     Albumin 3.9 3.4 - 5.0 g/dL     Alkaline Phosphatase 71 33 - 136 U/L     Total Protein 7.0 6.4 - 8.2 g/dL     AST 27 9 - 39 U/L     Bilirubin, Total 0.5 0.0 - 1.2 mg/dL     ALT 18 10 - 52 U/L   Transthoracic Echo (TTE) Complete   Result Value Ref Range     AV pk tatum 1.29 m/s     AV mn grad 4 mmHg     LVOT diam 2.20 cm     MV  E/A ratio 1.48       LA vol index A/L 42.6 ml/m2     Tricuspid annular plane systolic excursion 2.9 cm     LV EF 58 %     RV free wall pk S' 10.20 cm/s     LVIDd 4.00 cm     RVSP 29.4 mmHg     Aortic Valve Area by Continuity of VTI 3.06 cm2     Aortic Valve Area by Continuity of Peak Velocity 2.89 cm2     AV pk grad 7 mmHg     LV A4C EF 54.5     Carotid duplex bilateral   Result Value Ref Range     BSA 2.03 m2      Patient recently received an antibiotic (last 12 hours)         None             Patient is s/p 5 stent placement and still with chest pain, long discussion on goals of care with patient and family at bedside- patient agreeable to cardiac catheterization. Plan discussed with interdisciplinary team, seen by cardiology, all agree to plan. Smoking cessation educator consult placed, appreciate input.  Will continue current and repeat labs in the AM.      Discharge planning discussed with patient and care team. Therapy evaluations ordered. Anticipate HHC/SNF at discharge. Patient aware and agreeable to current plan, continue plan as above.      I spent a total of 50 minutes on the date of the service which included preparing to see the patient, face-to-face patient care, completing clinical documentation, obtaining and/or reviewing separately obtained history, performing a medically appropriate examination, counseling and educating the patient/family/caregiver, ordering medications, tests, or procedures, communicating with other HCPs (not separately reported), independently interpreting results (not separately reported), communicating results to the patient/family/caregiver, and care coordination (not separately reported).               Objective  Last Recorded Vitals  /75 (BP Location: Left arm, Patient Position: Lying)   Pulse 62   Temp 35.6 °C (96.1 °F) (Temporal)   Resp 16   Wt 80.7 kg (178 lb)   SpO2 94%   Intake/Output last 3 Shifts:     Intake/Output Summary (Last 24 hours) at 3/24/2025  1739  Last data filed at 3/24/2025 1136      Gross per 24 hour   Intake --   Output 5 ml   Net -5 ml         Admission Weight  Weight: 80.7 kg (178 lb) (03/21/25 1554)     Daily Weight  03/21/25 : 80.7 kg (178 lb)     Image Results  Transthoracic Echo (TTE) Complete     [facility], [address] [place] [address]            Tel [phone] and Fax [phone]     TRANSTHORACIC ECHOCARDIOGRAM REPORT        Patient Name:       KENYATTA LEAVITT     Reading Physician:    [id number] Lopez Castelan DO  Study Date:         3/24/2025           Ordering Provider:    [id number] HANNA MORROW  MRN/PID:            [id number]            Fellow:  Accession#:         [id number]        Nurse:  Date of Birth/Age:  1950 / 74      Sonographer:          Gabriel au RDCS  Gender assigned at  M                   Additional Staff:  Birth:  Height:             182.00 cm           Admit Date:           3/21/2025  Weight:             80.01 kg            Admission Status:     Inpatient -                                                                Routine  BSA / BMI:          2.01 m2 / 24.15     Encounter#:           [id number]                      kg/m2  Blood Pressure:     112/59 mmHg         Department Location:  [facility]     Study Type:    TRANSTHORACIC ECHO (TTE) COMPLETE  Diagnosis/ICD: Other chest pain-R07.89; Chest pain, unspecified-R07.9  Indication:    Chest Pain  CPT Code:      Echo Complete w Full Doppler-89521     Patient History:  Pertinent History: PMH HTN, HLD, CAD s/p multiple stents (last laser-assisted                     PCI of Cx 1/22) on Plavix, watchman placement, prediabetes,                     COPD who presented to ED for  evaluation of intermittent pain                     that begins at left biceps and radiates up into trapezius and                     jaw with occasional associated chest discomfort for the past                     several months.     Study Detail: The following Echo studies were performed: 2D, M-Mode, Doppler,                color flow and 3D. Technically challenging study due to prominent                lung artifact. Definity used as a contrast agent for endocardial                border definition. Total contrast used for this procedure was 2.5                mL via IV push. Unable to obtain suprasternal notch and subcostal                view.        PHYSICIAN INTERPRETATION:  Left Ventricle: The left ventricular systolic function is normal, with a visually estimated ejection fraction of 55-60%. There is mild concentric left ventricular hypertrophy. There are no regional left ventricular wall motion abnormalities. The left ventricular cavity size is normal. There is mildly increased septal and mildly increased posterior left ventricular wall thickness. There is left ventricular concentric remodeling. Spectral Doppler shows a Grade II (pseudonormal pattern) of left ventricular diastolic filling with an elevated left atrial pressure.  Left Atrium: The left atrium is mildly dilated.  Right Ventricle: The right ventricle is mildly enlarged. There is normal right ventricular global systolic function.  Right Atrium: The right atrium is normal in size.  Aortic Valve: The aortic valve is trileaflet. There is mild aortic valve thickening. The aortic valve dimensionless index is 0.81. There is moderate aortic valve regurgitation. The peak instantaneous gradient of the aortic valve is 7 mmHg. The mean gradient of the aortic valve is 4 mmHg.  Mitral Valve: The mitral valve is mildly thickened. The peak instantaneous gradient of the mitral valve is 2 mmHg. There is mild mitral valve regurgitation.  Tricuspid Valve: The  tricuspid valve is structurally normal. There is mild tricuspid regurgitation. The Doppler estimated RVSP is within normal limits at 29.4 mmHg.  Pulmonic Valve: The pulmonic valve is structurally normal. There is mild pulmonic valve regurgitation.  Pericardium: There is no pericardial effusion noted.  Aorta: The aortic root is abnormal. There is mild dilatation of the ascending aorta.  In comparison to the previous echocardiogram(s): Compared with study dated 5/15/2020,.        CONCLUSIONS:   1. The left ventricular systolic function is normal, with a visually estimated ejection fraction of 55-60%.   2. Spectral Doppler shows a Grade II (pseudonormal pattern) of left ventricular diastolic filling with an elevated left atrial pressure.   3. Mildly enlarged right ventricle.   4. The left atrium is mildly dilated.   5. Right ventricular systolic pressure is within normal limits.   6. Moderate aortic valve regurgitation.     QUANTITATIVE DATA SUMMARY:     2D MEASUREMENTS:          Normal Ranges:  LAs:             4.50 cm  (2.7-4.0cm)  IVSd:            1.30 cm  (0.6-1.1cm)  LVPWd:           1.20 cm  (0.6-1.1cm)  LVIDd:           4.00 cm  (3.9-5.9cm)  LVIDs:           2.40 cm  LV Mass Index:   87 g/m2  LVEDV Index:     54 ml/m2  LV % FS          40.0 %        LEFT ATRIUM:                  Normal Ranges:  LA Vol A4C:        79.7 ml    (22+/-6mL/m2)  LA Vol A2C:        84.8 ml  LA Vol BP:         85.7 ml  LA Vol Index A4C:  39.6ml/m2  LA Vol Index A2C:  42.1 ml/m2  LA Vol Index BP:   42.6 ml/m2  LA Area A4C:       24.0 cm2  LA Area A2C:       25.8 cm2  LA Major Axis A4C: 6.1 cm  LA Major Axis A2C: 6.7 cm  LA Volume Index:   42.6 ml/m2        RIGHT ATRIUM:                 Normal Ranges:  RA Vol A4C:        37.9 ml    (8.3-19.5ml)  RA Vol Index A4C:  18.8 ml/m2  RA Area A4C:       15.6 cm2  RA Major Axis A4C: 5.5 cm        M-MODE MEASUREMENTS:         Normal Ranges:  Ao Root:             3.60 cm (2.0-3.7cm)  LAs:                  3.70 cm (2.7-4.0cm)        AORTA MEASUREMENTS:         Normal Ranges:  Ao Sinus, d:        3.70 cm (2.1-3.5cm)  Ao STJ, d:          3.40 cm (1.7-3.4cm)  Asc Ao, d:          3.80 cm (2.1-3.4cm)        LV SYSTOLIC FUNCTION:                       Normal Ranges:  EF-A4C View:    54 % (>=55%)  EF-A2C View:    42 %  EF-Biplane:     49 %  EF-Visual:      58 %  LV EF Reported: 58 %        LV DIASTOLIC FUNCTION:             Normal Ranges:  MV Peak E:             0.68 m/s    (0.7-1.2 m/s)  MV Peak A:             0.46 m/s    (0.42-0.7 m/s)  E/A Ratio:             1.48        (1.0-2.2)  MV e'                  0.103 m/s   (>8.0)  MV lateral e'          0.11 m/s  MV medial e'           0.09 m/s  MV A Dur:              121.00 msec  E/e' Ratio:            6.63        (<8.0)  PulmV Sys Cheko:         41.10 cm/s  PulmV Michelle Cheko:        47.40 cm/s  PulmV S/D Cheko:         0.90  PulmV A Revs Cheko:      22.80 cm/s  PulmV A Revs Dur:      124.00 msec        MITRAL VALVE:          Normal Ranges:  MV Vmax:      0.78 m/s (<=1.3m/s)  MV peak P.5 mmHg (<5mmHg)  MV mean P.0 mmHg (<2mmHg)  MV DT:        351 msec (150-240msec)        AORTIC VALVE:                     Normal Ranges:  AoV Vmax:                1.29 m/s (<=1.7m/s)  AoV Peak P.7 mmHg (<20mmHg)  AoV Mean P.0 mmHg (1.7-11.5mmHg)  LVOT Max Cheko:            0.98 m/s (<=1.1m/s)  AoV VTI:                 29.80 cm (18-25cm)  LVOT VTI:                24.00 cm  LVOT Diameter:           2.20 cm  (1.8-2.4cm)  AoV Area, VTI:           3.06 cm2 (2.5-5.5cm2)  AoV Area,Vmax:           2.89 cm2 (2.5-4.5cm2)  AoV Dimensionless Index: 0.81        AORTIC INSUFFICIENCY:  AI Vmax:       4.62 m/s  AI Half-time:  533 msec  AI Decel Rate: 254.00 cm/s2        RIGHT VENTRICLE:  RV Basal 4.10 cm  RV Mid   3.30 cm  RV Major 8.3 cm  TAPSE:   28.7 mm  RV s'    0.10 m/s        TRICUSPID VALVE/RVSP:          Normal Ranges:  Peak TR Velocity:     2.57 m/s  RV Syst Pressure:      29 mmHg  (< 30mmHg)        PULMONIC VALVE:          Normal Ranges:  PV Max Cheko:     0.9 m/s  (0.6-0.9m/s)  PV Max PG:      3.2 mmHg        PULMONARY VEINS:  PulmV A Revs Dur: 124.00 msec  PulmV A Revs Cheko: 22.80 cm/s  PulmV Michelle Cheko:   47.40 cm/s  PulmV S/D Cheko:    0.90  PulmV Sys Cheko:    41.10 cm/s        [id number] Lopez Castelan DO  Electronically signed on 3/24/2025 at 4:52:35 PM        ** Final **  Carotid duplex bilateral  Preliminary Cardiology Report             [facility]  [address] [address]  Tel [phone] and Fax [phone]            Preliminary Vascular Lab Report     [facility] US CAROTID ARTERY DUPLEX BILATERAL        Patient Name:      KENYATTA BEGUM TREE Reading Physician:  [id number] Mariah Pate MD  Study Date:        3/24/2025       Ordering Physician: [id number] DREAD CRUZ  MRN/PID:           [id number]        Technologist:       Jennfier Mahoney T  Accession#:        [id number]    Technologist 2:  Date of Birth/Age: 1950       Encounter#:         [id number]  Gender:            M  Admission Status:  Inpatient       [facility] Performed: [facility]        Diagnosis/ICD: Other specified symptoms and signs involving the circulatory and                 respiratory systems-R09.89  Procedure/CPT: 42805 Cerebrovascular Carotid Duplex scan complete        PRELIMINARY CONCLUSIONS:  Right Carotid: Findings are consistent with less than 50% stenosis of the right proximal internal carotid artery. Laminar flow seen by color Doppler. Right external carotid artery appears patent with no evidence of stenosis. No evidence of hemodynamically significant stenosis of the right common carotid artery. The right vertebral artery is patent with antegrade flow. No evidence of hemodynamically significant stenosis in the right subclavian artery.  Left Carotid: Findings are consistent with less than 50% stenosis of the left  Quality 110: Preventive Care And Screening: Influenza Immunization: Influenza Immunization not Administered for Documented Reasons. proximal internal carotid artery. Laminar flow seen by color Doppler. Left external carotid artery appears patent with no evidence of stenosis. No evidence of hemodynamically significant stenosis of the left common carotid artery. The left vertebral artery is patent with antegrade flow. No evidence of hemodynamically significant stenosis in the left subclavian artery.     Imaging & Doppler Findings:  Right Plaque Morph: The proximal right internal carotid artery demonstrates calcified and heterogenous plaque. The distal right common carotid artery demonstrates heterogenous and calcified plaque.  Left Plaque Morph: The proximal left internal carotid artery demonstrates intimal thickening and calcified plaque. The distal left common carotid artery demonstrates intimal thickening and calcified plaque.      Right                      Left    PSV     EDV                PSV     EDV  96 cm/s           CCA P    99 cm/s  75 cm/s           CCA D    61 cm/s  82 cm/s 30 cm/s   ICA P    69 cm/s 29 cm/s  89 cm/s 28 cm/s   ICA M    82 cm/s 35 cm/s  78 cm/s 38 cm/s   ICA D    80 cm/s 34 cm/s  96 cm/s            ECA     90 cm/s  36 cm/s 15 cm/s Vertebral  31 cm/s 8 cm/s  72 cm/s         Subclavian 94 cm/s                      Right Left  ICA/CCA Ratio  1.1  1.1              VASCULAR PRELIMINARY REPORT  completed by Jennifer Mahoney T on 3/24/2025 at 4:16:13 PM        ** Final **  ECG 12 lead  Sinus rhythm with marked sinus arrhythmia  Right superior axis deviation  Pulmonary disease pattern  Septal infarct (cited on or before 15-MAY-2020)  Abnormal ECG  When compared with ECG of 19-DEC-2024 14:01,  Premature atrial complexes are no longer Present  Vent. rate has increased BY  26 BPM        Physical Exam     Relevant Results                       Assessment/Plan                       Assessment & Plan  Chest pain, unspecified type     Unstable angina (Multi)           Silas Oliveira MD   Physician  Internal Medicine     H&P       Signed     Date of Service: 3/22/2025  5:19 PM      Signed        Expand All Collapse All    History Of Present Illness  Rafi Lisa Jr. is a 74 y.o. male with significant prior medical history of HTN, HLD, CAD s/p multiple stents (last laser-assisted PCI of Cx 1/22) on Plavix, watchman placement, prediabetes, COPD who presented to ED for evaluation of intermittent pain that begins at left biceps and radiates up into trapezius and jaw with occasional associated chest discomfort for the past several months.  Denies having any symptoms today, last episode of chest discomfort was last week that resolved on its own.  He states he had called Dr. Hunt's office to schedule a stress test at which point he was advised to come to the hospital.  He reports arm/jaw symptoms rarely are accompanied by chest comfort, but episodes of chest discomfort always include radiation to left arm.  States symptoms come on at rest and alleviated by NSAID.  Denies symptoms with activity, associated shortness of breath, dizziness, lightheadedness, diaphoresis.  Denies trauma to cervical spine, numbness/tingling sensation, weakness of left arm.  Denies fevers, chills, abdominal pain, changes in urinary/bowel habits.  Continues to smoke a pack of cigarettes a day.     ED course: Vitals within normal range.  Per radiology, imaging shows no acute cardiopulmonary process.  ECG shows no evidence of ischemia.  Labs unremarkable.     Past Medical History  Medical History           Past Medical History:   Diagnosis Date    Abnormal findings on diagnostic imaging of other specified body structures       Abnormal CT of the chest    Personal history of other medical treatment       History of echocardiogram    Personal history of other medical treatment       History of nuclear stress test            Surgical History  Surgical History             Past Surgical History:   Procedure Laterality Date    MR HEAD ANGIO WO IV CONTRAST   12/17/2023     MR HEAD  Quality 130: Documentation Of Current Medications In The Medical Record: Current Medications Documented ANGIO WO IV CONTRAST 2023 [facility] MRI    MR HEAD ANGIO WO IV CONTRAST   2023     MR HEAD ANGIO WO IV CONTRAST    MR NECK ANGIO WO IV CONTRAST   2023     MR NECK ANGIO WO IV CONTRAST 2023 [facility] MRI    MR NECK ANGIO WO IV CONTRAST   2023     MR NECK ANGIO WO IV CONTRAST    OTHER SURGICAL HISTORY   2020     Radiofrequency ablation    OTHER SURGICAL HISTORY   2022     Cardiac catheterization with stent placement    OTHER SURGICAL HISTORY   2022     Cardiac catheterization    OTHER SURGICAL HISTORY   2020     Atrial appendage closure    OTHER SURGICAL HISTORY   2020     Esophagogastroduodenoscopy    OTHER SURGICAL HISTORY   2020     Colonoscopy    OTHER SURGICAL HISTORY   2020     Cardiac catheterization with stent placement    OTHER SURGICAL HISTORY   2020     Tonsillectomy    OTHER SURGICAL HISTORY   2020     Bunionectomy    OTHER SURGICAL HISTORY   2020     Lithotripsy            Social History  He reports that he has been smoking cigarettes. He has a 64 pack-year smoking history. He has quit using smokeless tobacco. No history on file for alcohol use and drug use.     Family History  Family History               Family History   Problem Relation Name Age of Onset    Other (coronary thrombosis) Father              in 1970s @63            Allergies  Sulfa (sulfonamide antibiotics) and Adhesive tape-silicones     Review of Systems  10 point ROS negative except as specified in HPI     Physical Exam  HENT:      Head: Atraumatic.      Nose: Nose normal.   Eyes:      Conjunctiva/sclera: Conjunctivae normal.   Cardiovascular:      Rate and Rhythm: Normal rate and regular rhythm.   Pulmonary:      Effort: Pulmonary effort is normal.      Breath sounds: Wheezing and rhonchi present.      Comments: Wheezing, greater in right fields  Abdominal:      General: Abdomen is flat.      Palpations: Abdomen is soft.   Musculoskeletal:          Detail Level: Detailed General: Normal range of motion.      Cervical back: Normal range of motion.      Comments: 5/5 strength in upper extremities   Skin:     General: Skin is warm and dry.   Neurological:      Mental Status: He is alert. Mental status is at baseline.   Psychiatric:         Behavior: Behavior normal.            Last Recorded Vitals  Blood pressure 110/66, pulse 65, temperature 35.9 °C (96.6 °F), temperature source Temporal, resp. rate 16, height 1.829 m (6'), weight 80.7 kg (178 lb), SpO2 93%.     Relevant Results               Results for orders placed or performed during the hospital encounter of 03/21/25 (from the past 24 hours)   CBC and Auto Differential   Result Value Ref Range     WBC 4.5 4.4 - 11.3 x10*3/uL     nRBC 0.0 0.0 - 0.0 /100 WBCs     RBC 4.59 4.50 - 5.90 x10*6/uL     Hemoglobin 13.9 13.5 - 17.5 g/dL     Hematocrit 45.2 41.0 - 52.0 %     MCV 99 80 - 100 fL     MCH 30.3 26.0 - 34.0 pg     MCHC 30.8 (L) 32.0 - 36.0 g/dL     RDW 13.4 11.5 - 14.5 %     Platelets 169 150 - 450 x10*3/uL     Neutrophils % 68.9 40.0 - 80.0 %     Immature Granulocytes %, Automated 0.2 0.0 - 0.9 %     Lymphocytes % 11.8 13.0 - 44.0 %     Monocytes % 16.7 2.0 - 10.0 %     Eosinophils % 2.0 0.0 - 6.0 %     Basophils % 0.4 0.0 - 2.0 %     Neutrophils Absolute 3.09 1.60 - 5.50 x10*3/uL     Immature Granulocytes Absolute, Automated 0.01 0.00 - 0.50 x10*3/uL     Lymphocytes Absolute 0.53 (L) 0.80 - 3.00 x10*3/uL     Monocytes Absolute 0.75 0.05 - 0.80 x10*3/uL     Eosinophils Absolute 0.09 0.00 - 0.40 x10*3/uL     Basophils Absolute 0.02 0.00 - 0.10 x10*3/uL   Comprehensive metabolic panel   Result Value Ref Range     Glucose 93 74 - 99 mg/dL     Sodium 135 (L) 136 - 145 mmol/L     Potassium 4.7 3.5 - 5.3 mmol/L     Chloride 101 98 - 107 mmol/L     Bicarbonate 26 21 - 32 mmol/L     Anion Gap 13 10 - 20 mmol/L     Urea Nitrogen 25 (H) 6 - 23 mg/dL     Creatinine 1.63 (H) 0.50 - 1.30 mg/dL     eGFR 44 (L) >60 mL/min/1.73m*2     Calcium  Quality 131: Pain Assessment And Follow-Up: Pain assessment documented as positive using a standardized tool AND a follow-up plan is documented 9.8 8.6 - 10.3 mg/dL     Albumin 4.7 3.4 - 5.0 g/dL     Alkaline Phosphatase 89 33 - 136 U/L     Total Protein 8.3 (H) 6.4 - 8.2 g/dL     AST 32 9 - 39 U/L     Bilirubin, Total 0.7 0.0 - 1.2 mg/dL     ALT 18 10 - 52 U/L   Troponin I, High Sensitivity   Result Value Ref Range     Troponin I, High Sensitivity 6 0 - 20 ng/L   Troponin I, High Sensitivity   Result Value Ref Range     Troponin I, High Sensitivity 6 0 - 20 ng/L   Lipid Panel   Result Value Ref Range     Cholesterol 91 0 - 199 mg/dL     HDL-Cholesterol 36.6 mg/dL     Cholesterol/HDL Ratio 2.5       LDL Calculated 39 <=99 mg/dL     VLDL 15 0 - 40 mg/dL     Triglycerides 75 0 - 149 mg/dL     Non HDL Cholesterol 54 0 - 149 mg/dL   Lavender Top   Result Value Ref Range     Extra Tube Hold for add-ons.        XR chest 2 views     Result Date: 3/21/2025  STUDY: Chest Radiographs;  3/21/2025 17:44 INDICATION: Chest pain. COMPARISON: 12/27/2019 XR Chest ACCESSION NUMBER(S): [id number] ORDERING CLINICIAN: DIAMOND MARINELLI TECHNIQUE:  Frontal and lateral chest. FINDINGS: CARDIOMEDIASTINAL SILHOUETTE: Cardiomediastinal silhouette is normal in size and configuration.  LUNGS: Lungs are clear.  There is no consolidation, effusion or pneumothorax but there is a large retrocardiac hiatal hernia.  ABDOMEN: No remarkable upper abdominal findings.  BONES: No acute osseous changes.     No acute cardiopulmonary disease.  There is a retrocardiac hiatal hernia but the lungs otherwise appear clear.. Signed by Mikey Dick MD            Assessment/Plan     Assessment & Plan  Chest pain, unspecified type     74-year-old male with past medical history of HTN, HLD and CAD with 5 stents on Plavix presenting to ED for ongoing intermittent chest pain with radiation to arm for past 4 months.  Labs and EKG without evidence of cardiac ischemia/injury.  Patient admitted to observation with telemetry under Dr. Oliveira for further evaluation and care.     #Chest pain  #Tobacco use  - Continue  Plavix, statin  - Cardio consult  - Vitals every 4 hours, telemetry  - Cardiac diet  - ECG per ACS symptoms  - Follow lipid panel  - Nicotine patch     Chronic conditions  #CAD  #Hypertension  #HLD  #COPD  #GERD  - Continue home meds when reconciled     I spent 60 minutes in the professional and overall care of this patient.  Patient fully evaluated on March 22 and Long discussion with patient.  Patient is now agreeable to cardiac catheterization.  Will monitor overnight and run it past cardiology.  Recheck labs in AM.     Silas Oliveira MD                  Heart catheterization planned for tomorrow.  No chest pain noted at this time.  Patient fully evaluated on March 24.  Cardiac catheterization results noted and patient will require CABG.  Workup in progress for clearance.  Recheck labs in AM.        Silas Oliveira MD                      Pertinent Physical Exam At Time of Discharge  Physical Exam    Outpatient Follow-Up  Future Appointments   Date Time Provider Department Center   12/18/2025  1:00 PM James Hunt MD [id number] [place]         [facility]

## 2025-03-25 NOTE — CARE PLAN
The patient's goals for the shift include rest    The clinical goals for the shift include pt will have no chest pain throughout the shift

## 2025-03-25 NOTE — CARE PLAN
The patient's goals for the shift include rest    The clinical goals for the shift include pt will have no chest pain throughout the shift    Over the shift, the patient did not make progress toward the following goals. Recommendations to address these barriers include continue to monitor.

## 2025-03-25 NOTE — PROGRESS NOTES
[facility]   Cardiothoracic Surgery   Progress Note        Rafi Lisa Jr. is a 74 y.o. male on day 1 of admission presenting with Chest pain, unspecified type.    Subjective     Interval HPI: Seen and assessed patient this afternoon while they were laying in bed; in no acute distress and without anginal complaints.     Review of Systems   Constitutional: Negative for decreased appetite and malaise/fatigue.   HENT:  Negative for congestion.    Eyes:  Negative for blurred vision and double vision.   Cardiovascular:  Negative for chest pain, dyspnea on exertion, irregular heartbeat, leg swelling, orthopnea and palpitations.   Respiratory:  Negative for cough and shortness of breath.    Endocrine: Negative for cold intolerance and heat intolerance.   Skin:  Negative for nail changes, poor wound healing and rash.   Musculoskeletal:  Negative for arthritis, muscle cramps, muscle weakness, myalgias and stiffness.   Gastrointestinal:  Negative for bloating, nausea and vomiting.   Genitourinary:  Negative for frequency, hesitancy and urgency.   Neurological:  Negative for dizziness, focal weakness, light-headedness and weakness.   Psychiatric/Behavioral:  Negative for altered mental status.    Allergic/Immunologic: Negative for environmental allergies.         Objective   Physical Exam  Physical Exam  Vitals and nursing note reviewed.   Constitutional:       General: He is not in acute distress.     Appearance: Normal appearance. He is not ill-appearing.   HENT:      Head: Normocephalic and atraumatic.      Nose: Nose normal.      Mouth/Throat:      Mouth: Mucous membranes are moist.      Pharynx: Oropharynx is clear.   Eyes:      Extraocular Movements: Extraocular movements intact.      Conjunctiva/sclera: Conjunctivae normal.      Pupils: Pupils are equal, round, and reactive to light.   Cardiovascular:      Rate and Rhythm: Normal rate and regular rhythm.      Pulses: Normal pulses.       Heart sounds: Normal heart sounds, S1 normal and S2 normal. No murmur heard.     No systolic murmur is present.      No friction rub. No gallop.   Pulmonary:      Effort: Pulmonary effort is normal.      Breath sounds: Examination of the right-lower field reveals decreased breath sounds. Examination of the left-lower field reveals decreased breath sounds. Decreased breath sounds present.   Abdominal:      General: Abdomen is flat. Bowel sounds are normal. There is no distension.      Palpations: Abdomen is soft.   Musculoskeletal:         General: Normal range of motion.      Cervical back: Normal range of motion and neck supple.      Right lower leg: No edema.      Left lower leg: No edema.   Skin:     General: Skin is warm and dry.      Capillary Refill: Capillary refill takes less than 2 seconds.      Findings: No lesion.      Nails: There is no clubbing.   Neurological:      General: No focal deficit present.      Mental Status: He is alert and oriented to person, place, and time. Mental status is at baseline.      Cranial Nerves: Cranial nerves 2-12 are intact.      Sensory: Sensation is intact.      Motor: Motor function is intact.   Psychiatric:         Attention and Perception: Attention and perception normal.         Mood and Affect: Mood normal.         Speech: Speech normal.         Behavior: Behavior normal.         Thought Content: Thought content normal.         Cognition and Memory: Cognition and memory normal.         Judgment: Judgment normal.         Last Recorded Vitals  Vitals:    03/25/25 0050 03/25/25 0455 03/25/25 1000 03/25/25 1406   BP: 93/56 106/68 101/66 105/67   BP Location: Left arm Left arm Left arm Left arm   Patient Position: Lying Lying Lying Lying   Pulse: 72 70 94 73   Resp:  18 18 18   Temp: 36.3 °C (97.3 °F) 36.9 °C (98.4 °F) 35.6 °C (96.1 °F)    TempSrc: Temporal Temporal Temporal    SpO2: 93% 90% 95% 95%   Weight:       Height:            Intake/Output last 3 Shifts:  I/O  last 3 completed shifts:  In: 590 (7.3 mL/kg) [P.O.:590]  Out: 5 (0.1 mL/kg) [Blood:5]  Weight: 80.7 kg     Inpatient Medications  aspirin, 81 mg, oral, Daily  clopidogrel, 75 mg, oral, Daily  colchicine, 0.6 mg, oral, BID  enoxaparin, 40 mg, subcutaneous, q24h  ezetimibe, 10 mg, oral, Daily  lisinopril, 20 mg, oral, Daily  metoprolol succinate XL, 50 mg, oral, Daily  nicotine, 1 patch, transdermal, Daily  pantoprazole, 40 mg, oral, Daily before breakfast  perflutren protein A microsphere, 0.5 mL, intravenous, Once in imaging  probenecid, 500 mg, oral, BID  psyllium, 1 packet, oral, Daily  rosuvastatin, 40 mg, oral, Daily      Continuous medications     PRN medications  PRN medications: acetaminophen **OR** acetaminophen **OR** acetaminophen, guaiFENesin, nitroglycerin, ondansetron **OR** ondansetron, oxygen     LDA:       Relevant Results  Lab Review  Results from last 7 days   Lab Units 03/25/25  0720   WBC AUTO x10*3/uL 4.6   HEMOGLOBIN g/dL 12.8*   HEMATOCRIT % 41.7   PLATELETS AUTO x10*3/uL 165     Results from last 7 days   Lab Units 03/25/25  0720   SODIUM mmol/L 139   POTASSIUM mmol/L 4.6   CHLORIDE mmol/L 107   CO2 mmol/L 25   BUN mg/dL 26*   CREATININE mg/dL 1.18   CALCIUM mg/dL 9.0   PROTEIN TOTAL g/dL 7.0   BILIRUBIN TOTAL mg/dL 0.5   ALK PHOS U/L 70   ALT U/L 20   AST U/L 28   GLUCOSE mg/dL 85         Results from last 7 days   Lab Units 03/25/25  0947   POCT PH, ARTERIAL pH 7.43*   POCT PCO2, ARTERIAL mm Hg 39   POCT PO2, ARTERIAL mm Hg 72*   POCT HCO3 CALCULATED, ARTERIAL mmol/L 25.9   POCT BASE EXCESS, ARTERIAL mmol/L 1.5         Radiographic Testing  EKG:  ECG 12 lead 03/21/2025        Echo:    Transthoracic ECHO 03/22/2025  No results found for this or any previous visit from the past 1095 days.  PHYSICIAN INTERPRETATION:  Left Ventricle: The left ventricular systolic function is normal, with a visually estimated ejection fraction of 55-60%. There is mild concentric left ventricular hypertrophy.  There are no regional left ventricular wall motion abnormalities. The left ventricular cavity size is normal. There is mildly increased septal and mildly increased posterior left ventricular wall thickness. There is left ventricular concentric remodeling. Spectral Doppler shows a Grade II (pseudonormal pattern) of left ventricular diastolic filling with an elevated left atrial pressure.  Left Atrium: The left atrium is mildly dilated.  Right Ventricle: The right ventricle is mildly enlarged. There is normal right ventricular global systolic function.  Right Atrium: The right atrium is normal in size.  Aortic Valve: The aortic valve is trileaflet. There is mild aortic valve thickening. The aortic valve dimensionless index is 0.81. There is moderate aortic valve regurgitation. The peak instantaneous gradient of the aortic valve is 7 mmHg. The mean gradient of the aortic valve is 4 mmHg.  Mitral Valve: The mitral valve is mildly thickened. The peak instantaneous gradient of the mitral valve is 2 mmHg. There is mild mitral valve regurgitation.  Tricuspid Valve: The tricuspid valve is structurally normal. There is mild tricuspid regurgitation. The Doppler estimated RVSP is within normal limits at 29.4 mmHg.  Pulmonic Valve: The pulmonic valve is structurally normal. There is mild pulmonic valve regurgitation.  Pericardium: There is no pericardial effusion noted.  Aorta: The aortic root is abnormal. There is mild dilatation of the ascending aorta.  In comparison to the previous echocardiogram(s): Compared with study dated 5/15/2020,.        CONCLUSIONS:   1. The left ventricular systolic function is normal, with a visually estimated ejection fraction of 55-60%.   2. Spectral Doppler shows a Grade II (pseudonormal pattern) of left ventricular diastolic filling with an elevated left atrial pressure.   3. Mildly enlarged right ventricle.   4. The left atrium is mildly dilated.   5. Right ventricular systolic pressure is  within normal limits.   6. Moderate aortic valve regurgitation.    Chest Imaging:  CT chest wo IV contrast   Final Result   1. Extensive bilateral centrilobular emphysematous changes and   subpleural fibrotic changes. Advise pulmonology referral.   2. Few subcentimeter pulmonary nodules noted along with right lower   lobe ill-defined nodular density measuring 1.1 cm which warrant   attention on follow-up.   3. Borderline cardiomegaly. Severe coronary artery calcifications.   Aneurysmal dilatation of the ascending thoracic aorta measuring 4.2   cm.   4. Large stomach containing hiatal hernia.   5. Uncomplicated colonic diverticulosis.        MACRO:   None        Signed by: Abdias Samano 3/25/2025 7:37 AM   Dictation workstation:   [id number]      CT abdomen pelvis wo IV contrast   Final Result   1. Extensive bilateral centrilobular emphysematous changes and   subpleural fibrotic changes. Advise pulmonology referral.   2. Few subcentimeter pulmonary nodules noted along with right lower   lobe ill-defined nodular density measuring 1.1 cm which warrant   attention on follow-up.   3. Borderline cardiomegaly. Severe coronary artery calcifications.   Aneurysmal dilatation of the ascending thoracic aorta measuring 4.2   cm.   4. Large stomach containing hiatal hernia.   5. Uncomplicated colonic diverticulosis.        MACRO:   None        Signed by: Abdias Samano 3/25/2025 7:37 AM   Dictation workstation:   [id number]      Carotid duplex bilateral         Transthoracic Echo (TTE) Complete   Final Result      XR chest 2 views   Final Result   No acute cardiopulmonary disease.  There is a retrocardiac hiatal   hernia but the lungs otherwise appear clear..   Signed by Mikey Dick MD      Cardiac Catheterization Procedure    (Results Pending)   Vascular US lower extremity vein mapping bilateral    (Results Pending)   Vascular US upper extremity arterial duplex bilateral    (Results Pending)             History of Present  Illness: Rafi Lisa Jr. is a 74 y.o. male with a PMH significant for CAD (s/p PCI to the LAD, laser arthrectomy to the Lcx on 1/2022), HTN, HLD, COPD (Emphysemea), and nicotine use disorder   who presents to [facility] on 3/21/2025 for evaluation of his anginal complaints.     The patient was undergoing workup with cardiologist, Dr. James Hunt in workup for his anginal complaints. He ultimately was scheduled for a stress testing; however during his exam, he was referred to the ED and was subsequently admitted for inpatient workup.      On 3/24/25, he underwent a LHC with interventional cardiologist, Dr. Tony Oneill and his coronary anatomy revealed diffuse multivessel CAD. Cardiac surgery was subsequently consulted for bypass grafting evaluation.     Daily Events    3/25/25: No acute events overnight; patient remains without anginal complaints. CT imaging confirmed a large hiatal hernia that was appreciated on patients' CXR. Patient did notate that he is positionally symptomatic with his hiatal hernia.     Given stability with his coronary system, okay to discharge the patient to home. Will arrange for OP thoracic surgery evaluation & stage bypass grafting following eval.     Both patient and his wife were updated with this plan and agreeable.      Assessment & Plan       Multivessel CAD  - Patient has remote Hx of PCI to the LAD in 1998   --> most recently laser arterectomy to the Lcx on 1/2022  - Has been with anginal complaints & subsequently ordered an OP stress test, however when arrived for stress testing was send to the ED   - C this admission revealed diffuse MV-CAD including ISR   - Cardiac surgery consulted for bypass grafting evaluation   - Plan for surgical staging following thoracic surgery evaluation   - Continue on Clopidogrel 75mg, metoprolol succinate 50mg every day & rosuvastatin 20mg   --> Will need to hold [id number] five days piror to OR         Hyperlipidemia  - Home Medications: 40mg Rosuvastatin; 10mg Ezetimibe   - Continue as ordered        Hypertension  - Home Medications: Lisinopril 20mg  - Continue as ordered       Hiatal Hernia     GERD  - Home Medications: Pantoprazole 40mg  - Patient has a large, symptomatic hiatal hernia   --> Plan for outpatient thoracic surgery evaluation   - Continue on home PPI        COPD     Nicotine Use Disorder  - Home Medications: Albuterol PRN  - Home O2: N/A  - PFTs completed on 3/25 (see below) show COPD with reversible component   --> FEV1/FVC = 0.52 pre / 0.53 post (FEV1 improved 15% / FVC improved 16%)         Pulmonary Function Lab - Spirometry Only      03/25/25      Above patient seen and plan discussed with Dr. Juan Luis Kline; plan as above. Will arrange for OP evaluation with thoracic surgery to evaluate hiatal hernia. Will stage bypass grafting shortly after thoracic surgery evaluation.     Anders Aaron, APRN-CNP

## 2025-03-26 LAB — FERRITIN SERPL-MCNC: 32 NG/ML (ref 20–300)

## 2025-03-27 NOTE — PROGRESS NOTES
"Subjective   Rafi Lisa Jr.  is a 74 y.o. male who presents for evaluation of a large hiatal hernia.    In brief, Rafi Lisa Jr. is a 74 y.o. male with significant PMH for HTN, HLD, CAD with 5 stents on Plavix, Watchman placement, GI bleed, hepatitis C, prediabetes, COPD and CKD.  He recently presented to medical attention on 3/21/2025 with chest pain and was found to have coronary artery disease.  His workup is included multiple radiographic studies which also included a CT scan of the chest abdomen and pelvis on 3/24/2025.  This incidentally detected a large hiatal hernia.  The purpose of this visit is to evaluate his hiatal hernia in the setting of planned surgical intervention for this coronary artery disease.    Currently the patient is in their usual state of health. He reports that he has had this hernia for \"50 years\" and this was worked up \"extensively\" in the past. His most recent EGD was in 2023.  He denies the following symptoms: chest pain, shortness of breath at rest, shortness of breath with activity, cough, hemoptysis, fevers, chills, weight loss, abdominal pain, difficulty swallowing, vomiting, changes to bowel function, and reflux symptoms.  He has chronic GERD controlled with meds.     He  has a past medical history of Abnormal findings on diagnostic imaging of other specified body structures, Personal history of other medical treatment, and Personal history of other medical treatment.  He  has a past surgical history that includes Other surgical history (01/30/2020); Other surgical history (01/20/2022); Other surgical history (01/20/2022); Other surgical history (06/04/2020); Other surgical history (06/04/2020); Other surgical history (06/04/2020); Other surgical history (06/04/2020); Other surgical history (06/04/2020); Other surgical history (06/04/2020); Other surgical history (06/04/2020); MR angio head wo IV contrast (12/17/2023); MR angio neck wo IV contrast (12/17/2023); MR angio " "head wo IV contrast (2023); MR angio neck wo IV contrast (2023); and Cardiac catheterization (N/A, 3/24/2025).  He   Family History   Problem Relation Name Age of Onset    Other (coronary thrombosis) Father           in 1970s @63       He  reports that he has quit smoking. His smoking use included cigarettes. He has a 64 pack-year smoking history. He has quit using smokeless tobacco. He reports that he does not currently use alcohol. He reports that he does not currently use drugs.    Objective   Physical Exam  The patient is well-appearing and in no acute distress. The trachea is midline and there is no crepitus. The lungs were clear to auscultation grossly. There was good effort and excursion. The heart had a regular rate and rhythm. The abdomen was soft, nontender and nondistended. The extremities had no edema or gross deformities. Mood and affect are appropriate.  Diagnostic Studies  I reviewed the test results described in the HPI    Assessment/Plan   I believe that the patient has a diagnosis of large hiatal hernia .     I reviewed this patient's radiographic imaging.  He does have a large hiatal hernia.  I believe this could be relatively easily managed perioperatively with nasogastric tube decompression.  Currently, his gastrointestinal symptoms are not worrisome to me, and I believe any intervention of his hiatal hernia could be safely delayed until after his coronary disease has been corrected.  An alternative to nasogastric decompression perioperatively would be to place a percutaneous gastrostomy, which could act as a \"pexy\" of his stomach, but I believe this is likely not required.    I recommend  proceeding with planned coronary artery bypass surgery, consideration of elective surgical repair after correction of his coronary artery disease.    Patent will contact PMD about getting a yearly screening CT for lung cancer.     I discussed this in detail with the patient, including a " discussion of alternatives. They were comfortable with this approach.     Juan Luis Dominguez MD  [phone]

## 2025-03-28 LAB
MGC ASCENT PFT - FEV1 - POST: 2.25
MGC ASCENT PFT - FEV1 - PRE: 1.87
MGC ASCENT PFT - FEV1 - PREDICTED: 3.1
MGC ASCENT PFT - FVC - POST: 4.28
MGC ASCENT PFT - FVC - PRE: 3.6
MGC ASCENT PFT - FVC - PREDICTED: 4.21

## 2025-04-02 ENCOUNTER — OFFICE VISIT (OUTPATIENT)
Dept: SURGERY | Facility: CLINIC | Age: 75
End: 2025-04-02
Payer: COMMERCIAL

## 2025-04-02 VITALS
BODY MASS INDEX: 24.46 KG/M2 | HEIGHT: 72 IN | TEMPERATURE: 97.5 F | WEIGHT: 180.6 LBS | SYSTOLIC BLOOD PRESSURE: 104 MMHG | OXYGEN SATURATION: 96 % | DIASTOLIC BLOOD PRESSURE: 67 MMHG | HEART RATE: 71 BPM

## 2025-04-02 DIAGNOSIS — K44.9 HIATAL HERNIA: Primary | ICD-10-CM

## 2025-04-02 PROCEDURE — 1126F AMNT PAIN NOTED NONE PRSNT: CPT | Performed by: THORACIC SURGERY (CARDIOTHORACIC VASCULAR SURGERY)

## 2025-04-02 PROCEDURE — 99215 OFFICE O/P EST HI 40 MIN: CPT | Performed by: THORACIC SURGERY (CARDIOTHORACIC VASCULAR SURGERY)

## 2025-04-02 PROCEDURE — 3078F DIAST BP <80 MM HG: CPT | Performed by: THORACIC SURGERY (CARDIOTHORACIC VASCULAR SURGERY)

## 2025-04-02 PROCEDURE — 99205 OFFICE O/P NEW HI 60 MIN: CPT | Performed by: THORACIC SURGERY (CARDIOTHORACIC VASCULAR SURGERY)

## 2025-04-02 PROCEDURE — 1111F DSCHRG MED/CURRENT MED MERGE: CPT | Performed by: THORACIC SURGERY (CARDIOTHORACIC VASCULAR SURGERY)

## 2025-04-02 PROCEDURE — 1159F MED LIST DOCD IN RCRD: CPT | Performed by: THORACIC SURGERY (CARDIOTHORACIC VASCULAR SURGERY)

## 2025-04-02 PROCEDURE — 3074F SYST BP LT 130 MM HG: CPT | Performed by: THORACIC SURGERY (CARDIOTHORACIC VASCULAR SURGERY)

## 2025-04-02 RX ORDER — UBIDECARENONE 100 MG
100 CAPSULE ORAL 2 TIMES DAILY
COMMUNITY

## 2025-04-02 RX ORDER — VARENICLINE TARTRATE 1 MG/1
1 TABLET, FILM COATED ORAL 2 TIMES DAILY
COMMUNITY

## 2025-04-02 ASSESSMENT — ENCOUNTER SYMPTOMS
DEPRESSION: 0
OCCASIONAL FEELINGS OF UNSTEADINESS: 1
LOSS OF SENSATION IN FEET: 0

## 2025-04-02 ASSESSMENT — PAIN SCALES - GENERAL: PAINLEVEL_OUTOF10: 0-NO PAIN

## 2025-04-03 ENCOUNTER — HOSPITAL ENCOUNTER (OUTPATIENT)
Facility: HOSPITAL | Age: 75
Setting detail: SURGERY ADMIT
DRG: 236 | End: 2025-04-03
Attending: THORACIC SURGERY (CARDIOTHORACIC VASCULAR SURGERY) | Admitting: THORACIC SURGERY (CARDIOTHORACIC VASCULAR SURGERY)
Payer: COMMERCIAL

## 2025-04-03 ENCOUNTER — TELEPHONE (OUTPATIENT)
Dept: CARDIAC SURGERY | Facility: CLINIC | Age: 75
End: 2025-04-03
Payer: MEDICARE

## 2025-04-03 ENCOUNTER — PREP FOR PROCEDURE (OUTPATIENT)
Dept: CARDIAC SURGERY | Facility: CLINIC | Age: 75
End: 2025-04-03
Payer: MEDICARE

## 2025-04-03 DIAGNOSIS — J42 CHRONIC BRONCHITIS, UNSPECIFIED CHRONIC BRONCHITIS TYPE (MULTI): ICD-10-CM

## 2025-04-03 DIAGNOSIS — E78.5 HYPERLIPIDEMIA, UNSPECIFIED HYPERLIPIDEMIA TYPE: ICD-10-CM

## 2025-04-03 DIAGNOSIS — E78.2 MIXED HYPERLIPIDEMIA: ICD-10-CM

## 2025-04-03 DIAGNOSIS — R07.9 CHEST PAIN, UNSPECIFIED TYPE: ICD-10-CM

## 2025-04-03 DIAGNOSIS — Z95.1 S/P CABG X 3: ICD-10-CM

## 2025-04-03 DIAGNOSIS — N18.31 STAGE 3A CHRONIC KIDNEY DISEASE (MULTI): ICD-10-CM

## 2025-04-03 DIAGNOSIS — I25.85 CHRONIC CORONARY MICROVASCULAR DYSFUNCTION: Primary | ICD-10-CM

## 2025-04-03 DIAGNOSIS — R07.9 CHEST PAIN, UNSPECIFIED: ICD-10-CM

## 2025-04-03 DIAGNOSIS — I48.20 CHRONIC ATRIAL FIBRILLATION (MULTI): ICD-10-CM

## 2025-04-03 DIAGNOSIS — Z95.5 S/P CORONARY ARTERY STENT PLACEMENT: ICD-10-CM

## 2025-04-03 DIAGNOSIS — I10 ESSENTIAL HYPERTENSION, BENIGN: ICD-10-CM

## 2025-04-03 DIAGNOSIS — I25.10 CORONARY ARTERY DISEASE INVOLVING NATIVE CORONARY ARTERY OF NATIVE HEART WITHOUT ANGINA PECTORIS: ICD-10-CM

## 2025-04-03 RX ORDER — CHLORHEXIDINE GLUCONATE ORAL RINSE 1.2 MG/ML
15 SOLUTION DENTAL 2 TIMES DAILY
OUTPATIENT
Start: 2025-04-03 | End: 2025-04-04

## 2025-04-03 RX ORDER — CEFAZOLIN SODIUM 2 G/100ML
2 INJECTION, SOLUTION INTRAVENOUS ONCE
OUTPATIENT
Start: 2025-04-03 | End: 2025-04-03

## 2025-04-03 NOTE — TELEPHONE ENCOUNTER
Rafi informed that his surgery date with Dr. Kline is 4/22/25 at 0730.  I informed him to stop taking his Lisinopril for 48 hours prior to surgery and his last dose should be taken on 4/19/25. I informed him to stop taking his Plavix for 5 days prior to surgery and his last dose should be taken on 4/17/25.  He is aware to take his Aspirin and Metoprolol with just a sip of water prior to leaving his home the day of surgery to come to the hospital. He is aware that he should have nothing to eat or drink after midnight the night before his surgery.

## 2025-04-03 NOTE — TELEPHONE ENCOUNTER
Left a voicemail regarding a surgical date with Dr. Kline and when to stop taking two medications. I left the office number and asked for a return call.

## 2025-04-08 ENCOUNTER — HOSPITAL ENCOUNTER (OUTPATIENT)
Dept: VASCULAR MEDICINE | Facility: CLINIC | Age: 75
Discharge: HOME | End: 2025-04-08
Payer: COMMERCIAL

## 2025-04-08 ENCOUNTER — DOCUMENTATION (OUTPATIENT)
Dept: CARDIAC SURGERY | Facility: CLINIC | Age: 75
End: 2025-04-08
Payer: MEDICARE

## 2025-04-08 ENCOUNTER — PRE-ADMISSION TESTING (OUTPATIENT)
Dept: PREADMISSION TESTING | Facility: HOSPITAL | Age: 75
End: 2025-04-08
Payer: COMMERCIAL

## 2025-04-08 VITALS
HEART RATE: 61 BPM | TEMPERATURE: 95 F | OXYGEN SATURATION: 97 % | WEIGHT: 176.37 LBS | DIASTOLIC BLOOD PRESSURE: 78 MMHG | SYSTOLIC BLOOD PRESSURE: 132 MMHG | RESPIRATION RATE: 18 BRPM | BODY MASS INDEX: 23.89 KG/M2 | HEIGHT: 72 IN

## 2025-04-08 DIAGNOSIS — Z01.818 PRE-OP TESTING: Primary | ICD-10-CM

## 2025-04-08 DIAGNOSIS — Z01.818 PREOPERATIVE TESTING: ICD-10-CM

## 2025-04-08 DIAGNOSIS — Z01.810 ENCOUNTER FOR PREPROCEDURAL CARDIOVASCULAR EXAMINATION: ICD-10-CM

## 2025-04-08 DIAGNOSIS — R07.9 CHEST PAIN, UNSPECIFIED: ICD-10-CM

## 2025-04-08 DIAGNOSIS — I25.85 CHRONIC CORONARY MICROVASCULAR DYSFUNCTION: ICD-10-CM

## 2025-04-08 LAB
ABO GROUP (TYPE) IN BLOOD: NORMAL
ABO GROUP (TYPE) IN BLOOD: NORMAL
ANTIBODY SCREEN: NORMAL
APPEARANCE UR: CLEAR
BILIRUB UR STRIP.AUTO-MCNC: NEGATIVE MG/DL
COLOR UR: YELLOW
FIBRINOGEN PPP-MCNC: 389 MG/DL (ref 200–400)
GLUCOSE UR STRIP.AUTO-MCNC: NORMAL MG/DL
KETONES UR STRIP.AUTO-MCNC: NEGATIVE MG/DL
LEUKOCYTE ESTERASE UR QL STRIP.AUTO: NEGATIVE
MUCOUS THREADS #/AREA URNS AUTO: NORMAL /LPF
NITRITE UR QL STRIP.AUTO: NEGATIVE
PH UR STRIP.AUTO: 5.5 [PH]
PROT UR STRIP.AUTO-MCNC: ABNORMAL MG/DL
RBC # UR STRIP.AUTO: NEGATIVE MG/DL
RBC #/AREA URNS AUTO: NORMAL /HPF
RH FACTOR (ANTIGEN D): NORMAL
RH FACTOR (ANTIGEN D): NORMAL
SP GR UR STRIP.AUTO: 1.02
UROBILINOGEN UR STRIP.AUTO-MCNC: NORMAL MG/DL
WBC #/AREA URNS AUTO: NORMAL /HPF

## 2025-04-08 PROCEDURE — 36415 COLL VENOUS BLD VENIPUNCTURE: CPT

## 2025-04-08 PROCEDURE — 81001 URINALYSIS AUTO W/SCOPE: CPT

## 2025-04-08 PROCEDURE — 85384 FIBRINOGEN ACTIVITY: CPT

## 2025-04-08 PROCEDURE — 86901 BLOOD TYPING SEROLOGIC RH(D): CPT

## 2025-04-08 PROCEDURE — 93970 EXTREMITY STUDY: CPT

## 2025-04-08 PROCEDURE — 86923 COMPATIBILITY TEST ELECTRIC: CPT

## 2025-04-08 PROCEDURE — 93970 EXTREMITY STUDY: CPT | Performed by: SURGERY

## 2025-04-08 PROCEDURE — 87081 CULTURE SCREEN ONLY: CPT | Mod: PARLAB | Performed by: NURSE PRACTITIONER

## 2025-04-08 RX ORDER — CHLORHEXIDINE GLUCONATE 40 MG/ML
SOLUTION TOPICAL DAILY
Qty: 118 ML | Refills: 0 | Status: SHIPPED | OUTPATIENT
Start: 2025-04-08 | End: 2025-04-08 | Stop reason: ALTCHOICE

## 2025-04-08 RX ORDER — CHLORHEXIDINE GLUCONATE 40 MG/ML
SOLUTION TOPICAL DAILY
Qty: 118 ML | Refills: 0 | Status: SHIPPED | OUTPATIENT
Start: 2025-04-08 | End: 2025-04-13

## 2025-04-08 RX ORDER — CHLORHEXIDINE GLUCONATE ORAL RINSE 1.2 MG/ML
15 SOLUTION DENTAL DAILY
Qty: 30 ML | Refills: 0 | Status: SHIPPED | OUTPATIENT
Start: 2025-04-08 | End: 2025-04-10

## 2025-04-08 RX ORDER — CHLORHEXIDINE GLUCONATE ORAL RINSE 1.2 MG/ML
15 SOLUTION DENTAL DAILY
Qty: 30 ML | Refills: 0 | Status: SHIPPED | OUTPATIENT
Start: 2025-04-08 | End: 2025-04-08 | Stop reason: ALTCHOICE

## 2025-04-08 ASSESSMENT — DUKE ACTIVITY SCORE INDEX (DASI)
CAN YOU DO YARD WORK LIKE RAKING LEAVES, WEEDING OR PUSHING A MOWER: YES
CAN YOU RUN A SHORT DISTANCE: NO
DASI METS SCORE: 5.6
CAN YOU HAVE SEXUAL RELATIONS: NO
CAN YOU PARTICIPATE IN STRENOUS SPORTS LIKE SWIMMING, SINGLES TENNIS, FOOTBALL, BASKETBALL, OR SKIING: NO
TOTAL_SCORE: 23.45
CAN YOU TAKE CARE OF YOURSELF (EAT, DRESS, BATHE, OR USE TOILET): YES
CAN YOU WALK A BLOCK OR TWO ON LEVEL GROUND: YES
CAN YOU DO HEAVY WORK AROUND THE HOUSE LIKE SCRUBBING FLOORS OR LIFTING AND MOVING HEAVY FURNITURE: NO
CAN YOU CLIMB A FLIGHT OF STAIRS OR WALK UP A HILL: YES
CAN YOU PARTICIPATE IN MODERATE RECREATIONAL ACTIVITIES LIKE GOLF, BOWLING, DANCING, DOUBLES TENNIS OR THROWING A BASEBALL OR FOOTBALL: NO
CAN YOU WALK INDOORS, SUCH AS AROUND YOUR HOUSE: YES
CAN YOU DO LIGHT WORK AROUND THE HOUSE LIKE DUSTING OR WASHING DISHES: YES
CAN YOU DO MODERATE WORK AROUND THE HOUSE LIKE VACUUMING, SWEEPING FLOORS OR CARRYING GROCERIES: YES

## 2025-04-08 ASSESSMENT — PAIN DESCRIPTION - DESCRIPTORS: DESCRIPTORS: SORE;PRESSURE

## 2025-04-08 NOTE — PREPROCEDURE INSTRUCTIONS
Medication List            Accurate as of April 8, 2025  9:58 AM. Always use your most recent med list.                acetaminophen 325 mg tablet  Commonly known as: Tylenol  Medication Adjustments for Surgery: Do Not take on the morning of surgery     aspirin 81 mg chewable tablet  Chew 1 tablet (81 mg) once daily.  Medication Adjustments for Surgery: Take on the morning of surgery     Centrum Silver 0.4 mg-300 mcg- 250 mcg  Generic drug: multivitamin with minerals iron-free  Additional Medication Adjustments for Surgery: Take last dose 7 days before surgery     clopidogrel 75 mg tablet  Commonly known as: Plavix  Take 1 tablet (75 mg) by mouth once daily.     coenzyme Q-10 100 mg capsule     ezetimibe 10 mg tablet  Commonly known as: Zetia  Take 1 tablet (10 mg) by mouth once daily.  Medication Adjustments for Surgery: Take last dose 1 day (24 hours) before surgery     lisinopril 20 mg tablet  Take 1 tablet (20 mg) by mouth once daily.     metoprolol succinate XL 50 mg 24 hr tablet  Commonly known as: Toprol-XL  Take 1 tablet (50 mg) by mouth once daily. DO NOT CRUSH OR CHEW  Medication Adjustments for Surgery: Take on the morning of surgery     nicotine 21 mg/24 hr patch  Commonly known as: Nicoderm CQ  Place 1 patch over 24 hours on the skin once daily.  Additional Medication Adjustments for Surgery: Other (Comment)  Notes to patient: Remove patch the night before sugery     nitroglycerin 0.4 mg SL tablet  Commonly known as: Nitrostat  Place 1 tablet (0.4 mg) under the tongue every 5 minutes if needed for chest pain (FOR UP TO 3 DOSES AS NEEDED FOR CHEST PAIN.CALL 911 IF PAIN PERSISTS.).  Medication Adjustments for Surgery: Take/Use as prescribed     omeprazole 40 mg DR capsule  Commonly known as: PriLOSEC  Medication Adjustments for Surgery: Do Not take on the morning of surgery     probenecid-colchicine 500-0.5 mg tablet  Medication Adjustments for Surgery: Do Not take on the morning of surgery      rosuvastatin 40 mg tablet  Commonly known as: Crestor  Take 1 tablet (40 mg) by mouth once daily.  Medication Adjustments for Surgery: Do Not take on the morning of surgery     TURMERIC ORAL  Additional Medication Adjustments for Surgery: Take last dose 7 days before surgery                              NPO Instructions:    Do not eat any food after midnight the night before your surgery/procedure.    Additional Instructions:     Day of Surgery:  [facility] patients, drink your Carbohydrate drink TWO hours before surgery  Wear  comfortable loose fitting clothing  Do not use moisturizers, creams, lotions or perfume  All jewelry and valuables should be left at home      PRE-OPERATIVE INSTRUCTIONS FOR SURGERY    *Do not eat anything after midnight the night of surgery.  This includes food of any kind (including hard candy, cough drops, mints).     You may have up to 13 ounces of clear liquid  until TWO hours prior to your scheduled surgery time  Clear liquids include water, black tea/coffee, (no milk or cream) apple juice and electrolyte drinks (GATORADE).  You may chew gum until TWO hours prior you your surgery/procedure.       *IF you are ORDERED the [facility] protocol: follow as instructed.  DO not drink an additional 13 ounces as noted above.  Follow hand out given in PAT  -----------    *One of our staff members will call you ONE business day before your surgery, between 11am-2 pm to let you know the time to arrive.  If you have not received a call by 2 pm, call [phone]  This is the surgery scheduler that will call you on 4/21/25    *When you arrive at the hospital-->GO TO Registration on the ground floor  *Stop smoking 24 hours prior to surgery.  No Marijuana, CBD Oil or Vaping for 48 hours  *No alcohol 24 hours prior to surgery  *You will need a responsible adult to drive you home  -No acrylic nails or nail polish on at least one fingernail, NO polish on toes for foot surgery  -You may be asked to remove your  dentures, partial plate, eyeglasses or contact lenses before going to surgery.  Please bring a case for these items.  -Body piercings need to be removed.  Jewelry and valuables should be left at home.  -Put on loose,  comfortable, clean clothing, that will accommodate bandages    *If you have any further questions about your pre-op instructions,  not mentioned in this handout, then call [phone]* this is the nurse line for medical questions    This is the heart navigator Bee [phone]    Follow [facility] instructions reviewed in PAT     Stop lisinopril 48 hours prior surgery    Stop plavix 5 days prior surgery    TAKE ON DAY OF SURGERY Aspirin and Metorprol

## 2025-04-08 NOTE — PROGRESS NOTES
Met with patient after PAT his visit.  He's scheduled for a CABG for 4/22/25  Heart Surgery pre-op teaching education given to patient by Nurse Navigator with a good understanding.  Family present no .    Topics discussed:    1.  What to expect day of surgery and post-surgery  2.  CHG shower  3.  Intubation/Extubation  4. Chest tubes  5. Pain management   6.  Hospital medication  7. Daily weight education   8. Nutrition education   9.  Physical therapy   10.  IS teaching, (good effort noted)   11.  Domonique videos sent.   12. Eras Kit discussed  non-diabetic instructions reviewed and given to patient.       Patient verbalized understanding of open heart education, all questions answered.

## 2025-04-09 DIAGNOSIS — I10 ESSENTIAL HYPERTENSION, BENIGN: ICD-10-CM

## 2025-04-09 DIAGNOSIS — E78.2 MIXED HYPERLIPIDEMIA: ICD-10-CM

## 2025-04-09 DIAGNOSIS — I25.10 CORONARY ARTERY DISEASE INVOLVING NATIVE CORONARY ARTERY OF NATIVE HEART WITHOUT ANGINA PECTORIS: ICD-10-CM

## 2025-04-09 DIAGNOSIS — I48.20 CHRONIC ATRIAL FIBRILLATION (MULTI): ICD-10-CM

## 2025-04-09 LAB — STAPHYLOCOCCUS SPEC CULT: ABNORMAL

## 2025-04-11 ENCOUNTER — TELEPHONE (OUTPATIENT)
Dept: CARDIOLOGY | Facility: CLINIC | Age: 75
End: 2025-04-11

## 2025-04-11 NOTE — TELEPHONE ENCOUNTER
Patient was told by CT surgery he needs to follow up with  after his bypass in May does she want to follow up or send to General Cardiology

## 2025-04-21 ENCOUNTER — ANESTHESIA EVENT (OUTPATIENT)
Dept: OPERATING ROOM | Facility: HOSPITAL | Age: 75
End: 2025-04-21
Payer: COMMERCIAL

## 2025-04-21 RX ORDER — EZETIMIBE 10 MG/1
10 TABLET ORAL DAILY
Qty: 30 TABLET | Refills: 0 | Status: ON HOLD | OUTPATIENT
Start: 2025-04-21 | End: 2025-04-27

## 2025-04-21 RX ORDER — METOPROLOL SUCCINATE 50 MG/1
50 TABLET, EXTENDED RELEASE ORAL DAILY
Qty: 30 TABLET | Refills: 0 | Status: ON HOLD | OUTPATIENT
Start: 2025-04-21 | End: 2025-04-27

## 2025-04-21 RX ORDER — LISINOPRIL 20 MG/1
20 TABLET ORAL DAILY
Qty: 30 TABLET | Refills: 0 | Status: ON HOLD | OUTPATIENT
Start: 2025-04-21 | End: 2025-04-27

## 2025-04-21 RX ORDER — ROSUVASTATIN CALCIUM 40 MG/1
40 TABLET, COATED ORAL DAILY
Qty: 30 TABLET | Refills: 0 | Status: ON HOLD | OUTPATIENT
Start: 2025-04-21 | End: 2025-04-27

## 2025-04-21 RX ORDER — CLOPIDOGREL BISULFATE 75 MG/1
75 TABLET ORAL DAILY
Qty: 30 TABLET | Refills: 0 | Status: ON HOLD | OUTPATIENT
Start: 2025-04-21 | End: 2025-04-27

## 2025-04-21 NOTE — PROGRESS NOTES
Cardiac Surgery:     Mr. Lisa is a 74-year-old man who presents with chest pain and underwent left heart catheterization.  I have personally seen and evaluated him, his laboratory and radiographic database.  In summary:    His left heart catheterization shows multivessel coronary artery disease.  This is a left dominant coronary circulation.  He has a stent in the left anterior descending coronary artery with in-stent stenosis that is approximately 85%, the distal LAD is a good target.  The circumflex has a 50-60% mid stenosis, with a reasonable distal 2nd or 3rd marginal circumflex branch.  The right coronary artery is small, diminutive, and nondominant.  The ramus intermedius branch is 100% occluded with no collateral circulation.     Echocardiogram shows of normal ventricular function at 55-60%.  There is mild left ventricular hypertrophy.  Aortic valve appears to be trileaflet, there is mild to moderate aortic insufficiency.  The mitral valve is mildly thickened, there is mild mitral regurgitation.  There is also mild tricuspid regurgitation.  The right ventricular systolic pressure is normal at 29 mmHg.  The left atrium is mildly dilated there is a Watchman device in the left atrial appendage.    Computed tomography of the chest shows a large hiatal hernia with most of the stomach in the chest.    Carotid duplex shows bilateral less than 50% stenosis of carotid arteries, vertebral arteries are antegrade and patent bilaterally, there is no significant subclavian stenosis in the right or the left.    I discussed with him the need for surgical coronary revascularization.  We discussed the specifics of this procedure, with the possible benefits of resolution of chest pain, prevention of future myocardial infarction, and preservation of left ventricular function.  This specific risks discussed were bleeding, infection, stroke, renal failure, myocardial infarction, up to including death.  I discussed also the  possibility of prolonged nasogastric tube decompression postoperatively if he were to have any issues with his hiatal hernia post procedure.    Pk Kline MD

## 2025-04-22 ENCOUNTER — PREP FOR PROCEDURE (OUTPATIENT)
Dept: CARDIAC SURGERY | Facility: CLINIC | Age: 75
End: 2025-04-22

## 2025-04-22 ENCOUNTER — ANESTHESIA (OUTPATIENT)
Dept: OPERATING ROOM | Facility: HOSPITAL | Age: 75
End: 2025-04-22
Payer: COMMERCIAL

## 2025-04-22 ENCOUNTER — APPOINTMENT (OUTPATIENT)
Dept: RADIOLOGY | Facility: HOSPITAL | Age: 75
DRG: 236 | End: 2025-04-22
Payer: COMMERCIAL

## 2025-04-22 ENCOUNTER — APPOINTMENT (OUTPATIENT)
Dept: CARDIOLOGY | Facility: HOSPITAL | Age: 75
DRG: 236 | End: 2025-04-22
Payer: COMMERCIAL

## 2025-04-22 PROBLEM — I25.85 CHRONIC CORONARY MICROVASCULAR DYSFUNCTION: Status: ACTIVE | Noted: 2025-04-22

## 2025-04-22 LAB
ALBUMIN SERPL BCP-MCNC: 3.1 G/DL (ref 3.4–5)
ANION GAP BLDA CALCULATED.4IONS-SCNC: 10 MMO/L (ref 10–25)
ANION GAP BLDA CALCULATED.4IONS-SCNC: 12 MMO/L (ref 10–25)
ANION GAP BLDA CALCULATED.4IONS-SCNC: 6 MMO/L (ref 10–25)
ANION GAP BLDA CALCULATED.4IONS-SCNC: 7 MMO/L (ref 10–25)
ANION GAP BLDA CALCULATED.4IONS-SCNC: 7 MMO/L (ref 10–25)
ANION GAP BLDA CALCULATED.4IONS-SCNC: 8 MMO/L (ref 10–25)
ANION GAP BLDA CALCULATED.4IONS-SCNC: 9 MMO/L (ref 10–25)
ANION GAP BLDV CALCULATED.4IONS-SCNC: 6 MMOL/L (ref 10–25)
ANION GAP SERPL CALC-SCNC: 10 MMOL/L (ref 10–20)
APTT PPP: 28 SECONDS (ref 26–36)
APTT PPP: 42 SECONDS (ref 26–36)
BASE EXCESS BLDA CALC-SCNC: -1 MMOL/L (ref -2–3)
BASE EXCESS BLDA CALC-SCNC: -2.4 MMOL/L (ref -2–3)
BASE EXCESS BLDA CALC-SCNC: -3.2 MMOL/L (ref -2–3)
BASE EXCESS BLDA CALC-SCNC: -5 MMOL/L (ref -2–3)
BASE EXCESS BLDA CALC-SCNC: 0.2 MMOL/L (ref -2–3)
BASE EXCESS BLDA CALC-SCNC: 1.2 MMOL/L (ref -2–3)
BASE EXCESS BLDA CALC-SCNC: 2.4 MMOL/L (ref -2–3)
BASE EXCESS BLDA CALC-SCNC: 3.1 MMOL/L (ref -2–3)
BASE EXCESS BLDA CALC-SCNC: 5 MMOL/L (ref -2–3)
BASE EXCESS BLDA CALC-SCNC: 5.2 MMOL/L (ref -2–3)
BASE EXCESS BLDV CALC-SCNC: 4.3 MMOL/L (ref -2–3)
BODY TEMPERATURE: 37 DEGREES CELSIUS
BUN SERPL-MCNC: 24 MG/DL (ref 6–23)
CA-I BLDA-SCNC: 1.02 MMOL/L (ref 1.1–1.33)
CA-I BLDA-SCNC: 1.06 MMOL/L (ref 1.1–1.33)
CA-I BLDA-SCNC: 1.07 MMOL/L (ref 1.1–1.33)
CA-I BLDA-SCNC: 1.13 MMOL/L (ref 1.1–1.33)
CA-I BLDA-SCNC: 1.15 MMOL/L (ref 1.1–1.33)
CA-I BLDA-SCNC: 1.19 MMOL/L (ref 1.1–1.33)
CA-I BLDA-SCNC: 1.19 MMOL/L (ref 1.1–1.33)
CA-I BLDA-SCNC: 1.22 MMOL/L (ref 1.1–1.33)
CA-I BLDA-SCNC: 1.36 MMOL/L (ref 1.1–1.33)
CA-I BLDA-SCNC: 1.57 MMOL/L (ref 1.1–1.33)
CA-I BLDV-SCNC: 1.06 MMOL/L (ref 1.1–1.33)
CALCIUM SERPL-MCNC: 8 MG/DL (ref 8.6–10.3)
CHLORIDE BLDA-SCNC: 105 MMOL/L (ref 98–107)
CHLORIDE BLDA-SCNC: 106 MMOL/L (ref 98–107)
CHLORIDE BLDA-SCNC: 106 MMOL/L (ref 98–107)
CHLORIDE BLDA-SCNC: 107 MMOL/L (ref 98–107)
CHLORIDE BLDA-SCNC: 108 MMOL/L (ref 98–107)
CHLORIDE BLDA-SCNC: 109 MMOL/L (ref 98–107)
CHLORIDE BLDA-SCNC: 110 MMOL/L (ref 98–107)
CHLORIDE BLDA-SCNC: 110 MMOL/L (ref 98–107)
CHLORIDE BLDV-SCNC: 107 MMOL/L (ref 98–107)
CHLORIDE SERPL-SCNC: 111 MMOL/L (ref 98–107)
CO2 SERPL-SCNC: 25 MMOL/L (ref 21–32)
COHGB MFR BLDA: 0.9 %
COHGB MFR BLDA: 1.3 %
COHGB MFR BLDA: 1.5 %
COHGB MFR BLDA: 1.7 %
COHGB MFR BLDA: 1.7 %
COHGB MFR BLDA: 1.9 %
COHGB MFR BLDA: 1.9 %
COHGB MFR BLDA: 2.3 %
COHGB MFR BLDV: 2.3 %
CREAT SERPL-MCNC: 1.32 MG/DL (ref 0.5–1.3)
CRITICAL CALL TIME: 1056
CRITICAL CALL TIME: 1056
CRITICAL CALL TIME: 1059
CRITICAL CALL TIME: 1059
CRITICAL CALL TIME: 1125
CRITICAL CALL TIME: 1125
CRITICAL CALLED BY: ABNORMAL
CRITICAL CALLED BY: NORMAL
CRITICAL CALLED TO: ABNORMAL
CRITICAL CALLED TO: NORMAL
CRITICAL READ BACK: ABNORMAL
CRITICAL READ BACK: NORMAL
DO-HGB MFR BLDA: 1 % (ref 0–5)
DO-HGB MFR BLDA: 1.1 % (ref 0–5)
DO-HGB MFR BLDA: 1.2 % (ref 0–5)
DO-HGB MFR BLDA: 1.3 % (ref 0–5)
DO-HGB MFR BLDA: 1.5 % (ref 0–5)
DO-HGB MFR BLDA: 1.9 % (ref 0–5)
DO-HGB MFR BLDA: 2 % (ref 0–5)
DO-HGB MFR BLDA: 2.9 % (ref 0–5)
EGFRCR SERPLBLD CKD-EPI 2021: 56 ML/MIN/1.73M*2
ERYTHROCYTE [DISTWIDTH] IN BLOOD BY AUTOMATED COUNT: 13.7 % (ref 11.5–14.5)
FIBRINOGEN PPP-MCNC: 212 MG/DL (ref 200–400)
FIBRINOGEN PPP-MCNC: 219 MG/DL (ref 200–400)
FREQUENCY (BPM): 18 BPM
GLUCOSE BLD MANUAL STRIP-MCNC: 103 MG/DL (ref 74–99)
GLUCOSE BLD MANUAL STRIP-MCNC: 137 MG/DL (ref 74–99)
GLUCOSE BLD MANUAL STRIP-MCNC: 137 MG/DL (ref 74–99)
GLUCOSE BLD MANUAL STRIP-MCNC: 214 MG/DL (ref 74–99)
GLUCOSE BLDA-MCNC: 102 MG/DL (ref 74–99)
GLUCOSE BLDA-MCNC: 102 MG/DL (ref 74–99)
GLUCOSE BLDA-MCNC: 104 MG/DL (ref 74–99)
GLUCOSE BLDA-MCNC: 108 MG/DL (ref 74–99)
GLUCOSE BLDA-MCNC: 114 MG/DL (ref 74–99)
GLUCOSE BLDA-MCNC: 122 MG/DL (ref 74–99)
GLUCOSE BLDA-MCNC: 134 MG/DL (ref 74–99)
GLUCOSE BLDA-MCNC: 154 MG/DL (ref 74–99)
GLUCOSE BLDA-MCNC: 195 MG/DL (ref 74–99)
GLUCOSE BLDA-MCNC: 86 MG/DL (ref 74–99)
GLUCOSE BLDV-MCNC: 103 MG/DL (ref 74–99)
GLUCOSE SERPL-MCNC: 129 MG/DL (ref 74–99)
HCO3 BLDA-SCNC: 21.2 MMOL/L (ref 22–26)
HCO3 BLDA-SCNC: 23.6 MMOL/L (ref 22–26)
HCO3 BLDA-SCNC: 24.4 MMOL/L (ref 22–26)
HCO3 BLDA-SCNC: 25.4 MMOL/L (ref 22–26)
HCO3 BLDA-SCNC: 25.6 MMOL/L (ref 22–26)
HCO3 BLDA-SCNC: 26 MMOL/L (ref 22–26)
HCO3 BLDA-SCNC: 26.9 MMOL/L (ref 22–26)
HCO3 BLDA-SCNC: 28.7 MMOL/L (ref 22–26)
HCO3 BLDA-SCNC: 29.1 MMOL/L (ref 22–26)
HCO3 BLDA-SCNC: 31.4 MMOL/L (ref 22–26)
HCO3 BLDV-SCNC: 29.7 MMOL/L (ref 22–26)
HCT VFR BLD AUTO: 33.5 % (ref 41–52)
HCT VFR BLD EST: 24 % (ref 41–52)
HCT VFR BLD EST: 25 % (ref 41–52)
HCT VFR BLD EST: 26 % (ref 41–52)
HCT VFR BLD EST: 31 % (ref 41–52)
HCT VFR BLD EST: 32 % (ref 41–52)
HCT VFR BLD EST: 32 % (ref 41–52)
HCT VFR BLD EST: 33 % (ref 41–52)
HGB BLD-MCNC: 10.3 G/DL (ref 13.5–17.5)
HGB BLDA-MCNC: 10.4 G/DL (ref 13.5–17.5)
HGB BLDA-MCNC: 10.5 G/DL (ref 13.5–17.5)
HGB BLDA-MCNC: 11.1 G/DL (ref 13.5–17.5)
HGB BLDA-MCNC: 11.1 G/DL (ref 13.5–17.5)
HGB BLDA-MCNC: 8.1 G/DL (ref 13.5–17.5)
HGB BLDA-MCNC: 8.1 G/DL (ref 13.5–17.5)
HGB BLDA-MCNC: 8.2 G/DL (ref 13.5–17.5)
HGB BLDA-MCNC: 8.3 G/DL (ref 13.5–17.5)
HGB BLDA-MCNC: 8.3 G/DL (ref 13.5–17.5)
HGB BLDA-MCNC: 8.5 G/DL (ref 13.5–17.5)
HGB BLDA-MCNC: 8.5 G/DL (ref 13.5–17.5)
HGB BLDA-MCNC: 8.8 G/DL (ref 13.5–17.5)
HGB BLDA-MCNC: 8.8 G/DL (ref 13.5–17.5)
HGB BLDV-MCNC: 8.5 G/DL (ref 13.5–17.5)
INHALED O2 CONCENTRATION: 100 %
INHALED O2 CONCENTRATION: 40 %
INHALED O2 CONCENTRATION: 50 %
INHALED O2 CONCENTRATION: 70 %
INHALED O2 CONCENTRATION: 75 %
INHALED O2 CONCENTRATION: 80 %
INR PPP: 1.1 (ref 0.9–1.1)
INR PPP: 1.7 (ref 0.9–1.1)
LACTATE BLDA-SCNC: 0.5 MMOL/L (ref 0.4–2)
LACTATE BLDA-SCNC: 0.8 MMOL/L (ref 0.4–2)
LACTATE BLDA-SCNC: 0.9 MMOL/L (ref 0.4–2)
LACTATE BLDA-SCNC: 1 MMOL/L (ref 0.4–2)
LACTATE BLDA-SCNC: 1.1 MMOL/L (ref 0.4–2)
LACTATE BLDA-SCNC: 1.4 MMOL/L (ref 0.4–2)
LACTATE BLDA-SCNC: 1.7 MMOL/L (ref 0.4–2)
LACTATE BLDA-SCNC: 1.9 MMOL/L (ref 0.4–2)
LACTATE BLDA-SCNC: 2.1 MMOL/L (ref 0.4–2)
LACTATE BLDA-SCNC: 2.5 MMOL/L (ref 0.4–2)
LACTATE BLDV-SCNC: 0.7 MMOL/L (ref 0.4–2)
MAGNESIUM SERPL-MCNC: 2.95 MG/DL (ref 1.6–2.4)
MAGNESIUM SERPL-MCNC: 3.37 MG/DL (ref 1.6–2.4)
MCH RBC QN AUTO: 30.6 PG (ref 26–34)
MCHC RBC AUTO-ENTMCNC: 30.7 G/DL (ref 32–36)
MCV RBC AUTO: 99 FL (ref 80–100)
METHGB MFR BLDA: 0.2 % (ref 0–1.5)
METHGB MFR BLDA: 0.4 % (ref 0–1.5)
METHGB MFR BLDA: 0.5 % (ref 0–1.5)
METHGB MFR BLDA: 0.5 % (ref 0–1.5)
METHGB MFR BLDA: 0.7 % (ref 0–1.5)
METHGB MFR BLDA: 0.9 % (ref 0–1.5)
METHGB MFR BLDA: 1.2 % (ref 0–1.5)
METHGB MFR BLDA: 1.2 % (ref 0–1.5)
METHGB MFR BLDV: 0.4 % (ref 0–1.5)
NRBC BLD-RTO: 0 /100 WBCS (ref 0–0)
OXYHGB MFR BLDA: 94.8 % (ref 94–98)
OXYHGB MFR BLDA: 94.8 % (ref 94–98)
OXYHGB MFR BLDA: 95.2 % (ref 94–98)
OXYHGB MFR BLDA: 95.3 % (ref 94–98)
OXYHGB MFR BLDA: 95.3 % (ref 94–98)
OXYHGB MFR BLDA: 95.9 % (ref 94–98)
OXYHGB MFR BLDA: 96 % (ref 94–98)
OXYHGB MFR BLDA: 96 % (ref 94–98)
OXYHGB MFR BLDA: 96.4 % (ref 94–98)
OXYHGB MFR BLDA: 96.4 % (ref 94–98)
OXYHGB MFR BLDA: 96.8 % (ref 94–98)
OXYHGB MFR BLDA: 97.1 % (ref 94–98)
OXYHGB MFR BLDA: 97.1 % (ref 94–98)
OXYHGB MFR BLDV: 80.9 % (ref 45–75)
PCO2 BLDA: 37 MM HG (ref 38–42)
PCO2 BLDA: 40 MM HG (ref 38–42)
PCO2 BLDA: 41 MM HG (ref 38–42)
PCO2 BLDA: 43 MM HG (ref 38–42)
PCO2 BLDA: 43 MM HG (ref 38–42)
PCO2 BLDA: 49 MM HG (ref 38–42)
PCO2 BLDA: 52 MM HG (ref 38–42)
PCO2 BLDA: 53 MM HG (ref 38–42)
PCO2 BLDV: 48 MM HG (ref 41–51)
PEEP CMH2O: 5 CM H2O
PEEP CMH2O: 8 CM H2O
PH BLDA: 7.28 PH (ref 7.38–7.42)
PH BLDA: 7.29 PH (ref 7.38–7.42)
PH BLDA: 7.3 PH (ref 7.38–7.42)
PH BLDA: 7.3 PH (ref 7.38–7.42)
PH BLDA: 7.35 PH (ref 7.38–7.42)
PH BLDA: 7.38 PH (ref 7.38–7.42)
PH BLDA: 7.38 PH (ref 7.38–7.42)
PH BLDA: 7.41 PH (ref 7.38–7.42)
PH BLDA: 7.47 PH (ref 7.38–7.42)
PH BLDA: 7.47 PH (ref 7.38–7.42)
PH BLDV: 7.4 PH (ref 7.33–7.43)
PHOSPHATE SERPL-MCNC: 4.2 MG/DL (ref 2.5–4.9)
PLATELET # BLD AUTO: 133 X10*3/UL (ref 150–450)
PLATELET # BLD AUTO: 93 X10*3/UL (ref 150–450)
PO2 BLDA: 119 MM HG (ref 85–95)
PO2 BLDA: 253 MM HG (ref 85–95)
PO2 BLDA: 256 MM HG (ref 85–95)
PO2 BLDA: 264 MM HG (ref 85–95)
PO2 BLDA: 295 MM HG (ref 85–95)
PO2 BLDA: 337 MM HG (ref 85–95)
PO2 BLDA: 383 MM HG (ref 85–95)
PO2 BLDA: 404 MM HG (ref 85–95)
PO2 BLDA: 88 MM HG (ref 85–95)
PO2 BLDA: 88 MM HG (ref 85–95)
PO2 BLDV: 52 MM HG (ref 35–45)
POTASSIUM BLDA-SCNC: 4.1 MMOL/L (ref 3.5–5.3)
POTASSIUM BLDA-SCNC: 4.5 MMOL/L (ref 3.5–5.3)
POTASSIUM BLDA-SCNC: 4.8 MMOL/L (ref 3.5–5.3)
POTASSIUM BLDA-SCNC: 4.8 MMOL/L (ref 3.5–5.3)
POTASSIUM BLDA-SCNC: 5 MMOL/L (ref 3.5–5.3)
POTASSIUM BLDA-SCNC: 5.3 MMOL/L (ref 3.5–5.3)
POTASSIUM BLDA-SCNC: 5.4 MMOL/L (ref 3.5–5.3)
POTASSIUM BLDA-SCNC: 6 MMOL/L (ref 3.5–5.3)
POTASSIUM BLDA-SCNC: 6.2 MMOL/L (ref 3.5–5.3)
POTASSIUM BLDA-SCNC: 6.3 MMOL/L (ref 3.5–5.3)
POTASSIUM BLDV-SCNC: 6.1 MMOL/L (ref 3.5–5.3)
POTASSIUM SERPL-SCNC: 4.8 MMOL/L (ref 3.5–5.3)
PRESSURE SUPPORT: 5 CM H2O
PROTHROMBIN TIME: 12.7 SECONDS (ref 9.8–12.4)
PROTHROMBIN TIME: 18.5 SECONDS (ref 9.8–12.4)
RBC # BLD AUTO: 3.37 X10*6/UL (ref 4.5–5.9)
SAO2 % BLDA: 97 % (ref 94–100)
SAO2 % BLDA: 97 % (ref 94–100)
SAO2 % BLDA: 98 % (ref 94–100)
SAO2 % BLDA: 98 % (ref 94–100)
SAO2 % BLDA: 99 % (ref 94–100)
SAO2 % BLDV: 83 % (ref 45–75)
SODIUM BLDA-SCNC: 134 MMOL/L (ref 136–145)
SODIUM BLDA-SCNC: 136 MMOL/L (ref 136–145)
SODIUM BLDA-SCNC: 136 MMOL/L (ref 136–145)
SODIUM BLDA-SCNC: 137 MMOL/L (ref 136–145)
SODIUM BLDA-SCNC: 137 MMOL/L (ref 136–145)
SODIUM BLDA-SCNC: 138 MMOL/L (ref 136–145)
SODIUM BLDA-SCNC: 139 MMOL/L (ref 136–145)
SODIUM BLDA-SCNC: 139 MMOL/L (ref 136–145)
SODIUM BLDV-SCNC: 137 MMOL/L (ref 136–145)
SODIUM SERPL-SCNC: 141 MMOL/L (ref 136–145)
SPECIMEN DRAWN FROM PATIENT: ABNORMAL
SPECIMEN DRAWN FROM PATIENT: ABNORMAL
SPONTANEOUS TIDAL VOLUME: 564 ML
TIDAL VOLUME: 550 ML
TOTAL MINUTE VOLUME: 10.4 LITER
VENTILATOR MODE: ABNORMAL
VENTILATOR MODE: ABNORMAL
VENTILATOR RATE: 14 BPM
WBC # BLD AUTO: 10.2 X10*3/UL (ref 4.4–11.3)

## 2025-04-22 PROCEDURE — 71045 X-RAY EXAM CHEST 1 VIEW: CPT | Performed by: STUDENT IN AN ORGANIZED HEALTH CARE EDUCATION/TRAINING PROGRAM

## 2025-04-22 PROCEDURE — 83735 ASSAY OF MAGNESIUM: CPT | Performed by: THORACIC SURGERY (CARDIOTHORACIC VASCULAR SURGERY)

## 2025-04-22 PROCEDURE — 82435 ASSAY OF BLOOD CHLORIDE: CPT | Performed by: NURSE PRACTITIONER

## 2025-04-22 PROCEDURE — 94002 VENT MGMT INPAT INIT DAY: CPT

## 2025-04-22 PROCEDURE — 3600000012 HC PERFUSION TIME - EACH INCREMENTAL 1 MINUTE: Performed by: THORACIC SURGERY (CARDIOTHORACIC VASCULAR SURGERY)

## 2025-04-22 PROCEDURE — 93010 ELECTROCARDIOGRAM REPORT: CPT | Performed by: INTERNAL MEDICINE

## 2025-04-22 PROCEDURE — 3700000001 HC GENERAL ANESTHESIA TIME - INITIAL BASE CHARGE: Performed by: THORACIC SURGERY (CARDIOTHORACIC VASCULAR SURGERY)

## 2025-04-22 PROCEDURE — 82435 ASSAY OF BLOOD CHLORIDE: CPT

## 2025-04-22 PROCEDURE — 2020000001 HC ICU ROOM DAILY

## 2025-04-22 PROCEDURE — 82375 ASSAY CARBOXYHB QUANT: CPT

## 2025-04-22 PROCEDURE — 36620 INSERTION CATHETER ARTERY: CPT

## 2025-04-22 PROCEDURE — 71045 X-RAY EXAM CHEST 1 VIEW: CPT

## 2025-04-22 PROCEDURE — P9017 PLASMA 1 DONOR FRZ W/IN 8 HR: HCPCS

## 2025-04-22 PROCEDURE — 2500000001 HC RX 250 WO HCPCS SELF ADMINISTERED DRUGS (ALT 637 FOR MEDICARE OP): Performed by: NURSE PRACTITIONER

## 2025-04-22 PROCEDURE — 85610 PROTHROMBIN TIME: CPT | Performed by: NURSE PRACTITIONER

## 2025-04-22 PROCEDURE — 021109W BYPASS CORONARY ARTERY, TWO ARTERIES FROM AORTA WITH AUTOLOGOUS VENOUS TISSUE, OPEN APPROACH: ICD-10-PCS | Performed by: THORACIC SURGERY (CARDIOTHORACIC VASCULAR SURGERY)

## 2025-04-22 PROCEDURE — 85610 PROTHROMBIN TIME: CPT | Performed by: THORACIC SURGERY (CARDIOTHORACIC VASCULAR SURGERY)

## 2025-04-22 PROCEDURE — 85384 FIBRINOGEN ACTIVITY: CPT | Performed by: THORACIC SURGERY (CARDIOTHORACIC VASCULAR SURGERY)

## 2025-04-22 PROCEDURE — 82947 ASSAY GLUCOSE BLOOD QUANT: CPT

## 2025-04-22 PROCEDURE — A33535 PR CABG, ARTERIAL, THREE: Performed by: ANESTHESIOLOGY

## 2025-04-22 PROCEDURE — A4312 CATH W/O BAG 2-WAY SILICONE: HCPCS | Performed by: THORACIC SURGERY (CARDIOTHORACIC VASCULAR SURGERY)

## 2025-04-22 PROCEDURE — 99100 ANES PT EXTEME AGE<1 YR&>70: CPT | Performed by: ANESTHESIOLOGY

## 2025-04-22 PROCEDURE — 85049 AUTOMATED PLATELET COUNT: CPT | Performed by: THORACIC SURGERY (CARDIOTHORACIC VASCULAR SURGERY)

## 2025-04-22 PROCEDURE — 85730 THROMBOPLASTIN TIME PARTIAL: CPT | Performed by: THORACIC SURGERY (CARDIOTHORACIC VASCULAR SURGERY)

## 2025-04-22 PROCEDURE — 36430 TRANSFUSION BLD/BLD COMPNT: CPT

## 2025-04-22 PROCEDURE — A33535 PR CABG, ARTERIAL, THREE

## 2025-04-22 PROCEDURE — 5A1221Z PERFORMANCE OF CARDIAC OUTPUT, CONTINUOUS: ICD-10-PCS | Performed by: THORACIC SURGERY (CARDIOTHORACIC VASCULAR SURGERY)

## 2025-04-22 PROCEDURE — 36620 INSERTION CATHETER ARTERY: CPT | Mod: 25 | Performed by: NURSE PRACTITIONER

## 2025-04-22 PROCEDURE — 06BP4ZZ EXCISION OF RIGHT SAPHENOUS VEIN, PERCUTANEOUS ENDOSCOPIC APPROACH: ICD-10-PCS | Performed by: THORACIC SURGERY (CARDIOTHORACIC VASCULAR SURGERY)

## 2025-04-22 PROCEDURE — 02100Z9 BYPASS CORONARY ARTERY, ONE ARTERY FROM LEFT INTERNAL MAMMARY, OPEN APPROACH: ICD-10-PCS | Performed by: THORACIC SURGERY (CARDIOTHORACIC VASCULAR SURGERY)

## 2025-04-22 PROCEDURE — 3600000017 HC OR TIME - EACH INCREMENTAL 1 MINUTE - PROCEDURE LEVEL SIX: Performed by: THORACIC SURGERY (CARDIOTHORACIC VASCULAR SURGERY)

## 2025-04-22 PROCEDURE — 2500000005 HC RX 250 GENERAL PHARMACY W/O HCPCS: Performed by: THORACIC SURGERY (CARDIOTHORACIC VASCULAR SURGERY)

## 2025-04-22 PROCEDURE — 2500000004 HC RX 250 GENERAL PHARMACY W/ HCPCS (ALT 636 FOR OP/ED): Performed by: NURSE PRACTITIONER

## 2025-04-22 PROCEDURE — 37799 UNLISTED PX VASCULAR SURGERY: CPT | Performed by: NURSE PRACTITIONER

## 2025-04-22 PROCEDURE — 3600000011 HC PERFUSION TIME - INITIAL BASE CHARGE: Performed by: THORACIC SURGERY (CARDIOTHORACIC VASCULAR SURGERY)

## 2025-04-22 PROCEDURE — C1751 CATH, INF, PER/CENT/MIDLINE: HCPCS | Performed by: THORACIC SURGERY (CARDIOTHORACIC VASCULAR SURGERY)

## 2025-04-22 PROCEDURE — 2500000002 HC RX 250 W HCPCS SELF ADMINISTERED DRUGS (ALT 637 FOR MEDICARE OP, ALT 636 FOR OP/ED): Performed by: NURSE PRACTITIONER

## 2025-04-22 PROCEDURE — 2500000004 HC RX 250 GENERAL PHARMACY W/ HCPCS (ALT 636 FOR OP/ED): Mod: JZ | Performed by: THORACIC SURGERY (CARDIOTHORACIC VASCULAR SURGERY)

## 2025-04-22 PROCEDURE — 3600000018 HC OR TIME - INITIAL BASE CHARGE - PROCEDURE LEVEL SIX: Performed by: THORACIC SURGERY (CARDIOTHORACIC VASCULAR SURGERY)

## 2025-04-22 PROCEDURE — 0W9D00Z DRAINAGE OF PERICARDIAL CAVITY WITH DRAINAGE DEVICE, OPEN APPROACH: ICD-10-PCS | Performed by: THORACIC SURGERY (CARDIOTHORACIC VASCULAR SURGERY)

## 2025-04-22 PROCEDURE — 2500000004 HC RX 250 GENERAL PHARMACY W/ HCPCS (ALT 636 FOR OP/ED): Performed by: THORACIC SURGERY (CARDIOTHORACIC VASCULAR SURGERY)

## 2025-04-22 PROCEDURE — 85027 COMPLETE CBC AUTOMATED: CPT | Performed by: NURSE PRACTITIONER

## 2025-04-22 PROCEDURE — 2780000003 HC OR 278 NO HCPCS: Performed by: THORACIC SURGERY (CARDIOTHORACIC VASCULAR SURGERY)

## 2025-04-22 PROCEDURE — 2500000004 HC RX 250 GENERAL PHARMACY W/ HCPCS (ALT 636 FOR OP/ED): Mod: JZ | Performed by: NURSE PRACTITIONER

## 2025-04-22 PROCEDURE — 64450 NJX AA&/STRD OTHER PN/BRANCH: CPT | Performed by: ANESTHESIOLOGY

## 2025-04-22 PROCEDURE — 3700000002 HC GENERAL ANESTHESIA TIME - EACH INCREMENTAL 1 MINUTE: Performed by: THORACIC SURGERY (CARDIOTHORACIC VASCULAR SURGERY)

## 2025-04-22 PROCEDURE — 85384 FIBRINOGEN ACTIVITY: CPT | Performed by: NURSE PRACTITIONER

## 2025-04-22 PROCEDURE — 82330 ASSAY OF CALCIUM: CPT | Performed by: NURSE PRACTITIONER

## 2025-04-22 PROCEDURE — 99223 1ST HOSP IP/OBS HIGH 75: CPT | Performed by: NURSE PRACTITIONER

## 2025-04-22 PROCEDURE — 36556 INSERT NON-TUNNEL CV CATH: CPT

## 2025-04-22 PROCEDURE — 2500000002 HC RX 250 W HCPCS SELF ADMINISTERED DRUGS (ALT 637 FOR MEDICARE OP, ALT 636 FOR OP/ED)

## 2025-04-22 PROCEDURE — B246ZZ4 ULTRASONOGRAPHY OF RIGHT AND LEFT HEART, TRANSESOPHAGEAL: ICD-10-PCS | Performed by: THORACIC SURGERY (CARDIOTHORACIC VASCULAR SURGERY)

## 2025-04-22 PROCEDURE — 2500000004 HC RX 250 GENERAL PHARMACY W/ HCPCS (ALT 636 FOR OP/ED): Mod: JZ

## 2025-04-22 PROCEDURE — 33518 CABG ARTERY-VEIN TWO: CPT | Performed by: THORACIC SURGERY (CARDIOTHORACIC VASCULAR SURGERY)

## 2025-04-22 PROCEDURE — 2500000005 HC RX 250 GENERAL PHARMACY W/O HCPCS: Performed by: NURSE PRACTITIONER

## 2025-04-22 PROCEDURE — 2500000002 HC RX 250 W HCPCS SELF ADMINISTERED DRUGS (ALT 637 FOR MEDICARE OP, ALT 636 FOR OP/ED): Mod: JZ

## 2025-04-22 PROCEDURE — 83735 ASSAY OF MAGNESIUM: CPT | Performed by: NURSE PRACTITIONER

## 2025-04-22 PROCEDURE — 37799 UNLISTED PX VASCULAR SURGERY: CPT | Performed by: THORACIC SURGERY (CARDIOTHORACIC VASCULAR SURGERY)

## 2025-04-22 PROCEDURE — P9045 ALBUMIN (HUMAN), 5%, 250 ML: HCPCS | Mod: JZ | Performed by: NURSE PRACTITIONER

## 2025-04-22 PROCEDURE — 33508 ENDOSCOPIC VEIN HARVEST: CPT | Performed by: THORACIC SURGERY (CARDIOTHORACIC VASCULAR SURGERY)

## 2025-04-22 PROCEDURE — A4649 SURGICAL SUPPLIES: HCPCS | Performed by: THORACIC SURGERY (CARDIOTHORACIC VASCULAR SURGERY)

## 2025-04-22 PROCEDURE — 76942 ECHO GUIDE FOR BIOPSY: CPT | Performed by: ANESTHESIOLOGY

## 2025-04-22 PROCEDURE — 99291 CRITICAL CARE FIRST HOUR: CPT

## 2025-04-22 PROCEDURE — 2720000007 HC OR 272 NO HCPCS: Performed by: THORACIC SURGERY (CARDIOTHORACIC VASCULAR SURGERY)

## 2025-04-22 PROCEDURE — 2500000005 HC RX 250 GENERAL PHARMACY W/O HCPCS

## 2025-04-22 PROCEDURE — 33533 CABG ARTERIAL SINGLE: CPT | Performed by: THORACIC SURGERY (CARDIOTHORACIC VASCULAR SURGERY)

## 2025-04-22 PROCEDURE — 93005 ELECTROCARDIOGRAM TRACING: CPT

## 2025-04-22 RX ORDER — POLYETHYLENE GLYCOL 3350 17 G/17G
17 POWDER, FOR SOLUTION ORAL DAILY PRN
Status: DISCONTINUED | OUTPATIENT
Start: 2025-04-22 | End: 2025-04-27 | Stop reason: HOSPADM

## 2025-04-22 RX ORDER — POTASSIUM CHLORIDE 14.9 MG/ML
20 INJECTION INTRAVENOUS
Status: DISCONTINUED | OUTPATIENT
Start: 2025-04-22 | End: 2025-04-25

## 2025-04-22 RX ORDER — VANCOMYCIN HYDROCHLORIDE 1 G/20ML
INJECTION, POWDER, LYOPHILIZED, FOR SOLUTION INTRAVENOUS AS NEEDED
Status: DISCONTINUED | OUTPATIENT
Start: 2025-04-22 | End: 2025-04-22 | Stop reason: HOSPADM

## 2025-04-22 RX ORDER — MAGNESIUM SULFATE HEPTAHYDRATE 40 MG/ML
4 INJECTION, SOLUTION INTRAVENOUS EVERY 6 HOURS PRN
Status: DISCONTINUED | OUTPATIENT
Start: 2025-04-22 | End: 2025-04-27 | Stop reason: HOSPADM

## 2025-04-22 RX ORDER — PROPOFOL 10 MG/ML
INJECTION, EMULSION INTRAVENOUS AS NEEDED
Status: DISCONTINUED | OUTPATIENT
Start: 2025-04-22 | End: 2025-04-22

## 2025-04-22 RX ORDER — HEPARIN SODIUM 1000 [USP'U]/ML
INJECTION, SOLUTION INTRAVENOUS; SUBCUTANEOUS AS NEEDED
Status: DISCONTINUED | OUTPATIENT
Start: 2025-04-22 | End: 2025-04-22

## 2025-04-22 RX ORDER — DEXMEDETOMIDINE HYDROCHLORIDE 4 UG/ML
INJECTION, SOLUTION INTRAVENOUS CONTINUOUS PRN
Status: DISCONTINUED | OUTPATIENT
Start: 2025-04-22 | End: 2025-04-22

## 2025-04-22 RX ORDER — ETOMIDATE 2 MG/ML
INJECTION INTRAVENOUS AS NEEDED
Status: DISCONTINUED | OUTPATIENT
Start: 2025-04-22 | End: 2025-04-22

## 2025-04-22 RX ORDER — NORETHINDRONE AND ETHINYL ESTRADIOL 0.5-0.035
KIT ORAL AS NEEDED
Status: DISCONTINUED | OUTPATIENT
Start: 2025-04-22 | End: 2025-04-22

## 2025-04-22 RX ORDER — POTASSIUM CHLORIDE 1.5 G/1.58G
40 POWDER, FOR SOLUTION ORAL EVERY 6 HOURS PRN
Status: DISCONTINUED | OUTPATIENT
Start: 2025-04-22 | End: 2025-04-27 | Stop reason: HOSPADM

## 2025-04-22 RX ORDER — ENOXAPARIN SODIUM 100 MG/ML
40 INJECTION SUBCUTANEOUS DAILY
Status: DISCONTINUED | OUTPATIENT
Start: 2025-04-23 | End: 2025-04-25

## 2025-04-22 RX ORDER — INSULIN LISPRO 100 [IU]/ML
0-15 INJECTION, SOLUTION INTRAVENOUS; SUBCUTANEOUS EVERY 4 HOURS
Status: DISCONTINUED | OUTPATIENT
Start: 2025-04-22 | End: 2025-04-24

## 2025-04-22 RX ORDER — ALBUTEROL SULFATE 0.83 MG/ML
2.5 SOLUTION RESPIRATORY (INHALATION) EVERY 6 HOURS PRN
Status: DISCONTINUED | OUTPATIENT
Start: 2025-04-22 | End: 2025-04-24

## 2025-04-22 RX ORDER — CEFAZOLIN SODIUM 2 G/100ML
2 INJECTION, SOLUTION INTRAVENOUS ONCE
Status: COMPLETED | OUTPATIENT
Start: 2025-04-22 | End: 2025-04-22

## 2025-04-22 RX ORDER — SODIUM CHLORIDE, SODIUM GLUCONATE, SODIUM ACETATE, POTASSIUM CHLORIDE AND MAGNESIUM CHLORIDE 30; 37; 368; 526; 502 MG/100ML; MG/100ML; MG/100ML; MG/100ML; MG/100ML
1000 INJECTION, SOLUTION INTRAVENOUS AS NEEDED
Status: DISCONTINUED | OUTPATIENT
Start: 2025-04-22 | End: 2025-04-24

## 2025-04-22 RX ORDER — OXYCODONE HYDROCHLORIDE 5 MG/1
5 TABLET ORAL EVERY 4 HOURS PRN
Status: DISCONTINUED | OUTPATIENT
Start: 2025-04-22 | End: 2025-04-25

## 2025-04-22 RX ORDER — NITROGLYCERIN 40 MG/100ML
INJECTION INTRAVENOUS AS NEEDED
Status: DISCONTINUED | OUTPATIENT
Start: 2025-04-22 | End: 2025-04-22

## 2025-04-22 RX ORDER — ALBUMIN HUMAN 50 G/1000ML
12.5 SOLUTION INTRAVENOUS
Status: DISPENSED | OUTPATIENT
Start: 2025-04-22 | End: 2025-04-22

## 2025-04-22 RX ORDER — CHLORHEXIDINE GLUCONATE ORAL RINSE 1.2 MG/ML
15 SOLUTION DENTAL 2 TIMES DAILY
Status: DISCONTINUED | OUTPATIENT
Start: 2025-04-22 | End: 2025-04-22 | Stop reason: HOSPADM

## 2025-04-22 RX ORDER — ALBUMIN HUMAN 50 G/1000ML
12.5 SOLUTION INTRAVENOUS AS NEEDED
Status: DISCONTINUED | OUTPATIENT
Start: 2025-04-22 | End: 2025-04-24

## 2025-04-22 RX ORDER — MANNITOL 20 G/100ML
INJECTION, SOLUTION INTRAVENOUS AS NEEDED
Status: DISCONTINUED | OUTPATIENT
Start: 2025-04-22 | End: 2025-04-22

## 2025-04-22 RX ORDER — DEXTROSE 50 % IN WATER (D50W) INTRAVENOUS SYRINGE
25
Status: DISCONTINUED | OUTPATIENT
Start: 2025-04-22 | End: 2025-04-27 | Stop reason: HOSPADM

## 2025-04-22 RX ORDER — PANTOPRAZOLE SODIUM 40 MG/1
40 TABLET, DELAYED RELEASE ORAL
Status: DISCONTINUED | OUTPATIENT
Start: 2025-04-23 | End: 2025-04-27 | Stop reason: HOSPADM

## 2025-04-22 RX ORDER — PROTAMINE SULFATE 10 MG/ML
INJECTION, SOLUTION INTRAVENOUS AS NEEDED
Status: DISCONTINUED | OUTPATIENT
Start: 2025-04-22 | End: 2025-04-22

## 2025-04-22 RX ORDER — ACETAMINOPHEN 325 MG/1
650 TABLET ORAL EVERY 6 HOURS
Status: DISCONTINUED | OUTPATIENT
Start: 2025-04-22 | End: 2025-04-23

## 2025-04-22 RX ORDER — CEFAZOLIN SODIUM 2 G/100ML
2 INJECTION, SOLUTION INTRAVENOUS EVERY 8 HOURS
Status: DISPENSED | OUTPATIENT
Start: 2025-04-22 | End: 2025-04-24

## 2025-04-22 RX ORDER — FENTANYL CITRATE 50 UG/ML
50 INJECTION, SOLUTION INTRAMUSCULAR; INTRAVENOUS
Status: DISCONTINUED | OUTPATIENT
Start: 2025-04-22 | End: 2025-04-23

## 2025-04-22 RX ORDER — POTASSIUM CHLORIDE 20 MEQ/1
40 TABLET, EXTENDED RELEASE ORAL EVERY 6 HOURS PRN
Status: DISCONTINUED | OUTPATIENT
Start: 2025-04-22 | End: 2025-04-27 | Stop reason: HOSPADM

## 2025-04-22 RX ORDER — FENTANYL CITRATE 50 UG/ML
INJECTION, SOLUTION INTRAMUSCULAR; INTRAVENOUS AS NEEDED
Status: DISCONTINUED | OUTPATIENT
Start: 2025-04-22 | End: 2025-04-22

## 2025-04-22 RX ORDER — LIDOCAINE HYDROCHLORIDE 10 MG/ML
INJECTION, SOLUTION INFILTRATION; PERINEURAL AS NEEDED
Status: DISCONTINUED | OUTPATIENT
Start: 2025-04-22 | End: 2025-04-22

## 2025-04-22 RX ORDER — METHOCARBAMOL 500 MG/1
500 TABLET, FILM COATED ORAL EVERY 8 HOURS SCHEDULED
Status: DISCONTINUED | OUTPATIENT
Start: 2025-04-22 | End: 2025-04-23

## 2025-04-22 RX ORDER — MAGNESIUM SULFATE HEPTAHYDRATE 40 MG/ML
2 INJECTION, SOLUTION INTRAVENOUS EVERY 6 HOURS PRN
Status: DISCONTINUED | OUTPATIENT
Start: 2025-04-22 | End: 2025-04-27 | Stop reason: HOSPADM

## 2025-04-22 RX ORDER — NAPROXEN SODIUM 220 MG/1
81 TABLET, FILM COATED ORAL DAILY
Status: DISCONTINUED | OUTPATIENT
Start: 2025-04-23 | End: 2025-04-27 | Stop reason: HOSPADM

## 2025-04-22 RX ORDER — MIDAZOLAM HYDROCHLORIDE 1 MG/ML
INJECTION, SOLUTION INTRAMUSCULAR; INTRAVENOUS AS NEEDED
Status: DISCONTINUED | OUTPATIENT
Start: 2025-04-22 | End: 2025-04-22

## 2025-04-22 RX ORDER — EPINEPHRINE IN 0.9 % SOD CHLOR 4MG/250ML
0-.2 PLASTIC BAG, INJECTION (ML) INTRAVENOUS CONTINUOUS
Status: DISCONTINUED | OUTPATIENT
Start: 2025-04-22 | End: 2025-04-22

## 2025-04-22 RX ORDER — ROCURONIUM BROMIDE 10 MG/ML
INJECTION, SOLUTION INTRAVENOUS AS NEEDED
Status: DISCONTINUED | OUTPATIENT
Start: 2025-04-22 | End: 2025-04-22

## 2025-04-22 RX ORDER — EZETIMIBE 10 MG/1
10 TABLET ORAL DAILY
Status: DISCONTINUED | OUTPATIENT
Start: 2025-04-22 | End: 2025-04-27 | Stop reason: HOSPADM

## 2025-04-22 RX ORDER — DEXMEDETOMIDINE HYDROCHLORIDE 4 UG/ML
.1-1.5 INJECTION, SOLUTION INTRAVENOUS CONTINUOUS
Status: DISCONTINUED | OUTPATIENT
Start: 2025-04-22 | End: 2025-04-22

## 2025-04-22 RX ORDER — LIDOCAINE 560 MG/1
1 PATCH PERCUTANEOUS; TOPICAL; TRANSDERMAL EVERY 24 HOURS
Status: DISCONTINUED | OUTPATIENT
Start: 2025-04-22 | End: 2025-04-27 | Stop reason: HOSPADM

## 2025-04-22 RX ORDER — AMOXICILLIN 250 MG
2 CAPSULE ORAL 2 TIMES DAILY
Status: DISCONTINUED | OUTPATIENT
Start: 2025-04-22 | End: 2025-04-27 | Stop reason: HOSPADM

## 2025-04-22 RX ORDER — OXYCODONE HYDROCHLORIDE 5 MG/1
10 TABLET ORAL EVERY 4 HOURS PRN
Status: DISCONTINUED | OUTPATIENT
Start: 2025-04-22 | End: 2025-04-25

## 2025-04-22 RX ORDER — POTASSIUM CHLORIDE 29.8 MG/ML
40 INJECTION INTRAVENOUS EVERY 2 HOUR PRN
Status: DISCONTINUED | OUTPATIENT
Start: 2025-04-22 | End: 2025-04-25

## 2025-04-22 RX ORDER — LANOLIN ALCOHOL/MO/W.PET/CERES
400 CREAM (GRAM) TOPICAL DAILY
Status: DISCONTINUED | OUTPATIENT
Start: 2025-04-22 | End: 2025-04-27 | Stop reason: HOSPADM

## 2025-04-22 RX ORDER — NALOXONE HYDROCHLORIDE 1 MG/ML
0.2 INJECTION INTRAMUSCULAR; INTRAVENOUS; SUBCUTANEOUS EVERY 5 MIN PRN
Status: DISCONTINUED | OUTPATIENT
Start: 2025-04-22 | End: 2025-04-27 | Stop reason: HOSPADM

## 2025-04-22 RX ORDER — PAPAVERINE HYDROCHLORIDE 30 MG/ML
INJECTION INTRAMUSCULAR; INTRAVENOUS AS NEEDED
Status: DISCONTINUED | OUTPATIENT
Start: 2025-04-22 | End: 2025-04-22 | Stop reason: HOSPADM

## 2025-04-22 RX ORDER — POTASSIUM CHLORIDE 1.5 G/1.58G
20 POWDER, FOR SOLUTION ORAL EVERY 6 HOURS PRN
Status: DISCONTINUED | OUTPATIENT
Start: 2025-04-22 | End: 2025-04-27 | Stop reason: HOSPADM

## 2025-04-22 RX ORDER — MAGNESIUM SULFATE HEPTAHYDRATE 500 MG/ML
INJECTION, SOLUTION INTRAMUSCULAR; INTRAVENOUS AS NEEDED
Status: DISCONTINUED | OUTPATIENT
Start: 2025-04-22 | End: 2025-04-22

## 2025-04-22 RX ORDER — IPRATROPIUM BROMIDE AND ALBUTEROL SULFATE 2.5; .5 MG/3ML; MG/3ML
SOLUTION RESPIRATORY (INHALATION)
Status: COMPLETED
Start: 2025-04-22 | End: 2025-04-22

## 2025-04-22 RX ORDER — PHENYLEPHRINE 10 MG/250 ML(40 MCG/ML)IN 0.9 % SOD.CHLORIDE INTRAVENOUS
0-2 CONTINUOUS
Status: DISCONTINUED | OUTPATIENT
Start: 2025-04-22 | End: 2025-04-22

## 2025-04-22 RX ORDER — ROSUVASTATIN CALCIUM 40 MG/1
40 TABLET, COATED ORAL DAILY
Status: DISCONTINUED | OUTPATIENT
Start: 2025-04-22 | End: 2025-04-27 | Stop reason: HOSPADM

## 2025-04-22 RX ORDER — DEXTROSE 50 % IN WATER (D50W) INTRAVENOUS SYRINGE
AS NEEDED
Status: DISCONTINUED | OUTPATIENT
Start: 2025-04-22 | End: 2025-04-22

## 2025-04-22 RX ORDER — FENTANYL CITRATE 50 UG/ML
25 INJECTION, SOLUTION INTRAMUSCULAR; INTRAVENOUS
Status: DISCONTINUED | OUTPATIENT
Start: 2025-04-22 | End: 2025-04-23

## 2025-04-22 RX ORDER — POTASSIUM CHLORIDE 20 MEQ/1
20 TABLET, EXTENDED RELEASE ORAL EVERY 6 HOURS PRN
Status: DISCONTINUED | OUTPATIENT
Start: 2025-04-22 | End: 2025-04-27 | Stop reason: HOSPADM

## 2025-04-22 RX ORDER — SODIUM CHLORIDE 0.9 G/100ML
INJECTION, SOLUTION IRRIGATION AS NEEDED
Status: DISCONTINUED | OUTPATIENT
Start: 2025-04-22 | End: 2025-04-22 | Stop reason: HOSPADM

## 2025-04-22 RX ORDER — LIDOCAINE HCL/PF 100 MG/5ML
SYRINGE (ML) INTRAVENOUS AS NEEDED
Status: DISCONTINUED | OUTPATIENT
Start: 2025-04-22 | End: 2025-04-22

## 2025-04-22 RX ORDER — NOREPINEPHRINE BITARTRATE 0.03 MG/ML
0-.2 INJECTION, SOLUTION INTRAVENOUS CONTINUOUS
Status: DISCONTINUED | OUTPATIENT
Start: 2025-04-22 | End: 2025-04-24

## 2025-04-22 RX ORDER — GABAPENTIN 100 MG/1
100 CAPSULE ORAL 3 TIMES DAILY
Status: DISCONTINUED | OUTPATIENT
Start: 2025-04-22 | End: 2025-04-27 | Stop reason: HOSPADM

## 2025-04-22 RX ORDER — PHENYLEPHRINE HCL IN 0.9% NACL 1 MG/10 ML
SYRINGE (ML) INTRAVENOUS AS NEEDED
Status: DISCONTINUED | OUTPATIENT
Start: 2025-04-22 | End: 2025-04-22

## 2025-04-22 RX ORDER — IPRATROPIUM BROMIDE AND ALBUTEROL SULFATE 2.5; .5 MG/3ML; MG/3ML
3 SOLUTION RESPIRATORY (INHALATION)
Status: DISCONTINUED | OUTPATIENT
Start: 2025-04-22 | End: 2025-04-22 | Stop reason: HOSPADM

## 2025-04-22 RX ORDER — CALCIUM CHLORIDE INJECTION 100 MG/ML
INJECTION, SOLUTION INTRAVENOUS AS NEEDED
Status: DISCONTINUED | OUTPATIENT
Start: 2025-04-22 | End: 2025-04-22

## 2025-04-22 RX ADMIN — MIDAZOLAM 2 MG: 1 INJECTION INTRAMUSCULAR; INTRAVENOUS at 10:21

## 2025-04-22 RX ADMIN — Medication 50 PERCENT: at 14:30

## 2025-04-22 RX ADMIN — PROPOFOL 60 MG: 10 INJECTION, EMULSION INTRAVENOUS at 09:10

## 2025-04-22 RX ADMIN — MAGNESIUM OXIDE TAB 400 MG (241.3 MG ELEMENTAL MG) 400 MG: 400 (241.3 MG) TAB at 15:42

## 2025-04-22 RX ADMIN — MANNITOL 25 ML: 20 INJECTION, SOLUTION INTRAVENOUS at 11:11

## 2025-04-22 RX ADMIN — FENTANYL CITRATE 25 MCG: 50 INJECTION INTRAMUSCULAR; INTRAVENOUS at 17:47

## 2025-04-22 RX ADMIN — ROCURONIUM BROMIDE 20 MG: 10 INJECTION INTRAVENOUS at 08:51

## 2025-04-22 RX ADMIN — EZETIMIBE 10 MG: 10 TABLET ORAL at 15:42

## 2025-04-22 RX ADMIN — ROCURONIUM BROMIDE 5 MG: 10 INJECTION INTRAVENOUS at 08:09

## 2025-04-22 RX ADMIN — Medication 3 L/MIN: at 20:46

## 2025-04-22 RX ADMIN — PROPOFOL 40 MG: 10 INJECTION, EMULSION INTRAVENOUS at 08:56

## 2025-04-22 RX ADMIN — FENTANYL CITRATE 50 MCG: 50 INJECTION, SOLUTION INTRAMUSCULAR; INTRAVENOUS at 10:21

## 2025-04-22 RX ADMIN — METHOCARBAMOL 500 MG: 500 TABLET ORAL at 15:42

## 2025-04-22 RX ADMIN — FENTANYL CITRATE 100 MCG: 50 INJECTION, SOLUTION INTRAMUSCULAR; INTRAVENOUS at 08:55

## 2025-04-22 RX ADMIN — ROCURONIUM BROMIDE 20 MG: 10 INJECTION INTRAVENOUS at 09:43

## 2025-04-22 RX ADMIN — Medication 50 MCG: at 10:28

## 2025-04-22 RX ADMIN — LIDOCAINE HYDROCHLORIDE 100 MG: 20 INJECTION INTRAVENOUS at 08:09

## 2025-04-22 RX ADMIN — SODIUM CHLORIDE, SODIUM LACTATE, POTASSIUM CHLORIDE, AND CALCIUM CHLORIDE: .6; .31; .03; .02 INJECTION, SOLUTION INTRAVENOUS at 07:46

## 2025-04-22 RX ADMIN — EPHEDRINE SULFATE 10 MG: 50 INJECTION, SOLUTION INTRAVENOUS at 13:38

## 2025-04-22 RX ADMIN — ETOMIDATE 12 MG: 2 INJECTION INTRAVENOUS at 08:09

## 2025-04-22 RX ADMIN — HEPARIN SODIUM 32000 UNITS: 1000 INJECTION, SOLUTION INTRAVENOUS; SUBCUTANEOUS at 10:12

## 2025-04-22 RX ADMIN — ROCURONIUM BROMIDE 20 MG: 10 INJECTION INTRAVENOUS at 10:20

## 2025-04-22 RX ADMIN — LIDOCAINE 1 PATCH: 4 PATCH TOPICAL at 15:42

## 2025-04-22 RX ADMIN — EPHEDRINE SULFATE 10 MG: 50 INJECTION, SOLUTION INTRAVENOUS at 12:12

## 2025-04-22 RX ADMIN — DEXTROSE MONOHYDRATE 12.5 G: 25 INJECTION, SOLUTION INTRAVENOUS at 11:36

## 2025-04-22 RX ADMIN — OXYCODONE HYDROCHLORIDE 5 MG: 5 TABLET ORAL at 15:42

## 2025-04-22 RX ADMIN — FENTANYL CITRATE 25 MCG: 50 INJECTION INTRAMUSCULAR; INTRAVENOUS at 19:28

## 2025-04-22 RX ADMIN — Medication 50 MCG: at 12:54

## 2025-04-22 RX ADMIN — HEPARIN SODIUM 5000 UNITS: 1000 INJECTION, SOLUTION INTRAVENOUS; SUBCUTANEOUS at 09:49

## 2025-04-22 RX ADMIN — ACETAMINOPHEN 650 MG: 325 TABLET, FILM COATED ORAL at 15:42

## 2025-04-22 RX ADMIN — NITROGLYCERIN 50 MCG: 10 INJECTION INTRAVENOUS at 10:30

## 2025-04-22 RX ADMIN — GABAPENTIN 100 MG: 100 CAPSULE ORAL at 15:42

## 2025-04-22 RX ADMIN — CEFAZOLIN SODIUM 2 G: 2 INJECTION, SOLUTION INTRAVENOUS at 20:45

## 2025-04-22 RX ADMIN — CALCIUM CHLORIDE 1 G: 100 INJECTION, SOLUTION INTRAVENOUS at 12:10

## 2025-04-22 RX ADMIN — PROPOFOL 30 MG: 10 INJECTION, EMULSION INTRAVENOUS at 09:43

## 2025-04-22 RX ADMIN — SODIUM CHLORIDE: 9 INJECTION, SOLUTION INTRAVENOUS at 11:05

## 2025-04-22 RX ADMIN — MIDAZOLAM 1 MG: 1 INJECTION INTRAMUSCULAR; INTRAVENOUS at 08:02

## 2025-04-22 RX ADMIN — DEXMEDETOMIDINE HYDROCHLORIDE 0.2 MCG/KG/HR: 400 INJECTION INTRAVENOUS at 15:22

## 2025-04-22 RX ADMIN — Medication 100 MCG: at 12:21

## 2025-04-22 RX ADMIN — NITROGLYCERIN 100 MCG: 10 INJECTION INTRAVENOUS at 12:25

## 2025-04-22 RX ADMIN — PROTAMINE SULFATE 350 MG: 10 INJECTION, SOLUTION INTRAVENOUS at 12:23

## 2025-04-22 RX ADMIN — CEFAZOLIN SODIUM 2 G: 2 INJECTION, SOLUTION INTRAVENOUS at 12:00

## 2025-04-22 RX ADMIN — PROPOFOL 40 MG: 10 INJECTION, EMULSION INTRAVENOUS at 10:21

## 2025-04-22 RX ADMIN — SODIUM CHLORIDE: 9 INJECTION, SOLUTION INTRAVENOUS at 08:30

## 2025-04-22 RX ADMIN — FENTANYL CITRATE 50 MCG: 50 INJECTION, SOLUTION INTRAMUSCULAR; INTRAVENOUS at 10:05

## 2025-04-22 RX ADMIN — NITROGLYCERIN 100 MCG: 10 INJECTION INTRAVENOUS at 10:03

## 2025-04-22 RX ADMIN — LIDOCAINE HYDROCHLORIDE 3 ML: 10 INJECTION, SOLUTION INFILTRATION; PERINEURAL at 11:53

## 2025-04-22 RX ADMIN — NITROGLYCERIN 100 MCG: 10 INJECTION INTRAVENOUS at 10:01

## 2025-04-22 RX ADMIN — SODIUM CHLORIDE 20 G: 900 INJECTION, SOLUTION INTRAVENOUS at 08:45

## 2025-04-22 RX ADMIN — POVIDONE-IODINE 1 APPLICATION: 5 SOLUTION TOPICAL at 06:35

## 2025-04-22 RX ADMIN — NITROGLYCERIN 50 MCG: 10 INJECTION INTRAVENOUS at 12:30

## 2025-04-22 RX ADMIN — CEFAZOLIN SODIUM 2 G: 2 INJECTION, SOLUTION INTRAVENOUS at 08:00

## 2025-04-22 RX ADMIN — ALBUMIN HUMAN 12.5 G: 0.05 INJECTION, SOLUTION INTRAVENOUS at 15:00

## 2025-04-22 RX ADMIN — FENTANYL CITRATE 50 MCG: 50 INJECTION, SOLUTION INTRAMUSCULAR; INTRAVENOUS at 07:55

## 2025-04-22 RX ADMIN — ROSUVASTATIN 40 MG: 40 TABLET, FILM COATED ORAL at 15:42

## 2025-04-22 RX ADMIN — DEXMEDETOMIDINE HYDROCHLORIDE 0.2 MCG/KG/HR: 4 INJECTION, SOLUTION INTRAVENOUS at 11:55

## 2025-04-22 RX ADMIN — MIDAZOLAM 2 MG: 1 INJECTION INTRAMUSCULAR; INTRAVENOUS at 07:55

## 2025-04-22 RX ADMIN — ROCURONIUM BROMIDE 55 MG: 10 INJECTION INTRAVENOUS at 08:10

## 2025-04-22 RX ADMIN — OXYCODONE HYDROCHLORIDE 10 MG: 5 TABLET ORAL at 20:45

## 2025-04-22 RX ADMIN — NITROGLYCERIN 50 MCG: 10 INJECTION INTRAVENOUS at 10:34

## 2025-04-22 RX ADMIN — NITROGLYCERIN 50 MCG: 10 INJECTION INTRAVENOUS at 09:01

## 2025-04-22 RX ADMIN — FENTANYL CITRATE 50 MCG: 50 INJECTION, SOLUTION INTRAMUSCULAR; INTRAVENOUS at 13:25

## 2025-04-22 RX ADMIN — MAGNESIUM SULFATE HEPTAHYDRATE 2 G: 500 INJECTION, SOLUTION INTRAMUSCULAR; INTRAVENOUS at 11:53

## 2025-04-22 RX ADMIN — ACETAMINOPHEN 650 MG: 325 TABLET, FILM COATED ORAL at 20:45

## 2025-04-22 RX ADMIN — MIDAZOLAM 2 MG: 1 INJECTION INTRAMUSCULAR; INTRAVENOUS at 11:30

## 2025-04-22 RX ADMIN — ROCURONIUM BROMIDE 10 MG: 10 INJECTION INTRAVENOUS at 13:00

## 2025-04-22 RX ADMIN — GABAPENTIN 100 MG: 100 CAPSULE ORAL at 20:45

## 2025-04-22 RX ADMIN — NOREPINEPHRINE BITARTRATE 0.02 MCG/KG/MIN: 32 SOLUTION INTRAVENOUS at 12:56

## 2025-04-22 RX ADMIN — SENNOSIDES AND DOCUSATE SODIUM 2 TABLET: 50; 8.6 TABLET ORAL at 20:45

## 2025-04-22 RX ADMIN — PROPOFOL 30 MG: 10 INJECTION, EMULSION INTRAVENOUS at 10:02

## 2025-04-22 RX ADMIN — FENTANYL CITRATE 100 MCG: 50 INJECTION, SOLUTION INTRAMUSCULAR; INTRAVENOUS at 08:09

## 2025-04-22 RX ADMIN — EPHEDRINE SULFATE 10 MG: 50 INJECTION, SOLUTION INTRAVENOUS at 11:55

## 2025-04-22 RX ADMIN — FENTANYL CITRATE 150 MCG: 50 INJECTION, SOLUTION INTRAMUSCULAR; INTRAVENOUS at 08:51

## 2025-04-22 RX ADMIN — INSULIN HUMAN 5 UNITS: 100 INJECTION, SOLUTION PARENTERAL at 11:40

## 2025-04-22 RX ADMIN — ROCURONIUM BROMIDE 20 MG: 10 INJECTION INTRAVENOUS at 11:20

## 2025-04-22 RX ADMIN — IPRATROPIUM BROMIDE AND ALBUTEROL SULFATE 3 ML: 2.5; .5 SOLUTION RESPIRATORY (INHALATION) at 07:09

## 2025-04-22 RX ADMIN — IPRATROPIUM BROMIDE AND ALBUTEROL SULFATE 3 ML: .5; 3 SOLUTION RESPIRATORY (INHALATION) at 07:09

## 2025-04-22 RX ADMIN — FENTANYL CITRATE 50 MCG: 50 INJECTION, SOLUTION INTRAMUSCULAR; INTRAVENOUS at 13:43

## 2025-04-22 SDOH — HEALTH STABILITY: MENTAL HEALTH: CURRENT SMOKER: 1

## 2025-04-22 SDOH — SOCIAL STABILITY: SOCIAL INSECURITY: DO YOU FEEL UNSAFE GOING BACK TO THE PLACE WHERE YOU ARE LIVING?: NO

## 2025-04-22 SDOH — SOCIAL STABILITY: SOCIAL INSECURITY: ARE YOU OR HAVE YOU BEEN THREATENED OR ABUSED PHYSICALLY, EMOTIONALLY, OR SEXUALLY BY ANYONE?: NO

## 2025-04-22 SDOH — SOCIAL STABILITY: SOCIAL INSECURITY: WITHIN THE LAST YEAR, HAVE YOU BEEN AFRAID OF YOUR PARTNER OR EX-PARTNER?: NO

## 2025-04-22 SDOH — ECONOMIC STABILITY: INCOME INSECURITY: IN THE PAST 12 MONTHS HAS THE ELECTRIC, GAS, OIL, OR WATER COMPANY THREATENED TO SHUT OFF SERVICES IN YOUR HOME?: NO

## 2025-04-22 SDOH — SOCIAL STABILITY: SOCIAL INSECURITY: DO YOU FEEL ANYONE HAS EXPLOITED OR TAKEN ADVANTAGE OF YOU FINANCIALLY OR OF YOUR PERSONAL PROPERTY?: NO

## 2025-04-22 SDOH — ECONOMIC STABILITY: FOOD INSECURITY: WITHIN THE PAST 12 MONTHS, THE FOOD YOU BOUGHT JUST DIDN'T LAST AND YOU DIDN'T HAVE MONEY TO GET MORE.: NEVER TRUE

## 2025-04-22 SDOH — SOCIAL STABILITY: SOCIAL INSECURITY: DOES ANYONE TRY TO KEEP YOU FROM HAVING/CONTACTING OTHER FRIENDS OR DOING THINGS OUTSIDE YOUR HOME?: NO

## 2025-04-22 SDOH — SOCIAL STABILITY: SOCIAL INSECURITY: WITHIN THE LAST YEAR, HAVE YOU BEEN HUMILIATED OR EMOTIONALLY ABUSED IN OTHER WAYS BY YOUR PARTNER OR EX-PARTNER?: NO

## 2025-04-22 SDOH — SOCIAL STABILITY: SOCIAL INSECURITY: ABUSE: ADULT

## 2025-04-22 SDOH — ECONOMIC STABILITY: FOOD INSECURITY: WITHIN THE PAST 12 MONTHS, YOU WORRIED THAT YOUR FOOD WOULD RUN OUT BEFORE YOU GOT THE MONEY TO BUY MORE.: NEVER TRUE

## 2025-04-22 SDOH — SOCIAL STABILITY: SOCIAL INSECURITY: HAVE YOU HAD THOUGHTS OF HARMING ANYONE ELSE?: NO

## 2025-04-22 SDOH — SOCIAL STABILITY: SOCIAL INSECURITY: HAVE YOU HAD ANY THOUGHTS OF HARMING ANYONE ELSE?: NO

## 2025-04-22 SDOH — SOCIAL STABILITY: SOCIAL INSECURITY: HAS ANYONE EVER THREATENED TO HURT YOUR FAMILY OR YOUR PETS?: NO

## 2025-04-22 SDOH — SOCIAL STABILITY: SOCIAL INSECURITY: ARE THERE ANY APPARENT SIGNS OF INJURIES/BEHAVIORS THAT COULD BE RELATED TO ABUSE/NEGLECT?: NO

## 2025-04-22 ASSESSMENT — COGNITIVE AND FUNCTIONAL STATUS - GENERAL
PATIENT BASELINE BEDBOUND: NO
DAILY ACTIVITIY SCORE: 24
MOBILITY SCORE: 24

## 2025-04-22 ASSESSMENT — PATIENT HEALTH QUESTIONNAIRE - PHQ9
SUM OF ALL RESPONSES TO PHQ9 QUESTIONS 1 & 2: 0
2. FEELING DOWN, DEPRESSED OR HOPELESS: NOT AT ALL
1. LITTLE INTEREST OR PLEASURE IN DOING THINGS: NOT AT ALL

## 2025-04-22 ASSESSMENT — COLUMBIA-SUICIDE SEVERITY RATING SCALE - C-SSRS
2. HAVE YOU ACTUALLY HAD ANY THOUGHTS OF KILLING YOURSELF?: NO
1. IN THE PAST MONTH, HAVE YOU WISHED YOU WERE DEAD OR WISHED YOU COULD GO TO SLEEP AND NOT WAKE UP?: NO
6. HAVE YOU EVER DONE ANYTHING, STARTED TO DO ANYTHING, OR PREPARED TO DO ANYTHING TO END YOUR LIFE?: NO

## 2025-04-22 ASSESSMENT — PAIN DESCRIPTION - ORIENTATION: ORIENTATION: MID

## 2025-04-22 ASSESSMENT — ACTIVITIES OF DAILY LIVING (ADL)
ASSISTIVE_DEVICE: EYEGLASSES
DRESSING YOURSELF: INDEPENDENT
FEEDING YOURSELF: INDEPENDENT
JUDGMENT_ADEQUATE_SAFELY_COMPLETE_DAILY_ACTIVITIES: YES
BATHING: INDEPENDENT
GROOMING: INDEPENDENT
HEARING - RIGHT EAR: FUNCTIONAL
TOILETING: INDEPENDENT
LACK_OF_TRANSPORTATION: NO
ADEQUATE_TO_COMPLETE_ADL: YES
WALKS IN HOME: INDEPENDENT
PATIENT'S MEMORY ADEQUATE TO SAFELY COMPLETE DAILY ACTIVITIES?: YES
HEARING - LEFT EAR: FUNCTIONAL

## 2025-04-22 ASSESSMENT — PAIN - FUNCTIONAL ASSESSMENT
PAIN_FUNCTIONAL_ASSESSMENT: 0-10

## 2025-04-22 ASSESSMENT — LIFESTYLE VARIABLES
HOW OFTEN DO YOU HAVE A DRINK CONTAINING ALCOHOL: NEVER
HOW MANY STANDARD DRINKS CONTAINING ALCOHOL DO YOU HAVE ON A TYPICAL DAY: PATIENT DOES NOT DRINK
SKIP TO QUESTIONS 9-10: 1
AUDIT-C TOTAL SCORE: 0
AUDIT-C TOTAL SCORE: 0
HOW OFTEN DO YOU HAVE 6 OR MORE DRINKS ON ONE OCCASION: NEVER

## 2025-04-22 ASSESSMENT — PAIN DESCRIPTION - LOCATION: LOCATION: CHEST

## 2025-04-22 ASSESSMENT — PAIN SCALES - GENERAL
PAINLEVEL_OUTOF10: 3
PAINLEVEL_OUTOF10: 10 - WORST POSSIBLE PAIN
PAINLEVEL_OUTOF10: 9
PAINLEVEL_OUTOF10: 5 - MODERATE PAIN
PAIN_LEVEL: 0
PAINLEVEL_OUTOF10: 10 - WORST POSSIBLE PAIN
PAINLEVEL_OUTOF10: 3
PAINLEVEL_OUTOF10: 0 - NO PAIN
PAINLEVEL_OUTOF10: 1

## 2025-04-22 ASSESSMENT — PAIN DESCRIPTION - DESCRIPTORS
DESCRIPTORS: ACHING

## 2025-04-22 NOTE — ANESTHESIA POSTPROCEDURE EVALUATION
Patient: Rafi Lisa Jr.    Procedure Summary       Date: 04/22/25 Room / Location: [facility] OR 11 / Virtual [facility] OR    Anesthesia Start: 0746 Anesthesia Stop: 1416    Procedure: CORONARY ARTERY BYPASS GRAFT x3/ LEFT INTERNAL MAMMARY ARTERY & VEIN (Chest) Diagnosis:       Chronic coronary microvascular dysfunction      (Chronic coronary microvascular dysfunction [I25.85])    Surgeons: Pk Kline MD Responsible Provider: Maverick Delgado MD    Anesthesia Type: general ASA Status: Not recorded            Anesthesia Type: general    Vitals Value Taken Time   /60 04/22/25 14:17   Temp 36.0 04/22/25 14:17   Pulse 88 04/22/25 14:15   Resp 17 04/22/25 14:15   SpO2 100% 04/22/25 14:17   Vitals shown include unfiled device data.    Anesthesia Post Evaluation    Patient location during evaluation: ICU  Patient participation: complete - patient cannot participate  Level of consciousness: sedated  Pain score: 0  Pain management: adequate  Airway patency: patent  Cardiovascular status: acceptable  Respiratory status: acceptable, nonlabored ventilation, ETT, intubated, ventilator and spontaneous ventilation  Hydration status: acceptable  Postoperative Nausea and Vomiting: none        There were no known notable events for this encounter.

## 2025-04-22 NOTE — CARE PLAN
The patient's goals for the shift include      The clinical goals for the shift include pt will remain hemodynamically stable      Problem: Pain - Adult  Goal: Verbalizes/displays adequate comfort level or baseline comfort level  Outcome: Progressing     Problem: Safety - Adult  Goal: Free from fall injury  Outcome: Progressing     Problem: Discharge Planning  Goal: Discharge to home or other facility with appropriate resources  Outcome: Progressing     Problem: Chronic Conditions and Co-morbidities  Goal: Patient's chronic conditions and co-morbidity symptoms are monitored and maintained or improved  Outcome: Progressing     Problem: Nutrition  Goal: Nutrient intake appropriate for maintaining nutritional needs  Outcome: Progressing     Problem: Pain  Goal: Takes deep breaths with improved pain control throughout the shift  Outcome: Progressing  Goal: Turns in bed with improved pain control throughout the shift  Outcome: Progressing  Goal: Walks with improved pain control throughout the shift  Outcome: Progressing  Goal: Performs ADL's with improved pain control throughout shift  Outcome: Progressing  Goal: Participates in PT with improved pain control throughout the shift  Outcome: Progressing  Goal: Free from opioid side effects throughout the shift  Outcome: Progressing  Goal: Free from acute confusion related to pain meds throughout the shift  Outcome: Progressing     Problem: Fall/Injury  Goal: Not fall by end of shift  Outcome: Progressing  Goal: Be free from injury by end of the shift  Outcome: Progressing  Goal: Verbalize understanding of personal risk factors for fall in the hospital  Outcome: Progressing  Goal: Verbalize understanding of risk factor reduction measures to prevent injury from fall in the home  Outcome: Progressing  Goal: Use assistive devices by end of the shift  Outcome: Progressing  Goal: Pace activities to prevent fatigue by end of the shift  Outcome: Progressing     Problem: Skin  Goal:  Decreased wound size/increased tissue granulation at next dressing change  Outcome: Progressing  Flowsheets (Taken 4/22/2025 1642)  Decreased wound size/increased tissue granulation at next dressing change: Promote sleep for wound healing  Goal: Participates in plan/prevention/treatment measures  Outcome: Progressing  Flowsheets (Taken 4/22/2025 1642)  Participates in plan/prevention/treatment measures: Discuss with provider PT/OT consult  Goal: Prevent/manage excess moisture  Outcome: Progressing  Flowsheets (Taken 4/22/2025 1642)  Prevent/manage excess moisture: Cleanse incontinence/protect with barrier cream  Goal: Prevent/minimize sheer/friction injuries  Outcome: Progressing  Flowsheets (Taken 4/22/2025 1642)  Prevent/minimize sheer/friction injuries: Complete micro-shifts as needed if patient unable. Adjust patient position to relieve pressure points, not a full turn  Goal: Promote/optimize nutrition  Outcome: Progressing  Flowsheets (Taken 4/22/2025 1642)  Promote/optimize nutrition: Monitor/record intake including meals  Goal: Promote skin healing  Outcome: Progressing  Flowsheets (Taken 4/22/2025 1642)  Promote skin healing: Protective dressings over bony prominences

## 2025-04-22 NOTE — ANESTHESIA PROCEDURE NOTES
Airway  Date/Time: 4/22/2025 8:12 AM  Reason: elective    Airway not difficult    Staffing  Performed: SRNA   Authorized by: Maverick Delgado MD    Performed by: TIMUR Barboza  Patient location during procedure: OR    Patient Condition  Indications for airway management: anesthesia  Patient position: sniffing  MILS maintained throughout  Sedation level: deep     Final Airway Details   Preoxygenated: yes  Final airway type: endotracheal airway  Successful airway: ETT  Cuffed: yes   Successful intubation technique: video laryngoscopy  Adjuncts used in placement: intubating stylet  Endotracheal tube insertion site: oral  Blade: Melanie  Blade size: #4  ETT size (mm): 8.0  Cormack-Lehane Classification: grade I - full view of glottis  Placement verified by: chest auscultation and capnometry   Measured from: gums  ETT to gums (cm): 22  Number of attempts at approach: 1

## 2025-04-22 NOTE — PROCEDURES
Arterial Line Insertion    Date/Time: 4/22/2025 5:19 PM    Performed by: MILEY Joe DNP  Authorized by: MILEY Joe DNP    Consent:     Consent obtained:  Emergent situation    Consent given by:  Patient    Risks, benefits, and alternatives were discussed: yes      Risks discussed:  Bleeding, ischemia, repeat procedure, infection and pain  Universal protocol:     Procedure explained and questions answered to patient or proxy's satisfaction: yes      Relevant documents present and verified: yes      Test results available: yes      Imaging studies available: yes      Required blood products, implants, devices, and special equipment available: yes      Site/side marked: yes      Immediately prior to procedure, a time out was called: yes      Patient identity confirmed:  Verbally with patient, hospital-assigned identification number and arm band  Indications:     Indications: hemodynamic monitoring and multiple ABGs    Pre-procedure details:     Skin preparation:  Chlorhexidine  Sedation:     Sedation type:  Anxiolysis  Anesthesia:     Anesthesia method:  Local infiltration    Local anesthetic:  Lidocaine 2% w/o epi  Procedure details:     Location: L Brachial.    Arturo's test performed: yes      Arturo's test abnormal: no      Needle gauge:  20 G    Placement technique:  Ultrasound guided    Number of attempts:  1    Transducer: waveform confirmed    Post-procedure details:     Post-procedure:  Biopatch applied, sterile dressing applied and sutured    CMS:  Normal    Procedure completion:  Tolerated well, no immediate complications  Comments:      Briefly, Mr. Rafi Lisa is a 75 y/o M who is now s/p CABG x3. In the immediate post-op period, his R radial arterial line was with a dampened amplitude and no longer drawing back blood.     Given the patient had been started on norepinephrine, to continue close hemodynamic monitoring, a L brachial arterial line was placed under ultrasound  guidance under sterile technique.

## 2025-04-22 NOTE — H&P
Cardiac Surgery   History of Presenting Illness       History Of Present Illness  Rafi Lisa Jr. is a 74 y.o. male with a PMH significant for CAD (s/p PCI to the LAD, laser arthrectomy to the Lcx on 1/2022), HTN, HLD, COPD (Emphysemea), and nicotine use disorder who presents to [facility] on 4/22/25 for a staged coronary.     The patient was undergoing workup with cardiologist, Dr. James Hunt in workup for his anginal complaints. He ultimately was scheduled for a stress testing; however during his exam, he was referred to the ED and was subsequently admitted for inpatient workup. On 3/24/25, he underwent a LHC with interventional cardiologist, Dr. Tony Oneill and his coronary anatomy revealed diffuse multivessel CAD. Cardiac surgery was subsequently consulted for bypass grafting evaluation.      Subjective    Past Medical History  He has a past medical history of Abnormal findings on diagnostic imaging of other specified body structures, Arthritis, COPD (chronic obstructive pulmonary disease) (Multi), Coronary artery disease, GERD (gastroesophageal reflux disease), HL (hearing loss), Hyperlipidemia, Hypertension, Joint pain, Nephrolithiasis, Personal history of other medical treatment, and Personal history of other medical treatment.    Surgical History  He has a past surgical history that includes Other surgical history (01/30/2020); Other surgical history (01/20/2022); Other surgical history (01/20/2022); Other surgical history (06/04/2020); Other surgical history (06/04/2020); Other surgical history (06/04/2020); Other surgical history (06/04/2020); Other surgical history (06/04/2020); Other surgical history (06/04/2020); Other surgical history (06/04/2020); MR angio head wo IV contrast (12/17/2023); MR angio neck wo IV contrast (12/17/2023); MR angio head wo IV contrast (12/18/2023); MR angio neck wo IV contrast (12/18/2023); Cardiac catheterization (N/A, 03/24/2025);  Tonsillectomy; Upper gastrointestinal endoscopy; Colonoscopy; ORIF wrist fracture; LASIK; Cystoscopy; Lithotripsy; and Bunionectomy.     Social History  He reports that he has quit smoking. His smoking use included cigarettes. He has a 64 pack-year smoking history. He has quit using smokeless tobacco. He reports that he does not currently use drugs. He reports that he does not drink alcohol.    Family History  Family History[1]     Allergies  Sulfa (sulfonamide antibiotics) and Adhesive tape-silicones    Review of Systems   Unable to perform ROS: Intubated          Objective    Physical Exam  Vitals and nursing note reviewed.   Constitutional:       General: He is not in acute distress.     Appearance: He is ill-appearing. He is not diaphoretic.      Interventions: He is sedated and intubated.   HENT:      Head: Normocephalic and atraumatic.      Nose: Nose normal.      Mouth/Throat:      Mouth: Mucous membranes are dry.      Pharynx: No oropharyngeal exudate or posterior oropharyngeal erythema.   Eyes:      Extraocular Movements: Extraocular movements intact.      Pupils: Pupils are equal, round, and reactive to light.   Neck:      Comments: Right internal jugular triple lumen catheter    Cardiovascular:      Rate and Rhythm: Normal rate and regular rhythm.      Pulses: Normal pulses.      Heart sounds: Normal heart sounds, S1 normal and S2 normal. No murmur heard.     No friction rub. No gallop.      Comments: A-Ventricular Wires     Pulmonary:      Effort: Pulmonary effort is normal. No tachypnea or bradypnea. He is intubated.      Breath sounds: Examination of the right-lower field reveals decreased breath sounds. Examination of the left-lower field reveals decreased breath sounds. Decreased breath sounds present.   Chest:      Chest wall: Tenderness present.      Comments: Bilateral + Mediastinal Chest Tubes   Abdominal:      General: Bowel sounds are decreased. There is no distension.      Tenderness: There is  no abdominal tenderness.   Genitourinary:     Comments: Saldana Catheter     Musculoskeletal:      Cervical back: Normal range of motion and neck supple. No edema.   Skin:     General: Skin is cool and dry.      Capillary Refill: Capillary refill takes less than 2 seconds.      Comments: Mediastinal Incision w/ Post-op Dressing, C/D/I    Neurological:      GCS: GCS eye subscore is 1. GCS verbal subscore is 1. GCS motor subscore is 1.      Cranial Nerves: Cranial nerves 2-12 are intact.         Home Medications  Current Outpatient Medications   Medication Instructions    acetaminophen (TYLENOL) 650 mg, Every 6 hours PRN    aspirin 81 mg, oral, Daily    clopidogrel (PLAVIX) 75 mg, oral, Daily, To stop 4/17/25    coenzyme Q-10 100 mg, 2 times daily    ezetimibe (ZETIA) 10 mg, oral, Daily    lisinopril 20 mg, oral, Daily, Stop 4/19/25    metoprolol succinate XL (TOPROL-XL) 50 mg, oral, Daily    multivitamin with minerals iron-free (Centrum Silver) 1 tablet, Daily    nitroglycerin (NITROSTAT) 0.4 mg, sublingual, Every 5 min PRN    omeprazole (PRILOSEC) 40 mg, Daily before breakfast    probenecid-colchicine 500-0.5 mg tablet 1 tablet, 2 times daily    rosuvastatin (CRESTOR) 40 mg, oral, Daily    TURMERIC ORAL 1,500 mg, 2 times daily        Inpatient Medications  Scheduled medications  Scheduled Medications[2]  Continuous medications  Continuous Medications[3]  PRN medications  PRN Medications[4]     LDA:  CVC 04/22/25 Triple lumen Right Internal jugular (Active)   Placement Date/Time: 04/22/25 (c) 0825   Hand Hygiene Performed Prior to CVC Insertion: Yes  Site Prep: Chlorhexidine   Site Prep Agent has Completely Dried Before Insertion: Yes  All 5 Sterile Barriers Used (Gloves, Gown, Cap, Mask, Large Sterile Rowena...   Number of days: 0       Arterial Line 04/22/25 Right Radial (Active)   Placement Date/Time: 04/22/25 (c) 0805   Size: 20 G  Orientation: Right  Location: Radial  Securement Method: Transparent dressing  Patient  Tolerance: Tolerated well   Number of days: 0       ETT  8 mm (Active)   Placement Date/Time: 04/22/25 (c) 0812   Mask Ventilation: Vent by mask + OA or adjuvant +/- NMBA  Technique: Video laryngoscopy  ETT Type: ETT - single  Single Lumen Tube Size: 8 mm  Cuffed: Yes  Laryngoscope: Melanie  Blade Size: 4  Location: Oral...   Number of days: 0       Urethral Catheter Temperature probe;Non-latex 14 Fr. (Active)   Placement Date/Time: 04/22/25 0817   Placed by: Trang Reyes  Hand Hygiene Completed: Yes  Catheter Type: Temperature probe;Non-latex  Tube Size (Fr.): 14 Fr.  Catheter Balloon Size: 10 mL  Urine Returned: Yes   Number of days: 0        Relevant Results  Vitals  Vitals:    04/22/25 1600   BP: 102/68   Pulse: 69   Resp: (!) 37   Temp: 35.9 °C (96.6 °F)   SpO2: 100%       Lab Review  Results from last 7 days   Lab Units 04/22/25  1420   WBC AUTO x10*3/uL 10.2   HEMOGLOBIN g/dL 10.3*   HEMATOCRIT % 33.5*   PLATELETS AUTO x10*3/uL 133*     Results from last 7 days   Lab Units 04/22/25  1420   SODIUM mmol/L 141   POTASSIUM mmol/L 4.8   CHLORIDE mmol/L 111*   CO2 mmol/L 25   BUN mg/dL 24*   CREATININE mg/dL 1.32*   CALCIUM mg/dL 8.0*   GLUCOSE mg/dL 129*     Results from last 7 days   Lab Units 04/22/25  1420   MAGNESIUM mg/dL 2.95*     Results from last 7 days   Lab Units 04/22/25  1422   POCT PH, ARTERIAL pH 7.29*   POCT PCO2, ARTERIAL mm Hg 49*   POCT PO2, ARTERIAL mm Hg 119*   POCT HCO3 CALCULATED, ARTERIAL mmol/L 23.6   POCT BASE EXCESS, ARTERIAL mmol/L -3.2*       I/O:  No intake/output data recorded.        Radiographic Testing  EKG:  ECG 12 lead 03/21/2025        Echo:  Transthoracic Echo (TTE) Complete 03/24/2025  PHYSICIAN INTERPRETATION:  Left Ventricle: The left ventricular systolic function is normal, with a visually estimated ejection fraction of 55-60%. There is mild concentric left ventricular hypertrophy. There are no regional left ventricular wall motion abnormalities. The left ventricular  cavity size is normal. There is mildly increased septal and mildly increased posterior left ventricular wall thickness. There is left ventricular concentric remodeling. Spectral Doppler shows a Grade II (pseudonormal pattern) of left ventricular diastolic filling with an elevated left atrial pressure.  Left Atrium: The left atrium is mildly dilated.  Right Ventricle: The right ventricle is mildly enlarged. There is normal right ventricular global systolic function.  Right Atrium: The right atrium is normal in size.  Aortic Valve: The aortic valve is trileaflet. There is mild aortic valve thickening. The aortic valve dimensionless index is 0.81. There is moderate aortic valve regurgitation. The peak instantaneous gradient of the aortic valve is 7 mmHg. The mean gradient of the aortic valve is 4 mmHg.  Mitral Valve: The mitral valve is mildly thickened. The peak instantaneous gradient of the mitral valve is 2 mmHg. There is mild mitral valve regurgitation.  Tricuspid Valve: The tricuspid valve is structurally normal. There is mild tricuspid regurgitation. The Doppler estimated RVSP is within normal limits at 29.4 mmHg.  Pulmonic Valve: The pulmonic valve is structurally normal. There is mild pulmonic valve regurgitation.  Pericardium: There is no pericardial effusion noted.  Aorta: The aortic root is abnormal. There is mild dilatation of the ascending aorta.  In comparison to the previous echocardiogram(s): Compared with study dated 5/15/2020,.        CONCLUSIONS:   1. The left ventricular systolic function is normal, with a visually estimated ejection fraction of 55-60%.   2. Spectral Doppler shows a Grade II (pseudonormal pattern) of left ventricular diastolic filling with an elevated left atrial pressure.   3. Mildly enlarged right ventricle.   4. The left atrium is mildly dilated.   5. Right ventricular systolic pressure is within normal limits.   6. Moderate aortic valve regurgitation.    Ejection Fractions:  EF    Date/Time Value Ref Range Status   03/24/2025 09:25 AM 58 %      Cath:  Cardiac Catheterization Procedure 03/24/2025  Coronary Angiography:  The coronary circulation is left dominant.     Left Main Coronary Artery:  The left main coronary artery is a normal caliber vessel. The left main arises normally from the left coronary sinus of Valsalva and trifurcation. The left main coronary artery showed no significant disease or stenosis greater than 30%.     Left Anterior Descending Coronary Artery Distribution:  The left anterior descending coronary artery is a normal caliber vessel. The LAD arises normally from the left main coronary artery. The LAD demonstrated dense calcification in the proximal to mid segment. The ostial and proximal to mid left anterior descending coronary artery showed 90% stenosis. This lesion was heavily calcified.     Circumflex Coronary Artery Distribution:  The circumflex coronary artery is a large caliber vessel. The circumflex arises normally from the left main coronary artery. The circumflex revealed mild distal atherosclerotic disease. The mid circumflex coronary artery showed 50% stenosis.     Ramus Intermedius:  The ramus intermedius is a medium-sized caliber vessel. The proximal ramus intermedius showed 100% stenosis.  with collaterals.     Right Coronary Artery Distribution:     The right coronary artery is a medium-sized caliber vessel. The RCA showed dense calcification. The entire right coronary artery showed 95% stenosis.        Left Ventriculography:  The size of the left ventricle is normal. The LV ejection fraction was 50 to 55%. All left ventricular regional wall segments contract normally. There is no LV diastolic dysfunction noted.    Chest Imaging:  XR chest 1 view   Final Result   NG/OG tube is coiled in the midesophagus but then continues distally   with the tip terminating in the supradiaphragmatic portion of the   hiatal hernia. Other lines and tubes as above.         Bibasilar atelectasis and pleural effusions.        MACRO:   None.        Signed by: Meet Dee 4/22/2025 3:57 PM   Dictation workstation:   [id number]        Pulmonary Function Testing:   Pulmonary Function Lab - Spirometry Only      03/25/25           Daily Event     04/22/25: Patient arrived to the ICU in critically ill but stable condition. S/p CABG x3. Patient arrived hemodynamically stable without the need for continuous vasopressors. Flowtrack noting decreased hemodynamics, however concerned that this is not accurate as patient is not with signs of decreased perfusion.     Plan to recover in the immediate post-op period and wean mechanical ventilation towards extubation.     - Vital Signs: HR 65, BP 91/59, SpO2 100%   - Hemodynamics: CVP 10, CO 3, CI 1.5, SVR 1725 (consider accuracy of #s)  - Ventilator Settings: PRVC-AC / FiO2 50% / PEEP 8 / RR 16    - Pump time: 99 min / x-clamp 76 min     Intake & Output:   - Initial R Pleural Chest Tube: 120mL   - Initial L Pleural Chest Tube: 100mL   - Initial Mediastinal Chest Tube: 150mL       Assessment & Plan       Multivessel Coronary Artery Disease  - Patient has remote Hx of PCI to the LAD in 1998   --> most recently laser arterectomy to the Lcx on 1/2022  - S/p CABG x 3 on 4/22/25 with Dr. Juan Luis Kline   --> LIMA-LAD, SVG-Ramus, SVG-OM2  - Continue on ASA 81mg and Rosuvastatin 40mg    - Holding beta-blocker in initial post op period  - Maintain MS/pl chest tubes to -20cm H20 continuous suction   - Daily CXR while chest tube intact        Mediastinal Incision  - Remove post-op dressing on POD #2  - Dressing C/D/I       Acute Post-Op Pain  - As expected   - Multimodal pain control   --> Scheduled: Acetaminophen, magnesium oxide 400mg QD, gabapentin 100mg TID, methocarbamol 500mg q6h   --> PRN: oxycodone & fentanyl  - Bowel regimen while taking narcotics        Risk for Post-Op Arrhythmia  - Ventricular wires placed  - Discontinue V-wires prior to DC  -  Telemetry until discharged       Acute Postoperative Respiratory Insufficiency   - As expected following CABG with post-op atelectasis  - Currently maintained on mechanical ventilator   - Coughing and deep breathing exercises with Incentive spirometry  - Out of bed, early and aggressive mobilization with assistance  - Oxygen as needed, wean to keep saturation above 92%  - ICU intensivist/pulmonologist consulted  - Albuterol nebulizers as needed       COPD     Nicotine Use Disorder  - Home Medications: Albuterol PRN  - Home O2: N/A  - PFTs completed on 3/25 (see below) show COPD with reversible component   --> FEV1/FVC = 0.52 pre / 0.53 post (FEV1 improved 15% / FVC improved 16%)  - Consider addition of tiotropium once extubated          Risk for Fluid Volume Overload  - Pre-Op Weight: 80 kg   - Strict I& Os and daily weights    - Monitor CVP and hemodynamics        Acute Post-Op Blood-Loss Anemia  - Pre-Op H/H:  12.8/41.7  - Monitor h/h in the initial post op period  - Monitor chest tubes for post op hemorrhage   - Multivitamin/iron tablet   - Daily CBC while in hospital       Post-Op Leukocytosis  - Pre-Op WBC: 4.6  - Afebrile, consider elevations as reactive to surgery   - Post-op ATB Q12 for 48hrs  - Daily cbc while in hospital        Essential HTN  - Home Medications: Lisinopril 20mg   -  Hold in the immediate post-op period        Pre-diabetic   - Home Medications: None   - HbA1c: 6.4 --> 6.2  - Goal for BG <150 in the post-operative period  - SSI q4h       Dyslipidemia  - Home Medications: Rosuvastatin 40mg, Ezetimibe 10mg  - Continue on statin therapy as above        Hiatal Hernia     GERD  - Home Medications: Pantoprazole 40mg  - Patient has a large, symptomatic hiatal hernia   --> Maintain NG tube placement in the post-op period   - Continue on home PPI     Risk for Electrolyte Disturbances  - Optimize electrolytes per [facility] protocol      Bowel Regimen  - Senna-S BID   - PRN suppositories and  miralax    Prophylaxis  - GI: PPI  - DVT: Chadd-Hose & starting enoxaparin on POD #1  - MRSA: Negative (MSSA positive)   - PT/OT     Disposition  - CVICU    Patient seen and plan discussed with Dr. Juan Luis Kline.        Anders Aaron, APRN-CNP, DNP         [1]   Family History  Problem Relation Name Age of Onset    No Known Problems Mother      Other (coronary thrombosis) Father           in 1970s @63   [2] acetaminophen, 650 mg, oral, q6h  [START ON 2025] aspirin, 81 mg, oral, Daily  ceFAZolin, 2 g, intravenous, q8h  [START ON 2025] enoxaparin, 40 mg, subcutaneous, Daily  ezetimibe, 10 mg, oral, Daily  gabapentin, 100 mg, oral, TID  insulin lispro, 0-15 Units, subcutaneous, q4h  lidocaine, 1 patch, transdermal, q24h  magnesium oxide, 400 mg, oral, Daily  methocarbamol, 500 mg, oral, q8h BANDAR  oxygen, , inhalation, Continuous - Inhalation  [START ON 2025] pantoprazole, 40 mg, oral, Daily before breakfast  rosuvastatin, 40 mg, oral, Daily  sennosides-docusate sodium, 2 tablet, oral, BID     [3] aminocaproic acid, 1 g/hr  dexmedeTOMIDine, 0.1-1.5 mcg/kg/hr, Last Rate: 0.2 mcg/kg/hr (25 1522)  EPINEPHrine, 0-0.2 mcg/kg/min  norepinephrine, 0-0.2 mcg/kg/min, Last Rate: 0.04 mcg/kg/min (25 1527)  phenylephrine, 0-2 mcg/kg/min     [4] PRN medications: albumin human, alteplase, calcium chloride, calcium chloride, dextrose **OR** glucagon, electrolyte-A, fentaNYL **OR** fentaNYL, magnesium sulfate, magnesium sulfate, naloxone, oxyCODONE, oxyCODONE, polyethylene glycol, potassium chloride CR **OR** potassium chloride, potassium chloride CR **OR** potassium chloride, potassium chloride, potassium chloride

## 2025-04-22 NOTE — ANESTHESIA PROCEDURE NOTES
Arterial Line:    Date/Time: 4/22/2025 8:05 AM    Staffing  Performed: SRNA   Authorized by: Maverick Delgado MD    Performed by: Sebastian Hoffman    An arterial line was placed. Procedure performed using surface landmarks.in the ICU for the following indication(s): continuous blood pressure monitoring and blood sampling needed.    A 20 gauge (size), 1 and 3/4 inch (length), Arrow (type) catheter was placed into the Right radial artery, secured by Tegaderm,   Seldinger technique not used.  Events:  patient tolerated procedure well with no complications.

## 2025-04-22 NOTE — BRIEF OP NOTE
Date: 2025  OR Location: [facility] OR    Name: Rafi Lisa Jr., : 1950, Age: 74 y.o., MRN: [id number], Sex: male    Diagnosis  Pre-op Diagnosis      * Chronic coronary microvascular dysfunction [I25.85] Post-op Diagnosis     * Chronic coronary microvascular dysfunction [I25.85]     Procedures  CORONARY ARTERY BYPASS GRAFT x3/ LEFT INTERNAL MAMMARY ARTERY & VEIN  [address]117 - [place] CORONARY ARTERY BYP W/VEIN & ARTERY GRAFT 2 VEIN  Median sternotomy  2. Right thigh endoscopic saphenous vein harvest  3.  Central cannulation for cardiopulmonary bypass  4.  CABG x 3: SVG sequenced to Ramus and OM2, and LIMA to LAD  5.  Posterior pericardial window  6.  Sternal approximation with regular sternal wires    Chest Tubes/Drains: Bilateral pleural/ 1 mediastinal       Temporary Pacing Wires: Ventricular    -Settings:NA   -Underlying Rhythm:NSR    Permanent pacer/ICD: No   -Preoperative settings:    -Intra-op/ Postoperative settings:    Sternotomy performed by: Pesek CSA     Conduit Harvested by: Clara SA-C - right thigh SVH    Sternal Wires placed by: Pesek CSA     Arm/Leg/Groin Closure/Cutdown performed by: Right leg incision closure: Clara SA-C     Cardio Pulmonary Bypass Time: 98 minutes  Cross-clamp Time: 76 minutesNo Time:     Is patient candidate for Emergency Re-sternotomy? No   -If yes, POD #10 is -      Surgeons      * Pk Kline - Primary    Resident/Fellow/Other Assistant:  Surgeons and Role:  * No surgeons found with a matching role *  Pesek CSA Clara SA-C   Staff:   Circulator: Carla Soria Person: Jaymie  Surgical Assistant: Trang  Surgical Assistant: Artis Recinos Scrub: Rena Recinos Circulator: Gloria    Anesthesia Staff: Anesthesiologist: Maverick Delgado MD  CRNA: MAURO Barboza-CRNA  SRNA: Sebastian Hoffman  Perfusionist: Jessica Galeano    Procedure Summary  Anesthesia: Anesthesia type not filed in the log.  ASA: ASA status not filed in the log.  Estimated Blood Loss: 250mL  Intra-op  Medications: * Intraprocedure medication information is unavailable because the case start and end events have not been set *           Anesthesia Record               Intraprocedure I/O Totals          Intake    PLASMA 206.00 mL    Dexmedetomidine 0.00 mL    The total shown is the total volume documented since Anesthesia Start was filed.    LR bolus 1000.00 mL    NaCl 0.9 % bolus 2000.00 mL    Norepinephrine Drip 0.00 mL    The total shown is the total volume documented since Anesthesia Start was filed.    Aminocaproic Acid Drip 0.00 mL    The total shown is the total volume documented since Anesthesia Start was filed.    Phenylephrine Drip 0.00 mL    The total shown is the total volume documented since Anesthesia Start was filed.    Cell Saver 550 mL    Total Intake 3756 mL       Output    Urine 850 mL    Total Output 850 mL       Net    Net Volume 2906 mL          Specimen: No specimens collected               Findings: CAD    Complications:  None; patient tolerated the procedure well.     Disposition: ICU - intubated and hemodynamically stable.  Condition: stable  Specimens Collected: No specimens collected  Attending Attestation: I was present for the entire procedure.    Pk Kline  Phone Number: [phone]

## 2025-04-22 NOTE — ANESTHESIA PREPROCEDURE EVALUATION
Patient: Rafi Lisa Jr.    Procedure Information       Date/Time: 04/22/25 0730    Procedure: CORONARY ARTERY BYPASS GRAFT x3-4/ LEFT INTERNAL MAMMARY ARTERY & VEIN - Surgery start time of 0730. Surgery length of 5 hours.    Location: [facility] OR  / [facility] OR    Surgeons: Pk Kline MD            Relevant Problems   Anesthesia (within normal limits)      Cardiac   (+) Atrial fibrillation (Multi)   (+) Chest pain, unspecified type   (+) Chronic coronary microvascular dysfunction   (+) Coronary artery disease   (+) Essential hypertension, benign   (+) Hyperlipidemia   (+) Unstable angina (Multi)      Pulmonary   (+) COPD (chronic obstructive pulmonary disease) (Multi)      Neuro   (+) Cervical radiculopathy      GI   (+) Gastrointestinal hemorrhage      /Renal   (+) Uric acid nephrolithiasis      Hematology   (+) History of blood transfusion   (+) Iron deficiency anemia      Musculoskeletal   (+) Chronic neck pain   (+) Localized osteoarthritis of knees, bilateral      Skin   (+) Eczema       Clinical information reviewed:    Allergies  Meds               NPO Detail:  NPO/Void Status  Date of Last Liquid: 04/22/25  Time of Last Liquid: 0500  Date of Last Solid: 04/21/25  Time of Last Solid: 1800         Physical Exam    Airway  Mallampati: I  TM distance: >3 FB  Mouth opening: 3 or more finger widths     Cardiovascular - normal exam   Dental     (+) upper dentures, lower dentures     Pulmonary (+) wheezes     Abdominal - normal exam           Anesthesia Plan    History of general anesthesia?: yes  History of complications of general anesthesia?: no    ASA 4     general     The patient is a current smoker.  Patient was previously instructed to abstain from smoking on day of procedure.  Patient smoked on day of procedure.    intravenous induction   Postoperative pain plan includes opioids.  Anesthetic plan and risks discussed with patient and spouse.  Use of blood products discussed with patient and  spouse who consented to blood products.    Plan discussed with CRNA.

## 2025-04-22 NOTE — CONSULTS
[facility] Critical Care Medicine   Consult Note      Date:  4/22/2025  Patient:  Rafi Lisa Jr.  YOB: 1950  MRN:  [id number]   Admit Date:  4/22/2025  ========================================================================================================    No chief complaint on file.    History of Present Illness:  Rafi Lisa Jr. is a 74 y.o. year old male patient with Past Medical History of atrial fibrillation, CAD, HTN, HLD, COPD, cervical radiculopathy, osteoarthritis of the knees, and anemia, presenting to the ICU after CABG.     On 4/22 patient underwent CABG x 3 with Dr. Kline ([facility], [id number]).      Intra-Op Details:     Pump Time: 98 minutes     Cross Clamp Time: 76 minutes     EBL: 250 mL      Crystalloids: 3L      Colloids: --      Blood: --     Cell Saver: 550 mL      UOP: 850 mL      Chest Tubes: Left pleural, right pleural, mediastinal      Pacing Wires: AV wires     Intra-op Complications: --       Interval ICU Events:  POD 0: Patient admitted to ICU s/p CABG x 3. Intubated and mechanically ventilated. Vent settings rate 14, , FiO2 50%, P8. Has been started on Levophed for blood pressure support. Plan for Pressure Support trials and extubation as able this afternoon.    Medical History:  Medical History[1]  Surgical History[2]  Prescriptions Prior to Admission[3]  Sulfa (sulfonamide antibiotics) and Adhesive tape-silicones  Social History[4]  Family History[5]    Hospital Medications:    Continuous Medications[6]    Current Medications[7]    Review of Systems:  14 point review of systems was completed and negative except for those specially mention in my HPI    Physical Exam:    Heart Rate:  [62]   Temp:  [36.4 °C (97.5 °F)]   Resp:  [18]   BP: (166)/(83)   Height:  [182.9 cm (6')]   Weight:  [80 kg (176 lb 5.9 oz)]   SpO2:  [65 %-96 %]     Physical Exam  Constitutional:       Comments: Intubated and sedated   Cardiovascular:      Rate and Rhythm: Normal rate and  regular rhythm.      Pulses: Normal pulses.      Heart sounds:      Friction rub present.      Comments: Sternal dressings, chest tubes x3  Pulmonary:      Breath sounds: No wheezing.      Comments: ETT tube.   Diminished breath sounds in the lower bases.   Abdominal:      General: Bowel sounds are normal.      Palpations: Abdomen is soft.      Tenderness: There is no abdominal tenderness.   Musculoskeletal:      Comments: RLE wrapped  LLE, no edema   Skin:     General: Skin is warm and dry.      Capillary Refill: Capillary refill takes less than 2 seconds.   Neurological:      Comments: Sedated with precedex  Follow commands.          Objective:    I have reviewed all medications, laboratory results, and imaging pertinent for today's encounter.    Vent Mode: Volume control/assist control  S RR:  [14] 14  S VT:  [550 mL] 550 mL  PEEP/CPAP (cm H2O):  [8 cm H20] 8 cm H20  MAP (cm H2O):  [10] 10      Intake/Output Summary (Last 24 hours) at 4/22/2025 1450  Last data filed at 4/22/2025 1416  Gross per 24 hour   Intake 3756 ml   Output 1250 ml   Net 2506 ml         Assessment/Plan    Neuro/Psych/Pain Ctrl/Sedation:  #Post-Op Pain  Arrived to ICU intubated and sedated post operatively  -- Neuro checks, CAM Assessment, Delirium precautions  -- Sedation with precedex, RASS goal -1 to 0  -- Plan for SAT/SBT today  -- Scheduled tylenol, mag-ox, gabapentin and robaxin  -- PRN Oxycodone & Fentanyl  -- Bowel regimen while on narcotics     Respiratory/ENT:  #Post-Op Respiratory Insufficiency  #COPD  -- Arrived to ICU intubated, mechanically ventilated  -- Plan for SAT/SBT and extubation today as able  -- Wean supplemental oxygen for spO2 goal > 92%  -- albuterol nebulizer PRN   -- ABG as needed    Cardiovascular:  #Multivessel CAD s/p CABG x 3  #Hx atrial fibrillation, HTN, HLD   Patient underwent CABG x 3 ([facility], [id number])  -- AV wires in place, settings: back up rate of 50  -- 2 pleural, 1 mediastinal chest tube, maintain  to wall suction, monitor output, notify if > 100cc/hr  -- on Levophed  -- Continue rosuvastatin and ezetimibe   -- Hold lisinopril     GI:  #Hiatal Hernia  -- Protonix   -- NG tube placed   -- should he require NIPPV, NGT should be placed to suction d/t size of hernia   -- diet: NPO    Renal/Volume Status (Intra & Extravascular):  Baseline Cr ~ 1.2-1.39 pre-operatively, likely has some degree of CKD 3a  -- Cr today 1.32  -- Saldana catheter in place, strict I/O  -- Daily BMP and electrolyte repletion    Endocrine  #Hyperglycemia  #Pre-diabetes   -- A1C(3/25/25): 6.2  -- Strict blood glucose control post-operatively, goal glucose <150  -- Cardiac SSI every 4 hours    Infectious Disease:  No concern for active infection  -- Perioperative Ancef x 48 hours  -- Monitor SIRS criteria    Heme/Onc:  #Acute Blood Loss Anemia Post-Op  Baseline Hgb ~12-13  -- Hgb today 10.3  -- Strict chest tube output monitoring  -- Daily CBC, transfuse for Hgb goal > 7 or signs of active bleeding with hemodynamic instablity    ORTHO/MSK:  -- PT/OT, Cardiac Rehab POD 1    Ethics/Code Status:  FULL CODE    :  DVT Prophylaxis: SCD on LLE, lovenox tomorrow   GI Prophylaxis: protonix  Bowel Regimen: senna, Miralax  Diet: NPO  CVC: RIJ CVC   Martha: Yes  Saldana: Yes  Restraints: Yes  Dispo: ICU    Critical Care Time:  40 minutes spent in preparing to see patient (I.e. review of medical records), evaluation of diagnostics (I.e. labs, imaging, etc.), documentation, discussing plan of care with patient/ family/ caregiver, and/ or coordination of care with multidisciplinary team. Time does not include completion of procedure time.     Mariza Diaz PA-C  Pulmonary & Critical Care Medicine  [facility]         [1]   Past Medical History:  Diagnosis Date    Abnormal findings on diagnostic imaging of other specified body structures     Abnormal CT of the chest    Arthritis     COPD (chronic obstructive pulmonary disease) (Multi)      Coronary artery disease     GERD (gastroesophageal reflux disease)     HL (hearing loss)     Hyperlipidemia     Hypertension     Joint pain     Nephrolithiasis     Personal history of other medical treatment     History of echocardiogram    Personal history of other medical treatment     History of nuclear stress test   [2]   Past Surgical History:  Procedure Laterality Date    BUNIONECTOMY      CARDIAC CATHETERIZATION N/A 03/24/2025    Procedure: Left Heart Cath;  Surgeon: Tony Oneill MD;  Location: [facility] Cardiac Cath Lab;  Service: Cardiovascular;  Laterality: N/A;    COLONOSCOPY      CYSTOSCOPY      LASIK      LITHOTRIPSY      MR HEAD ANGIO WO IV CONTRAST  12/17/2023    MR HEAD ANGIO WO IV CONTRAST 12/17/2023 [facility] MRI    MR HEAD ANGIO WO IV CONTRAST  12/18/2023    MR HEAD ANGIO WO IV CONTRAST    MR NECK ANGIO WO IV CONTRAST  12/17/2023    MR NECK ANGIO WO IV CONTRAST 12/17/2023 [facility] MRI    MR NECK ANGIO WO IV CONTRAST  12/18/2023    MR NECK ANGIO WO IV CONTRAST    ORIF WRIST FRACTURE      OTHER SURGICAL HISTORY  01/30/2020    Radiofrequency ablation    OTHER SURGICAL HISTORY  01/20/2022    Cardiac catheterization with stent placement    OTHER SURGICAL HISTORY  01/20/2022    Cardiac catheterization    OTHER SURGICAL HISTORY  06/04/2020    Atrial appendage closure    OTHER SURGICAL HISTORY  06/04/2020    Esophagogastroduodenoscopy    OTHER SURGICAL HISTORY  06/04/2020    Colonoscopy    OTHER SURGICAL HISTORY  06/04/2020    Cardiac catheterization with stent placement    OTHER SURGICAL HISTORY  06/04/2020    Tonsillectomy    OTHER SURGICAL HISTORY  06/04/2020    Bunionectomy    OTHER SURGICAL HISTORY  06/04/2020    Lithotripsy    TONSILLECTOMY      UPPER GASTROINTESTINAL ENDOSCOPY     [3]   Medications Prior to Admission   Medication Sig Dispense Refill Last Dose/Taking    acetaminophen (Tylenol) 325 mg tablet Take 2 tablets (650 mg) by mouth every 6 hours if needed for mild pain (1 - 3).   Past Week     aspirin 81 mg chewable tablet Chew 1 tablet (81 mg) once daily.   2025 Morning    coenzyme Q-10 100 mg capsule Take 1 capsule (100 mg) by mouth 2 times a day. Takes two capsules in am and one capsule in pm   Past Week    ezetimibe (Zetia) 10 mg tablet Take 1 tablet (10 mg) by mouth once daily. 30 tablet 0 2025    lisinopril 20 mg tablet Take 1 tablet (20 mg) by mouth once daily. Stop 25 30 tablet 0 2025    metoprolol succinate XL (Toprol-XL) 50 mg 24 hr tablet Take 1 tablet (50 mg) by mouth once daily. 30 tablet 0 2025 Morning    multivitamin with minerals iron-free (Centrum Silver) Take 1 tablet by mouth once daily. (Patient taking differently: Take 1 tablet by mouth 2 times a day.)   Past Week    probenecid-colchicine 500-0.5 mg tablet Take 1 tablet by mouth twice a day.   Past Week    TURMERIC ORAL Take 1,500 mg by mouth 2 times a day.   Past Week    clopidogrel (Plavix) 75 mg tablet Take 1 tablet (75 mg) by mouth once daily. To stop 25 30 tablet 0 2025    nitroglycerin (Nitrostat) 0.4 mg SL tablet Place 1 tablet (0.4 mg) under the tongue every 5 minutes if needed for chest pain (FOR UP TO 3 DOSES AS NEEDED FOR CHEST PAIN.CALL 911 IF PAIN PERSISTS.). 90 tablet 3     omeprazole (PriLOSEC) 40 mg DR capsule Take 1 capsule (40 mg) by mouth once daily in the morning. Take before meals.   2025    rosuvastatin (Crestor) 40 mg tablet Take 1 tablet (40 mg) by mouth once daily. 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    Drug use: Not Currently   [5]   Family History  Problem Relation Name Age of Onset    No Known Problems Mother      Other (coronary thrombosis) Father           in 1970s @63   [6] aminocaproic acid, 1  g/hr  EPINEPHrine, 0-0.2 mcg/kg/min  norepinephrine, 0-0.2 mcg/kg/min, Last Rate: Stopped (04/22/25 1357)  phenylephrine, 0-2 mcg/kg/min     [7]   Current Facility-Administered Medications:     acetaminophen (Tylenol) tablet 650 mg, 650 mg, oral, q6h, Anders R Patchen, APRN-CNP, DNP    albumin human 5 % infusion 12.5 g, 12.5 g, intravenous, PRN, Anders R Patchen, APRN-CNP, DNP    alteplase (Cathflo Activase) injection 2 mg, 2 mg, intra-catheter, PRN, Anders R Patchen, APRN-CNP, DNP    aminocaproic acid (Amicar) 25 g in sodium chloride 0.9% 250 mL (0.1 g/mL) infusion, 1 g/hr, intravenous, Continuous, Pk Kline MD, 20 g at 04/22/25 0845    [START ON 4/23/2025] aspirin chewable tablet 81 mg, 81 mg, oral, Daily, Anders R Patchdanna, APRN-CNP, DNP    calcium chloride 1 g in dextrose 5% 50 mL IV, 1 g, intravenous, q8h PRN, Anders R Patchen, APRN-CNP, DNP    calcium chloride 500 mg in dextrose 5%-water (Ordered as: calcium chloride), 0.5 g, intravenous, q8h PRN, Anders R Patchdanna, APRN-CNP, DNP    ceFAZolin (Ancef) 2 g in dextrose (iso)  mL, 2 g, intravenous, q8h, Anders R Patchdanna, APRN-CNP, DNP    dextrose 50 % injection 25 g, 25 g, intravenous, q15 min PRN **OR** glucagon (Glucagen) injection 1 mg, 1 mg, intramuscular, q15 min PRN, Anders R Patchdanna, APRN-CNP, DNP    electrolyte-A (Plasmalyte-A) solution 1,000 mL, 1,000 mL, intravenous, PRN, Anders R Patchen, APRN-CNP, DNP    [START ON 4/23/2025] enoxaparin (Lovenox) syringe 40 mg, 40 mg, subcutaneous, Daily, Anders R Patchdanna, APRN-CNP, DNP    EPINEPHrine (Adrenalin) 4 mg in sodium chloride 0.9% 250 mL (16 mcg/mL) infusion (premix), 0-0.2 mcg/kg/min, intravenous, Continuous, Pk Kline MD    ezetimibe (Zetia) tablet 10 mg, 10 mg, oral, Daily, MILEY Joe, AFSANEH    fentaNYL PF (Sublimaze) injection 25 mcg, 25 mcg, intravenous, q1h PRN **OR** fentaNYL PF (Sublimaze) injection 50 mcg, 50 mcg, intravenous, q1h PRN, MILEY Joe, AFSANEH    gabapentin  (Neurontin) capsule 100 mg, 100 mg, oral, TID, Anders R Patchen, APRN-CNP, DNP    insulin lispro injection 0-15 Units, 0-15 Units, subcutaneous, q4h, Anders R Patchen, APRN-CNP, DNP    lidocaine 4 % patch 1 patch, 1 patch, transdermal, q24h, Anders R Patchen, APRN-CNP, DNP    magnesium oxide (Mag-Ox) tablet 400 mg, 400 mg, oral, Daily, Anders R Patchen, APRN-CNP, DNP    magnesium sulfate 2 g in sterile water for injection 50 mL, 2 g, intravenous, q6h PRN, Anders R Patchen, APRN-CNP, DNP    magnesium sulfate 4 g in sterile water for injection 100 mL, 4 g, intravenous, q6h PRN, Anders R Patchen, APRN-CNP, DNP    methocarbamol (Robaxin) tablet 500 mg, 500 mg, oral, q8h BANDAR, Anders R Patchen, APRN-CNP, DNP    naloxone (Narcan) injection 0.2 mg, 0.2 mg, intravenous, q5 min PRN, Anders R Patchdanna, APRN-CNP, DNP    norepinephrine (Levophed) 8 mg in sodium chloride 0.9% 250 mL (0.032 mg/mL) infusion (premix), 0-0.2 mcg/kg/min, intravenous, Continuous, Pk Kline MD, Stopped at 04/22/25 1357    oxyCODONE (Roxicodone) immediate release tablet 10 mg, 10 mg, oral, q4h PRN, Anders R Patchdanna, APRN-CNP, DNP    oxyCODONE (Roxicodone) immediate release tablet 5 mg, 5 mg, oral, q4h PRN, Anders R Patchen, APRN-CNP, DNP    oxygen (O2) therapy, , inhalation, Continuous - Inhalation, Anders R Patchen, APRN-CNP, DNP, 50 percent at 04/22/25 1430    [START ON 4/23/2025] pantoprazole (ProtoNix) EC tablet 40 mg, 40 mg, oral, Daily before breakfast, Anders R Patchdanna, APRN-CNP, DNP    phenylephrine ([place]-Synephrine) 10 mg in sodium chloride 0.9% 250 mL (0.04 mg/mL) infusion (premix), 0-2 mcg/kg/min, intravenous, Continuous, Pk Kline MD    polyethylene glycol (Glycolax, Miralax) packet 17 g, 17 g, oral, Daily PRN, Anders Aaron, APRN-CNP, DNP    potassium chloride CR (Klor-Con M20) ER tablet 20 mEq, 20 mEq, oral, q6h PRN **OR** potassium chloride (Klor-Con) packet 20 mEq, 20 mEq, oral, q6h PRN, Anders Aaron, APRN-CNP, DNP    potassium chloride CR  (Klor-Con M20) ER tablet 40 mEq, 40 mEq, oral, q6h PRN **OR** potassium chloride (Klor-Con) packet 40 mEq, 40 mEq, oral, q6h PRN, Anders R Patchen, APRN-CNP, DNP    potassium chloride 20 mEq in sterile water for injection 100 mL, 20 mEq, intravenous, q1h PRN, Anders R Patchen, APRN-CNP, DNP    potassium chloride 40 mEq in sterile water for injection 100 mL, 40 mEq, intravenous, q2h PRN, Anders R Patchen, APRN-CNP, DNP    rosuvastatin (Crestor) tablet 40 mg, 40 mg, oral, Daily, Anders R Patchen, APRN-CNP, DNP    sennosides-docusate sodium (Lyudmila-Colace) 8.6-50 mg per tablet 2 tablet, 2 tablet, oral, BID, Anders R Patchen, APRN-CNP, DNP

## 2025-04-22 NOTE — OP NOTE
CORONARY ARTERY BYPASS GRAFT x3/ LEFT INTERNAL MAMMARY ARTERY & VEIN Operative Note     Date: 2025  OR Location: [facility]    Name: Rafi Lisa Jr., : 1950, Age: 74 y.o., MRN: [id number], Sex: male    Diagnosis  Pre-op Diagnosis      * Chronic coronary microvascular dysfunction [I25.85] Post-op Diagnosis     * Chronic coronary microvascular dysfunction [I25.85]     Procedures  CORONARY ARTERY BYPASS GRAFT x3/ LEFT INTERNAL MAMMARY ARTERY & VEIN  [id number] - [place] CORONARY ARTERY BYP W/VEIN & ARTERY GRAFT 2 VEIN  1. Coronary artery bypass grafting x 3:               In situ left internal mammary artery to left anterior descending coronary artery bypass.               Aorto to ramus intermedius sequenced to second marginal circumflex, reversed saphenous vein coronary artery bypass.  2. Ligation, Ligament of Kirt.  3. Left posterior pericardial window creation.  4. Cannulation and initiation of cardiopulmonary oxygenator.   5. Medistim flow probe analysis of bypass grafts.   6. Endoscopic harvest of reversed saphenous vein      Surgeons      * Pk Kline - Primary    Resident/Fellow/Other Assistant:  Artis Merritt SA;  RUDOLPH Snider    Staff:   Circulator: Carla Rutledgeub Person: Jaymie  Surgical Assistant: Trang  Surgical Assistant: Artis Recinos Scrub: Rena Recinos Circulator: Gloria    Anesthesia Staff: Anesthesiologist: Maverick Delgado MD  CRNA: MAURO Barboza-MIQUEL  SRNA: Sebastian Hoffman  Perfusionist: Jessica Galeano    Procedure Summary  Anesthesia: General  ASA: ASA status not filed in the log.  Estimated Blood Loss: 250 mL  Intra-op Medications: * Intraprocedure medication information is unavailable because the case start and end events have not been set *           Anesthesia Record               Intraprocedure I/O Totals          Intake    PLASMA 206.00 mL    Dexmedetomidine 0.00 mL    The total shown is the total volume documented since Anesthesia Start was filed.    LR bolus  1000.00 mL    NaCl 0.9 % bolus 2000.00 mL    Norepinephrine Drip 0.00 mL    The total shown is the total volume documented since Anesthesia Start was filed.    Aminocaproic Acid Drip 0.00 mL    The total shown is the total volume documented since Anesthesia Start was filed.    Cell Saver 550 mL    Total Intake 3756 mL       Output    Urine 850 mL    Est. Blood Loss 400 mL    Total Output 1250 mL       Net    Net Volume 2506 mL          Specimen: No specimens collected       Drains and/or Catheters:   Chest Tube 1 Left Pleural 28 Fr (Active)       Chest Tube 2 Mediastinal 28 Fr (Active)       Chest Tube 3 Right Pleural 28 Fr (Active)       Urethral Catheter Temperature probe;Non-latex 14 Fr. (Active)       Findings:   The ascending aorta was free of significant calcification.  The left atrial appendage of the previously closed with a Watchman device.  There was significant adhesions throughout the pericardium and the entire cartilage surface.  I was able to dissect out the right atrial free wall as well as the ascending aorta for cannulation.  I dissected out the remainder of the heart while on cardiopulmonary oxygenator support.  I created a left posterior pericardial window a bit more lateral but still was able to manage and avoid the nerve.  I did this because of the large hiatal hernia posterior containing much of his stomach.    The left internal mammary artery was 3 mm in diameter and was excellent quality, flow was excellent.  The saphenous vein was 4 mm in diameter and was good quality. The left anterior descending coronary artery was 2.25  mm in diameter and had severe calcification in the walls, I grafted in the middle third.  The ramus intermedius was 1.25  mm in diameter and had severe diffuse calcification in the walls, was completely occluded proximally.   The second marginal circumflex artery was 1 there is no significant calcification there was no thrombus in the left atrium or left atrial appendage.   Post-procedure intra-operative SPENCER showed normal right left ventricular function.  There was no significant valvular disease.  The patient  from the cardiopulmonary oxygenator on 0 inotrope and 0 vasopressor agents.    Cardiopulmonary bypass time: 98 min  Cross-clamp time: 76 min     Medistem flows:   LIIMA to LAD:  53 ml/min with a PI of 1.3  SVG to RI to OM3: 44 ml/min with a PI of 1.5      Indications: Rafi Lisa Jr. is an 74 y.o. male who is having surgery for Chronic coronary microvascular dysfunction [I25.85]. Mr. Lisa is a 74-year-old man who presents with chest pain and underwent left heart catheterization.  I have personally seen and evaluated him, his laboratory and radiographic database.  In summary:     His left heart catheterization shows multivessel coronary artery disease.  This is a left dominant coronary circulation.  He has a stent in the left anterior descending coronary artery with in-stent stenosis that is approximately 85%, the distal LAD is a good target.  The circumflex has a 50-60% mid stenosis, with a reasonable distal 2nd or 3rd marginal circumflex branch.  The right coronary artery is small, diminutive, and nondominant.  The ramus intermedius branch is 100% occluded with no collateral circulation.      Echocardiogram shows of normal ventricular function at 55-60%.  There is mild left ventricular hypertrophy.  Aortic valve appears to be trileaflet, there is mild to moderate aortic insufficiency.  The mitral valve is mildly thickened, there is mild mitral regurgitation.  There is also mild tricuspid regurgitation.  The right ventricular systolic pressure is normal at 29 mmHg.  The left atrium is mildly dilated there is a Watchman device in the left atrial appendage.     Computed tomography of the chest shows a large hiatal hernia with most of the stomach in the chest.     Carotid duplex shows bilateral less than 50% stenosis of carotid arteries, vertebral arteries are  antegrade and patent bilaterally, there is no significant subclavian stenosis in the right or the left.     I discussed with him the need for surgical coronary revascularization.  We discussed the specifics of this procedure, with the possible benefits of resolution of chest pain, prevention of future myocardial infarction, and preservation of left ventricular function.  This specific risks discussed were bleeding, infection, stroke, renal failure, myocardial infarction, up to including death.  I discussed also the possibility of prolonged nasogastric tube decompression postoperatively if he were to have any issues with his hiatal hernia post procedure.    The patient was seen in the preoperative area. The risks, benefits, complications, treatment options, non-operative alternatives, expected recovery and outcomes were discussed with the patient. The possibilities of reaction to medication, pulmonary aspiration, injury to surrounding structures, bleeding, recurrent infection, the need for additional procedures, failure to diagnose a condition, and creating a complication requiring transfusion or operation were discussed with the patient. The patient concurred with the proposed plan, giving informed consent.  The site of surgery was properly noted/marked if necessary per policy. The patient has been actively warmed in preoperative area. Preoperative antibiotics have been ordered and given within 1 hours of incision. Venous thrombosis prophylaxis have been ordered including chemical prophylaxis    Procedure Details: After informed consent was obtained and all questions asked and answered to the patient's satisfaction and after positive identification, they were brought to the operative theatre.  They were placed on the operating room table in the supine position, had an arterial monitoring line placed, and induction of anesthesia with orotracheal intubation.  An internal jugular central venous line was placed.  A  transesophageal echocardiography probe was placed in the stomach without event.  They were then prepped and draped in a standard sterile surgical fashion.    A surgical timeout was performed with the correct patient, the correct procedure, availability of blood, timing and dosage of antibiotic administration, correct position, laterality, allergies, and availability of needed equipment were all verified prior to beginning the case.    A midline incision was made on the chest, a sternotomy was performed using a reciprocating sternal saw. The left internal mammary artery was harvested off the chest wall in a skeletonized fashion, while concomitantly using endoscopic technique, the Right reversed saphenous vein was harvested.  The pericardium was incised and reflected laterally, full dose heparin administration was performed.  The patient was then cannulated in the ascending aorta with a 20 Frisian EOPA  and  in the right atrium a 32/26 Fr two-stage venous cannula.  A nonselective antegrade cardioplegia needle was placed in the ascending aorta.  A retrograde coronary sinus catheter was placed.  The patient then had initiation of cardiopulmonary bypass 2.2 L/min/m².    An aortic cross-clamp was applied, and cold blood cardioplegia was delivered antegrade via the root non-selectively.  Initially 1000 mL was delivered, and then we switched to retrograde cardioplegia via the coronary sinus catheter for a total of 1400 mL of cardioplegia delivered.  Throughout the case, every 15 minutes, the patient had intermittent cardioplegia administered retrograde via the coronary sinus and via conduits.    LIGATION, LIGAMENT OF KIRT  The ligament of Kirt was identified and divided with electrocautery.      LEFT POSTERIOR PERICARDIAL WINDOW:  The heart was manually lifted, and the pericardium inspected. The left phrenic nerve was identified. A 3 cm elipitical excision of pericardium was taken with bovie electrocautery, below the  phrenic nerve into the left pleural space.     CORONARY REVASCULARIZATION:  Attention was then placed to the second marginal circumflex coronary artery.  Using a 15 blade scalpel, the coronary artery was entered; an arteriotomy was completed with Stevens scissors.  The reversed saphenous vein conduit was anastomosed end-to-side, with a 7-0 polypropylene suture in a running fashion.  This was found to be hemostatic.  Next, attention was placed to the ramus intermedius coronary artery. Using a 15 blade scalpel, the coronary artery was entered, an arteriotomy was completed with Stevens scissors.  The reversed saphenous vein conduit was anastomosed side-to-side with a 7-0 polypropylene suture in a running fashion.  This was found to be hemostatic.   Next attention was placed to the left anterior descending coronary artery.  A 15 blade scalpel was used to make an arteriotomy, this was completed with Stevens scissors.  The left internal mammary artery was anastomosed to the left anterior descending coronary artery, end-to-side using a 7-0 polypropylene suture.  This was performed in a running fashion.  This anastomosis was also found to be hemostatic.      Attention was then placed to the ascending aorta, an arteriotomy was performed with a 4 mm aortic punch.  The proximal end of the reversed saphenous vein was anastomosed in an end-to-side fashion using a 6-0 polypropylene suture in a running fashion.    The patient was then placed in Trendelenburg, the heart was fully de-aired, and the aortic cross-clamp was removed with the root vent on. The retrograde coronary sinus catheter was removed from the free wall of the right atrium. The coronary sinus atriotomy was closed with a 4-0 polypropylene.   Ventricular unipolar pacing wires were inserted.  The heart was fully de-aired.  The patient was weaned from cardiopulmonary bypass without any difficulties.  The venous cannula was removed, and the site was repaired with 4-0  polypropylene suture.  The root vent was removed and the site repaired with 4-0 polypropylene suture.  Test dose of protamine, followed by full reversal of Protamine was administered.  The ascending aorta perfusion cannula, was removed, and the site repaired with 4-0 polypropylene pledgeted suture.  Hemostasis was performed.  When hemostasis was satisfactory, the field was irrigated with saline.  All the surgical sites were checked, and they were hemostatic.  Three  #28 Armenian Gokul drains were placed. One was placed in the anterior mediastinum, the second was placed in the posterior pericardial well, through the left pericardial window, into the left pleural space. The third was placed in the right pleural space. The pericardium was reapproximated using interrupted 3-0 silk suture.  The sternum was closed with #7 stainless steel surgical wire.  The fascia was closed with 0 Polysorb suture.  The subcutaneous tissue was closed with 2-0 Polysorb suture in a running fashion.  The skin was closed with 3-0 Polysorb suture in a running fashion.  Epidermal glue was placed as a biologic dressing.     The needle instrument and sponge count were correct x2, the patient tolerated this procedure well, and was transferred to the intensive care unit in stable condition.    Complications:  None; patient tolerated the procedure well.    Disposition: ICU - intubated and hemodynamically stable.  Condition: stable         Task Performed by RNFA or Surgical Assistant:  Placement of bladder catheter.   Surgical Prep and draping of patient.   Saphenous vein endoscopic harvest.  Assistance with grafting.   Sternal closure  Monitoring of patient and transport to ICU    Attending Attestation: I performed the procedure.    Pk Kline  Phone Number: [phone]

## 2025-04-22 NOTE — PROGRESS NOTES
[facility]   Cardiothoracic Surgery - Clinical Update Note    Interval HPI: Seen and assessed patient in the immediate post-op period; S/p CABG x3. While recovering in the ICU, the patient was started on a norepinephrine gtt to maintain adequate hemodynamics. This was started after the patient had a limited response to fluid resuscitation (CVP 12, given 1L plasmalyte & 12.5g 5% albumin).     Patient's cardiac index/output remained stable and he was without signs of decreased output.    Regarding mechanical ventilation, the patient passed a SBT trial without issue and was subsequently extubated to NC.     Will continue to monitor in the immediate post-op period.     Objective   Visit Vitals  /68   Pulse 70   Temp 35.9 °C (96.6 °F) (Temporal)   Resp (!) 28     Medications Ordered Prior to Encounter[1]       Anders Aaron, APRN-CNP, DNP     Critical Care Billing Statement    I have reviewed and evaluated the most recent data and results, personally examined the patient, and formulated the plan of care as presented above. This patient was critically ill and required continued critical care treatment. Teaching and any separately billable procedures are not included in the time calculation.    Billing Provider Critical Care Time: 35 minutes         [1]   No current facility-administered medications on file prior to encounter.     Current Outpatient Medications on File Prior to Encounter   Medication Sig Dispense Refill    acetaminophen (Tylenol) 325 mg tablet Take 2 tablets (650 mg) by mouth every 6 hours if needed for mild pain (1 - 3).      aspirin 81 mg chewable tablet Chew 1 tablet (81 mg) once daily.      coenzyme Q-10 100 mg capsule Take 1 capsule (100 mg) by mouth 2 times a day. Takes two capsules in am and one capsule in pm      multivitamin with minerals iron-free (Centrum Silver) Take 1 tablet by mouth once daily. (Patient taking differently: Take 1 tablet by mouth 2 times a day.)       probenecid-colchicine 500-0.5 mg tablet Take 1 tablet by mouth twice a day.      TURMERIC ORAL Take 1,500 mg by mouth 2 times a day.      nitroglycerin (Nitrostat) 0.4 mg SL tablet Place 1 tablet (0.4 mg) under the tongue every 5 minutes if needed for chest pain (FOR UP TO 3 DOSES AS NEEDED FOR CHEST PAIN.CALL 911 IF PAIN PERSISTS.). 90 tablet 3    omeprazole (PriLOSEC) 40 mg DR capsule Take 1 capsule (40 mg) by mouth once daily in the morning. Take before meals.      [DISCONTINUED] clopidogrel (Plavix) 75 mg tablet Take 1 tablet (75 mg) by mouth once daily. (Patient taking differently: Take 1 tablet (75 mg) by mouth once daily. To stop 4/17/25) 90 tablet 3    [DISCONTINUED] ezetimibe (Zetia) 10 mg tablet Take 1 tablet (10 mg) by mouth once daily. 90 tablet 3    [DISCONTINUED] lisinopril 20 mg tablet Take 1 tablet (20 mg) by mouth once daily. (Patient taking differently: Take 1 tablet (20 mg) by mouth once daily. Stop 4/19/25) 90 tablet 3    [DISCONTINUED] metoprolol succinate XL (Toprol-XL) 50 mg 24 hr tablet Take 1 tablet (50 mg) by mouth once daily. DO NOT CRUSH OR CHEW 90 tablet 3    [DISCONTINUED] nicotine (Nicoderm CQ) 21 mg/24 hr patch Place 1 patch over 24 hours on the skin once daily. (Patient not taking: Reported on 4/22/2025) 30 patch 0    [DISCONTINUED] rosuvastatin (Crestor) 40 mg tablet Take 1 tablet (40 mg) by mouth once daily. 90 tablet 3

## 2025-04-22 NOTE — H&P
History Of Present Illness  Rafi Lisa Jr. is a 74 y.o. male presenting with CAD,.     Past Medical History  Medical History[1]    Surgical History  Surgical History[2]     Social History  He reports that he has quit smoking. His smoking use included cigarettes. He has a 64 pack-year smoking history. He has quit using smokeless tobacco. He reports that he does not currently use drugs. He reports that he does not drink alcohol.    Family History  Family History[3]     Allergies  Sulfa (sulfonamide antibiotics) and Adhesive tape-silicones    Review of Systems     Physical Exam  Constitutional:       Appearance: Normal appearance.   HENT:      Head: Normocephalic.   Eyes:      Pupils: Pupils are equal, round, and reactive to light.   Cardiovascular:      Rate and Rhythm: Normal rate and regular rhythm.   Pulmonary:      Effort: Pulmonary effort is normal.      Breath sounds: Normal breath sounds.   Abdominal:      General: Abdomen is flat.      Palpations: Abdomen is soft.   Musculoskeletal:      Cervical back: Normal range of motion.   Neurological:      Mental Status: He is alert and oriented to person, place, and time.          Last Recorded Vitals  Blood pressure 166/83, pulse 62, temperature 36.4 °C (97.5 °F), temperature source Temporal, resp. rate 18, height 1.829 m (6'), weight 80 kg (176 lb 5.9 oz), SpO2 96%.    Relevant Results           Assessment & Plan  Coronary artery disease    Chronic coronary microvascular dysfunction      74 y.o. man with multivessel CAD.  Plan for CABG.     I spent 30 minutes in the professional and overall care of this patient.      Pk Kline MD         [1]   Past Medical History:  Diagnosis Date    Abnormal findings on diagnostic imaging of other specified body structures     Abnormal CT of the chest    Arthritis     COPD (chronic obstructive pulmonary disease) (Multi)     Coronary artery disease     GERD (gastroesophageal reflux disease)     HL (hearing loss)      Hyperlipidemia     Hypertension     Joint pain     Nephrolithiasis     Personal history of other medical treatment     History of echocardiogram    Personal history of other medical treatment     History of nuclear stress test   [2]   Past Surgical History:  Procedure Laterality Date    BUNIONECTOMY      CARDIAC CATHETERIZATION N/A 2025    Procedure: Left Heart Cath;  Surgeon: Tony Oneill MD;  Location: [facility] Cardiac [facility];  Service: Cardiovascular;  Laterality: N/A;    COLONOSCOPY      CYSTOSCOPY      LASIK      LITHOTRIPSY      MR HEAD ANGIO WO IV CONTRAST  2023    MR HEAD ANGIO WO IV CONTRAST 2023 [facility] MRI    MR HEAD ANGIO WO IV CONTRAST  2023    MR HEAD ANGIO WO IV CONTRAST    MR NECK ANGIO WO IV CONTRAST  2023    MR NECK ANGIO WO IV CONTRAST 2023 [facility] MRI    MR NECK ANGIO WO IV CONTRAST  2023    MR NECK ANGIO WO IV CONTRAST    ORIF WRIST FRACTURE      OTHER SURGICAL HISTORY  2020    Radiofrequency ablation    OTHER SURGICAL HISTORY  2022    Cardiac catheterization with stent placement    OTHER SURGICAL HISTORY  2022    Cardiac catheterization    OTHER SURGICAL HISTORY  2020    Atrial appendage closure    OTHER SURGICAL HISTORY  2020    Esophagogastroduodenoscopy    OTHER SURGICAL HISTORY  2020    Colonoscopy    OTHER SURGICAL HISTORY  2020    Cardiac catheterization with stent placement    OTHER SURGICAL HISTORY  2020    Tonsillectomy    OTHER SURGICAL HISTORY  2020    Bunionectomy    OTHER SURGICAL HISTORY  2020    Lithotripsy    TONSILLECTOMY      UPPER GASTROINTESTINAL ENDOSCOPY     [3]   Family History  Problem Relation Name Age of Onset    No Known Problems Mother      Other (coronary thrombosis) Father           in 1970s @63

## 2025-04-22 NOTE — ANESTHESIA PROCEDURE NOTES
Central Venous Line:    Date/Time: 4/22/2025 8:25 AM    A central venous line was placed in the OR for the following indication(s): central venous access and CVP monitoring.  Staffing  Performed: attending   Authorized by: Maverick Delgado MD    Performed by: MAURO Barboza-CRNA    Sterility preparation included the following: provider hand hygiene performed prior to central venous catheter insertion, all 5 sterile barriers used (gloves, gown, cap, mask, large sterile drape) during central venous catheter insertion, antiseptic used during central venous catheter insertion and skin prep agent completely dried prior to procedure.  The patient was placed in Trendelenburg position.    Right internal jugular vein was prepped.    The site was prepped with Chlorhexidine.  Size: 7.5 Fr   Length: 15  Catheter type: introducer   Number of Lumens: triple lumen      During the procedure, the following specific steps were taken: target vein identified, needle advanced into vein and blood aspirated and guidewire advanced into vein.Procedure performed using surface landmarks.    Intravenous verification was obtained by venous blood return and transducer.      Post insertion care included: all ports aspirated, all ports flushed easily, guidewire removed intact, Biopatch applied, line sutured in place and dressing applied.    During the procedure the patient experienced: patient tolerated procedure well with no complications.

## 2025-04-23 ENCOUNTER — APPOINTMENT (OUTPATIENT)
Dept: CARDIOLOGY | Facility: HOSPITAL | Age: 75
DRG: 236 | End: 2025-04-23
Payer: COMMERCIAL

## 2025-04-23 ENCOUNTER — APPOINTMENT (OUTPATIENT)
Dept: RADIOLOGY | Facility: HOSPITAL | Age: 75
DRG: 236 | End: 2025-04-23
Payer: COMMERCIAL

## 2025-04-23 LAB
ALBUMIN SERPL BCP-MCNC: 3.4 G/DL (ref 3.4–5)
ANION GAP BLDA CALCULATED.4IONS-SCNC: 10 MMO/L (ref 10–25)
ANION GAP SERPL CALC-SCNC: 11 MMOL/L (ref 10–20)
APPARATUS: ABNORMAL
ATRIAL RATE: 62 BPM
ATRIAL RATE: 66 BPM
BASE EXCESS BLDA CALC-SCNC: -1.7 MMOL/L (ref -2–3)
BODY TEMPERATURE: 37 DEGREES CELSIUS
BUN SERPL-MCNC: 24 MG/DL (ref 6–23)
CA-I BLD-SCNC: 1.12 MMOL/L (ref 1.1–1.33)
CA-I BLDA-SCNC: 1.12 MMOL/L (ref 1.1–1.33)
CALCIUM SERPL-MCNC: 8 MG/DL (ref 8.6–10.3)
CHLORIDE BLDA-SCNC: 108 MMOL/L (ref 98–107)
CHLORIDE SERPL-SCNC: 108 MMOL/L (ref 98–107)
CO2 SERPL-SCNC: 24 MMOL/L (ref 21–32)
CREAT SERPL-MCNC: 1.31 MG/DL (ref 0.5–1.3)
DIASTOLIC BLOOD PRESSURE: 53 MMHG
EGFRCR SERPLBLD CKD-EPI 2021: 57 ML/MIN/1.73M*2
ERYTHROCYTE [DISTWIDTH] IN BLOOD BY AUTOMATED COUNT: 13.8 % (ref 11.5–14.5)
GLUCOSE BLD MANUAL STRIP-MCNC: 110 MG/DL (ref 74–99)
GLUCOSE BLD MANUAL STRIP-MCNC: 110 MG/DL (ref 74–99)
GLUCOSE BLD MANUAL STRIP-MCNC: 139 MG/DL (ref 74–99)
GLUCOSE BLD MANUAL STRIP-MCNC: 148 MG/DL (ref 74–99)
GLUCOSE BLD MANUAL STRIP-MCNC: 154 MG/DL (ref 74–99)
GLUCOSE BLD MANUAL STRIP-MCNC: 154 MG/DL (ref 74–99)
GLUCOSE BLD MANUAL STRIP-MCNC: 164 MG/DL (ref 74–99)
GLUCOSE BLDA-MCNC: 147 MG/DL (ref 74–99)
GLUCOSE SERPL-MCNC: 142 MG/DL (ref 74–99)
HCO3 BLDA-SCNC: 22.9 MMOL/L (ref 22–26)
HCT VFR BLD AUTO: 29.7 % (ref 41–52)
HCT VFR BLD EST: 28 % (ref 41–52)
HGB BLD-MCNC: 9 G/DL (ref 13.5–17.5)
HGB BLDA-MCNC: 9.2 G/DL (ref 13.5–17.5)
INHALED O2 CONCENTRATION: 32 %
LACTATE BLDA-SCNC: 1.4 MMOL/L (ref 0.4–2)
MAGNESIUM SERPL-MCNC: 2.5 MG/DL (ref 1.6–2.4)
MCH RBC QN AUTO: 30.3 PG (ref 26–34)
MCHC RBC AUTO-ENTMCNC: 30.3 G/DL (ref 32–36)
MCV RBC AUTO: 100 FL (ref 80–100)
NRBC BLD-RTO: 0 /100 WBCS (ref 0–0)
OXYHGB MFR BLDA: 96.1 % (ref 94–98)
P AXIS: 61 DEGREES
P AXIS: 83 DEGREES
P OFFSET: 164 MS
P OFFSET: 181 MS
P ONSET: 119 MS
P ONSET: 125 MS
PCO2 BLDA: 37 MM HG (ref 38–42)
PH BLDA: 7.4 PH (ref 7.38–7.42)
PHOSPHATE SERPL-MCNC: 4.5 MG/DL (ref 2.5–4.9)
PLATELET # BLD AUTO: 119 X10*3/UL (ref 150–450)
PO2 BLDA: 90 MM HG (ref 85–95)
POTASSIUM BLDA-SCNC: 4.7 MMOL/L (ref 3.5–5.3)
POTASSIUM SERPL-SCNC: 4.8 MMOL/L (ref 3.5–5.3)
PR INTERVAL: 178 MS
PR INTERVAL: 192 MS
Q ONSET: 214 MS
Q ONSET: 215 MS
QRS COUNT: 10 BEATS
QRS COUNT: 11 BEATS
QRS DURATION: 80 MS
QRS DURATION: 86 MS
QT INTERVAL: 446 MS
QT INTERVAL: 452 MS
QTC CALCULATION(BAZETT): 452 MS
QTC CALCULATION(BAZETT): 473 MS
QTC FREDERICIA: 451 MS
QTC FREDERICIA: 467 MS
R AXIS: 219 DEGREES
R AXIS: 255 DEGREES
RBC # BLD AUTO: 2.97 X10*6/UL (ref 4.5–5.9)
SAO2 % BLDA: 99 % (ref 94–100)
SODIUM BLDA-SCNC: 136 MMOL/L (ref 136–145)
SODIUM SERPL-SCNC: 138 MMOL/L (ref 136–145)
SPECIMEN DRAWN FROM PATIENT: ABNORMAL
SYSTOLIC BLOOD PRESSURE: 94 MMHG
T AXIS: 55 DEGREES
T AXIS: 76 DEGREES
T OFFSET: 438 MS
T OFFSET: 440 MS
VENTRICULAR RATE: 62 BPM
VENTRICULAR RATE: 66 BPM
WBC # BLD AUTO: 7.5 X10*3/UL (ref 4.4–11.3)

## 2025-04-23 PROCEDURE — 2500000001 HC RX 250 WO HCPCS SELF ADMINISTERED DRUGS (ALT 637 FOR MEDICARE OP): Performed by: NURSE PRACTITIONER

## 2025-04-23 PROCEDURE — 37799 UNLISTED PX VASCULAR SURGERY: CPT | Performed by: NURSE PRACTITIONER

## 2025-04-23 PROCEDURE — 82435 ASSAY OF BLOOD CHLORIDE: CPT | Performed by: NURSE PRACTITIONER

## 2025-04-23 PROCEDURE — 71045 X-RAY EXAM CHEST 1 VIEW: CPT | Performed by: RADIOLOGY

## 2025-04-23 PROCEDURE — 2500000004 HC RX 250 GENERAL PHARMACY W/ HCPCS (ALT 636 FOR OP/ED): Performed by: NURSE PRACTITIONER

## 2025-04-23 PROCEDURE — 2500000005 HC RX 250 GENERAL PHARMACY W/O HCPCS: Performed by: THORACIC SURGERY (CARDIOTHORACIC VASCULAR SURGERY)

## 2025-04-23 PROCEDURE — 97161 PT EVAL LOW COMPLEX 20 MIN: CPT | Mod: GP

## 2025-04-23 PROCEDURE — 82947 ASSAY GLUCOSE BLOOD QUANT: CPT

## 2025-04-23 PROCEDURE — 99291 CRITICAL CARE FIRST HOUR: CPT

## 2025-04-23 PROCEDURE — 93010 ELECTROCARDIOGRAM REPORT: CPT | Performed by: INTERNAL MEDICINE

## 2025-04-23 PROCEDURE — 85027 COMPLETE CBC AUTOMATED: CPT | Performed by: NURSE PRACTITIONER

## 2025-04-23 PROCEDURE — 80069 RENAL FUNCTION PANEL: CPT | Performed by: NURSE PRACTITIONER

## 2025-04-23 PROCEDURE — 82330 ASSAY OF CALCIUM: CPT | Performed by: NURSE PRACTITIONER

## 2025-04-23 PROCEDURE — 2500000002 HC RX 250 W HCPCS SELF ADMINISTERED DRUGS (ALT 637 FOR MEDICARE OP, ALT 636 FOR OP/ED): Performed by: NURSE PRACTITIONER

## 2025-04-23 PROCEDURE — 2020000001 HC ICU ROOM DAILY

## 2025-04-23 PROCEDURE — 83735 ASSAY OF MAGNESIUM: CPT | Performed by: NURSE PRACTITIONER

## 2025-04-23 PROCEDURE — 2500000004 HC RX 250 GENERAL PHARMACY W/ HCPCS (ALT 636 FOR OP/ED)

## 2025-04-23 PROCEDURE — 97165 OT EVAL LOW COMPLEX 30 MIN: CPT | Mod: GO

## 2025-04-23 PROCEDURE — 93005 ELECTROCARDIOGRAM TRACING: CPT

## 2025-04-23 PROCEDURE — 2500000005 HC RX 250 GENERAL PHARMACY W/O HCPCS: Performed by: NURSE PRACTITIONER

## 2025-04-23 PROCEDURE — 71045 X-RAY EXAM CHEST 1 VIEW: CPT

## 2025-04-23 PROCEDURE — 2500000001 HC RX 250 WO HCPCS SELF ADMINISTERED DRUGS (ALT 637 FOR MEDICARE OP)

## 2025-04-23 RX ORDER — METHOCARBAMOL 500 MG/1
500 TABLET, FILM COATED ORAL EVERY 8 HOURS SCHEDULED
Status: DISCONTINUED | OUTPATIENT
Start: 2025-04-25 | End: 2025-04-27 | Stop reason: HOSPADM

## 2025-04-23 RX ORDER — METHOCARBAMOL 100 MG/ML
1000 INJECTION, SOLUTION INTRAMUSCULAR; INTRAVENOUS EVERY 8 HOURS
Status: DISCONTINUED | OUTPATIENT
Start: 2025-04-23 | End: 2025-04-24

## 2025-04-23 RX ORDER — ACETAMINOPHEN 325 MG/1
1000 TABLET ORAL EVERY 6 HOURS
Status: DISCONTINUED | OUTPATIENT
Start: 2025-04-23 | End: 2025-04-27 | Stop reason: HOSPADM

## 2025-04-23 RX ADMIN — ACETAMINOPHEN 975 MG: 325 TABLET, FILM COATED ORAL at 20:18

## 2025-04-23 RX ADMIN — INSULIN LISPRO 5 UNITS: 100 INJECTION, SOLUTION INTRAVENOUS; SUBCUTANEOUS at 00:10

## 2025-04-23 RX ADMIN — METHOCARBAMOL 1000 MG: 100 INJECTION INTRAMUSCULAR; INTRAVENOUS at 11:18

## 2025-04-23 RX ADMIN — METHOCARBAMOL 1000 MG: 100 INJECTION INTRAMUSCULAR; INTRAVENOUS at 16:54

## 2025-04-23 RX ADMIN — INSULIN LISPRO 5 UNITS: 100 INJECTION, SOLUTION INTRAVENOUS; SUBCUTANEOUS at 20:18

## 2025-04-23 RX ADMIN — GABAPENTIN 100 MG: 100 CAPSULE ORAL at 08:14

## 2025-04-23 RX ADMIN — ACETAMINOPHEN 975 MG: 325 TABLET, FILM COATED ORAL at 14:44

## 2025-04-23 RX ADMIN — METHOCARBAMOL 500 MG: 500 TABLET ORAL at 00:10

## 2025-04-23 RX ADMIN — ACETAMINOPHEN 650 MG: 325 TABLET, FILM COATED ORAL at 08:14

## 2025-04-23 RX ADMIN — ENOXAPARIN SODIUM 40 MG: 40 INJECTION SUBCUTANEOUS at 08:14

## 2025-04-23 RX ADMIN — LIDOCAINE 1 PATCH: 4 PATCH TOPICAL at 14:44

## 2025-04-23 RX ADMIN — INSULIN LISPRO 5 UNITS: 100 INJECTION, SOLUTION INTRAVENOUS; SUBCUTANEOUS at 04:13

## 2025-04-23 RX ADMIN — CEFAZOLIN SODIUM 2 G: 2 INJECTION, SOLUTION INTRAVENOUS at 20:21

## 2025-04-23 RX ADMIN — ASPIRIN 81 MG CHEWABLE TABLET 81 MG: 81 TABLET CHEWABLE at 08:13

## 2025-04-23 RX ADMIN — CEFAZOLIN SODIUM 2 G: 2 INJECTION, SOLUTION INTRAVENOUS at 03:29

## 2025-04-23 RX ADMIN — SENNOSIDES AND DOCUSATE SODIUM 2 TABLET: 50; 8.6 TABLET ORAL at 20:18

## 2025-04-23 RX ADMIN — Medication 3 L/MIN: at 20:21

## 2025-04-23 RX ADMIN — Medication 3 L/MIN: at 08:14

## 2025-04-23 RX ADMIN — HYDROMORPHONE HYDROCHLORIDE 0.4 MG: 1 INJECTION, SOLUTION INTRAMUSCULAR; INTRAVENOUS; SUBCUTANEOUS at 23:11

## 2025-04-23 RX ADMIN — OXYCODONE HYDROCHLORIDE 10 MG: 5 TABLET ORAL at 08:13

## 2025-04-23 RX ADMIN — OXYCODONE HYDROCHLORIDE 10 MG: 5 TABLET ORAL at 19:28

## 2025-04-23 RX ADMIN — INSULIN LISPRO 5 UNITS: 100 INJECTION, SOLUTION INTRAVENOUS; SUBCUTANEOUS at 16:54

## 2025-04-23 RX ADMIN — EZETIMIBE 10 MG: 10 TABLET ORAL at 08:14

## 2025-04-23 RX ADMIN — GABAPENTIN 100 MG: 100 CAPSULE ORAL at 14:44

## 2025-04-23 RX ADMIN — CEFAZOLIN SODIUM 2 G: 2 INJECTION, SOLUTION INTRAVENOUS at 11:18

## 2025-04-23 RX ADMIN — ACETAMINOPHEN 650 MG: 325 TABLET, FILM COATED ORAL at 02:45

## 2025-04-23 RX ADMIN — FENTANYL CITRATE 25 MCG: 50 INJECTION INTRAMUSCULAR; INTRAVENOUS at 00:09

## 2025-04-23 RX ADMIN — GABAPENTIN 100 MG: 100 CAPSULE ORAL at 20:18

## 2025-04-23 RX ADMIN — MAGNESIUM OXIDE TAB 400 MG (241.3 MG ELEMENTAL MG) 400 MG: 400 (241.3 MG) TAB at 08:14

## 2025-04-23 RX ADMIN — Medication 3 L/MIN: at 21:00

## 2025-04-23 RX ADMIN — OXYCODONE HYDROCHLORIDE 10 MG: 5 TABLET ORAL at 02:45

## 2025-04-23 RX ADMIN — FENTANYL CITRATE 50 MCG: 50 INJECTION INTRAMUSCULAR; INTRAVENOUS at 04:52

## 2025-04-23 RX ADMIN — SENNOSIDES AND DOCUSATE SODIUM 2 TABLET: 50; 8.6 TABLET ORAL at 08:14

## 2025-04-23 RX ADMIN — HYDROMORPHONE HYDROCHLORIDE 0.4 MG: 1 INJECTION, SOLUTION INTRAMUSCULAR; INTRAVENOUS; SUBCUTANEOUS at 09:50

## 2025-04-23 RX ADMIN — ROSUVASTATIN 40 MG: 40 TABLET, FILM COATED ORAL at 08:13

## 2025-04-23 RX ADMIN — PANTOPRAZOLE SODIUM 40 MG: 40 TABLET, DELAYED RELEASE ORAL at 06:20

## 2025-04-23 RX ADMIN — METHOCARBAMOL 500 MG: 500 TABLET ORAL at 06:20

## 2025-04-23 ASSESSMENT — ENCOUNTER SYMPTOMS
PALPITATIONS: 0
WEAKNESS: 1
MUSCLE CRAMPS: 0
PARESTHESIAS: 0
DYSURIA: 0
FEVER: 0
JOINT SWELLING: 0
NAUSEA: 0
COUGH: 0
HEADACHES: 0
DIZZINESS: 0
DOUBLE VISION: 0
SHORTNESS OF BREATH: 0
BLURRED VISION: 0
ALTERED MENTAL STATUS: 0
VOMITING: 0
ABDOMINAL PAIN: 0
DIAPHORESIS: 0

## 2025-04-23 ASSESSMENT — PAIN SCALES - GENERAL
PAINLEVEL_OUTOF10: 1
PAINLEVEL_OUTOF10: 0 - NO PAIN
PAINLEVEL_OUTOF10: 10 - WORST POSSIBLE PAIN
PAINLEVEL_OUTOF10: 7
PAINLEVEL_OUTOF10: 1
PAINLEVEL_OUTOF10: 10 - WORST POSSIBLE PAIN
PAINLEVEL_OUTOF10: 8
PAINLEVEL_OUTOF10: 7
PAINLEVEL_OUTOF10: 10 - WORST POSSIBLE PAIN
PAINLEVEL_OUTOF10: 10 - WORST POSSIBLE PAIN
PAINLEVEL_OUTOF10: 7
PAINLEVEL_OUTOF10: 6
PAINLEVEL_OUTOF10: 9
PAINLEVEL_OUTOF10: 0 - NO PAIN
PAINLEVEL_OUTOF10: 8

## 2025-04-23 ASSESSMENT — PAIN DESCRIPTION - DESCRIPTORS
DESCRIPTORS: ACHING
DESCRIPTORS: ACHING

## 2025-04-23 ASSESSMENT — PAIN - FUNCTIONAL ASSESSMENT
PAIN_FUNCTIONAL_ASSESSMENT: 0-10

## 2025-04-23 ASSESSMENT — COGNITIVE AND FUNCTIONAL STATUS - GENERAL
TOILETING: A LOT
MOBILITY SCORE: 13
DRESSING REGULAR UPPER BODY CLOTHING: A LOT
DRESSING REGULAR LOWER BODY CLOTHING: TOTAL
HELP NEEDED FOR BATHING: TOTAL
MOVING FROM LYING ON BACK TO SITTING ON SIDE OF FLAT BED WITH BEDRAILS: A LITTLE
CLIMB 3 TO 5 STEPS WITH RAILING: TOTAL
MOVING TO AND FROM BED TO CHAIR: A LITTLE
DAILY ACTIVITIY SCORE: 13
TURNING FROM BACK TO SIDE WHILE IN FLAT BAD: A LOT
WALKING IN HOSPITAL ROOM: TOTAL
STANDING UP FROM CHAIR USING ARMS: A LITTLE
PERSONAL GROOMING: A LITTLE

## 2025-04-23 ASSESSMENT — PAIN DESCRIPTION - LOCATION
LOCATION: CHEST
LOCATION: CHEST

## 2025-04-23 ASSESSMENT — PAIN DESCRIPTION - ORIENTATION
ORIENTATION: MID;ANTERIOR
ORIENTATION: ANTERIOR;MID

## 2025-04-23 ASSESSMENT — ACTIVITIES OF DAILY LIVING (ADL): ADL_ASSISTANCE: INDEPENDENT

## 2025-04-23 NOTE — ANESTHESIA PROCEDURE NOTES
Peripheral Block    Patient location during procedure: OR  Start time: 4/22/2025 1:45 PM  End time: 4/22/2025 1:55 PM  Reason for block: at surgeon's request and post-op pain management  Staffing  Performed: attending   Authorized by: Maverick Delgado MD    Performed by: Maverick Delgado MD  Preanesthetic Checklist  Completed: patient identified, IV checked, site marked, risks and benefits discussed, surgical consent, monitors and equipment checked, pre-op evaluation and timeout performed   Timeout performed at: 4/22/2025 1:45 PM  Peripheral Block  Patient position: laying flat  Prep: ChloraPrep  Patient monitoring: heart rate, cardiac monitor and continuous pulse ox  Block type: serratus anterior  Laterality: B/L  Injection technique: single-shot  Guidance: Doppler guided  Local infiltration: ropivacaine  Infiltration strength: 0.5 %  Dose: 20 mL  Needle  Needle type: short-bevel   Needle gauge: 21 G  Needle length: 8 cm  Needle localization: ultrasound guidance  Needle insertion depth: 2 cm  Test dose: negative  Assessment  Injection assessment: negative aspiration for heme, no paresthesia on injection and incremental injection  Paresthesia pain: none  Heart rate change: no  Slow fractionated injection: yes  Additional Notes  Diluted with equal parts NS

## 2025-04-23 NOTE — CARE PLAN
The patient's goals for the shift include      The clinical goals for the shift include pt will remain hemodynamically stable      Problem: Pain - Adult  Goal: Verbalizes/displays adequate comfort level or baseline comfort level  Outcome: Progressing     Problem: Safety - Adult  Goal: Free from fall injury  Outcome: Progressing     Problem: Discharge Planning  Goal: Discharge to home or other facility with appropriate resources  Outcome: Progressing     Problem: Chronic Conditions and Co-morbidities  Goal: Patient's chronic conditions and co-morbidity symptoms are monitored and maintained or improved  Outcome: Progressing     Problem: Nutrition  Goal: Nutrient intake appropriate for maintaining nutritional needs  Outcome: Progressing     Problem: Pain  Goal: Takes deep breaths with improved pain control throughout the shift  Outcome: Progressing  Goal: Turns in bed with improved pain control throughout the shift  Outcome: Progressing  Goal: Walks with improved pain control throughout the shift  Outcome: Progressing  Goal: Performs ADL's with improved pain control throughout shift  Outcome: Progressing  Goal: Participates in PT with improved pain control throughout the shift  Outcome: Progressing  Goal: Free from opioid side effects throughout the shift  Outcome: Progressing  Goal: Free from acute confusion related to pain meds throughout the shift  Outcome: Progressing     Problem: Fall/Injury  Goal: Not fall by end of shift  Outcome: Progressing  Goal: Be free from injury by end of the shift  Outcome: Progressing  Goal: Verbalize understanding of personal risk factors for fall in the hospital  Outcome: Progressing  Goal: Verbalize understanding of risk factor reduction measures to prevent injury from fall in the home  Outcome: Progressing  Goal: Use assistive devices by end of the shift  Outcome: Progressing  Goal: Pace activities to prevent fatigue by end of the shift  Outcome: Progressing     Problem: Skin  Goal:  Decreased wound size/increased tissue granulation at next dressing change  Outcome: Progressing  Flowsheets (Taken 4/23/2025 0856)  Decreased wound size/increased tissue granulation at next dressing change: Promote sleep for wound healing  Goal: Participates in plan/prevention/treatment measures  Outcome: Progressing  Flowsheets (Taken 4/23/2025 0856)  Participates in plan/prevention/treatment measures: Discuss with provider PT/OT consult  Goal: Prevent/manage excess moisture  Outcome: Progressing  Flowsheets (Taken 4/23/2025 0856)  Prevent/manage excess moisture: Cleanse incontinence/protect with barrier cream  Goal: Prevent/minimize sheer/friction injuries  Outcome: Progressing  Flowsheets (Taken 4/23/2025 0856)  Prevent/minimize sheer/friction injuries: Complete micro-shifts as needed if patient unable. Adjust patient position to relieve pressure points, not a full turn  Goal: Promote/optimize nutrition  Outcome: Progressing  Flowsheets (Taken 4/23/2025 0856)  Promote/optimize nutrition: Monitor/record intake including meals  Goal: Promote skin healing  Outcome: Progressing  Flowsheets (Taken 4/23/2025 0856)  Promote skin healing: Protective dressings over bony prominences     Problem: Resident is recovering from cardiovascular surgery  Goal: I will maintain and build strength.  Outcome: Progressing  Goal: I will decrease complications and risks after surgery  Outcome: Progressing

## 2025-04-23 NOTE — PROGRESS NOTES
Occupational Therapy    Evaluation    Patient Name: Rafi Lisa Jr.  MRN: [id number]  Department: [facility]  Room: [address] [place], [place] [address]  Today's Date: 4/23/2025  Time Calculation  Start Time: 1056  Stop Time: 1111  Time Calculation (min): 15 min    Assessment  IP OT Assessment  OT Assessment: Pt. presents with a decline in self-care, mobility and safety and would benefit from skilled OT services to maximize independence and promote return to prior level of function.  Prognosis: Good  Barriers to Discharge Home: No anticipated barriers  End of Session Communication: Bedside nurse  End of Session Patient Position: Up in chair, Alarm off, not on at start of session  Plan:  Treatment Interventions: ADL retraining, Functional transfer training, Endurance training, Compensatory technique education  OT Frequency: 3 times per week  OT Discharge Recommendations: Low intensity level of continued care  OT Recommended Transfer Status: Assist of 1  OT - OK to Discharge: Yes (to next level of care when cleared by medical team)    Subjective   Current Problem:  1. Chronic coronary microvascular dysfunction          General:  General  Reason for Referral: impaired adl; pt. admitted for cabg x 3 on 4/22/25  Referred By: Agnieszka  Past Medical History Relevant to Rehab: cad, copd, htn, hld, emphysema, pci to lad, laser arthrectomy, cervical radiculopathy, anemia  Family/Caregiver Present:  (spouse and spouse's cousin present)  Co-Treatment: PT  Co-Treatment Reason: to maximize patient safety/abilities  Prior to Session Communication: Bedside nurse  Patient Position Received: Up in chair, Alarm off, not on at start of session  General Comment: pt. agreeable to therapy intervention  Precautions:  Precautions Comment: MITT, tele, o2 3 L/min, chest tubes x 3, miri trac, bergman, temporary pacer, arterial line/cvp, piv, triple lumen, levo .01, pt's map wnl then following standing map=51, increased to 66 within seconds, pt. completed ankle pumps/having  Pt is requesting medication normally given by his cardiologist not in med list.     Patient requesting a medication refill.   Medication: pepsid , lipitor , plevix  Pharmacy: [facility] mail order  Last office visit: 03/06/20  Next office visit: 5/8/2020 some c/o dizziness     Date/Time Vitals Session Patient Position Pulse Resp SpO2 BP MAP (mmHg)    04/23/25 1100 --  --  86  31  90 %  --  --     04/23/25 1200 --  --  80  18  92 %  --  --                Pain:  Pain Assessment  Pain Assessment: 0-10  0-10 (Numeric) Pain Score:  (6-7/10 incisional pain, repositioned for comfort)    Objective   Cognition:  Overall Cognitive Status: Within Functional Limits           Home Living:  Home Living Comments: pt. lives with spouse, 2 floor home, no bathroom 1st floor, full bath 2nd floor with claw foot tub/shower, hhs, st. toilet, wh. walker, crutches   Prior Function:  Prior Function Comments: pt. is independent with adl/shares iadl tasks, drives, ambulates without device  IADL History:     ADL:  ADL Comments: would estimate max assist for LB bathe/dress/toilet, mod assist for UB bathe/dress/grooming, set up for feeding  Activity Tolerance:  Endurance: Decreased tolerance for upright activites  Early Mobility/Exercise Safety Screen: Proceed with mobilization - No exclusion criteria met  Bed Mobility/Transfers:      Transfers  Transfer:  (sit<> stand from recliner chair;  cga)      Functional Mobility:  Functional Mobility  Functional Mobility Performed:  (min assist x 1 to step up/down onto scale)  Sensation:  Sensation Comment: sensation intact  Strength:  Strength Comments: bue's at least 3/5 per observation  Coordination:  Movements are Fluid and Coordinated: Yes   Outcome Measures: [facility] Daily Activity  Putting on and taking off regular lower body clothing: Total  Bathing (including washing, rinsing, drying): Total  Putting on and taking off regular upper body clothing: A lot  Toileting, which includes using toilet, bedpan or urinal: A lot  Taking care of personal grooming such as brushing teeth: A little  Eating Meals: None  Daily Activity - Total Score: 13         and Early Mobility/Exercise Safety Screen: Proceed with mobilization - No exclusion criteria met  ICU  Mobility Scale: Standing [4]  Education Documentation  Precautions, taught by Rianna Ruiz OT at 4/23/2025 12:52 PM.  Learner: Patient  Readiness: Acceptance  Method: Explanation  Response: Verbalizes Understanding, Needs Reinforcement  Comment: OT POC    ADL Training, taught by Rianna Ruiz OT at 4/23/2025 12:52 PM.  Learner: Patient  Readiness: Acceptance  Method: Explanation  Response: Verbalizes Understanding, Needs Reinforcement  Comment: OT POC        Goals:   Encounter Problems       Encounter Problems (Active)       OT Goals       Increase functional mobility and  functional transfers to supervision for bed/chair/toilet with dme prn   (Progressing)       Start:  04/23/25    Expected End:  04/30/25            increase bue ther ex/activity x 7-10 minutes and increase standing tolerance x 3-5 minutes with supervision to promote greater activity tolerance for assist with adl.   (Progressing)       Start:  04/23/25    Expected End:  04/30/25            Increase ub/lb dressing to supervision with dme prn  (Progressing)       Start:  04/23/25    Expected End:  04/30/25            Increase toileting to supervision with dme prn  (Progressing)       Start:  04/23/25    Expected End:  04/30/25            pt. to apply ec/ws techniques/MITT with minimal cues to all mobility/transfer/adl to decrease fatigue/promote efficient use of energy toward completion of functional tasks.  (Progressing)       Start:  04/23/25    Expected End:  04/30/25

## 2025-04-23 NOTE — CARE PLAN
The patient's goals for the shift include  pain control    The clinical goals for the shift include patient will remain hemodynamically stable    Over the shift, the patient did not make progress toward the following goals. Barriers to progression include acute pain. Recommendations to address these barriers include .

## 2025-04-23 NOTE — PROGRESS NOTES
[facility]   Cardiothoracic Surgery   Progress Note        Rafi Lisa Jr. is a 74 y.o. male on day 1 of admission presenting with Coronary artery disease.    Subjective     Interval HPI: Seen and assessed patient this morning while they were sitting in bedside chair. He is in no acute distress, although, patient is complaining of moderate pain.    Review of Systems   Constitutional: Positive for malaise/fatigue. Negative for diaphoresis and fever.   HENT:  Negative for congestion, ear pain and hearing loss.    Eyes:  Negative for blurred vision and double vision.   Cardiovascular:  Negative for chest pain and palpitations.   Respiratory:  Negative for cough and shortness of breath.    Endocrine: Negative for cold intolerance and heat intolerance.   Skin:  Negative for dry skin, itching and rash.   Musculoskeletal:  Negative for joint pain, joint swelling and muscle cramps.   Gastrointestinal:  Negative for abdominal pain, nausea and vomiting.   Genitourinary:  Negative for dysuria.   Neurological:  Positive for weakness. Negative for dizziness, headaches and paresthesias.   Psychiatric/Behavioral:  Negative for altered mental status.          Objective   Physical Exam  Physical Exam  Vitals and nursing note reviewed.   Constitutional:       General: He is awake. He is not in acute distress.     Appearance: Normal appearance.   HENT:      Head: Normocephalic and atraumatic.      Mouth/Throat:      Lips: Pink.      Mouth: Mucous membranes are moist.   Eyes:      General: Lids are normal.      Pupils: Pupils are equal, round, and reactive to light.   Cardiovascular:      Rate and Rhythm: Normal rate and regular rhythm.      Pulses: Normal pulses.      Heart sounds: Normal heart sounds. No murmur heard.  Pulmonary:      Effort: Pulmonary effort is normal.   Chest:      Comments: Midsternal incision; Dressing C/D/I  Pleural and mediastinal chest tubes in place  Abdominal:       General: Bowel sounds are decreased.      Palpations: Abdomen is soft.      Tenderness: There is no abdominal tenderness.   Genitourinary:     Comments: Saldana catheter  Musculoskeletal:      Cervical back: Normal range of motion and neck supple.      Right lower leg: No edema.      Left lower leg: No edema.   Skin:     General: Skin is warm.      Capillary Refill: Capillary refill takes less than 2 seconds.   Neurological:      General: No focal deficit present.      Mental Status: He is alert.      Sensory: Sensation is intact.      Motor: Motor function is intact.   Psychiatric:         Attention and Perception: Attention normal.         Speech: Speech normal.         Cognition and Memory: Cognition normal.         Last Recorded Vitals  Vitals:    04/23/25 0700 04/23/25 0800 04/23/25 0900 04/23/25 1000   BP:       BP Location:       Patient Position:       Pulse: 63 72 73 79   Resp: 17 22 22 (!) 30   Temp:  36.4 °C (97.5 °F)     TempSrc:  Temporal     SpO2: 97% 92% 94% 92%   Weight:       Height:            Intake/Output last 3 Shifts:  I/O last 3 completed shifts:  In: 5693.1 (65.7 mL/kg) [I.V.:302.1 (3.5 mL/kg); Blood:1006; NG/GT:100; IV Piggyback:4285]  Out: 3305 (38.2 mL/kg) [Urine:1885 (0.6 mL/kg/hr); Emesis/NG output:200; Blood:400; Chest Tube:820]  Weight: 86.6 kg     Inpatient Medications  Scheduled Medications[1]  Continuous medications  Continuous Medications[2]  PRN medications  PRN Medications[3]     LDA:  CVC 04/22/25 Triple lumen Right Internal jugular (Active)   Placement Date/Time: 04/22/25 (c) 0825   Hand Hygiene Performed Prior to CVC Insertion: Yes  Site Prep: Chlorhexidine   Site Prep Agent has Completely Dried Before Insertion: Yes  All 5 Sterile Barriers Used (Gloves, Gown, Cap, Mask, Large Sterile Rowena...   Number of days: 1       Arterial Line 04/22/25 Left Brachial (Active)   Placement Date/Time: 04/22/25 1715   Hand Hygiene Completed: Yes  Size: 20 G  Orientation: Left  Location: Brachial   Placed by: Anders Aaron NP  Insertion attempts: 1  Securement Method: Sutured   Number of days: 0       Urethral Catheter Temperature probe;Non-latex 14 Fr. (Active)   Placement Date/Time: 04/22/25 0817   Placed by: Trang Reyes  Hand Hygiene Completed: Yes  Catheter Type: Temperature probe;Non-latex  Tube Size (Fr.): 14 Fr.  Catheter Balloon Size: 10 mL  Urine Returned: Yes   Number of days: 1       Chest Tube 1 Left Pleural 28 Fr (Active)   Placement Date/Time: 04/22/25 1227   Placed by: DR. JOHNSON  Hand Hygiene Completed: Yes  Tube Number: 1  Chest Tube Orientation: Left  Chest Tube Location: Pleural  Chest Tube Drain Tube Size (Fr): 28 Fr  Chest Tube Drainage System: (c) Dry seal ches...   Number of days: 0       Chest Tube 2 Mediastinal 28 Fr (Active)   Placement Date/Time: 04/22/25 1228   Placed by: DR. JOHNSON  Hand Hygiene Completed: Yes  Tube Number: 2  Chest Tube Location: Mediastinal  Chest Tube Drain Tube Size (Fr): 28 Fr  Chest Tube Drainage System: (c) Dry seal chest drain   Number of days: 0       Chest Tube 3 Right Pleural 28 Fr (Active)   Placement Date/Time: 04/22/25 1229   Placed by: DR. JOHNSON  Hand Hygiene Completed: Yes  Tube Number: 3  Chest Tube Orientation: Right  Chest Tube Location: Pleural  Chest Tube Drain Tube Size (Fr): 28 Fr  Chest Tube Drainage System: (c) Dry seal kendrick...   Number of days: 0       Relevant Results  Lab Review  Results from last 7 days   Lab Units 04/23/25  0319   WBC AUTO x10*3/uL 7.5   HEMOGLOBIN g/dL 9.0*   HEMATOCRIT % 29.7*   PLATELETS AUTO x10*3/uL 119*     Results from last 7 days   Lab Units 04/23/25  0319   SODIUM mmol/L 138   POTASSIUM mmol/L 4.8   CHLORIDE mmol/L 108*   CO2 mmol/L 24   BUN mg/dL 24*   CREATININE mg/dL 1.31*   CALCIUM mg/dL 8.0*   GLUCOSE mg/dL 142*     Results from last 7 days   Lab Units 04/23/25  0319   MAGNESIUM mg/dL 2.50*     Results from last 7 days   Lab Units 04/23/25  0450   POCT PH, ARTERIAL pH 7.40   POCT PCO2, ARTERIAL mm Hg  37*   POCT PO2, ARTERIAL mm Hg 90   POCT HCO3 CALCULATED, ARTERIAL mmol/L 22.9   POCT BASE EXCESS, ARTERIAL mmol/L -1.7         Radiographic Testing  EKG:  ECG 12 lead 03/21/2025      ECG 12 lead (Clinic Performed) 12/19/2024      ECG 12 lead (Clinic Performed) 10/20/2023    Echo:  Transthoracic Echo (TTE) Complete 03/24/2025  PHYSICIAN INTERPRETATION:  Left Ventricle: The left ventricular systolic function is normal, with a visually estimated ejection fraction of 55-60%. There is mild concentric left ventricular hypertrophy. There are no regional left ventricular wall motion abnormalities. The left ventricular cavity size is normal. There is mildly increased septal and mildly increased posterior left ventricular wall thickness. There is left ventricular concentric remodeling. Spectral Doppler shows a Grade II (pseudonormal pattern) of left ventricular diastolic filling with an elevated left atrial pressure.  Left Atrium: The left atrium is mildly dilated.  Right Ventricle: The right ventricle is mildly enlarged. There is normal right ventricular global systolic function.  Right Atrium: The right atrium is normal in size.  Aortic Valve: The aortic valve is trileaflet. There is mild aortic valve thickening. The aortic valve dimensionless index is 0.81. There is moderate aortic valve regurgitation. The peak instantaneous gradient of the aortic valve is 7 mmHg. The mean gradient of the aortic valve is 4 mmHg.  Mitral Valve: The mitral valve is mildly thickened. The peak instantaneous gradient of the mitral valve is 2 mmHg. There is mild mitral valve regurgitation.  Tricuspid Valve: The tricuspid valve is structurally normal. There is mild tricuspid regurgitation. The Doppler estimated RVSP is within normal limits at 29.4 mmHg.  Pulmonic Valve: The pulmonic valve is structurally normal. There is mild pulmonic valve regurgitation.  Pericardium: There is no pericardial effusion noted.  Aorta: The aortic root is abnormal.  There is mild dilatation of the ascending aorta.  In comparison to the previous echocardiogram(s): Compared with study dated 5/15/2020,.        CONCLUSIONS:   1. The left ventricular systolic function is normal, with a visually estimated ejection fraction of 55-60%.   2. Spectral Doppler shows a Grade II (pseudonormal pattern) of left ventricular diastolic filling with an elevated left atrial pressure.   3. Mildly enlarged right ventricle.   4. The left atrium is mildly dilated.   5. Right ventricular systolic pressure is within normal limits.   6. Moderate aortic valve regurgitation.       Ejection Fractions:  EF   Date/Time Value Ref Range Status   03/24/2025 09:25 AM 58 %      Cath:  Cardiac Catheterization Procedure 03/24/2025  Coronary Angiography:  The coronary circulation is left dominant.     Left Main Coronary Artery:  The left main coronary artery is a normal caliber vessel. The left main arises normally from the left coronary sinus of Valsalva and trifurcation. The left main coronary artery showed no significant disease or stenosis greater than 30%.     Left Anterior Descending Coronary Artery Distribution:  The left anterior descending coronary artery is a normal caliber vessel. The LAD arises normally from the left main coronary artery. The LAD demonstrated dense calcification in the proximal to mid segment. The ostial and proximal to mid left anterior descending coronary artery showed 90% stenosis. This lesion was heavily calcified.     Circumflex Coronary Artery Distribution:  The circumflex coronary artery is a large caliber vessel. The circumflex arises normally from the left main coronary artery. The circumflex revealed mild distal atherosclerotic disease. The mid circumflex coronary artery showed 50% stenosis.     Ramus Intermedius:  The ramus intermedius is a medium-sized caliber vessel. The proximal ramus intermedius showed 100% stenosis.  with collaterals.     Right Coronary Artery  Distribution:     The right coronary artery is a medium-sized caliber vessel. The RCA showed dense calcification. The entire right coronary artery showed 95% stenosis.        Left Ventriculography:  The size of the left ventricle is normal. The LV ejection fraction was 50 to 55%. All left ventricular regional wall segments contract normally. There is no LV diastolic dysfunction noted.    Stress Test:  No results found for this or any previous visit from the past 1095 days.    Cardiac Imaging:  No results found for this or any previous visit from the past 1095 days.    Chest Imaging:  XR chest 1 view   Final Result   1.  Improved lower lung atelectasis.        Signed by: Durga Larson 4/23/2025 8:32 AM   Dictation workstation:   [id number]      XR chest 1 view   Final Result   NG/OG tube is coiled in the midesophagus but then continues distally   with the tip terminating in the supradiaphragmatic portion of the   hiatal hernia. Other lines and tubes as above.        Bibasilar atelectasis and pleural effusions.        MACRO:   None.        Signed by: Meet Dee 4/22/2025 3:57 PM   Dictation workstation:   [id number]        Pulmonary Function Testing:   Pulmonary Function Lab - Spirometry Only      03/25/25        History of Present Illness:   Rafi Lisa Jr. is a 74 y.o. male with a PMH significant for CAD (s/p PCI to the LAD, laser arthrectomy to the Lcx on 1/2022), HTN, HLD, COPD (Emphysemea), and nicotine use disorder who presents to [facility] on 4/22/25 for a staged coronary.      The patient was undergoing workup with cardiologist, Dr. James Hunt in workup for his anginal complaints. He ultimately was scheduled for a stress testing; however during his exam, he was referred to the ED and was subsequently admitted for inpatient workup. On 3/24/25, he underwent a LHC with interventional cardiologist, Dr. oTny Oneill and his coronary anatomy revealed diffuse  multivessel CAD. Cardiac surgery was subsequently consulted for bypass grafting evaluation.       Daily Event      04/22/25: Patient arrived to the ICU in critically ill but stable condition. S/p CABG x3. Patient arrived hemodynamically stable without the need for continuous vasopressors. Flowtrack noting decreased hemodynamics, however concerned that this is not accurate as patient is not with signs of decreased perfusion.      Plan to recover in the immediate post-op period and wean mechanical ventilation towards extubation.      - Vital Signs: HR 65, BP 91/59, SpO2 100%   - Hemodynamics: CVP 10, CO 3, CI 1.5, SVR 1725 (consider accuracy of #s)  - Ventilator Settings: PRVC-AC / FiO2 50% / PEEP 8 / RR 16     - Pump time: 99 min / x-clamp 76 min      Intake & Output:   - Initial R Pleural Chest Tube: 120mL   - Initial L Pleural Chest Tube: 100mL   - Initial Mediastinal Chest Tube: 150mL     04/23/2025: No acute events overnight. Patient was extubated yesterday evening without complication. He is sitting up in the chair this morning. He remains on 0.01mcg of norepinephrine. NG tube removed this morning. Patient is reporting moderate-severe pain; will adjust pain regimen. Maintain mediastinal and pleural chest tubes today.     - Current O2 Requirements: 3L NC    Intake & Output:  - Total R Pleural Chest Tube: 280mL   - Total L Pleural Chest Tube: 320mL   - Total Mediastinal Chest Tube: 310mL         Assessment & Plan        Multivessel Coronary Artery Disease  - Patient has remote Hx of PCI to the LAD in 1998   --> most recently laser arterectomy to the Lcx on 1/2022  - S/p CABG x 3 on 4/22/25 with Dr. Juan Luis Kline   --> LIMA-LAD, SVG-Ramus, SVG-OM2  - Continue on ASA 81mg and Rosuvastatin 40mg    - Holding beta-blocker in initial post op period  - Maintain MS/pl chest tubes to -20cm H20 continuous suction   - Daily CXR while chest tube intact         Mediastinal Incision  - Remove post-op dressing on POD #2  -  Dressing C/D/I        Acute Post-Op Pain  - As expected   - Multimodal pain control   --> Scheduled: Acetaminophen, magnesium oxide 400mg QD, gabapentin 100mg TID, methocarbamol 1g IV q8h x2 days, followed by 500mg PO Q8h  --> PRN: oxycodone & fentanyl  - Bowel regimen while taking narcotics         Risk for Post-Op Arrhythmia  - Ventricular wires placed  - Discontinue V-wires prior to DC  - Telemetry until discharged        Acute Postoperative Respiratory Insufficiency   - As expected following CABG with post-op atelectasis  - Current O2 Requirements: 3L NC  - Coughing and deep breathing exercises with Incentive spirometry  - Out of bed, early and aggressive mobilization with assistance  - Oxygen as needed, wean to keep saturation above 92%  - ICU intensivist/pulmonologist consulted  - Albuterol nebulizers as needed        COPD     Nicotine Use Disorder  - Home Medications: Albuterol PRN  - Home O2: N/A  - PFTs completed on 3/25 (see below) show COPD with reversible component   --> FEV1/FVC = 0.52 pre / 0.53 post (FEV1 improved 15% / FVC improved 16%)  - Consider addition of tiotropium if needed        Risk for Fluid Volume Overload  - Pre-Op Weight: 80 kg      4/23: 86.6 kg  - Strict I& Os and daily weights    - Monitor CVP and hemodynamics         Acute Post-Op Blood-Loss Anemia  - Pre-Op H/H:  12.8/41.7     4/23: 9/29.7  - Monitor h/h in the initial post op period  - Monitor chest tubes for post op hemorrhage   - Multivitamin/iron tablet   - Daily CBC while in hospital        Post-Op Leukocytosis  - Pre-Op WBC: 4.6     4/23: 7.5  - Afebrile, consider elevations as reactive to surgery   - Post-op ATB Q12 for 48hrs  - Daily cbc while in hospital         Essential HTN  - Home Medications: Lisinopril 20mg   -  Hold in the immediate post-op period         Pre-diabetic   - Home Medications: None   - HbA1c: 6.4 --> 6.2  - Goal for BG <150 in the post-operative period  - SSI q4h        Dyslipidemia  - Home Medications:  Rosuvastatin 40mg, Ezetimibe 10mg  - Continue on statin therapy as above         Hiatal Hernia     GERD  - Home Medications: Pantoprazole 40mg  - Patient has a large, symptomatic hiatal hernia  --> Discontinue NG tube today  - Continue on home PPI      Risk for Electrolyte Disturbances  - Optimize electrolytes per [facility] protocol       Bowel Regimen  - Senna-S BID   - PRN suppositories and miralax     Prophylaxis  - GI: PPI  - DVT: Chadd-Hose & starting enoxaparin on POD #1  - MRSA: Negative (MSSA positive)   - PT/OT      Disposition  - CVICU     Patient seen and plan discussed with Dr. Juan Luis Kline.          Ama Pennington, APRN-CNP           [1] acetaminophen, 975 mg, oral, q6h  aspirin, 81 mg, oral, Daily  ceFAZolin, 2 g, intravenous, q8h  enoxaparin, 40 mg, subcutaneous, Daily  ezetimibe, 10 mg, oral, Daily  gabapentin, 100 mg, oral, TID  insulin lispro, 0-15 Units, subcutaneous, q4h  lidocaine, 1 patch, transdermal, q24h  magnesium oxide, 400 mg, oral, Daily  methocarbamol, 1,000 mg, intravenous, q8h  [START ON 4/25/2025] methocarbamol, 500 mg, oral, q8h BANDAR  oxygen, , inhalation, Continuous - Inhalation  pantoprazole, 40 mg, oral, Daily before breakfast  rosuvastatin, 40 mg, oral, Daily  sennosides-docusate sodium, 2 tablet, oral, BID  [2] aminocaproic acid, 1 g/hr  norepinephrine, 0-0.2 mcg/kg/min, Last Rate: 0.01 mcg/kg/min (04/23/25 0830)  [3] PRN medications: albumin human, albuterol, alteplase, calcium chloride, calcium chloride, dextrose **OR** glucagon, electrolyte-A, fentaNYL **OR** fentaNYL, magnesium sulfate, magnesium sulfate, naloxone, oxyCODONE, oxyCODONE, polyethylene glycol, potassium chloride CR **OR** potassium chloride, potassium chloride CR **OR** potassium chloride, potassium chloride, potassium chloride

## 2025-04-23 NOTE — PROGRESS NOTES
04/23/25 1400   Discharge Planning   Living Arrangements Spouse/significant other   Support Systems Spouse/significant other   Assistance Needed independent   Type of Residence Private residence   Number of Stairs to Enter Residence 4   Do you have animals or pets at home? Yes   Type of Animals or Pets 4 dogs   Home or Post Acute Services In home services   Type of Home Care Services Home nursing visits;Home OT;Home PT   Expected Discharge Disposition Home H   Does the patient need discharge transport arranged? No   Intensity of Service   Intensity of Service 0-30 min     Care transitions at bedside to complete assessment with patient; patient had CABG x3 yesterday.  TCC introduced self and explained role.  Patient demographics reviewed and verified.  Patient stated that he has been independent at home and stated that he has a walker or crutches that he uses as needed.  Patient stated that he drives and is planning on having his wife pick him up at discharge.  Anticipating Home with [facility].  Care transtions to follow.    PCP: Magno Babb  Insurance: [facility]  Ph[facility]

## 2025-04-23 NOTE — ADDENDUM NOTE
Addendum  created 04/22/25 2028 by Maverick Delgado MD    Child order released for a procedure order, Clinical Note Signed, Intraprocedure Blocks edited (Anesthesia), Order Canceled from Note, SmartForm saved

## 2025-04-23 NOTE — PROGRESS NOTES
Physical Therapy    Physical Therapy Evaluation    Patient Name: Rafi Lisa Jr.  MRN: [id number]  173/[id number]  Today's Date: 4/23/2025   Time Calculation  Start Time: 1057  Stop Time: 1111  Time Calculation (min): 14 min    Assessment/Plan   PT Assessment  PT Assessment Results: Decreased strength, Decreased endurance, Impaired balance, Decreased mobility  Rehab Prognosis: Good  Barriers to Discharge Home: No anticipated barriers  End of Session Communication: Bedside nurse  Assessment Comment: Pt is presenting with a decline in functional mobility from baseline. Pt would benefit from further PT services to address these deficits to return to prior level of function.  End of Session Patient Position: Up in chair, Alarm off, not on at start of session  IP OR SWING BED PT PLAN  Inpatient or Swing Bed: Inpatient  PT Plan  Treatment/Interventions: Bed mobility, Transfer training, Gait training, Balance training, Strengthening, Endurance training, Therapeutic exercise, Therapeutic activity  PT Plan: Ongoing PT  PT Frequency: 4 times per week  PT Discharge Recommendations: Low intensity level of continued care  PT - OK to Discharge: Yes    Subjective     Current Problem:  Problem List[1]    General Visit Information:  General  Reason for Referral: PT eval and treat; s/p CABG x 3 on 4/22/25  Referred By: Anders Aaron  Past Medical History Relevant to Rehab: CAD, COPD, HTN, HLD, emphysema, PCI to LAD, laser arthrectomy, cervical radiculopathy, anemia  Family/Caregiver Present: Yes (spouse and spouse's cousin present)  Co-Treatment: OT  Co-Treatment Reason: For patient safety and to maximize mobility  Prior to Session Communication: Bedside nurse  Patient Position Received: Up in chair, Alarm off, not on at start of session  General Comment: Pt resting in chair upon entering, agreeable to PT    Home Living:  Home Living  Type of Home: House  Lives With: Spouse  Home Adaptive Equipment:  (WW, crtuches)  Home Layout: Two  level (bedroom and bathroom on second floor)  Home Access: Stairs to enter with rails  Entrance Stairs-Number of Steps: 2+4 stairs  Bathroom Shower/Tub: Tub/shower unit (claw foot tub)  Bathroom Toilet: Standard  Bathroom Equipment: Hand-held shower hose    Prior Level of Function:  Prior Function Per Pt/Caregiver Report  Level of [place]: Independent with ADLs and functional transfers  ADL Assistance: Independent  Homemaking Assistance: Independent  Ambulatory Assistance: Independent  Vocational: Retired  Prior Function Comments: (+) driving    Precautions:  Precautions  Precautions Comment: MITT, tele, 3L O2, chest tubes x 3, miri trac, bergman, temporary pacer, arterial line/cvp, PIV, triple lumen, levo .01    Vital Signs:  Vital Signs  MAP (mmHg):  (MAP down to 51 after standing and taking weight on scale, pt symptomatic with lightheaded and dizziness limiting further mobility this date.)  Objective     Pain:  Pain Assessment  Pain Assessment: 0-10  0-10 (Numeric) Pain Score:  (6-7/10 incisional pain, repositioned for comfort)    Cognition:  Cognition  Overall Cognitive Status: Within Functional Limits    General Assessments:      Activity Tolerance  Endurance: Tolerates less than 10 min exercise with changes in vital signs  Early Mobility/Exercise Safety Screen: Proceed with mobilization - No exclusion criteria met  Sensation  Light Touch: No apparent deficits  Strength  Strength Comments: BLE WFL for strength and ROM  Perception  Inattention/Neglect: Appears intact  Coordination  Movements are Fluid and Coordinated: Yes  Postural Control  Postural Control: Within Functional Limits  Static Sitting Balance  Static Sitting-Level of Assistance: Independent  Dynamic Sitting Balance  Dynamic Sitting-Level of Assistance: Close supervision  Static Standing Balance  Static Standing-Level of Assistance: Contact guard  Dynamic Standing Balance  Dynamic Standing-Level of Assistance: Minimum assistance    Functional  Assessments:     Bed Mobility  Bed Mobility: Yes  Bed Mobility 1  Bed Mobility Comments 1: supine<>sit not assessed, pt up in chair  Transfers  Transfer: Yes  Transfer 1  Trials/Comments 1: sit to stand CGA without device  Ambulation/Gait Training  Ambulation/Gait Training Performed:  (Pt takes one step forward onto scale with BUE support on rails. Pt requires min assist for balance on and off scale. Pt reports dizziness)          Extremity/Trunk Assessments:        RLE   RLE : Within Functional Limits  LLE   LLE : Within Functional Limits    Outcome Measures:  [facility] Basic Mobility  Turning from your back to your side while in a flat bed without using bedrails: A little  Moving from lying on your back to sitting on the side of a flat bed without using bedrails: A lot  Moving to and from bed to chair (including a wheelchair): A little  Standing up from a chair using your arms (e.g. wheelchair or bedside chair): A little  To walk in hospital room: Total  Climbing 3-5 steps with railing: Total  Basic Mobility - Total Score: 13           FSS-ICU  Ambulation: Unable to attempt due to weakness  Rolling: Minimal assistance (performs 75% or more of task)  Sitting: Minimal assistance (performs 75% or more of task)  Transfer Sit-to-Stand: Minimal assistance (performs 75% or more of task)  Transfer Supine-to-Sit: Moderate assistance (performs 50 - 74% of task)  Total Score: 15  ICU Mobility Screen  Early Mobility/Exercise Safety Screen: Proceed with mobilization - No exclusion criteria met  ICU Mobility Scale: Transferring bed to chair  E = Exercise and Early Mobility  Early Mobility/Exercise Safety Screen: Proceed with mobilization - No exclusion criteria met  ICU Mobility Scale: Transferring bed to chair          Goals:  Encounter Problems       Encounter Problems (Active)       PT Problem       PT Goal 1 STG - Pt will amb >150' using no device with IND  (Progressing)       Start:  04/23/25    Expected End:  05/07/25             PT Goal 2 STG - Pt will transition supine <> sitting with IND (Progressing)       Start:  04/23/25    Expected End:  05/07/25            PT Goal 3 STG -  Pt will navigate 10 stairs using rail with SUP  (Progressing)       Start:  04/23/25    Expected End:  05/07/25            PT Goal 4 STG - Pt will transfer STS with IND  (Progressing)       Start:  04/23/25    Expected End:  05/07/25               Pain - Adult            Education Documentation  Precautions, taught by Kim Olea PT at 4/23/2025  3:38 PM.  Learner: Patient  Readiness: Acceptance  Method: Explanation  Response: Verbalizes Understanding  Comment: MITT    Mobility Training, taught by Kim Olea PT at 4/23/2025  3:38 PM.  Learner: Patient  Readiness: Acceptance  Method: Explanation  Response: Verbalizes Understanding  Comment: MITT    Education Comments  No comments found.               [1]   Patient Active Problem List  Diagnosis    Atrial fibrillation (Multi)    Arteriovenous malformation (AVM) ([facility])    COPD (chronic obstructive pulmonary disease) (Multi)    Coronary artery disease    Presence of Watchman left atrial appendage closure device    History of gastric ulcer    History of blood transfusion    History of GI bleed    At risk for stroke    Alcohol dependence in remission (Multi)    AVM (arteriovenous malformation) of duodenum, acquired with hemorrhage    Cervical radiculopathy    Chronic neck pain    Diverticular disease of colon    Dizziness    Eczema    Erectile dysfunction    Essential hypertension, benign    Gastrointestinal hemorrhage    Gout    History of hepatitis C    Hyperlipidemia    Insulin resistance    Iron deficiency anemia    Localized osteoarthritis of knees, bilateral    Low testosterone in male    Prediabetes    S/P coronary artery stent placement    Snoring    Stage 3a chronic kidney disease (Multi)    Tobacco use disorder    Uric acid nephrolithiasis    Chest pain, unspecified type    Unstable angina  (Multi)    Chronic coronary microvascular dysfunction

## 2025-04-23 NOTE — CONSULTS
[facility] Critical Care Medicine   Consult Note      Date:  4/23/2025  Patient:  Rafi Lisa Jr.  YOB: 1950  MRN:  [id number]   Admit Date:  4/22/2025  ========================================================================================================    No chief complaint on file.    History of Present Illness:  Rafi Lisa Jr. is a 74 y.o. year old male patient with Past Medical History of atrial fibrillation, CAD, HTN, HLD, COPD, cervical radiculopathy, osteoarthritis of the knees, and anemia, presenting to the ICU after CABG.     On 4/22 patient underwent CABG x 3 with Dr. Kline ([facility], [id number]).      Intra-Op Details:     Pump Time: 98 minutes     Cross Clamp Time: 76 minutes     EBL: 250 mL      Crystalloids: 3L      Colloids: --      Blood: --     Cell Saver: 550 mL      UOP: 850 mL      Chest Tubes: Left pleural, right pleural, mediastinal      Pacing Wires: AV wires     Intra-op Complications: --       Interval ICU Events:  POD 0: Patient admitted to ICU s/p CABG x 3. Intubated and mechanically ventilated. Vent settings rate 14, , FiO2 50%, P8. Has been started on Levophed for blood pressure support. Plan for Pressure Support trials and extubation as able this afternoon.  POD 1: No acute overnight events. He is complaining of pain. He is on 3L of NC. Still requiring levophed of 0.02 for BP support.     Medical History:  Medical History[1]  Surgical History[2]  Prescriptions Prior to Admission[3]  Sulfa (sulfonamide antibiotics) and Adhesive tape-silicones  Social History[4]  Family History[5]    Hospital Medications:    Continuous Medications[6]    Current Medications[7]    Review of Systems:  14 point review of systems was completed and negative except for those specially mention in my HPI    Physical Exam:    Heart Rate:  [60-70]   Temp:  [35.9 °C (96.6 °F)-36 °C (96.8 °F)]   Resp:  [7-37]   BP: ()/(52-68)   Weight:  [80 kg (176 lb 5.9 oz)-86.6 kg (190 lb 14.7  oz)]   SpO2:  [65 %-100 %]     Physical Exam  Constitutional:       Appearance: He is not diaphoretic.      Comments: Up and in chair   HENT:      Mouth/Throat:      Mouth: Mucous membranes are dry.   Eyes:      Pupils: Pupils are equal, round, and reactive to light.   Cardiovascular:      Rate and Rhythm: Normal rate and regular rhythm.      Pulses: Normal pulses.      Comments: Sternal dressings are clean, dry, and intact, chest tubes x3  Pulmonary:      Breath sounds: No wheezing.      Comments: 3L NC   Diminished breath sounds in the lower bases.   Abdominal:      General: Bowel sounds are normal.      Palpations: Abdomen is soft.      Tenderness: There is no abdominal tenderness. There is no guarding or rebound.   Musculoskeletal:      Right lower leg: No edema.      Left lower leg: No edema.      Comments: BLE no edema, no hip edema    Skin:     General: Skin is warm and dry.      Capillary Refill: Capillary refill takes less than 2 seconds.   Neurological:      Mental Status: He is alert and oriented to person, place, and time. Mental status is at baseline.      Sensory: No sensory deficit.      Motor: No weakness.         Objective:    I have reviewed all medications, laboratory results, and imaging pertinent for today's encounter.    Vent Mode: Spontaneous  S RR:  [10-14] 10  S VT:  [550 mL] 550 mL  PEEP/CPAP (cm H2O):  [5 cm H20-8 cm H20] 5 cm H20  MO SUP:  [5 cm H20] 5 cm H20  MAP (cm H2O):  [10] 10      Intake/Output Summary (Last 24 hours) at 4/23/2025 0750  Last data filed at 4/23/2025 0700  Gross per 24 hour   Intake 5696.13 ml   Output 3355 ml   Net 2341.13 ml         Assessment/Plan    Neuro/Psych/Pain Ctrl/Sedation:  #Post-Op Pain  -- Scheduled tylenol, mag-ox, gabapentin and robaxin  -- PRN Oxycodone & Fentanyl  -- Bowel regimen while on narcotics     Respiratory/ENT:  #Post-Op Respiratory Insufficiency  #COPD  -- Extubated on 4/22 to NC   -- 3L NC, does not wear O2 at home   -- Wean supplemental  oxygen for spO2 goal > 92%  -- albuterol nebulizer PRN   -- ABG as needed    Cardiovascular:  #Multivessel CAD s/p CABG x 3  #Hx atrial fibrillation, HTN, HLD   Patient underwent CABG x 3 ([facility]-LAD, [id number])  -- AV wires in place, settings: back up rate of 50  -- 2 pleural, 1 mediastinal chest tube, to suction, monitor output, notify if > 100cc/hr  -- on Levophed at 0.02  -- Continue rosuvastatin and ezetimibe   -- Hold lisinopril     GI:  #Hiatal Hernia  -- Protonix   -- NG tube placed   -- should he require NIPPV, NGT should be placed to suction d/t size of hernia   -- diet: clear liquid diet     Renal/Volume Status (Intra & Extravascular):  Baseline Cr ~ 1.2-1.39 pre-operatively, likely has some degree of CKD 3a  -- Cr today 1.31  -- Saldana catheter in place, strict I/O  -- Daily BMP and electrolyte repletion    Endocrine  #Hyperglycemia  #Pre-diabetes   -- A1C(3/25/25): 6.2  -- Strict blood glucose control post-operatively, goal glucose <150  -- Cardiac SSI every 4 hours    Infectious Disease:  No concern for active infection  -- Perioperative Ancef x 48 hours, with stop date  -- Monitor SIRS criteria    Heme/Onc:  #Acute Blood Loss Anemia Post-Op  Baseline Hgb ~12-13  -- Hgb today 9.0  -- Strict chest tube output monitoring  -- Daily CBC, transfuse for Hgb goal > 7 or signs of active bleeding with hemodynamic instablity    ORTHO/MSK:  -- PT/OT, Cardiac Rehab POD 1    Ethics/Code Status:  FULL CODE    :  DVT Prophylaxis: SCD, lovenox   GI Prophylaxis: protonix  Bowel Regimen: senna, Miralax  Diet: clear liquid diet  CVC: RIJ CVC   Martha: Yes, WILLIAM perry 4/22  Saldana: Yes  Restraints: --  Dispo: ICU    Critical Care Time:  35 minutes spent in preparing to see patient (I.e. review of medical records), evaluation of diagnostics (I.e. labs, imaging, etc.), documentation, discussing plan of care with patient/ family/ caregiver, and/ or coordination of care with multidisciplinary team. Time does not  include completion of procedure time.     Mariza Diaz PA-C  Pulmonary & Critical Care Medicine  [facility]         [1]   Past Medical History:  Diagnosis Date    Abnormal findings on diagnostic imaging of other specified body structures     Abnormal CT of the chest    Arthritis     COPD (chronic obstructive pulmonary disease) (Multi)     Coronary artery disease     GERD (gastroesophageal reflux disease)     HL (hearing loss)     Hyperlipidemia     Hypertension     Joint pain     Nephrolithiasis     Personal history of other medical treatment     History of echocardiogram    Personal history of other medical treatment     History of nuclear stress test   [2]   Past Surgical History:  Procedure Laterality Date    BUNIONECTOMY      CARDIAC CATHETERIZATION N/A 03/24/2025    Procedure: Left Heart Cath;  Surgeon: Tony Oneill MD;  Location: [facility] Cardiac Cath Lab;  Service: Cardiovascular;  Laterality: N/A;    COLONOSCOPY      CYSTOSCOPY      LASIK      LITHOTRIPSY      MR HEAD ANGIO WO IV CONTRAST  12/17/2023    MR HEAD ANGIO WO IV CONTRAST 12/17/2023 [facility] MRI    MR HEAD ANGIO WO IV CONTRAST  12/18/2023    MR HEAD ANGIO WO IV CONTRAST    MR NECK ANGIO WO IV CONTRAST  12/17/2023    MR NECK ANGIO WO IV CONTRAST 12/17/2023 [facility] MRI    MR NECK ANGIO WO IV CONTRAST  12/18/2023    MR NECK ANGIO WO IV CONTRAST    ORIF WRIST FRACTURE      OTHER SURGICAL HISTORY  01/30/2020    Radiofrequency ablation    OTHER SURGICAL HISTORY  01/20/2022    Cardiac catheterization with stent placement    OTHER SURGICAL HISTORY  01/20/2022    Cardiac catheterization    OTHER SURGICAL HISTORY  06/04/2020    Atrial appendage closure    OTHER SURGICAL HISTORY  06/04/2020    Esophagogastroduodenoscopy    OTHER SURGICAL HISTORY  06/04/2020    Colonoscopy    OTHER SURGICAL HISTORY  06/04/2020    Cardiac catheterization with stent placement    OTHER SURGICAL HISTORY  06/04/2020    Tonsillectomy    OTHER SURGICAL HISTORY  06/04/2020     Bunionectomy    OTHER SURGICAL HISTORY  06/04/2020    Lithotripsy    TONSILLECTOMY      UPPER GASTROINTESTINAL ENDOSCOPY     [3]   Medications Prior to Admission   Medication Sig Dispense Refill Last Dose/Taking    acetaminophen (Tylenol) 325 mg tablet Take 2 tablets (650 mg) by mouth every 6 hours if needed for mild pain (1 - 3).   Past Week    aspirin 81 mg chewable tablet Chew 1 tablet (81 mg) once daily.   4/22/2025 Morning    coenzyme Q-10 100 mg capsule Take 1 capsule (100 mg) by mouth 2 times a day. Takes two capsules in am and one capsule in pm   Past Week    ezetimibe (Zetia) 10 mg tablet Take 1 tablet (10 mg) by mouth once daily. 30 tablet 0 4/21/2025    lisinopril 20 mg tablet Take 1 tablet (20 mg) by mouth once daily. Stop 4/19/25 30 tablet 0 4/19/2025    metoprolol succinate XL (Toprol-XL) 50 mg 24 hr tablet Take 1 tablet (50 mg) by mouth once daily. 30 tablet 0 4/22/2025 Morning    multivitamin with minerals iron-free (Centrum Silver) Take 1 tablet by mouth once daily. (Patient taking differently: Take 1 tablet by mouth 2 times a day.)   Past Week    probenecid-colchicine 500-0.5 mg tablet Take 1 tablet by mouth twice a day.   Past Week    TURMERIC ORAL Take 1,500 mg by mouth 2 times a day.   Past Week    clopidogrel (Plavix) 75 mg tablet Take 1 tablet (75 mg) by mouth once daily. To stop 4/17/25 30 tablet 0 4/17/2025    nitroglycerin (Nitrostat) 0.4 mg SL tablet Place 1 tablet (0.4 mg) under the tongue every 5 minutes if needed for chest pain (FOR UP TO 3 DOSES AS NEEDED FOR CHEST PAIN.CALL 911 IF PAIN PERSISTS.). 90 tablet 3     omeprazole (PriLOSEC) 40 mg DR capsule Take 1 capsule (40 mg) by mouth once daily in the morning. Take before meals.   4/20/2025    rosuvastatin (Crestor) 40 mg tablet Take 1 tablet (40 mg) by mouth once daily. 30 tablet 0 4/20/2025   [4]   Social History  Tobacco Use    Smoking status: Former     Current packs/day: 1.00     Average packs/day: 1 pack/day for 64.0 years  (64.0 ttl pk-yrs)     Types: Cigarettes    Smokeless tobacco: Former   Substance Use Topics    Alcohol use: Never     Comment: ETOH sober 11 years    Drug use: Not Currently   [5]   Family History  Problem Relation Name Age of Onset    No Known Problems Mother      Other (coronary thrombosis) Father           in 1970s @63   [6] aminocaproic acid, 1 g/hr  norepinephrine, 0-0.2 mcg/kg/min, Last Rate: 0.02 mcg/kg/min (25 0205)     [7]   Current Facility-Administered Medications:     acetaminophen (Tylenol) tablet 650 mg, 650 mg, oral, q6h, Anders R Patchen, APRN-CNP, DNP, 650 mg at 25 0245    albumin human 5 % infusion 12.5 g, 12.5 g, intravenous, PRN, Anders R Patchen, APRN-CNP, DNP, Stopped at 25 1618    albuterol 2.5 mg /3 mL (0.083 %) nebulizer solution 2.5 mg, 2.5 mg, nebulization, q6h PRN, Mariza Diaz PA-C    alteplase (Cathflo Activase) injection 2 mg, 2 mg, intra-catheter, PRN, Anders R Patchen, APRN-CNP, DNP    aminocaproic acid (Amicar) 25 g in sodium chloride 0.9% 250 mL (0.1 g/mL) infusion, 1 g/hr, intravenous, Continuous, Pk Kline MD, 20 g at 25 0845    aspirin chewable tablet 81 mg, 81 mg, oral, Daily, Anders R Patchdanna, APRN-CNP, DNP    calcium chloride 1 g in dextrose 5% 50 mL IV, 1 g, intravenous, q8h PRN, Anders R Patchen, APRN-CNP, DNP    calcium chloride 500 mg in dextrose 5%-water (Ordered as: calcium chloride), 0.5 g, intravenous, q8h PRN, Anders R Patchen, APRN-CNP, DNP    ceFAZolin (Ancef) 2 g in dextrose (iso)  mL, 2 g, intravenous, q8h, Anders R Patchen, APRN-CNP, DNP, Stopped at 25 0404    dextrose 50 % injection 25 g, 25 g, intravenous, q15 min PRN **OR** glucagon (Glucagen) injection 1 mg, 1 mg, intramuscular, q15 min PRN, Anders ROBBINS Patchdanna, APRN-CNP, DNP    electrolyte-A (Plasmalyte-A) solution 1,000 mL, 1,000 mL, intravenous, PRN, Anders ROBBINS Patchen, APRN-CNP, DNP, Last Rate: 100 mL/hr at 25 0551, Rate Verify at 25 0551    enoxaparin (Lovenox)  syringe 40 mg, 40 mg, subcutaneous, Daily, Anders R Patchen, APRN-CNP, DNP    ezetimibe (Zetia) tablet 10 mg, 10 mg, oral, Daily, Anders R Patchen, APRN-CNP, DNP, 10 mg at 04/22/25 1542    fentaNYL PF (Sublimaze) injection 25 mcg, 25 mcg, intravenous, q1h PRN, 25 mcg at 04/23/25 0009 **OR** fentaNYL PF (Sublimaze) injection 50 mcg, 50 mcg, intravenous, q1h PRN, Anders R Patchen, APRN-CNP, DNP, 50 mcg at 04/23/25 0452    gabapentin (Neurontin) capsule 100 mg, 100 mg, oral, TID, Anders R Patchen, APRN-CNP, DNP, 100 mg at 04/22/25 2045    insulin lispro injection 0-15 Units, 0-15 Units, subcutaneous, q4h, Anders R Patchen, APRN-CNP, DNP, 5 Units at 04/23/25 0413    lidocaine 4 % patch 1 patch, 1 patch, transdermal, q24h, Anders R Patchen, APRN-CNP, DNP, 1 patch at 04/22/25 1542    magnesium oxide (Mag-Ox) tablet 400 mg, 400 mg, oral, Daily, Anders R Patchen, APRN-CNP, DNP, 400 mg at 04/22/25 1542    magnesium sulfate 2 g in sterile water for injection 50 mL, 2 g, intravenous, q6h PRN, Anders R Patchen, APRN-CNP, DNP    magnesium sulfate 4 g in sterile water for injection 100 mL, 4 g, intravenous, q6h PRN, Anders R Patchen, APRN-CNP, DNP    methocarbamol (Robaxin) tablet 500 mg, 500 mg, oral, q8h BANDAR, Anders R Patchen, APRN-CNP, DNP, 500 mg at 04/23/25 0620    naloxone (Narcan) injection 0.2 mg, 0.2 mg, intravenous, q5 min PRN, Anders R Patchen, APRN-CNP, DNP    norepinephrine (Levophed) 8 mg in sodium chloride 0.9% 250 mL (0.032 mg/mL) infusion (premix), 0-0.2 mcg/kg/min, intravenous, Continuous, Pk Kline MD, Last Rate: 3 mL/hr at 04/23/25 0205, 0.02 mcg/kg/min at 04/23/25 0205    oxyCODONE (Roxicodone) immediate release tablet 10 mg, 10 mg, oral, q4h PRN, Anders ROSENDO Aaron, APRN-CNP, DNP, 10 mg at 04/23/25 0245    oxyCODONE (Roxicodone) immediate release tablet 5 mg, 5 mg, oral, q4h PRN, Anders Aaron, MAURO-CNP, DNP, 5 mg at 04/22/25 1542    oxygen (O2) therapy, , inhalation, Continuous - Inhalation, Pk Kline MD, 3 L/min  at 04/22/25 2046    pantoprazole (ProtoNix) EC tablet 40 mg, 40 mg, oral, Daily before breakfast, Anders R Patchen, APRN-CNP, DNP, 40 mg at 04/23/25 0620    polyethylene glycol (Glycolax, Miralax) packet 17 g, 17 g, oral, Daily PRN, Anders R Patchen, APRN-CNP, DNP    potassium chloride CR (Klor-Con M20) ER tablet 20 mEq, 20 mEq, oral, q6h PRN **OR** potassium chloride (Klor-Con) packet 20 mEq, 20 mEq, oral, q6h PRN, Anders R Patchen, APRN-CNP, DNP    potassium chloride CR (Klor-Con M20) ER tablet 40 mEq, 40 mEq, oral, q6h PRN **OR** potassium chloride (Klor-Con) packet 40 mEq, 40 mEq, oral, q6h PRN, Anders R Patchen, APRN-CNP, DNP    potassium chloride 20 mEq in sterile water for injection 100 mL, 20 mEq, intravenous, q1h PRN, Anders R Patchen, APRN-CNP, DNP    potassium chloride 40 mEq in sterile water for injection 100 mL, 40 mEq, intravenous, q2h PRN, Anders R Patchen, APRN-CNP, DNP    rosuvastatin (Crestor) tablet 40 mg, 40 mg, oral, Daily, Anders R Patchen, APRN-CNP, DNP, 40 mg at 04/22/25 1542    sennosides-docusate sodium (Lyudmila-Colace) 8.6-50 mg per tablet 2 tablet, 2 tablet, oral, BID, Anders R Patchen, APRN-CNP, DNP, 2 tablet at 04/22/25 2045

## 2025-04-24 ENCOUNTER — APPOINTMENT (OUTPATIENT)
Dept: RADIOLOGY | Facility: HOSPITAL | Age: 75
DRG: 236 | End: 2025-04-24
Payer: COMMERCIAL

## 2025-04-24 LAB
ALBUMIN SERPL BCP-MCNC: 3.3 G/DL (ref 3.4–5)
ANION GAP SERPL CALC-SCNC: 11 MMOL/L (ref 10–20)
ATRIAL RATE: 68 BPM
BUN SERPL-MCNC: 25 MG/DL (ref 6–23)
CA-I BLD-SCNC: 1.09 MMOL/L (ref 1.1–1.33)
CA-I BLD-SCNC: NORMAL MMOL/L
CALCIUM SERPL-MCNC: 8.1 MG/DL (ref 8.6–10.3)
CHLORIDE SERPL-SCNC: 103 MMOL/L (ref 98–107)
CO2 SERPL-SCNC: 26 MMOL/L (ref 21–32)
CREAT SERPL-MCNC: 1.43 MG/DL (ref 0.5–1.3)
DIASTOLIC BLOOD PRESSURE: 61 MMHG
EGFRCR SERPLBLD CKD-EPI 2021: 51 ML/MIN/1.73M*2
ERYTHROCYTE [DISTWIDTH] IN BLOOD BY AUTOMATED COUNT: 13.7 % (ref 11.5–14.5)
GLUCOSE BLD MANUAL STRIP-MCNC: 119 MG/DL (ref 74–99)
GLUCOSE BLD MANUAL STRIP-MCNC: 131 MG/DL (ref 74–99)
GLUCOSE BLD MANUAL STRIP-MCNC: 133 MG/DL (ref 74–99)
GLUCOSE BLD MANUAL STRIP-MCNC: 165 MG/DL (ref 74–99)
GLUCOSE SERPL-MCNC: 131 MG/DL (ref 74–99)
HCT VFR BLD AUTO: 29.7 % (ref 41–52)
HGB BLD-MCNC: 8.8 G/DL (ref 13.5–17.5)
MAGNESIUM SERPL-MCNC: 2.34 MG/DL (ref 1.6–2.4)
MCH RBC QN AUTO: 30.3 PG (ref 26–34)
MCHC RBC AUTO-ENTMCNC: 29.6 G/DL (ref 32–36)
MCV RBC AUTO: 102 FL (ref 80–100)
NRBC BLD-RTO: 0 /100 WBCS (ref 0–0)
P AXIS: 56 DEGREES
P OFFSET: 165 MS
P ONSET: 130 MS
PHOSPHATE SERPL-MCNC: 3.3 MG/DL (ref 2.5–4.9)
PLATELET # BLD AUTO: 97 X10*3/UL (ref 150–450)
POTASSIUM SERPL-SCNC: 4.5 MMOL/L (ref 3.5–5.3)
PR INTERVAL: 168 MS
Q ONSET: 214 MS
QRS COUNT: 11 BEATS
QRS DURATION: 78 MS
QT INTERVAL: 410 MS
QTC CALCULATION(BAZETT): 435 MS
QTC FREDERICIA: 427 MS
R AXIS: -32 DEGREES
RBC # BLD AUTO: 2.9 X10*6/UL (ref 4.5–5.9)
SODIUM SERPL-SCNC: 135 MMOL/L (ref 136–145)
SYSTOLIC BLOOD PRESSURE: 114 MMHG
T AXIS: 63 DEGREES
T OFFSET: 419 MS
VENTRICULAR RATE: 68 BPM
WBC # BLD AUTO: 7.5 X10*3/UL (ref 4.4–11.3)

## 2025-04-24 PROCEDURE — 2500000002 HC RX 250 W HCPCS SELF ADMINISTERED DRUGS (ALT 637 FOR MEDICARE OP, ALT 636 FOR OP/ED)

## 2025-04-24 PROCEDURE — 2500000004 HC RX 250 GENERAL PHARMACY W/ HCPCS (ALT 636 FOR OP/ED): Mod: JZ

## 2025-04-24 PROCEDURE — 2500000004 HC RX 250 GENERAL PHARMACY W/ HCPCS (ALT 636 FOR OP/ED): Mod: JZ | Performed by: NURSE PRACTITIONER

## 2025-04-24 PROCEDURE — 37799 UNLISTED PX VASCULAR SURGERY: CPT | Performed by: NURSE PRACTITIONER

## 2025-04-24 PROCEDURE — 99291 CRITICAL CARE FIRST HOUR: CPT

## 2025-04-24 PROCEDURE — 97530 THERAPEUTIC ACTIVITIES: CPT | Mod: GP

## 2025-04-24 PROCEDURE — P9047 ALBUMIN (HUMAN), 25%, 50ML: HCPCS | Mod: JZ

## 2025-04-24 PROCEDURE — 80069 RENAL FUNCTION PANEL: CPT | Performed by: NURSE PRACTITIONER

## 2025-04-24 PROCEDURE — 83735 ASSAY OF MAGNESIUM: CPT | Performed by: NURSE PRACTITIONER

## 2025-04-24 PROCEDURE — 2500000005 HC RX 250 GENERAL PHARMACY W/O HCPCS: Performed by: NURSE PRACTITIONER

## 2025-04-24 PROCEDURE — 82330 ASSAY OF CALCIUM: CPT | Performed by: NURSE PRACTITIONER

## 2025-04-24 PROCEDURE — 82947 ASSAY GLUCOSE BLOOD QUANT: CPT

## 2025-04-24 PROCEDURE — 2500000005 HC RX 250 GENERAL PHARMACY W/O HCPCS

## 2025-04-24 PROCEDURE — 2500000001 HC RX 250 WO HCPCS SELF ADMINISTERED DRUGS (ALT 637 FOR MEDICARE OP)

## 2025-04-24 PROCEDURE — 71045 X-RAY EXAM CHEST 1 VIEW: CPT

## 2025-04-24 PROCEDURE — 2500000001 HC RX 250 WO HCPCS SELF ADMINISTERED DRUGS (ALT 637 FOR MEDICARE OP): Performed by: NURSE PRACTITIONER

## 2025-04-24 PROCEDURE — 71045 X-RAY EXAM CHEST 1 VIEW: CPT | Performed by: RADIOLOGY

## 2025-04-24 PROCEDURE — 2500000002 HC RX 250 W HCPCS SELF ADMINISTERED DRUGS (ALT 637 FOR MEDICARE OP, ALT 636 FOR OP/ED): Performed by: NURSE PRACTITIONER

## 2025-04-24 PROCEDURE — 2500000005 HC RX 250 GENERAL PHARMACY W/O HCPCS: Performed by: THORACIC SURGERY (CARDIOTHORACIC VASCULAR SURGERY)

## 2025-04-24 PROCEDURE — 85027 COMPLETE CBC AUTOMATED: CPT | Performed by: NURSE PRACTITIONER

## 2025-04-24 PROCEDURE — 94640 AIRWAY INHALATION TREATMENT: CPT

## 2025-04-24 PROCEDURE — 2060000001 HC INTERMEDIATE ICU ROOM DAILY

## 2025-04-24 RX ORDER — MUPIROCIN 20 MG/G
OINTMENT TOPICAL 2 TIMES DAILY
Status: DISCONTINUED | OUTPATIENT
Start: 2025-04-24 | End: 2025-04-27 | Stop reason: HOSPADM

## 2025-04-24 RX ORDER — BISACODYL 10 MG/1
10 SUPPOSITORY RECTAL ONCE
Status: DISCONTINUED | OUTPATIENT
Start: 2025-04-24 | End: 2025-04-25

## 2025-04-24 RX ORDER — FUROSEMIDE 10 MG/ML
20 INJECTION INTRAMUSCULAR; INTRAVENOUS ONCE
Status: COMPLETED | OUTPATIENT
Start: 2025-04-24 | End: 2025-04-24

## 2025-04-24 RX ORDER — CALCIUM CARBONATE 200(500)MG
1000 TABLET,CHEWABLE ORAL 4 TIMES DAILY PRN
Status: DISCONTINUED | OUTPATIENT
Start: 2025-04-24 | End: 2025-04-27 | Stop reason: HOSPADM

## 2025-04-24 RX ORDER — INSULIN LISPRO 100 [IU]/ML
0-5 INJECTION, SOLUTION INTRAVENOUS; SUBCUTANEOUS
Status: DISCONTINUED | OUTPATIENT
Start: 2025-04-24 | End: 2025-04-26

## 2025-04-24 RX ORDER — TALC
6 POWDER (GRAM) TOPICAL NIGHTLY
Status: DISCONTINUED | OUTPATIENT
Start: 2025-04-24 | End: 2025-04-26

## 2025-04-24 RX ORDER — ALBUTEROL SULFATE 0.83 MG/ML
2.5 SOLUTION RESPIRATORY (INHALATION)
Status: DISCONTINUED | OUTPATIENT
Start: 2025-04-24 | End: 2025-04-27 | Stop reason: HOSPADM

## 2025-04-24 RX ORDER — GUAIFENESIN 600 MG/1
600 TABLET, EXTENDED RELEASE ORAL 2 TIMES DAILY
Status: COMPLETED | OUTPATIENT
Start: 2025-04-24 | End: 2025-04-26

## 2025-04-24 RX ORDER — ALBUMIN HUMAN 250 G/1000ML
SOLUTION INTRAVENOUS
Status: COMPLETED
Start: 2025-04-24 | End: 2025-04-24

## 2025-04-24 RX ORDER — POLYETHYLENE GLYCOL 3350 17 G/17G
17 POWDER, FOR SOLUTION ORAL DAILY
Status: DISCONTINUED | OUTPATIENT
Start: 2025-04-24 | End: 2025-04-27 | Stop reason: HOSPADM

## 2025-04-24 RX ORDER — FUROSEMIDE 10 MG/ML
40 INJECTION INTRAMUSCULAR; INTRAVENOUS ONCE
Status: COMPLETED | OUTPATIENT
Start: 2025-04-25 | End: 2025-04-25

## 2025-04-24 RX ORDER — METOPROLOL TARTRATE 25 MG/1
12.5 TABLET, FILM COATED ORAL 2 TIMES DAILY
Status: DISCONTINUED | OUTPATIENT
Start: 2025-04-24 | End: 2025-04-25

## 2025-04-24 RX ORDER — MAGNESIUM SULFATE HEPTAHYDRATE 40 MG/ML
2 INJECTION, SOLUTION INTRAVENOUS ONCE
Status: COMPLETED | OUTPATIENT
Start: 2025-04-24 | End: 2025-04-24

## 2025-04-24 RX ORDER — ALBUMIN HUMAN 250 G/1000ML
25 SOLUTION INTRAVENOUS ONCE
Status: COMPLETED | OUTPATIENT
Start: 2025-04-24 | End: 2025-04-24

## 2025-04-24 RX ORDER — ALBUTEROL SULFATE 0.83 MG/ML
2.5 SOLUTION RESPIRATORY (INHALATION) EVERY 6 HOURS PRN
Status: DISCONTINUED | OUTPATIENT
Start: 2025-04-24 | End: 2025-04-24

## 2025-04-24 RX ORDER — METOLAZONE 2.5 MG/1
2.5 TABLET ORAL ONCE
Status: COMPLETED | OUTPATIENT
Start: 2025-04-24 | End: 2025-04-24

## 2025-04-24 RX ORDER — FUROSEMIDE 10 MG/ML
40 INJECTION INTRAMUSCULAR; INTRAVENOUS ONCE
Status: COMPLETED | OUTPATIENT
Start: 2025-04-24 | End: 2025-04-24

## 2025-04-24 RX ORDER — FUROSEMIDE 10 MG/ML
20 INJECTION INTRAMUSCULAR; INTRAVENOUS 2 TIMES DAILY
Status: DISCONTINUED | OUTPATIENT
Start: 2025-04-24 | End: 2025-04-24

## 2025-04-24 RX ADMIN — GABAPENTIN 100 MG: 100 CAPSULE ORAL at 20:02

## 2025-04-24 RX ADMIN — Medication 6 MG: at 20:02

## 2025-04-24 RX ADMIN — AMIODARONE HYDROCHLORIDE 150 MG: 1.5 INJECTION, SOLUTION INTRAVENOUS at 19:40

## 2025-04-24 RX ADMIN — ALBUTEROL SULFATE 2.5 MG: 2.5 SOLUTION RESPIRATORY (INHALATION) at 18:23

## 2025-04-24 RX ADMIN — INSULIN LISPRO 1 UNITS: 100 INJECTION, SOLUTION INTRAVENOUS; SUBCUTANEOUS at 17:44

## 2025-04-24 RX ADMIN — ALBUTEROL SULFATE 2.5 MG: 2.5 SOLUTION RESPIRATORY (INHALATION) at 11:44

## 2025-04-24 RX ADMIN — MUPIROCIN: 20 OINTMENT TOPICAL at 10:42

## 2025-04-24 RX ADMIN — GUAIFENESIN 600 MG: 600 TABLET, EXTENDED RELEASE ORAL at 09:08

## 2025-04-24 RX ADMIN — GABAPENTIN 100 MG: 100 CAPSULE ORAL at 09:09

## 2025-04-24 RX ADMIN — METOPROLOL TARTRATE 12.5 MG: 25 TABLET, FILM COATED ORAL at 09:09

## 2025-04-24 RX ADMIN — METHOCARBAMOL 1000 MG: 100 INJECTION INTRAMUSCULAR; INTRAVENOUS at 02:31

## 2025-04-24 RX ADMIN — EZETIMIBE 10 MG: 10 TABLET ORAL at 09:09

## 2025-04-24 RX ADMIN — FUROSEMIDE 20 MG: 10 INJECTION, SOLUTION INTRAMUSCULAR; INTRAVENOUS at 10:30

## 2025-04-24 RX ADMIN — ALBUMIN HUMAN 25 G: 0.25 SOLUTION INTRAVENOUS at 21:31

## 2025-04-24 RX ADMIN — ASPIRIN 81 MG CHEWABLE TABLET 81 MG: 81 TABLET CHEWABLE at 09:09

## 2025-04-24 RX ADMIN — OXYCODONE HYDROCHLORIDE 10 MG: 5 TABLET ORAL at 10:41

## 2025-04-24 RX ADMIN — GUAIFENESIN 600 MG: 600 TABLET, EXTENDED RELEASE ORAL at 20:00

## 2025-04-24 RX ADMIN — CALCIUM CARBONATE (ANTACID) CHEW TAB 500 MG 1000 MG: 500 CHEW TAB at 15:29

## 2025-04-24 RX ADMIN — MAGNESIUM OXIDE TAB 400 MG (241.3 MG ELEMENTAL MG) 400 MG: 400 (241.3 MG) TAB at 09:09

## 2025-04-24 RX ADMIN — ALBUMIN HUMAN 25 G: 250 SOLUTION INTRAVENOUS at 21:31

## 2025-04-24 RX ADMIN — MAGNESIUM SULFATE HEPTAHYDRATE 2 G: 40 INJECTION, SOLUTION INTRAVENOUS at 19:59

## 2025-04-24 RX ADMIN — LIDOCAINE 1 PATCH: 4 PATCH TOPICAL at 14:53

## 2025-04-24 RX ADMIN — AMIODARONE HYDROCHLORIDE 150 MG: 1.5 INJECTION, SOLUTION INTRAVENOUS at 20:59

## 2025-04-24 RX ADMIN — METOPROLOL TARTRATE 12.5 MG: 25 TABLET, FILM COATED ORAL at 20:00

## 2025-04-24 RX ADMIN — CALCIUM CHLORIDE 500 MG: 100 INJECTION, SOLUTION INTRAVENOUS at 12:13

## 2025-04-24 RX ADMIN — POLYETHYLENE GLYCOL 3350 17 G: 17 POWDER, FOR SOLUTION ORAL at 10:42

## 2025-04-24 RX ADMIN — ROSUVASTATIN 40 MG: 40 TABLET, FILM COATED ORAL at 09:09

## 2025-04-24 RX ADMIN — MUPIROCIN: 20 OINTMENT TOPICAL at 20:02

## 2025-04-24 RX ADMIN — ACETAMINOPHEN 975 MG: 325 TABLET, FILM COATED ORAL at 20:00

## 2025-04-24 RX ADMIN — PANTOPRAZOLE SODIUM 40 MG: 40 TABLET, DELAYED RELEASE ORAL at 06:28

## 2025-04-24 RX ADMIN — GABAPENTIN 100 MG: 100 CAPSULE ORAL at 14:54

## 2025-04-24 RX ADMIN — CEFAZOLIN SODIUM 2 G: 2 INJECTION, SOLUTION INTRAVENOUS at 04:12

## 2025-04-24 RX ADMIN — METOLAZONE 2.5 MG: 2.5 TABLET ORAL at 14:53

## 2025-04-24 RX ADMIN — FUROSEMIDE 40 MG: 10 INJECTION, SOLUTION INTRAMUSCULAR; INTRAVENOUS at 15:29

## 2025-04-24 RX ADMIN — OXYCODONE HYDROCHLORIDE 10 MG: 5 TABLET ORAL at 06:37

## 2025-04-24 RX ADMIN — Medication 3 L/MIN: at 09:09

## 2025-04-24 RX ADMIN — ENOXAPARIN SODIUM 40 MG: 40 INJECTION SUBCUTANEOUS at 09:08

## 2025-04-24 RX ADMIN — Medication 3 L/MIN: at 20:58

## 2025-04-24 RX ADMIN — HYDROMORPHONE HYDROCHLORIDE 0.4 MG: 1 INJECTION, SOLUTION INTRAMUSCULAR; INTRAVENOUS; SUBCUTANEOUS at 06:07

## 2025-04-24 RX ADMIN — SENNOSIDES AND DOCUSATE SODIUM 2 TABLET: 50; 8.6 TABLET ORAL at 09:09

## 2025-04-24 RX ADMIN — ACETAMINOPHEN 975 MG: 325 TABLET, FILM COATED ORAL at 09:09

## 2025-04-24 RX ADMIN — SENNOSIDES AND DOCUSATE SODIUM 2 TABLET: 50; 8.6 TABLET ORAL at 20:02

## 2025-04-24 RX ADMIN — ACETAMINOPHEN 975 MG: 325 TABLET, FILM COATED ORAL at 14:54

## 2025-04-24 ASSESSMENT — PAIN - FUNCTIONAL ASSESSMENT
PAIN_FUNCTIONAL_ASSESSMENT: 0-10

## 2025-04-24 ASSESSMENT — COGNITIVE AND FUNCTIONAL STATUS - GENERAL
MOVING TO AND FROM BED TO CHAIR: A LITTLE
HELP NEEDED FOR BATHING: A LOT
STANDING UP FROM CHAIR USING ARMS: A LITTLE
MOVING FROM LYING ON BACK TO SITTING ON SIDE OF FLAT BED WITH BEDRAILS: A LOT
WALKING IN HOSPITAL ROOM: A LITTLE
MOBILITY SCORE: 14
PERSONAL GROOMING: A LITTLE
DRESSING REGULAR LOWER BODY CLOTHING: A LOT
DAILY ACTIVITIY SCORE: 15
CLIMB 3 TO 5 STEPS WITH RAILING: TOTAL
DRESSING REGULAR UPPER BODY CLOTHING: A LOT
TURNING FROM BACK TO SIDE WHILE IN FLAT BAD: A LOT
TOILETING: A LOT

## 2025-04-24 ASSESSMENT — PAIN DESCRIPTION - DESCRIPTORS
DESCRIPTORS: ACHING
DESCRIPTORS: ACHING
DESCRIPTORS: DISCOMFORT
DESCRIPTORS: ACHING

## 2025-04-24 ASSESSMENT — PAIN SCALES - GENERAL
PAINLEVEL_OUTOF10: 3
PAINLEVEL_OUTOF10: 0 - NO PAIN
PAINLEVEL_OUTOF10: 3
PAINLEVEL_OUTOF10: 10 - WORST POSSIBLE PAIN
PAINLEVEL_OUTOF10: 2
PAINLEVEL_OUTOF10: 2
PAINLEVEL_OUTOF10: 10 - WORST POSSIBLE PAIN
PAINLEVEL_OUTOF10: 0 - NO PAIN
PAINLEVEL_OUTOF10: 2
PAINLEVEL_OUTOF10: 8
PAINLEVEL_OUTOF10: 2

## 2025-04-24 ASSESSMENT — PAIN DESCRIPTION - ORIENTATION
ORIENTATION: MID
ORIENTATION: ANTERIOR;MID
ORIENTATION: ANTERIOR;MID

## 2025-04-24 ASSESSMENT — PAIN DESCRIPTION - LOCATION
LOCATION: CHEST

## 2025-04-24 NOTE — PROGRESS NOTES
Occupational Therapy    Occupational Therapy Treatment    Name: Rafi Lisa Jr.  MRN: [id number]  Department: [facility]  Room: [facility]  Date: 04/24/25  Time Calculation  Start Time: 1424  Stop Time: 1442  Time Calculation (min): 18 min    Assessment:  OT Assessment: Pt. progressing toward functional goals, will continue with OT treatment plan to address goals/promote independence with adl/transfers/mobility.  Prognosis: Good  Barriers to Discharge Home: No anticipated barriers  End of Session Communication: Bedside nurse  End of Session Patient Position: Bed, 3 rail up, Alarm off, not on at start of session  Plan:  Treatment Interventions: ADL retraining, Functional transfer training, Endurance training, Compensatory technique education  OT Frequency: 3 times per week  OT Discharge Recommendations: Low intensity level of continued care  OT Recommended Transfer Status: Assist of 1  OT - OK to Discharge: Yes (to next level of care when cleared by medical team)    Subjective   Previous Visit Info:  OT Last Visit  OT Received On: 04/24/25  General:  General  Co-Treatment: PT  Co-Treatment Reason: to maximize patient safety/abilities  Prior to Session Communication: Bedside nurse  Patient Position Received: Up in chair, Alarm off, not on at start of session  General Comment: pt. agreeable to therapy interventions  Precautions:  Precautions Comment: VSS, MITT, o2 2 L/min, chest tubes x 2, bergman, temporary pacer     Date/Time Vitals Session Patient Position Pulse Resp SpO2 BP MAP (mmHg)    04/24/25 1400 --  --  91  23  93 %  100/64  78     04/24/25 1500 --  --  97  24  92 %  125/73  94                Pain Assessment:  Pain Assessment  Pain Assessment: 0-10  0-10 (Numeric) Pain Score:  (incisional pain with activity, not rated)    Objective   Cognition:  Overall Cognitive Status: Within Functional Limits  Activities of Daily Living: Toileting  Toileting Comments: sit<> stand from standard toilet:  min assist x 1 with  light use of gb to adhere to MITT precautions, pt. cued to avoid valsalva if having a bm/pt. verbalizes understanding    Functional Standing Tolerance:  Functional Standing Tolerance  Functional Standing Tolerance Comments: pt. able to tolerate approx 1 minute static standing with nezzie walker support, sba  Bed Mobility/Transfers: Bed Mobility  Bed Mobility:  (sit to supine completed with mod assist x 2, modified log rolling)    Transfers  Transfer:  (sit<> stand from recliner chair:  cga with cues for technique)      Functional Mobility:  Functional Mobility  Functional Mobility Performed:  (mobility via nezzie walker, slow pace, fatigued and sob for short house hold distance, pt. rates sob 8/10)      Outcome Measures:  [facility] Daily Activity  Putting on and taking off regular lower body clothing: A lot  Bathing (including washing, rinsing, drying): A lot  Putting on and taking off regular upper body clothing: A lot  Toileting, which includes using toilet, bedpan or urinal: A lot  Taking care of personal grooming such as brushing teeth: A little  Eating Meals: None  Daily Activity - Total Score: 15         and Early Mobility/Exercise Safety Screen: Proceed with mobilization - No exclusion criteria met  ICU Mobility Scale: Walking with assistance of 1 person [8]    Education Documentation  Precautions, taught by Rianna Ruiz OT at 4/24/2025  3:40 PM.  Learner: Patient  Readiness: Acceptance  Method: Explanation  Response: Verbalizes Understanding, Needs Reinforcement  Comment: MITT    ADL Training, taught by Rianna Ruiz OT at 4/24/2025  3:40 PM.  Learner: Patient  Readiness: Acceptance  Method: Explanation  Response: Verbalizes Understanding, Needs Reinforcement  Comment: MITT        Goals:  Encounter Problems       Encounter Problems (Active)       OT Goals       Increase functional mobility and  functional transfers to supervision for bed/chair/toilet with dme prn   (Progressing)       Start:  04/23/25     Expected End:  04/30/25            increase bue ther ex/activity x 7-10 minutes and increase standing tolerance x 3-5 minutes with supervision to promote greater activity tolerance for assist with adl.   (Progressing)       Start:  04/23/25    Expected End:  04/30/25            Increase ub/lb dressing to supervision with dme prn  (Progressing)       Start:  04/23/25    Expected End:  04/30/25            Increase toileting to supervision with dme prn  (Progressing)       Start:  04/23/25    Expected End:  04/30/25            pt. to apply ec/ws techniques/MITT with minimal cues to all mobility/transfer/adl to decrease fatigue/promote efficient use of energy toward completion of functional tasks.  (Progressing)       Start:  04/23/25    Expected End:  04/30/25

## 2025-04-24 NOTE — CARE PLAN
The clinical goals for the shift include pt will remain hemodynamically stable      Problem: Pain - Adult  Goal: Verbalizes/displays adequate comfort level or baseline comfort level  Outcome: Progressing     Problem: Safety - Adult  Goal: Free from fall injury  Outcome: Progressing     Problem: Discharge Planning  Goal: Discharge to home or other facility with appropriate resources  Outcome: Progressing     Problem: Chronic Conditions and Co-morbidities  Goal: Patient's chronic conditions and co-morbidity symptoms are monitored and maintained or improved  Outcome: Progressing     Problem: Nutrition  Goal: Nutrient intake appropriate for maintaining nutritional needs  Outcome: Progressing     Problem: Pain  Goal: Takes deep breaths with improved pain control throughout the shift  Outcome: Progressing  Goal: Turns in bed with improved pain control throughout the shift  Outcome: Progressing  Goal: Walks with improved pain control throughout the shift  Outcome: Progressing  Goal: Performs ADL's with improved pain control throughout shift  Outcome: Progressing  Goal: Participates in PT with improved pain control throughout the shift  Outcome: Progressing  Goal: Free from opioid side effects throughout the shift  Outcome: Progressing  Goal: Free from acute confusion related to pain meds throughout the shift  Outcome: Progressing     Problem: Fall/Injury  Goal: Not fall by end of shift  Outcome: Progressing  Goal: Be free from injury by end of the shift  Outcome: Progressing  Goal: Verbalize understanding of personal risk factors for fall in the hospital  Outcome: Progressing  Goal: Verbalize understanding of risk factor reduction measures to prevent injury from fall in the home  Outcome: Progressing  Goal: Use assistive devices by end of the shift  Outcome: Progressing  Goal: Pace activities to prevent fatigue by end of the shift  Outcome: Progressing     Problem: Resident is recovering from cardiovascular  surgery  Goal: I will maintain and build strength.  Outcome: Progressing  Goal: I will decrease complications and risks after surgery  Outcome: Progressing

## 2025-04-24 NOTE — CARE PLAN
The patient's goals for the shift include      The clinical goals for the shift include pt will remain hemodynamically stable      Problem: Pain - Adult  Goal: Verbalizes/displays adequate comfort level or baseline comfort level  Outcome: Progressing     Problem: Safety - Adult  Goal: Free from fall injury  Outcome: Progressing     Problem: Discharge Planning  Goal: Discharge to home or other facility with appropriate resources  Outcome: Progressing     Problem: Chronic Conditions and Co-morbidities  Goal: Patient's chronic conditions and co-morbidity symptoms are monitored and maintained or improved  Outcome: Progressing     Problem: Nutrition  Goal: Nutrient intake appropriate for maintaining nutritional needs  Outcome: Progressing     Problem: Pain  Goal: Takes deep breaths with improved pain control throughout the shift  Outcome: Progressing  Goal: Turns in bed with improved pain control throughout the shift  Outcome: Progressing  Goal: Walks with improved pain control throughout the shift  Outcome: Progressing  Goal: Performs ADL's with improved pain control throughout shift  Outcome: Progressing  Goal: Participates in PT with improved pain control throughout the shift  Outcome: Progressing  Goal: Free from opioid side effects throughout the shift  Outcome: Progressing  Goal: Free from acute confusion related to pain meds throughout the shift  Outcome: Progressing     Problem: Fall/Injury  Goal: Not fall by end of shift  Outcome: Progressing  Goal: Be free from injury by end of the shift  Outcome: Progressing  Goal: Verbalize understanding of personal risk factors for fall in the hospital  Outcome: Progressing  Goal: Verbalize understanding of risk factor reduction measures to prevent injury from fall in the home  Outcome: Progressing  Goal: Use assistive devices by end of the shift  Outcome: Progressing  Goal: Pace activities to prevent fatigue by end of the shift  Outcome: Progressing     Problem: Skin  Goal:  Decreased wound size/increased tissue granulation at next dressing change  Outcome: Progressing  Goal: Participates in plan/prevention/treatment measures  Outcome: Progressing  Goal: Prevent/manage excess moisture  Outcome: Progressing  Goal: Prevent/minimize sheer/friction injuries  Outcome: Progressing  Goal: Promote/optimize nutrition  Outcome: Progressing  Goal: Promote skin healing  Outcome: Progressing     Problem: Resident is recovering from cardiovascular surgery  Goal: I will maintain and build strength.  Outcome: Progressing  Goal: I will decrease complications and risks after surgery  Outcome: Progressing

## 2025-04-24 NOTE — CONSULTS
"Nutrition Initial Assessment:   Nutrition Assessment    Reason for Assessment: Dietitian discretion (CABG; MST=0)    Patient is a 74 y.o. male presenting with CAD; s/p CABG x3 4/22/25    Pmhx: HTN,HLD, COPD, GERD, hiatal hernia     Nutrition History:  Energy Intake:  (not established yet)  Pain affecting nutrition status: N/A  Food and Nutrient History: Pt unavailable at time of attempted visit today.  Per chart review, pt is POD#2 for CABG x3. Intakes not established yet, diet was advanced this morning.  Tito and Ensure HP both ordered BID to support wound healing.  Vitamin/Herbal Supplement Use: none noted       Anthropometrics:  Height: 182.9 cm (6' 0.01\")   Weight: 86.4 kg (190 lb 7.6 oz)   BMI (Calculated): 25.83  IBW/kg (Dietitian Calculated): 80.9 kg  Percent of IBW: 107 %     Weight History:     Weight Change %:  Weight History / % Weight Change: 4/23/25 85.5kg, 4/22/25 80kg, 4/16/25 80kg, 3/21/25 80.7kg, 12/19/24 82.3kg  Significant Weight Loss: No  Significant Weight Gain: Fluid related    Nutrition Focused Physical Exam Findings:    Subcutaneous Fat Loss:   Defer Subcutaneous Fat Loss Assessment: Defer all  Defer All Reason: unavailable  Muscle Wasting:  Defer Muscle Wasting Assessment: Defer all  Defer All Reason: unavailable  Edema:  Edema: none  Physical Findings:  Skin: Positive (midsternal incision, RLE x3 incisions)    Nutrition Significant Labs:  CBC Trend:   Results from last 7 days   Lab Units 04/24/25  0519 04/23/25 0319 04/22/25  1420   WBC AUTO x10*3/uL 7.5 7.5 10.2   RBC AUTO x10*6/uL 2.90* 2.97* 3.37*   HEMOGLOBIN g/dL 8.8* 9.0* 10.3*   HEMATOCRIT % 29.7* 29.7* 33.5*   MCV fL 102* 100 99   PLATELETS AUTO x10*3/uL 97* 119* 133*    , BMP Trend:   Results from last 7 days   Lab Units 04/24/25  0519 04/23/25 0319 04/22/25  1420   GLUCOSE mg/dL 131* 142* 129*   CALCIUM mg/dL 8.1* 8.0* 8.0*   SODIUM mmol/L 135* 138 141   POTASSIUM mmol/L 4.5 4.8 4.8   CO2 mmol/L 26 24 25   CHLORIDE mmol/L 103 " 108* 111*   BUN mg/dL 25* 24* 24*   CREATININE mg/dL 1.43* 1.31* 1.32*    , A1C:  Lab Results   Component Value Date    HGBA1C 6.2 (H) 03/25/2025   , BG POCT trend:   Results from last 7 days   Lab Units 04/24/25  0734 04/24/25  0342 04/23/25  2347 04/23/25  1955 04/23/25  1607   POCT GLUCOSE mg/dL 131* 119* 110* 164* 154*    , Lipid Panel:   Lab Results   Component Value Date    CHOL 91 03/22/2025    HDL 36.6 03/22/2025    CHHDL 2.5 03/22/2025    LDLF 77 09/22/2023    VLDL 15 03/22/2025    TRIG 75 03/22/2025        Nutrition Specific Medications:  Reviewed     I/O:   Last BM Date: 04/22/25;      Dietary Orders (From admission, onward)       Start     Ordered    04/24/25 1211  Oral nutritional supplements  Until discontinued        Comments: Vanilla   Question Answer Comment   Deliver with Breakfast    Deliver with Dinner    Select supplement: Ensure High Protein        04/24/25 1211    04/24/25 0807  Adult diet Regular  Diet effective now        Question:  Diet type  Answer:  Regular    04/24/25 0809    04/23/25 1250  Oral nutritional supplements  Until discontinued        Question Answer Comment   Deliver with Breakfast    Deliver with Lunch    Select supplement: Tito        04/23/25 1249    04/22/25 1633  May Participate in Room Service  ( ROOM SERVICE MAY PARTICIPATE)  Once        Question:  .  Answer:  Yes    04/22/25 1632                     Estimated Needs:      Method for Estimating Needs: 2400-2600kcals (28-30kcals/kg ABW)     Method for Estimating 24 Hour Protein Needs: 86-104g (1.0-1.2g/kg ABW)     Method for Estimating 24 Hour Fluid Needs: 1700-2100ml or per MD  Patient on Order Fluid Restriction: No        Nutrition Diagnosis   Malnutrition Diagnosis  Patient has Malnutrition Diagnosis:  (unable to determine at this time)    Nutrition Diagnosis  Patient has Nutrition Diagnosis: Yes  Diagnosis Status (1): New  Nutrition Diagnosis 1: Increased nutrient needs  Related to (1): physiological causes  increasing nutrient needs  As Evidenced by (1): post op wound healing needs       Nutrition Interventions/Recommendations   Nutrition prescription for oral nutrition    Nutrition Recommendations:  Individualized Nutrition Prescription Provided for : Regular diet as ordered (change to Cardiac once intakes established and PO >75%); Tito BID and Ensure HP BID    Nutrition Interventions/Goals:   Meals and Snacks: General healthful diet  Goal: consume >75%of meals  Medical Food Supplement: Commercial beverage medical food supplement therapy  Goal: consume >75% of Ensure HP BID (for an additional 160 kcals, 16 gm protein each)  Additional Interventions: consume >75% of Tito BID (for an additional 90 kcals, 2.5 gm protein, supplement glutamine and arginine)      Education Documentation  No documentation found.     Will reassess education needs at follow up      Nutrition Monitoring and Evaluation   Food/Nutrient Related History Monitoring  Monitoring and Evaluation Plan: Estimated Energy Intake, Intake / amount of food, Fluid intake  Estimated Energy Intake: Energy intake greater or equal to 75% of estimated energy needs  Fluid Intake: Estimated fluid intake  Intake / Amount of food: Consumes at least 75% or more of meals/snacks/supplements, Meets > 75% estimated energy needs    Anthropometric Measurements  Monitoring and Evaluation Plan: Body weight  Body Weight: Body weight - Maintain stable weight    Biochemical Data, Medical Tests and Procedures  Monitoring and Evaluation Plan: Electrolyte/renal panel, Glucose/endocrine profile  Electrolyte and Renal Panel: Electrolytes within normal limits  Glucose/Endocrine Profile: Glucose within normal limits ( mg/dL)    Physical Exam Findings  Monitoring and Evaluation Plan: Skin  Other: promote healing through adequate nutrition    Goal Status: New goal(s) identified    Time Spent (min): 45 minutes

## 2025-04-24 NOTE — NURSING NOTE
Cardiac Rehab - Phase I    Discipline: Nursing     Topic: Cardiac Rehab    Description:  Cardiac Rehabilitation Education done by cardiopulmonary rehabilitation staff with good understanding:  yes   Qualifying cardiac diagnosis/procedure: yes, CABG  Addressed patient's questions regarding cardiac diagnosis/procedure: yes    Healthy nutrition education done: yes  Cardiac Healthy lifestyle education done: yes  Does patient meet criteria for program enrollment: yes  Benefits of participation in Cardiac Rehabilitation program discussed with patient: yes  Is patient interested in attending Cardiac Rehabilitation: yes  Does patient have transportation to attend the program: yes  Cardiac Rehabilitation pamphlet given to patient: yes  Insurance coverage verification process explained to patient: yes  Initial interview scheduled: N/A, to be completed after discharge  Contact information for [facility] Cardiac Rehabilitation given to patient: yes    Comments: Room 173-A  UpToDate Patient Education Handouts Provided:  - Cardiac Rehab Information  - Heart Healthy Diet  - Recovery After Coronary Artery Bypass Graft Surgery

## 2025-04-24 NOTE — PROGRESS NOTES
Physical Therapy    Physical Therapy Treatment    Patient Name: aRfi Lisa Jr.  MRN: [id number]  Today's Date: 4/24/2025  Time Calculation  Start Time: 1423  Stop Time: 1442  Time Calculation (min): 19 min     173/173-A    Assessment/Plan   PT Assessment  PT Assessment Results: Decreased strength, Decreased endurance, Impaired balance, Decreased mobility  Rehab Prognosis: Good  Barriers to Discharge Home: No anticipated barriers  End of Session Communication: Bedside nurse  Assessment Comment: Pt would continue to benefit from further physical therapy to address deficits in functional mobility and gait.  End of Session Patient Position: Bed, 3 rail up, Alarm off, not on at start of session     PT Plan  Treatment/Interventions: Bed mobility, Transfer training, Gait training, Balance training, Strengthening, Endurance training, Therapeutic exercise, Therapeutic activity  PT Plan: Ongoing PT  PT Frequency: 4 times per week  PT Discharge Recommendations: Low intensity level of continued care  PT - OK to Discharge: Yes    Current Problem:  Problem List[1]    General Visit Information:   PT  Visit  PT Received On: 04/23/25  General  Reason for Referral: PT eval and treat; s/p CABG x 3 on 4/22/25  Referred By: Anders Aaron  Past Medical History Relevant to Rehab: CAD, COPD, HTN, HLD, emphysema, PCI to LAD, laser arthrectomy, cervical radiculopathy, anemia  Family/Caregiver Present: No  Co-Treatment: OT  Co-Treatment Reason: to maximize safety and functional mobility  Prior to Session Communication: Bedside nurse  Patient Position Received: Up in chair, Alarm off, not on at start of session  General Comment: Pt resting in chair upon entering, agreeable to PT.  Subjective     Precautions:  Precautions  Precautions Comment: MITT, tele, 2L O2, chest tubes x 2, bergman, temporary pacer, PIV    Vital Signs:  Vital Signs  MAP (mmHg):  (MAP down to 51 after standing and taking weight on scale, pt symptomatic with lightheaded and  dizziness limiting further mobility this date.)  Vital Signs Comment: VSS throughout session  Objective     Pain:  Pain Assessment  Pain Assessment: 0-10  0-10 (Numeric) Pain Score:  (surgical pain with mobility, unrated)    Cognition:  Cognition  Overall Cognitive Status: Within Functional Limits    Postural Control:  Postural Control  Postural Control: Within Functional Limits    Extremity/Trunk Assessments:        RLE   RLE : Within Functional Limits  LLE   LLE : Within Functional Limits    Activity Tolerance:  Activity Tolerance  Endurance: Tolerates less than 10 min exercise with changes in vital signs  Early Mobility/Exercise Safety Screen: Proceed with mobilization - No exclusion criteria met    Treatments:           Bed Mobility  Bed Mobility: Yes  Bed Mobility 1  Bed Mobility Comments 1: sit to supine with mod assist x 2, cues for technique and precautions  Ambulation/Gait Training  Ambulation/Gait Training Performed:  (Pt ambulates 80ft with nezzi walker with CGA. Pt reports SOB 7-8/10 on TOWNSEND scale. Pt requires increased rest time to catch breath. SpO2 WFL throughout. Cues for pacing provided.)  Transfers  Transfer: Yes  Transfer 1  Trials/Comments 1: sit to stand with CGA from chair with cues for technique, from toilet with min assist due to low surface          Outcome Measures:     [facility] Basic Mobility  Turning from your back to your side while in a flat bed without using bedrails: A lot  Moving from lying on your back to sitting on the side of a flat bed without using bedrails: A lot  Moving to and from bed to chair (including a wheelchair): A little  Standing up from a chair using your arms (e.g. wheelchair or bedside chair): A little  To walk in hospital room: A little  Climbing 3-5 steps with railing: Total  Basic Mobility - Total Score: 14              FSS-ICU  Ambulation: Walks >/ or equal to 50 feet with any assistance x1  Rolling: Moderate assistance (performs 50 - 74% of task)  Sitting:  Supervision or set-up only  Transfer Sit-to-Stand: Minimal assistance (performs 75% or more of task)  Transfer Supine-to-Sit: Moderate assistance (performs 50 - 74% of task)  Total Score: 17  ICU Mobility Screen  Early Mobility/Exercise Safety Screen: Proceed with mobilization - No exclusion criteria met  ICU Mobility Scale: Walking with assistance of 1 person  E = Exercise and Early Mobility  Early Mobility/Exercise Safety Screen: Proceed with mobilization - No exclusion criteria met  ICU Mobility Scale: Walking with assistance of 1 person                 Education Documentation  Precautions, taught by Kim Olea PT at 4/24/2025  3:26 PM.  Learner: Patient  Readiness: Acceptance  Method: Explanation  Response: Verbalizes Understanding, Needs Reinforcement    Mobility Training, taught by Kim Olea PT at 4/24/2025  3:26 PM.  Learner: Patient  Readiness: Acceptance  Method: Explanation  Response: Verbalizes Understanding, Needs Reinforcement    Precautions, taught by Kim Olea PT at 4/23/2025  3:38 PM.  Learner: Patient  Readiness: Acceptance  Method: Explanation  Response: Verbalizes Understanding  Comment: MITT    Mobility Training, taught by Kmi Olea PT at 4/23/2025  3:38 PM.  Learner: Patient  Readiness: Acceptance  Method: Explanation  Response: Verbalizes Understanding  Comment: MITT    Education Comments  No comments found.      ICU Mobility Scale: Walking with assistance of 1 person [8]    EDUCATION:     Encounter Problems       Encounter Problems (Active)       PT Problem       PT Goal 1 STG - Pt will amb >150' using no device with IND  (Progressing)       Start:  04/23/25    Expected End:  05/07/25            PT Goal 2 STG - Pt will transition supine <> sitting with IND (Progressing)       Start:  04/23/25    Expected End:  05/07/25            PT Goal 3 STG -  Pt will navigate 10 stairs using rail with SUP  (Progressing)       Start:  04/23/25    Expected End:  05/07/25             PT Goal 4 STG - Pt will transfer STS with IND  (Progressing)       Start:  04/23/25    Expected End:  05/07/25               Pain - Adult                   [1]   Patient Active Problem List  Diagnosis    Atrial fibrillation (Multi)    Arteriovenous malformation (AVM) ([facility])    COPD (chronic obstructive pulmonary disease) (Multi)    Coronary artery disease    Presence of Watchman left atrial appendage closure device    History of gastric ulcer    History of blood transfusion    History of GI bleed    At risk for stroke    Alcohol dependence in remission (Multi)    AVM (arteriovenous malformation) of duodenum, acquired with hemorrhage    Cervical radiculopathy    Chronic neck pain    Diverticular disease of colon    Dizziness    Eczema    Erectile dysfunction    Essential hypertension, benign    Gastrointestinal hemorrhage    Gout    History of hepatitis C    Hyperlipidemia    Insulin resistance    Iron deficiency anemia    Localized osteoarthritis of knees, bilateral    Low testosterone in male    Prediabetes    S/P coronary artery stent placement    Snoring    Stage 3a chronic kidney disease (Multi)    Tobacco use disorder    Uric acid nephrolithiasis    Chest pain, unspecified type    Unstable angina (Multi)    Chronic coronary microvascular dysfunction

## 2025-04-24 NOTE — PROGRESS NOTES
[facility]   Cardiothoracic Surgery   Progress Note        Rafi Lisa Jr. is a 74 y.o. male on day 2 of admission presenting with Coronary artery disease.    Subjective     Interval HPI: Seen and assessed patient this morning, sitting OOB in chair. He is in no acute distress, although, patient has non-productive cough and reporting discomfort with coughing.        Objective   Physical Exam  Physical Exam  Vitals and nursing note reviewed.   Constitutional:       General: He is awake. He is not in acute distress.     Appearance: Normal appearance.   HENT:      Head: Normocephalic and atraumatic.      Mouth/Throat:      Lips: Pink.      Mouth: Mucous membranes are moist.   Eyes:      General: Lids are normal.      Pupils: Pupils are equal, round, and reactive to light.   Cardiovascular:      Rate and Rhythm: Regular rhythm. Tachycardia present.      Pulses: Normal pulses.      Heart sounds: Normal heart sounds. No murmur heard.  Pulmonary:      Effort: Pulmonary effort is normal.      Breath sounds: Examination of the right-middle field reveals decreased breath sounds. Examination of the left-middle field reveals decreased breath sounds. Examination of the right-lower field reveals decreased breath sounds. Examination of the left-lower field reveals decreased breath sounds. Decreased breath sounds and wheezing present.      Comments: 4L NC  Non-productive cough  Chest:      Comments: Midsternal incision; Dressing C/D/I  Pleural and mediastinal chest tubes in place  Abdominal:      General: Bowel sounds are decreased.      Palpations: Abdomen is soft.      Tenderness: There is no abdominal tenderness.   Genitourinary:     Comments: Saldana catheter  Musculoskeletal:      Cervical back: Normal range of motion and neck supple.      Right lower leg: Edema present.      Left lower leg: Edema present.   Skin:     General: Skin is warm.      Capillary Refill: Capillary refill takes less  than 2 seconds.   Neurological:      General: No focal deficit present.      Mental Status: He is alert.      Sensory: Sensation is intact.      Motor: Motor function is intact.   Psychiatric:         Attention and Perception: Attention normal.         Speech: Speech normal.         Cognition and Memory: Cognition normal.       Last Recorded Vitals  Vitals:    04/24/25 0605 04/24/25 0611 04/24/25 0700 04/24/25 0800   BP: 108/63 113/61 108/64 107/66   BP Location:       Patient Position:       Pulse: (!) 122 (!) 115 93 95   Resp: (!) 42 (!) 27 20 20   Temp:    36.4 °C (97.5 °F)   TempSrc:    Temporal   SpO2: 91%  94% 94%   Weight:       Height:            Intake/Output last 3 Shifts:  I/O last 3 completed shifts:  In: 3852.3 (44.6 mL/kg) [P.O.:1000; I.V.:267.3 (3.1 mL/kg); IV Piggyback:2585]  Out: 2780 (32.2 mL/kg) [Urine:1780 (0.6 mL/kg/hr); Emesis/NG output:100; Chest Tube:900]  Weight: 86.4 kg     Inpatient Medications  Scheduled Medications[1]  Continuous medications  Continuous Medications[2]  PRN medications  PRN Medications[3]     LDA:  CVC 04/22/25 Triple lumen Right Internal jugular (Active)   Placement Date/Time: 04/22/25 (c) 0825   Hand Hygiene Performed Prior to CVC Insertion: Yes  Site Prep: Chlorhexidine   Site Prep Agent has Completely Dried Before Insertion: Yes  All 5 Sterile Barriers Used (Gloves, Gown, Cap, Mask, Large Sterile Rowena...   Number of days: 1       Arterial Line 04/22/25 Left Brachial (Active)   Placement Date/Time: 04/22/25 1715   Hand Hygiene Completed: Yes  Size: 20 G  Orientation: Left  Location: Brachial  Placed by: Anders Aaron NP  Insertion attempts: 1  Securement Method: Sutured   Number of days: 0       Urethral Catheter Temperature probe;Non-latex 14 Fr. (Active)   Placement Date/Time: 04/22/25 0817   Placed by: Trang Reyes  Hand Hygiene Completed: Yes  Catheter Type: Temperature probe;Non-latex  Tube Size (Fr.): 14 Fr.  Catheter Balloon Size: 10 mL  Urine Returned: Yes    Number of days: 1       Chest Tube 1 Left Pleural 28 Fr (Active)   Placement Date/Time: 04/22/25 1227   Placed by: DR. JOHNSON  Hand Hygiene Completed: Yes  Tube Number: 1  Chest Tube Orientation: Left  Chest Tube Location: Pleural  Chest Tube Drain Tube Size (Fr): 28 Fr  Chest Tube Drainage System: (c) Dry seal ches...   Number of days: 0       Chest Tube 2 Mediastinal 28 Fr (Active)   Placement Date/Time: 04/22/25 1228   Placed by: DR. JOHNSON  Hand Hygiene Completed: Yes  Tube Number: 2  Chest Tube Location: Mediastinal  Chest Tube Drain Tube Size (Fr): 28 Fr  Chest Tube Drainage System: (c) Dry seal chest drain   Number of days: 0       Chest Tube 3 Right Pleural 28 Fr (Active)   Placement Date/Time: 04/22/25 1229   Placed by: DR. JOHNSON  Hand Hygiene Completed: Yes  Tube Number: 3  Chest Tube Orientation: Right  Chest Tube Location: Pleural  Chest Tube Drain Tube Size (Fr): 28 Fr  Chest Tube Drainage System: (c) Dry seal kendrick...   Number of days: 0       Relevant Results  Lab Review  Results from last 7 days   Lab Units 04/24/25  0519   WBC AUTO x10*3/uL 7.5   HEMOGLOBIN g/dL 8.8*   HEMATOCRIT % 29.7*   PLATELETS AUTO x10*3/uL 97*     Results from last 7 days   Lab Units 04/24/25  0519   SODIUM mmol/L 135*   POTASSIUM mmol/L 4.5   CHLORIDE mmol/L 103   CO2 mmol/L 26   BUN mg/dL 25*   CREATININE mg/dL 1.43*   CALCIUM mg/dL 8.1*   GLUCOSE mg/dL 131*     Results from last 7 days   Lab Units 04/24/25  0519   MAGNESIUM mg/dL 2.34     Results from last 7 days   Lab Units 04/23/25  0450   POCT PH, ARTERIAL pH 7.40   POCT PCO2, ARTERIAL mm Hg 37*   POCT PO2, ARTERIAL mm Hg 90   POCT HCO3 CALCULATED, ARTERIAL mmol/L 22.9   POCT BASE EXCESS, ARTERIAL mmol/L -1.7         Radiographic Testing  EKG:  ECG 12 lead 03/21/2025        Echo:  Transthoracic Echo (TTE) Complete 03/24/2025  PHYSICIAN INTERPRETATION:  Left Ventricle: The left ventricular systolic function is normal, with a visually estimated ejection fraction of  55-60%. There is mild concentric left ventricular hypertrophy. There are no regional left ventricular wall motion abnormalities. The left ventricular cavity size is normal. There is mildly increased septal and mildly increased posterior left ventricular wall thickness. There is left ventricular concentric remodeling. Spectral Doppler shows a Grade II (pseudonormal pattern) of left ventricular diastolic filling with an elevated left atrial pressure.  Left Atrium: The left atrium is mildly dilated.  Right Ventricle: The right ventricle is mildly enlarged. There is normal right ventricular global systolic function.  Right Atrium: The right atrium is normal in size.  Aortic Valve: The aortic valve is trileaflet. There is mild aortic valve thickening. The aortic valve dimensionless index is 0.81. There is moderate aortic valve regurgitation. The peak instantaneous gradient of the aortic valve is 7 mmHg. The mean gradient of the aortic valve is 4 mmHg.  Mitral Valve: The mitral valve is mildly thickened. The peak instantaneous gradient of the mitral valve is 2 mmHg. There is mild mitral valve regurgitation.  Tricuspid Valve: The tricuspid valve is structurally normal. There is mild tricuspid regurgitation. The Doppler estimated RVSP is within normal limits at 29.4 mmHg.  Pulmonic Valve: The pulmonic valve is structurally normal. There is mild pulmonic valve regurgitation.  Pericardium: There is no pericardial effusion noted.  Aorta: The aortic root is abnormal. There is mild dilatation of the ascending aorta.  In comparison to the previous echocardiogram(s): Compared with study dated 5/15/2020,.        CONCLUSIONS:   1. The left ventricular systolic function is normal, with a visually estimated ejection fraction of 55-60%.   2. Spectral Doppler shows a Grade II (pseudonormal pattern) of left ventricular diastolic filling with an elevated left atrial pressure.   3. Mildly enlarged right ventricle.   4. The left atrium is  mildly dilated.   5. Right ventricular systolic pressure is within normal limits.   6. Moderate aortic valve regurgitation.       Ejection Fractions:  EF   Date/Time Value Ref Range Status   03/24/2025 09:25 AM 58 %      Cath:  Cardiac Catheterization Procedure 03/24/2025  Coronary Angiography:  The coronary circulation is left dominant.     Left Main Coronary Artery:  The left main coronary artery is a normal caliber vessel. The left main arises normally from the left coronary sinus of Valsalva and trifurcation. The left main coronary artery showed no significant disease or stenosis greater than 30%.     Left Anterior Descending Coronary Artery Distribution:  The left anterior descending coronary artery is a normal caliber vessel. The LAD arises normally from the left main coronary artery. The LAD demonstrated dense calcification in the proximal to mid segment. The ostial and proximal to mid left anterior descending coronary artery showed 90% stenosis. This lesion was heavily calcified.     Circumflex Coronary Artery Distribution:  The circumflex coronary artery is a large caliber vessel. The circumflex arises normally from the left main coronary artery. The circumflex revealed mild distal atherosclerotic disease. The mid circumflex coronary artery showed 50% stenosis.     Ramus Intermedius:  The ramus intermedius is a medium-sized caliber vessel. The proximal ramus intermedius showed 100% stenosis.  with collaterals.     Right Coronary Artery Distribution:     The right coronary artery is a medium-sized caliber vessel. The RCA showed dense calcification. The entire right coronary artery showed 95% stenosis.        Left Ventriculography:  The size of the left ventricle is normal. The LV ejection fraction was 50 to 55%. All left ventricular regional wall segments contract normally. There is no LV diastolic dysfunction noted.    Chest Imaging:  XR chest 1 view   Final Result   1.  Improved lower lung atelectasis.         Signed by: Durga Larson 4/23/2025 8:32 AM   Dictation workstation:   [id number]      XR chest 1 view   Final Result   NG/OG tube is coiled in the midesophagus but then continues distally   with the tip terminating in the supradiaphragmatic portion of the   hiatal hernia. Other lines and tubes as above.        Bibasilar atelectasis and pleural effusions.        MACRO:   None.        Signed by: Meet Dee 4/22/2025 3:57 PM   Dictation workstation:   [id number]      XR chest 1 view    (Results Pending)     Pulmonary Function Testing:   Pulmonary Function Lab - Spirometry Only      03/25/25        History of Present Illness:   Rafi Lisa Jr. is a 74 y.o. male with a PMH significant for CAD (s/p PCI to the LAD, laser arthrectomy to the Lcx on 1/2022), HTN, HLD, COPD (Emphysemea), and nicotine use disorder who presents to [facility] on 4/22/25 for a staged coronary.      The patient was undergoing workup with cardiologist, Dr. James Hunt in workup for his anginal complaints. He ultimately was scheduled for a stress testing; however during his exam, he was referred to the ED and was subsequently admitted for inpatient workup. On 3/24/25, he underwent a LHC with interventional cardiologist, Dr. Tony Oneill and his coronary anatomy revealed diffuse multivessel CAD. Cardiac surgery was subsequently consulted for bypass grafting evaluation.       Daily Event      04/22/25: Patient arrived to the ICU in critically ill but stable condition. S/p CABG x3. Patient arrived hemodynamically stable without the need for continuous vasopressors. Flowtrack noting decreased hemodynamics, however concerned that this is not accurate as patient is not with signs of decreased perfusion.      Plan to recover in the immediate post-op period and wean mechanical ventilation towards extubation.      - Vital Signs: HR 65, BP 91/59, SpO2 100%   - Hemodynamics: CVP 10, CO 3, CI 1.5, SVR 1725  (consider accuracy of #s)  - Ventilator Settings: PRVC-AC / FiO2 50% / PEEP 8 / RR 16     - Pump time: 99 min / x-clamp 76 min      Intake & Output:   - Initial R Pleural Chest Tube: 120mL   - Initial L Pleural Chest Tube: 100mL   - Initial Mediastinal Chest Tube: 150mL     04/23/2025: No acute events overnight. Patient was extubated yesterday evening without complication. He is sitting up in the chair this morning. He remains on 0.01mcg of norepinephrine. NG tube removed this morning. Patient is reporting moderate-severe pain; will adjust pain regimen. Maintain mediastinal and pleural chest tubes today.     - Current O2 Requirements: 3L NC    Intake & Output:  - Total R Pleural Chest Tube: 280mL   - Total L Pleural Chest Tube: 320mL   - Total Mediastinal Chest Tube: 310mL      04/24/25: No acute events overnight. Sitting OOB to chair this morning.  Requiring 4L NC with moist, non-productive cough.  Vasopressors weaned off overnight. Chest tubes remain in place with serosanguinous drainage, will remove right pleural today.  Start Beta-blocker and diuresis today. CXR with small left pneumothorax.      - Current O2 Requirements: 4L NC    Intake & Output:  - Total R Pleural Chest Tube: 70 mL -> Remove today  - Total L Pleural Chest Tube: 220 mL  -> [place]   - Total Mediastinal Chest Tube: 220 mL     Assessment & Plan        Multivessel Coronary Artery Disease  - Patient has remote Hx of PCI to the LAD in 1998   --> most recently laser arterectomy to the Lcx on 1/2022  - S/p CABG x 3 on 4/22/25 with Dr. Juan Luis Kline   --> LIMA-LAD, SVG-Ramus, SVG-OM2  - Continue on ASA 81mg and Rosuvastatin 40mg    - Start metoprolol 12.5 mg BID  - Maintain MS/pl chest tubes to -20cm H20 continuous suction -> Remove Right pleural today    - Daily CXR while chest tube intact         Mediastinal Incision  - Remove post-op dressing on POD #2  - Dressing C/D/I        Acute Post-Op Pain  - As expected   - Multimodal pain control   -->  Scheduled: Acetaminophen 975 mg q 6hr, magnesium oxide 400mg QD, gabapentin 100mg TID, PO methocarbamol 500mg PO Q8h  --> PRN: oxycodone & fentanyl  - Bowel regimen while taking narcotics         Risk for Post-Op Arrhythmia  - Ventricular wires placed  - Discontinue V-wires prior to DC  - Telemetry until discharged        Acute Postoperative Respiratory Insufficiency   - As expected following CABG with post-op atelectasis  - Current O2 Requirements: 4L NC  - Coughing and deep breathing exercises with Incentive spirometry  - Out of bed, early and aggressive mobilization with assistance  - Oxygen as needed, wean to keep saturation above 92%  - ICU intensivist/pulmonologist consulted  - Albuterol nebulizers as needed  - Start Bronchial Hygiene Ezpap TID        COPD     Nicotine Use Disorder  - Home Medications: Albuterol PRN  - Home O2: N/A  - PFTs completed on 3/25 (see below) show COPD with reversible component   --> FEV1/FVC = 0.52 pre / 0.53 post (FEV1 improved 15% / FVC improved 16%)  - Consider addition of tiotropium if needed        Risk for Fluid Volume Overload  - Pre-Op Weight: 80 kg      4/23: 85.5 kg     4/24: 86.4 kg -> Start IV Lasix 20 mg BID  - Strict I& Os and daily weights          Acute Post-Op Blood-Loss Anemia  - Pre-Op H/H:  12.8/41.7     4/23: 9/29.7     4/24: 8.8/29.7  - Monitor chest tubes for post op hemorrhage   - Will start POD 3 Multivitamin/iron tablet   - Daily CBC         Post-Op Leukocytosis  - Pre-Op WBC: 4.6     4/23: 7.5     4/24: 7.5  - Afebrile, consider elevations as reactive to surgery   - Post-op ATB Q12 for 48hrs  - Daily cbc while in hospital         Essential HTN  - Home Medications: Lisinopril 20mg   -  Hold in the immediate post-op period         Pre-diabetic   - Home Medications: None   - HbA1c: 6.4 --> 6.2  - Goal for BG <150 in the post-operative period  - SSI #2 AC/HS        Dyslipidemia  - Home Medications: Rosuvastatin 40mg, Ezetimibe 10mg  - Continue on statin  therapy as above         Hiatal Hernia     GERD  - Home Medications: Pantoprazole 40mg  - Patient has a large, symptomatic hiatal hernia  - Continue on home PPI      Risk for Electrolyte Disturbances  - Optimize electrolytes per [facility] protocol       Bowel Regimen: No post op BM.   - Senna-S BID and miralax daily  - Suppository x1     Prophylaxis  - GI: home PPI  - DVT: Chadd-Hose & enoxaparin   - MRSA: Negative (MSSA positive) -> Start mupirocin BID x 5 days  - PT/OT      Disposition  - CVICU (Step-down)     Patient seen and plan discussed with Dr. Juan Luis Kline.          Mikayla ROBBINS Head, APRN-CNP         [1] acetaminophen, 975 mg, oral, q6h  aspirin, 81 mg, oral, Daily  ceFAZolin, 2 g, intravenous, q8h  enoxaparin, 40 mg, subcutaneous, Daily  ezetimibe, 10 mg, oral, Daily  furosemide, 20 mg, intravenous, Once  gabapentin, 100 mg, oral, TID  insulin lispro, 0-5 Units, subcutaneous, TID AC  lidocaine, 1 patch, transdermal, q24h  magnesium oxide, 400 mg, oral, Daily  [START ON 4/25/2025] methocarbamol, 500 mg, oral, q8h BANDAR  metoprolol tartrate, 12.5 mg, oral, BID  oxygen, , inhalation, Continuous - Inhalation  pantoprazole, 40 mg, oral, Daily before breakfast  rosuvastatin, 40 mg, oral, Daily  sennosides-docusate sodium, 2 tablet, oral, BID     [2]    [3] PRN medications: albuterol, alteplase, calcium chloride, calcium chloride, dextrose **OR** glucagon, HYDROmorphone, magnesium sulfate, magnesium sulfate, naloxone, oxyCODONE, oxyCODONE, polyethylene glycol, potassium chloride CR **OR** potassium chloride, potassium chloride CR **OR** potassium chloride, potassium chloride, potassium chloride

## 2025-04-24 NOTE — PROGRESS NOTES
[facility] Pulmonary and Critical Care Medicine   Progress Note        Printed on 4/24/2025            Rafi Lisa Jr. is a 74 y.o. male on day 2 of admission presenting with Coronary artery disease    Subjective     History of Present Illness:  Rafi Lisa Jr. is a 74 y.o. year old male patient with Past Medical History of atrial fibrillation, CAD, HTN, HLD, COPD, cervical radiculopathy, osteoarthritis of the knees, and anemia, GERD, and large hiatal hernia presenting to the ICU after CABG.     Interval ICU events:   4/22: Patient underwent CABG x 3 with Dr. Kline (LIIMA-LAD, SVG-RI-OM3). Patient admitted to ICU s/p CABG x 3. Intubated and mechanically ventilated. Vent settings rate 14, , FiO2 50%, P8. Has been started on Levophed for blood pressure support. Plan for Pressure Support trials and extubation as able this afternoon.     4/23 POD 1: No acute overnight events. He is complaining of pain. He is on 3L of NC. Still requiring levophed of 0.02 for BP support.     4/24 POD 2: No acute overnight events. Pt sitting up in chair. Post op surgical pain 5-6/10 managed with scheduled Tylenol, gabapentin, and 10 mg oxycodone and 0.4 mg IV Dilaudid as needed. Patient c/o of increased moist non-productive cough. Hemodynamically stable off levophed. Sp02 945 on 3L NC. Adequate  mL/12 hrs. CTx3 to water seal without air leak and serosanguinous output.       Scheduled Medications:   Scheduled Medications[1]     Continuous Medications:   Continuous Medications[2]     PRN Medications:   PRN Medications[3]      Objective   Vitals:  Most Recent:  Vitals:    04/24/25 0700   BP: 108/64   Pulse: 93   Resp: 20   Temp:    SpO2: 94%       24hr Min/Max:  Temp  Min: 36 °C (96.8 °F)  Max: 36.7 °C (98.1 °F)  Pulse  Min: 72  Max: 122  BP  Min: 108/64  Max: 132/63  Resp  Min: 13  Max: 42  SpO2  Min: 88 %  Max: 96 %    LDA:   CVC 04/22/25 Triple lumen Right Internal jugular (Active)   Placement  Date/Time: 04/22/25 (c) 0825   Hand Hygiene Performed Prior to CVC Insertion: Yes  Site Prep: Chlorhexidine   Site Prep Agent has Completely Dried Before Insertion: Yes  All 5 Sterile Barriers Used (Gloves, Gown, Cap, Mask, Large Sterile Rowena...   Number of days: 1       Urethral Catheter Temperature probe;Non-latex 14 Fr. (Active)   Placement Date/Time: 04/22/25 0817   Placed by: Trang Reyes  Hand Hygiene Completed: Yes  Catheter Type: Temperature probe;Non-latex  Tube Size (Fr.): 14 Fr.  Catheter Balloon Size: 10 mL  Urine Returned: Yes   Number of days: 1       Chest Tube 1 Left Pleural 28 Fr (Active)   Placement Date/Time: 04/22/25 1227   Placed by: DR. JOHNSON  Hand Hygiene Completed: Yes  Tube Number: 1  Chest Tube Orientation: Left  Chest Tube Location: Pleural  Chest Tube Drain Tube Size (Fr): 28 Fr  Chest Tube Drainage System: (c) Dry seal ches...   Number of days: 1       Chest Tube 2 Mediastinal 28 Fr (Active)   Placement Date/Time: 04/22/25 1228   Placed by: DR. JOHNSON  Hand Hygiene Completed: Yes  Tube Number: 2  Chest Tube Location: Mediastinal  Chest Tube Drain Tube Size (Fr): 28 Fr  Chest Tube Drainage System: (c) Dry seal chest drain   Number of days: 1       Chest Tube 3 Right Pleural 28 Fr (Active)   Placement Date/Time: 04/22/25 1229   Placed by: DR. JOHNSON  Hand Hygiene Completed: Yes  Tube Number: 3  Chest Tube Orientation: Right  Chest Tube Location: Pleural  Chest Tube Drain Tube Size (Fr): 28 Fr  Chest Tube Drainage System: (c) Dry seal kendrick...   Number of days: 1         Vent settings:       Hemodynamic parameters for last 24 hours:  CVP:  [5 mmHg-14 mmHg] 6 mmHg  CO:  [3.4 L/min-5.1 L/min] 4.8 L/min  CI:  [1.7 L/min/m2-2.5 L/min/m2] 2.4 L/min/m2      Intake/Output Summary (Last 24 hours) at 4/24/2025 0758  Last data filed at 4/24/2025 0700  Gross per 24 hour   Intake 2314.25 ml   Output 1680 ml   Net 634.25 ml       Review of Systems:  14 point review of systems was completed and  negative except for those specially mention in my HPI    Physical exam:    Physical Exam  Constitutional:       Appearance: Normal appearance.   HENT:      Head: Normocephalic and atraumatic.      Mouth/Throat:      Mouth: Mucous membranes are moist.      Pharynx: Oropharynx is clear.   Eyes:      Extraocular Movements: Extraocular movements intact.      Pupils: Pupils are equal, round, and reactive to light.   Cardiovascular:      Rate and Rhythm: Normal rate and regular rhythm.      Pulses: Normal pulses.      Heart sounds: Normal heart sounds.   Pulmonary:      Effort: Pulmonary effort is normal.      Breath sounds: Examination of the right-middle field reveals decreased breath sounds. Examination of the right-lower field reveals decreased breath sounds. Examination of the left-lower field reveals decreased breath sounds. Decreased breath sounds present.      Comments: Moist non-productive cough  Abdominal:      General: Bowel sounds are decreased.      Palpations: Abdomen is soft.      Hernia: A hernia is present.   Musculoskeletal:         General: Normal range of motion.      Cervical back: Normal range of motion and neck supple.   Skin:     General: Skin is warm and dry.      Capillary Refill: Capillary refill takes less than 2 seconds.      Comments: Sternal dressings are clean, dry, and intact, chest tubes x3   Neurological:      Mental Status: He is alert and oriented to person, place, and time.   Psychiatric:         Mood and Affect: Mood normal.         Behavior: Behavior normal.        Lab/Radiology/Diagnostic Review:    All labs and Imaging have been personally reviewed.       Assessment/Plan         Neuro:  #Post-Op Pain  -- Scheduled tylenol, mag-ox, gabapentin and robaxin, Lidoderm  -- PRN Oxycodone & Fentanyl  -- Bowel regimen while on narcotics   -- CAM assessment, delirium precautions    Cardiovascular  #Multivessel CAD s/p CABG x 3  #Hx atrial fibrillation, HTN, HLD   POD 2  CABG x 3 (LIIMA-LAD,  SVG-RI-OM3)  -- AV wires in place and pacer off  -- 2 pleural, 1 mediastinal chest tube, to suction, monitor output, notify if > 100cc/hr  ---> 12 hr CT output - , LP 90, RP 50  -- MAPs > 65 off levophed  -- Continue rosuvastatin and ezetimibe   -- Home lisinopril held  -- Low dose Metoprolol 12.5 BID ordered  -- Gentle diuresis with Lasix ordered  -- CTS managing     Pulmonary:  #Post-Op Respiratory Insufficiency  #COPD  -- Extubated on 4/22 to NC   -- 3L NC, does not wear O2 at home   -- Wean supplemental oxygen for spO2 goal > 92%  -- Albuterol nebulizer PRN   -- Increased non-productive cough: Mucinex ordered  --> Aggressive pulmonary toileting with IS     GI:  #Hiatal Hernia  #GERD  -- Protonix   -- Diet: clear liquid diet (pt dislikes)  -- Poor appetite: Diet advanced to regular    Renal:   Baseline Cr ~ 1.2-1.39 pre-operatively, likely has some degree of CKD 3a  -- Cr today 1.43  -- Saldana catheter in place, strict I/O  -- Daily BMP and electrolyte repletion  -- Strict intake and output monitoring    Endocrine:  #Hyperglycemia  #Pre-diabetes   -- A1C(3/25/25): 6.2  -- Strict blood glucose control post-operatively, goal glucose <150  -- Cardiac SSI every 4 hours    Heme/Onc:  #Acute Blood Loss Anemia Post-Op - Resolved  Baseline Hgb ~12-13  -- Hgb today 8.8  -- Plt 97   -- Strict chest tube output monitoring  -- Monitor Plt daily  -- Daily CBC, transfuse for Hgb goal > 7 or signs of active bleeding with hemodynamic instab    ID:  No concern for active infection  -- Cefazolin Ppx Abx complete  -- Patient afebrile  -- Monitor SIRS criteria      Skin/MSK:  -Midline surgical incision D/I      ICU CHECK LIST:   Antimicrobials: none  Oxygen: NC  Drips: none  Feeding/Fluids:   Analgesia: Multimodal pain regimen  Sedation:   Thromboprophylaxis: SCDs and Lovenox  Ulcer prophylaxis: PPI   Glycemic control: BGM with SSI   Bowel care: Scheduled and prn  Indwelling catheters: Saldana   Lines: PIVs   Restraints:   Dispo:  MICU   Code Status: Full    I have personally spent 40 minutes of critical care time, exclusive of time spent on any procedures, in evaluation and management of this critically ill patient’s condition mentioned above in the assessment and plan.     MAURO Sheikh-CNP  Pulmonary and Critical Care Medicine    Please excuse any typographical or unwanted errors within this documentation as voice recognition software was used to dictate this note.           [1] acetaminophen, 975 mg, oral, q6h  aspirin, 81 mg, oral, Daily  ceFAZolin, 2 g, intravenous, q8h  enoxaparin, 40 mg, subcutaneous, Daily  ezetimibe, 10 mg, oral, Daily  gabapentin, 100 mg, oral, TID  insulin lispro, 0-15 Units, subcutaneous, q4h  lidocaine, 1 patch, transdermal, q24h  magnesium oxide, 400 mg, oral, Daily  methocarbamol, 1,000 mg, intravenous, q8h  [START ON 4/25/2025] methocarbamol, 500 mg, oral, q8h BANDAR  oxygen, , inhalation, Continuous - Inhalation  pantoprazole, 40 mg, oral, Daily before breakfast  rosuvastatin, 40 mg, oral, Daily  sennosides-docusate sodium, 2 tablet, oral, BID  [2] aminocaproic acid, 1 g/hr  norepinephrine, 0-0.2 mcg/kg/min, Last Rate: Stopped (04/23/25 1500)  [3] PRN medications: albumin human, albuterol, alteplase, calcium chloride, calcium chloride, dextrose **OR** glucagon, electrolyte-A, HYDROmorphone, magnesium sulfate, magnesium sulfate, naloxone, oxyCODONE, oxyCODONE, polyethylene glycol, potassium chloride CR **OR** potassium chloride, potassium chloride CR **OR** potassium chloride, potassium chloride, potassium chloride

## 2025-04-24 NOTE — PROGRESS NOTES
Rafi Lisa Jr. is a 74 y.o. male on day 2 of admission presenting with Coronary artery disease.  Record reviewed.  POD #2 CABG x3.  CT x3.  Up to chair.  O2 at 3L/NC.  PT/OT recommending low intensity level therapy.  This TCC met with patient for HHC choice.  Patient stated that he hasn't decided, his wife much have the list and will be in to visit.  Care Transitions will continue to follow.    2:20 pm addendum  This TCC met with patient at bedside to follow up for HHC choice.  Patient stated that his wife already left, but they didn't discuss HHC choices.  Care Transitions will continue to follow.    3:30 pm addendum  This TCC phoned patient's spouse.  Preference is [facility].  Care team updated via secure message. Care Transitions will continue to follow.

## 2025-04-25 ENCOUNTER — APPOINTMENT (OUTPATIENT)
Dept: RADIOLOGY | Facility: HOSPITAL | Age: 75
DRG: 236 | End: 2025-04-25
Payer: COMMERCIAL

## 2025-04-25 ENCOUNTER — APPOINTMENT (OUTPATIENT)
Dept: CARDIOLOGY | Facility: HOSPITAL | Age: 75
DRG: 236 | End: 2025-04-25
Payer: COMMERCIAL

## 2025-04-25 DIAGNOSIS — Z95.1 S/P CABG X 3: ICD-10-CM

## 2025-04-25 LAB
ALBUMIN SERPL BCP-MCNC: 3.3 G/DL (ref 3.4–5)
ALBUMIN SERPL BCP-MCNC: 4.3 G/DL (ref 3.4–5)
ANION GAP SERPL CALC-SCNC: 10 MMOL/L (ref 10–20)
ANION GAP SERPL CALC-SCNC: 15 MMOL/L (ref 10–20)
AORTIC VALVE PEAK VELOCITY: 1.67 M/S
AV PEAK GRADIENT: 11 MMHG
BLOOD EXPIRATION DATE: NORMAL
BLOOD EXPIRATION DATE: NORMAL
BUN SERPL-MCNC: 49 MG/DL (ref 6–23)
BUN SERPL-MCNC: 56 MG/DL (ref 6–23)
CA-I BLD-SCNC: 1.17 MMOL/L (ref 1.1–1.33)
CALCIUM SERPL-MCNC: 8.6 MG/DL (ref 8.6–10.3)
CALCIUM SERPL-MCNC: 9.2 MG/DL (ref 8.6–10.3)
CHLORIDE SERPL-SCNC: 97 MMOL/L (ref 98–107)
CHLORIDE SERPL-SCNC: 99 MMOL/L (ref 98–107)
CO2 SERPL-SCNC: 28 MMOL/L (ref 21–32)
CO2 SERPL-SCNC: 30 MMOL/L (ref 21–32)
CREAT SERPL-MCNC: 1.61 MG/DL (ref 0.5–1.3)
CREAT SERPL-MCNC: 1.66 MG/DL (ref 0.5–1.3)
DISPENSE STATUS: NORMAL
DISPENSE STATUS: NORMAL
EGFRCR SERPLBLD CKD-EPI 2021: 43 ML/MIN/1.73M*2
EGFRCR SERPLBLD CKD-EPI 2021: 44 ML/MIN/1.73M*2
EJECTION FRACTION APICAL 4 CHAMBER: 58.2
EJECTION FRACTION: 63 %
ERYTHROCYTE [DISTWIDTH] IN BLOOD BY AUTOMATED COUNT: 13.6 % (ref 11.5–14.5)
ERYTHROCYTE [DISTWIDTH] IN BLOOD BY AUTOMATED COUNT: 14.3 % (ref 11.5–14.5)
GLUCOSE BLD MANUAL STRIP-MCNC: 110 MG/DL (ref 74–99)
GLUCOSE BLD MANUAL STRIP-MCNC: 111 MG/DL (ref 74–99)
GLUCOSE BLD MANUAL STRIP-MCNC: 148 MG/DL (ref 74–99)
GLUCOSE SERPL-MCNC: 104 MG/DL (ref 74–99)
GLUCOSE SERPL-MCNC: 139 MG/DL (ref 74–99)
HCT VFR BLD AUTO: 24.5 % (ref 41–52)
HCT VFR BLD AUTO: 27.6 % (ref 41–52)
HGB BLD-MCNC: 7.4 G/DL (ref 13.5–17.5)
HGB BLD-MCNC: 8.7 G/DL (ref 13.5–17.5)
MAGNESIUM SERPL-MCNC: 2.69 MG/DL (ref 1.6–2.4)
MCH RBC QN AUTO: 30.5 PG (ref 26–34)
MCH RBC QN AUTO: 30.7 PG (ref 26–34)
MCHC RBC AUTO-ENTMCNC: 30.2 G/DL (ref 32–36)
MCHC RBC AUTO-ENTMCNC: 31.5 G/DL (ref 32–36)
MCV RBC AUTO: 101 FL (ref 80–100)
MCV RBC AUTO: 98 FL (ref 80–100)
NRBC BLD-RTO: 0 /100 WBCS (ref 0–0)
NRBC BLD-RTO: 0 /100 WBCS (ref 0–0)
PHOSPHATE SERPL-MCNC: 3.3 MG/DL (ref 2.5–4.9)
PHOSPHATE SERPL-MCNC: 4 MG/DL (ref 2.5–4.9)
PLATELET # BLD AUTO: 103 X10*3/UL (ref 150–450)
PLATELET # BLD AUTO: 89 X10*3/UL (ref 150–450)
POTASSIUM SERPL-SCNC: 3.9 MMOL/L (ref 3.5–5.3)
POTASSIUM SERPL-SCNC: 4.3 MMOL/L (ref 3.5–5.3)
PRODUCT BLOOD TYPE: 8400
PRODUCT BLOOD TYPE: 8400
PRODUCT CODE: NORMAL
PRODUCT CODE: NORMAL
RBC # BLD AUTO: 2.43 X10*6/UL (ref 4.5–5.9)
RBC # BLD AUTO: 2.83 X10*6/UL (ref 4.5–5.9)
RIGHT VENTRICLE FREE WALL PEAK S': 13.8 CM/S
RIGHT VENTRICLE PEAK SYSTOLIC PRESSURE: 36.4 MMHG
SODIUM SERPL-SCNC: 135 MMOL/L (ref 136–145)
SODIUM SERPL-SCNC: 136 MMOL/L (ref 136–145)
TRICUSPID ANNULAR PLANE SYSTOLIC EXCURSION: 2.3 CM
UNIT ABO: NORMAL
UNIT ABO: NORMAL
UNIT NUMBER: NORMAL
UNIT NUMBER: NORMAL
UNIT RH: NORMAL
UNIT RH: NORMAL
UNIT VOLUME: 206
UNIT VOLUME: 209
WBC # BLD AUTO: 6.2 X10*3/UL (ref 4.4–11.3)
WBC # BLD AUTO: 7.8 X10*3/UL (ref 4.4–11.3)

## 2025-04-25 PROCEDURE — 71045 X-RAY EXAM CHEST 1 VIEW: CPT

## 2025-04-25 PROCEDURE — 2500000004 HC RX 250 GENERAL PHARMACY W/ HCPCS (ALT 636 FOR OP/ED): Performed by: NURSE PRACTITIONER

## 2025-04-25 PROCEDURE — 2060000001 HC INTERMEDIATE ICU ROOM DAILY

## 2025-04-25 PROCEDURE — 71045 X-RAY EXAM CHEST 1 VIEW: CPT | Performed by: RADIOLOGY

## 2025-04-25 PROCEDURE — 94640 AIRWAY INHALATION TREATMENT: CPT

## 2025-04-25 PROCEDURE — 82330 ASSAY OF CALCIUM: CPT

## 2025-04-25 PROCEDURE — 36430 TRANSFUSION BLD/BLD COMPNT: CPT

## 2025-04-25 PROCEDURE — P9016 RBC LEUKOCYTES REDUCED: HCPCS

## 2025-04-25 PROCEDURE — 2500000001 HC RX 250 WO HCPCS SELF ADMINISTERED DRUGS (ALT 637 FOR MEDICARE OP)

## 2025-04-25 PROCEDURE — 36415 COLL VENOUS BLD VENIPUNCTURE: CPT

## 2025-04-25 PROCEDURE — 93308 TTE F-UP OR LMTD: CPT | Performed by: INTERNAL MEDICINE

## 2025-04-25 PROCEDURE — 83735 ASSAY OF MAGNESIUM: CPT

## 2025-04-25 PROCEDURE — 93325 DOPPLER ECHO COLOR FLOW MAPG: CPT | Performed by: INTERNAL MEDICINE

## 2025-04-25 PROCEDURE — 93321 DOPPLER ECHO F-UP/LMTD STD: CPT | Performed by: INTERNAL MEDICINE

## 2025-04-25 PROCEDURE — 85027 COMPLETE CBC AUTOMATED: CPT

## 2025-04-25 PROCEDURE — 2500000002 HC RX 250 W HCPCS SELF ADMINISTERED DRUGS (ALT 637 FOR MEDICARE OP, ALT 636 FOR OP/ED): Performed by: NURSE PRACTITIONER

## 2025-04-25 PROCEDURE — 93325 DOPPLER ECHO COLOR FLOW MAPG: CPT

## 2025-04-25 PROCEDURE — 2500000002 HC RX 250 W HCPCS SELF ADMINISTERED DRUGS (ALT 637 FOR MEDICARE OP, ALT 636 FOR OP/ED)

## 2025-04-25 PROCEDURE — 2500000005 HC RX 250 GENERAL PHARMACY W/O HCPCS: Performed by: THORACIC SURGERY (CARDIOTHORACIC VASCULAR SURGERY)

## 2025-04-25 PROCEDURE — 2500000004 HC RX 250 GENERAL PHARMACY W/ HCPCS (ALT 636 FOR OP/ED)

## 2025-04-25 PROCEDURE — 80069 RENAL FUNCTION PANEL: CPT

## 2025-04-25 PROCEDURE — 99291 CRITICAL CARE FIRST HOUR: CPT

## 2025-04-25 PROCEDURE — 2500000005 HC RX 250 GENERAL PHARMACY W/O HCPCS

## 2025-04-25 PROCEDURE — P9047 ALBUMIN (HUMAN), 25%, 50ML: HCPCS | Mod: JZ

## 2025-04-25 PROCEDURE — 94669 MECHANICAL CHEST WALL OSCILL: CPT

## 2025-04-25 PROCEDURE — 2500000001 HC RX 250 WO HCPCS SELF ADMINISTERED DRUGS (ALT 637 FOR MEDICARE OP): Performed by: NURSE PRACTITIONER

## 2025-04-25 PROCEDURE — 82947 ASSAY GLUCOSE BLOOD QUANT: CPT

## 2025-04-25 RX ORDER — BISACODYL 10 MG/1
10 SUPPOSITORY RECTAL ONCE
Status: COMPLETED | OUTPATIENT
Start: 2025-04-25 | End: 2025-04-25

## 2025-04-25 RX ORDER — METOPROLOL TARTRATE 25 MG/1
25 TABLET, FILM COATED ORAL 2 TIMES DAILY
Status: DISCONTINUED | OUTPATIENT
Start: 2025-04-25 | End: 2025-04-27 | Stop reason: HOSPADM

## 2025-04-25 RX ORDER — HEPARIN SODIUM 5000 [USP'U]/ML
5000 INJECTION, SOLUTION INTRAVENOUS; SUBCUTANEOUS EVERY 8 HOURS
Status: DISCONTINUED | OUTPATIENT
Start: 2025-04-25 | End: 2025-04-27 | Stop reason: HOSPADM

## 2025-04-25 RX ORDER — METOPROLOL TARTRATE 25 MG/1
12.5 TABLET, FILM COATED ORAL ONCE
Status: DISCONTINUED | OUTPATIENT
Start: 2025-04-25 | End: 2025-04-25

## 2025-04-25 RX ORDER — OXYCODONE HYDROCHLORIDE 5 MG/1
2.5 TABLET ORAL EVERY 4 HOURS PRN
Refills: 0 | Status: DISCONTINUED | OUTPATIENT
Start: 2025-04-25 | End: 2025-04-26

## 2025-04-25 RX ORDER — OXYCODONE HYDROCHLORIDE 5 MG/1
5 TABLET ORAL EVERY 4 HOURS PRN
Refills: 0 | Status: DISCONTINUED | OUTPATIENT
Start: 2025-04-25 | End: 2025-04-26

## 2025-04-25 RX ORDER — IRON POLYSACCHARIDE COMPLEX 150 MG
150 CAPSULE ORAL DAILY
Status: DISCONTINUED | OUTPATIENT
Start: 2025-04-25 | End: 2025-04-27 | Stop reason: HOSPADM

## 2025-04-25 RX ORDER — ALBUMIN HUMAN 250 G/1000ML
25 SOLUTION INTRAVENOUS ONCE
Status: COMPLETED | OUTPATIENT
Start: 2025-04-25 | End: 2025-04-25

## 2025-04-25 RX ORDER — FUROSEMIDE 10 MG/ML
20 INJECTION INTRAMUSCULAR; INTRAVENOUS ONCE
Status: COMPLETED | OUTPATIENT
Start: 2025-04-25 | End: 2025-04-25

## 2025-04-25 RX ORDER — MULTIVIT-MIN/IRON FUM/FOLIC AC 7.5 MG-4
1 TABLET ORAL DAILY
Status: DISCONTINUED | OUTPATIENT
Start: 2025-04-25 | End: 2025-04-27 | Stop reason: HOSPADM

## 2025-04-25 RX ADMIN — BISACODYL 10 MG: 10 SUPPOSITORY RECTAL at 13:03

## 2025-04-25 RX ADMIN — MUPIROCIN: 20 OINTMENT TOPICAL at 08:12

## 2025-04-25 RX ADMIN — FUROSEMIDE 20 MG: 10 INJECTION, SOLUTION INTRAMUSCULAR; INTRAVENOUS at 18:25

## 2025-04-25 RX ADMIN — Medication 1 TABLET: at 11:35

## 2025-04-25 RX ADMIN — GABAPENTIN 100 MG: 100 CAPSULE ORAL at 14:31

## 2025-04-25 RX ADMIN — OXYCODONE HYDROCHLORIDE 5 MG: 5 TABLET ORAL at 08:12

## 2025-04-25 RX ADMIN — Medication 150 MG: at 11:35

## 2025-04-25 RX ADMIN — GABAPENTIN 100 MG: 100 CAPSULE ORAL at 08:12

## 2025-04-25 RX ADMIN — METOPROLOL TARTRATE 25 MG: 25 TABLET, FILM COATED ORAL at 20:02

## 2025-04-25 RX ADMIN — GABAPENTIN 100 MG: 100 CAPSULE ORAL at 20:02

## 2025-04-25 RX ADMIN — OXYCODONE HYDROCHLORIDE 5 MG: 5 TABLET ORAL at 18:24

## 2025-04-25 RX ADMIN — ASPIRIN 81 MG CHEWABLE TABLET 81 MG: 81 TABLET CHEWABLE at 08:12

## 2025-04-25 RX ADMIN — Medication 3 L/MIN: at 19:24

## 2025-04-25 RX ADMIN — SENNOSIDES AND DOCUSATE SODIUM 2 TABLET: 50; 8.6 TABLET ORAL at 08:12

## 2025-04-25 RX ADMIN — GUAIFENESIN 600 MG: 600 TABLET, EXTENDED RELEASE ORAL at 20:02

## 2025-04-25 RX ADMIN — HEPARIN SODIUM 5000 UNITS: 5000 INJECTION INTRAVENOUS; SUBCUTANEOUS at 18:03

## 2025-04-25 RX ADMIN — ACETAMINOPHEN 975 MG: 325 TABLET, FILM COATED ORAL at 20:02

## 2025-04-25 RX ADMIN — METHOCARBAMOL 500 MG: 500 TABLET ORAL at 11:35

## 2025-04-25 RX ADMIN — PANTOPRAZOLE SODIUM 40 MG: 40 TABLET, DELAYED RELEASE ORAL at 06:25

## 2025-04-25 RX ADMIN — SENNOSIDES AND DOCUSATE SODIUM 2 TABLET: 50; 8.6 TABLET ORAL at 20:03

## 2025-04-25 RX ADMIN — POLYETHYLENE GLYCOL 3350 17 G: 17 POWDER, FOR SOLUTION ORAL at 08:11

## 2025-04-25 RX ADMIN — METOPROLOL TARTRATE 12.5 MG: 25 TABLET, FILM COATED ORAL at 08:12

## 2025-04-25 RX ADMIN — ROSUVASTATIN 40 MG: 40 TABLET, FILM COATED ORAL at 08:12

## 2025-04-25 RX ADMIN — LIDOCAINE 1 PATCH: 4 PATCH TOPICAL at 14:31

## 2025-04-25 RX ADMIN — METHOCARBAMOL 500 MG: 500 TABLET ORAL at 21:00

## 2025-04-25 RX ADMIN — ACETAMINOPHEN 975 MG: 325 TABLET, FILM COATED ORAL at 08:12

## 2025-04-25 RX ADMIN — ACETAMINOPHEN 975 MG: 325 TABLET, FILM COATED ORAL at 14:31

## 2025-04-25 RX ADMIN — Medication 3 L/MIN: at 07:25

## 2025-04-25 RX ADMIN — GUAIFENESIN 600 MG: 600 TABLET, EXTENDED RELEASE ORAL at 08:12

## 2025-04-25 RX ADMIN — EZETIMIBE 10 MG: 10 TABLET ORAL at 08:12

## 2025-04-25 RX ADMIN — ALBUTEROL SULFATE 2.5 MG: 2.5 SOLUTION RESPIRATORY (INHALATION) at 19:24

## 2025-04-25 RX ADMIN — MUPIROCIN: 20 OINTMENT TOPICAL at 20:03

## 2025-04-25 RX ADMIN — ALBUTEROL SULFATE 2.5 MG: 2.5 SOLUTION RESPIRATORY (INHALATION) at 12:25

## 2025-04-25 RX ADMIN — ALBUTEROL SULFATE 2.5 MG: 2.5 SOLUTION RESPIRATORY (INHALATION) at 07:25

## 2025-04-25 RX ADMIN — MAGNESIUM OXIDE TAB 400 MG (241.3 MG ELEMENTAL MG) 400 MG: 400 (241.3 MG) TAB at 08:12

## 2025-04-25 RX ADMIN — Medication 6 MG: at 20:02

## 2025-04-25 RX ADMIN — ALBUMIN HUMAN 25 G: 0.25 SOLUTION INTRAVENOUS at 11:35

## 2025-04-25 RX ADMIN — HEPARIN SODIUM 5000 UNITS: 5000 INJECTION INTRAVENOUS; SUBCUTANEOUS at 10:01

## 2025-04-25 RX ADMIN — ALBUMIN HUMAN 25 G: 0.25 SOLUTION INTRAVENOUS at 10:01

## 2025-04-25 RX ADMIN — FUROSEMIDE 40 MG: 10 INJECTION, SOLUTION INTRAMUSCULAR; INTRAVENOUS at 06:25

## 2025-04-25 ASSESSMENT — PAIN DESCRIPTION - DESCRIPTORS
DESCRIPTORS: ACHING;SORE
DESCRIPTORS: ACHING;SHARP
DESCRIPTORS: ACHING;SHARP
DESCRIPTORS: ACHING;SORE
DESCRIPTORS: ACHING;SORE

## 2025-04-25 ASSESSMENT — COGNITIVE AND FUNCTIONAL STATUS - GENERAL
DRESSING REGULAR UPPER BODY CLOTHING: A LITTLE
MOBILITY SCORE: 19
MOVING TO AND FROM BED TO CHAIR: A LITTLE
HELP NEEDED FOR BATHING: A LITTLE
DRESSING REGULAR LOWER BODY CLOTHING: A LITTLE
TOILETING: A LITTLE
TURNING FROM BACK TO SIDE WHILE IN FLAT BAD: A LITTLE
WALKING IN HOSPITAL ROOM: A LITTLE
STANDING UP FROM CHAIR USING ARMS: A LITTLE
CLIMB 3 TO 5 STEPS WITH RAILING: A LITTLE
DAILY ACTIVITIY SCORE: 20

## 2025-04-25 ASSESSMENT — PAIN - FUNCTIONAL ASSESSMENT
PAIN_FUNCTIONAL_ASSESSMENT: 0-10

## 2025-04-25 ASSESSMENT — PAIN SCALES - GENERAL
PAINLEVEL_OUTOF10: 2
PAINLEVEL_OUTOF10: 6
PAINLEVEL_OUTOF10: 6
PAINLEVEL_OUTOF10: 2
PAINLEVEL_OUTOF10: 0 - NO PAIN
PAINLEVEL_OUTOF10: 8
PAINLEVEL_OUTOF10: 2

## 2025-04-25 ASSESSMENT — PAIN DESCRIPTION - LOCATION
LOCATION: CHEST

## 2025-04-25 ASSESSMENT — PAIN DESCRIPTION - ORIENTATION
ORIENTATION: MID

## 2025-04-25 NOTE — PROGRESS NOTES
[facility] Pulmonary and Critical Care Medicine   Progress Note        Printed on 4/25/2025            Rafi Lisa Jr. is a 74 y.o. male on day 3 of admission presenting with Coronary artery disease    Subjective     History of Present Illness:  Rafi Lisa Jr. is a 74 y.o. year old male patient with Past Medical History of atrial fibrillation, CAD, HTN, HLD, COPD, cervical radiculopathy, osteoarthritis of the knees, and anemia, GERD, and large hiatal hernia presenting to the ICU after CABG.     Interval ICU events:   4/22: Patient underwent CABG x 3 with Dr. Kline (LIIMA-LAD, SVG-RI-OM3). Patient admitted to ICU s/p CABG x 3. Intubated and mechanically ventilated. Vent settings rate 14, , FiO2 50%, P8. Has been started on Levophed for blood pressure support. Plan for Pressure Support trials and extubation as able this afternoon.     4/23 POD 1: No acute overnight events. He is complaining of pain. He is on 3L of NC. Still requiring levophed of 0.02 for BP support.     4/24 POD 2: No acute overnight events. Pt sitting up in chair. Post op surgical pain 5-6/10 managed with scheduled Tylenol, gabapentin, and 10 mg oxycodone and 0.4 mg IV Dilaudid as needed. Patient c/o of increased moist non-productive cough. Hemodynamically stable off levophed. Sp02 945 on 3L NC. Adequate  mL/12 hrs. CTx3 to water seal without air leak and serosanguinous output.     4/25 POD 3: Afib RVR 4/23 @1900, Amio IVB x 2 and magnesium given - patient converted to SR this am. Transient hypotension with AFB: treated with albumin. Patient OOB denies any palpitations, chest pain, or shortness of breath. R pleural and MS CT removed 4/24. CXR shows improved atelectasis. Patient reports improved cough with less sputum production. Diuresed 1250 mL overnight. Surgical incisions CDI. No post op BM to date.     Scheduled Medications:   Scheduled Medications[1]     Continuous Medications:   Continuous Medications[2]      PRN Medications:   PRN Medications[3]      Objective   Vitals:  Most Recent:  Vitals:    04/25/25 1000   BP: 82/52   Pulse: 82   Resp:    Temp:    SpO2: 93%       24hr Min/Max:  Temp  Min: 35.9 °C (96.6 °F)  Max: 36.6 °C (97.9 °F)  Pulse  Min: 80  Max: 139  BP  Min: 82/52  Max: 140/66  Resp  Min: 12  Max: 34  SpO2  Min: 90 %  Max: 99 %    LDA:      Urethral Catheter Temperature probe;Non-latex 14 Fr. (Active)   Placement Date/Time: 04/22/25 0817   Placed by: Trang Reyes  Hand Hygiene Completed: Yes  Catheter Type: Temperature probe;Non-latex  Tube Size (Fr.): 14 Fr.  Catheter Balloon Size: 10 mL  Urine Returned: Yes   Number of days: 1       Chest Tube 1 Left Pleural 28 Fr (Active)   Placement Date/Time: 04/22/25 1227   Placed by: DR. JOHNSON  Hand Hygiene Completed: Yes  Tube Number: 1  Chest Tube Orientation: Left  Chest Tube Location: Pleural  Chest Tube Drain Tube Size (Fr): 28 Fr  Chest Tube Drainage System: (c) Dry seal ches...   Number of days: 1         Vent settings:  FiO2 (%):  [32 %] 32 %    Hemodynamic parameters for last 24 hours:         Intake/Output Summary (Last 24 hours) at 4/25/2025 1029  Last data filed at 4/25/2025 1000  Gross per 24 hour   Intake 2228.17 ml   Output 3795 ml   Net -1566.83 ml       Review of Systems:  14 point review of systems was completed and negative except for those specially mention in my HPI    Physical exam:    Physical Exam  Constitutional:       Appearance: Normal appearance.   HENT:      Head: Normocephalic and atraumatic.      Mouth/Throat:      Mouth: Mucous membranes are moist.      Pharynx: Oropharynx is clear.   Eyes:      Extraocular Movements: Extraocular movements intact.      Pupils: Pupils are equal, round, and reactive to light.   Cardiovascular:      Rate and Rhythm: Regular rhythm. Tachycardia present.      Pulses: Normal pulses.      Heart sounds: Normal heart sounds.      Comments: Afib conversion yesterday - reversed with Amio  Pulmonary:       Effort: Pulmonary effort is normal.      Breath sounds: Examination of the right-lower field reveals decreased breath sounds. Examination of the left-lower field reveals decreased breath sounds. Decreased breath sounds present.      Comments: Good expiratory cough - less congestion  Abdominal:      General: Bowel sounds are decreased.      Palpations: Abdomen is soft.      Hernia: A hernia is present.   Musculoskeletal:         General: No swelling. Normal range of motion.      Cervical back: Normal range of motion and neck supple.   Skin:     General: Skin is warm and dry.      Capillary Refill: Capillary refill takes less than 2 seconds.      Comments: Sternal dressings are clean, dry, and intact, LP chest tube   Neurological:      Mental Status: He is alert and oriented to person, place, and time.   Psychiatric:         Mood and Affect: Mood normal.         Behavior: Behavior normal.        Lab/Radiology/Diagnostic Review:    All labs and Imaging have been personally reviewed.       Assessment/Plan         Neuro:  #Post-Op Pain  -- Scheduled tylenol, mag-ox, gabapentin and robaxin, Lidoderm  -- PRN Oxycodone & Fentanyl  -- Bowel regimen while on narcotics   -- CAM assessment, delirium precautions    Cardiovascular  #Multivessel CAD s/p CABG x 3  #Acute Afib in th setting post CABG  #Hx atrial fibrillation, HTN, HLD   POD 2  CABG x 3 (LIIMA-LAD, SVG-RI-OM3)  -- Amio 150 mg IVB x2  converted to SR @ 0700  -- AV wires in place and pacer off  -- MS and RP CT removed,   ---> 12 hr CT output -  mL  -- MAPs > 65 off levophed  -- Continue rosuvastatin and ezetimibe   -- Home lisinopril held  -- Metoprolol increased 25 mg BID  -- Lasix as needed  -- CTS managing  -- TTE ordered     Pulmonary:  #Post-Op Respiratory Insufficiency  #COPD  -- Extubated on 4/22 to NC   -- 3L NC, does not wear O2 at home   -- Wean supplemental oxygen for spO2 goal > 92%  -- Albuterol nebulizer PRN   -- Mucinex  --> Aggressive pulmonary  toileting with IS and flutter valve    GI:  #Hiatal Hernia  #GERD  #Constipation - (post surgical, narcotic induced)  -- Protonix   -- Reg diet - appetite improved  -- Escalate bowel regimen: Suppository ordered    Renal:   #CHAPO on CKD likely related to diuresis  -- Baseline Cr ~ 1.2-1.39 pre-operatively, likely has some degree of CKD 3a  -- sCR 1.66   -- Lovenox switched to heparin  -- Hold off on additional Lasix dosing  -- Daily BMP and electrolyte repletion  -- Maintain Saldana: Strict intake and output monitoring    Endocrine:  #Hyperglycemia  #Pre-diabetes   -- A1C(3/25/25): 6.2  -- Strict blood glucose control post-operatively, goal glucose <150  -- Cardiac SSI every 4 hours    Heme/Onc:  #Acute Blood Loss Anemia Post-Op -   #Thrombocytopenia  #Macrocytic anemia  Baseline Hgb ~12-13  -- Hgb today 7.4 - Transfused 1 U PRBC  -- Plt 89 - Lovenox switched to subcut heparin  -- Strict chest tube output monitoring  -- Monitor Plt daily  -- Daily CBC, transfuse for Hgb goal > 7 or s/s active bleeding with hemodynamic instability  -- Multivitamin and iron supplement ordered  -- CTS managing    ID:  No concern for active infection  -- Cefazolin Ppx Abx complete  -- Patient afebrile  -- Monitor SIRS criteria      Skin/MSK:  -Midline surgical incision D/I      ICU CHECK LIST:   Antimicrobials: none  Oxygen: NC  Drips: none  Feeding/Fluids:   Analgesia: Multimodal pain regimen  Sedation:   Thromboprophylaxis: SCDs, TEDs and heparin subcut  Ulcer prophylaxis: PPI   Glycemic control: BGM with SSI   Bowel care: Scheduled and prn  Indwelling catheters: Saldana   Lines: PIVs   Restraints:   Dispo: MICU   Code Status: Full    I have personally spent 35 minutes of critical care time, exclusive of time spent on any procedures, in evaluation and management of this critically ill patient’s condition mentioned above in the assessment and plan.     MAURO Sheikh-CNP  Pulmonary and Critical Care Medicine    Please excuse any  typographical or unwanted errors within this documentation as voice recognition software was used to dictate this note.           [1] acetaminophen, 975 mg, oral, q6h  albumin human, 25 g, intravenous, Once  albuterol, 2.5 mg, nebulization, TID  aspirin, 81 mg, oral, Daily  bisacodyl, 10 mg, rectal, Once  ezetimibe, 10 mg, oral, Daily  gabapentin, 100 mg, oral, TID  guaiFENesin, 600 mg, oral, BID  heparin (porcine), 5,000 Units, subcutaneous, q8h  insulin lispro, 0-5 Units, subcutaneous, TID AC  lidocaine, 1 patch, transdermal, q24h  magnesium oxide, 400 mg, oral, Daily  melatonin, 6 mg, oral, Nightly  methocarbamol, 500 mg, oral, q8h BANDAR  metoprolol tartrate, 12.5 mg, oral, Once  metoprolol tartrate, 25 mg, oral, BID  mupirocin, , Topical, BID  oxygen, , inhalation, Continuous - Inhalation  pantoprazole, 40 mg, oral, Daily before breakfast  polyethylene glycol, 17 g, oral, Daily  rosuvastatin, 40 mg, oral, Daily  sennosides-docusate sodium, 2 tablet, oral, BID     [2]    [3] PRN medications: alteplase, amiodarone, calcium carbonate, calcium chloride, calcium chloride, dextrose **OR** glucagon, HYDROmorphone, magnesium sulfate, magnesium sulfate, naloxone, oxyCODONE, oxyCODONE, polyethylene glycol, potassium chloride CR **OR** potassium chloride, potassium chloride CR **OR** potassium chloride

## 2025-04-25 NOTE — PROGRESS NOTES
Physical Therapy                 Therapy Communication Note    Patient Name: Rafi Lisa Jr.  MRN: [id number]  Department: [facility] NONV1  Room: [address] [place], [place] [address]  Today's Date: 4/25/2025     Discipline: Physical Therapy    PT Missed Visit: Yes     Missed Visit Reason: Missed Visit Reason:  (PT treatment attempted x2 this date. First attempt pt just back to bed after ambulating /c nursing. 2nd attempt pt having echo performed. Will reattempt as able.)    Missed Time: Attempt

## 2025-04-25 NOTE — PROGRESS NOTES
Rafi Lisa . is a 74 y.o. male on day 3 of admission presenting with Coronary artery disease.  Record reviewed.  Episode of AF w/RVR overnight, tx w/amio and mag.  Converted to SR this morning.  CT x1, CXR improved.  Episodes of hypotension.  Ambulating with PT.  Planned for TTE today.  This TCC met with patient, up to chair at bedside, for discharge plan.  TCC updated patient regarding conversation with spouse late yesterday afternoon, for home care agency preference.  Patient agreeable to [facility].  [facility] also ordered by attending team.  Care Transitions will continue to follow.

## 2025-04-25 NOTE — PROGRESS NOTES
[facility]   Cardiothoracic Surgery   Progress Note        Rafi Lisa Jr. is a 74 y.o. male on day 3 of admission presenting with Coronary artery disease.    Subjective     Interval HPI: Seen and assessed patient this morning, sitting OOB in chair. He is in no acute distress, reporting breathing feels improved since yesterday.  Requiring 3L NC.        Objective   Physical Exam  Physical Exam  Vitals and nursing note reviewed.   Constitutional:       General: He is awake. He is not in acute distress.     Appearance: Normal appearance.   HENT:      Head: Normocephalic and atraumatic.      Mouth/Throat:      Lips: Pink.      Mouth: Mucous membranes are moist.   Eyes:      General: Lids are normal.      Pupils: Pupils are equal, round, and reactive to light.   Cardiovascular:      Rate and Rhythm: Normal rate and regular rhythm.      Pulses: Normal pulses.      Heart sounds: Normal heart sounds. No murmur heard.  Pulmonary:      Effort: Pulmonary effort is normal.      Breath sounds: Examination of the right-lower field reveals decreased breath sounds. Examination of the left-lower field reveals decreased breath sounds. Decreased breath sounds present.      Comments: 3L NC  Non-productive cough  Left pleural chest in place place to suction, serosanguinous drainage   Chest:      Comments: Midsternal incision; Dressing C/D/I  Pleural and mediastinal chest tubes in place  Abdominal:      General: Bowel sounds are decreased.      Palpations: Abdomen is soft.      Tenderness: There is no abdominal tenderness.   Genitourinary:     Comments: Saldana catheter  Musculoskeletal:      Cervical back: Normal range of motion and neck supple.      Right lower leg: Edema present.      Left lower leg: Edema present.   Skin:     General: Skin is warm.      Capillary Refill: Capillary refill takes less than 2 seconds.   Neurological:      General: No focal deficit present.      Mental Status: He is  alert.      Sensory: Sensation is intact.      Motor: Motor function is intact.   Psychiatric:         Attention and Perception: Attention normal.         Speech: Speech normal.         Cognition and Memory: Cognition normal.         Last Recorded Vitals  Vitals:    04/25/25 0745 04/25/25 0800 04/25/25 0900 04/25/25 1000   BP:  140/66 97/59 82/52   BP Location:  Left arm     Patient Position:  Sitting     Pulse:  106 82 82   Resp:       Temp: 36 °C (96.8 °F)      TempSrc: Temporal      SpO2:  90% 96% 93%   Weight:       Height:            Intake/Output last 3 Shifts:  I/O last 3 completed shifts:  In: 2200.7 (26 mL/kg) [P.O.:1809; IV Piggyback:391.7]  Out: 3540 (41.9 mL/kg) [Urine:3010 (1 mL/kg/hr); Chest Tube:530]  Weight: 84.5 kg     Inpatient Medications  Scheduled Medications[1]  Continuous medications  Continuous Medications[2]  PRN medications  PRN Medications[3]     LDA:  CVC 04/22/25 Triple lumen Right Internal jugular (Active)   Placement Date/Time: 04/22/25 (c) 0825   Hand Hygiene Performed Prior to CVC Insertion: Yes  Site Prep: Chlorhexidine   Site Prep Agent has Completely Dried Before Insertion: Yes  All 5 Sterile Barriers Used (Gloves, Gown, Cap, Mask, Large Sterile Rowena...   Number of days: 1       Arterial Line 04/22/25 Left Brachial (Active)   Placement Date/Time: 04/22/25 1715   Hand Hygiene Completed: Yes  Size: 20 G  Orientation: Left  Location: Brachial  Placed by: Anders Aaron NP  Insertion attempts: 1  Securement Method: Sutured   Number of days: 0       Urethral Catheter Temperature probe;Non-latex 14 Fr. (Active)   Placement Date/Time: 04/22/25 0817   Placed by: Trang Reyes  Hand Hygiene Completed: Yes  Catheter Type: Temperature probe;Non-latex  Tube Size (Fr.): 14 Fr.  Catheter Balloon Size: 10 mL  Urine Returned: Yes   Number of days: 1       Chest Tube 1 Left Pleural 28 Fr (Active)   Placement Date/Time: 04/22/25 1227   Placed by: DR. JOHNSON  Hand Hygiene Completed: Yes  Tube Number:  1  Chest Tube Orientation: Left  Chest Tube Location: Pleural  Chest Tube Drain Tube Size (Fr): 28 Fr  Chest Tube Drainage System: (c) Dry seal ches...   Number of days: 0       Chest Tube 2 Mediastinal 28 Fr (Active)   Placement Date/Time: 04/22/25 1228   Placed by: DR. JOHNSON  Hand Hygiene Completed: Yes  Tube Number: 2  Chest Tube Location: Mediastinal  Chest Tube Drain Tube Size (Fr): 28 Fr  Chest Tube Drainage System: (c) Dry seal chest drain   Number of days: 0       Chest Tube 3 Right Pleural 28 Fr (Active)   Placement Date/Time: 04/22/25 1229   Placed by: DR. JOHNSON  Hand Hygiene Completed: Yes  Tube Number: 3  Chest Tube Orientation: Right  Chest Tube Location: Pleural  Chest Tube Drain Tube Size (Fr): 28 Fr  Chest Tube Drainage System: (c) Dry seal kendrick...   Number of days: 0       Relevant Results  Lab Review  Results from last 7 days   Lab Units 04/25/25  0434   WBC AUTO x10*3/uL 6.2   HEMOGLOBIN g/dL 7.4*   HEMATOCRIT % 24.5*   PLATELETS AUTO x10*3/uL 89*     Results from last 7 days   Lab Units 04/25/25  0434   SODIUM mmol/L 135*   POTASSIUM mmol/L 4.3   CHLORIDE mmol/L 99   CO2 mmol/L 30   BUN mg/dL 49*   CREATININE mg/dL 1.66*   CALCIUM mg/dL 8.6   GLUCOSE mg/dL 139*     Results from last 7 days   Lab Units 04/25/25  0434   MAGNESIUM mg/dL 2.69*     Results from last 7 days   Lab Units 04/23/25  0450   POCT PH, ARTERIAL pH 7.40   POCT PCO2, ARTERIAL mm Hg 37*   POCT PO2, ARTERIAL mm Hg 90   POCT HCO3 CALCULATED, ARTERIAL mmol/L 22.9   POCT BASE EXCESS, ARTERIAL mmol/L -1.7         Radiographic Testing  EKG:  ECG 12 lead 03/21/2025        Echo:  Transthoracic Echo (TTE) Complete 03/24/2025  PHYSICIAN INTERPRETATION:  Left Ventricle: The left ventricular systolic function is normal, with a visually estimated ejection fraction of 55-60%. There is mild concentric left ventricular hypertrophy. There are no regional left ventricular wall motion abnormalities. The left ventricular cavity size is normal.  There is mildly increased septal and mildly increased posterior left ventricular wall thickness. There is left ventricular concentric remodeling. Spectral Doppler shows a Grade II (pseudonormal pattern) of left ventricular diastolic filling with an elevated left atrial pressure.  Left Atrium: The left atrium is mildly dilated.  Right Ventricle: The right ventricle is mildly enlarged. There is normal right ventricular global systolic function.  Right Atrium: The right atrium is normal in size.  Aortic Valve: The aortic valve is trileaflet. There is mild aortic valve thickening. The aortic valve dimensionless index is 0.81. There is moderate aortic valve regurgitation. The peak instantaneous gradient of the aortic valve is 7 mmHg. The mean gradient of the aortic valve is 4 mmHg.  Mitral Valve: The mitral valve is mildly thickened. The peak instantaneous gradient of the mitral valve is 2 mmHg. There is mild mitral valve regurgitation.  Tricuspid Valve: The tricuspid valve is structurally normal. There is mild tricuspid regurgitation. The Doppler estimated RVSP is within normal limits at 29.4 mmHg.  Pulmonic Valve: The pulmonic valve is structurally normal. There is mild pulmonic valve regurgitation.  Pericardium: There is no pericardial effusion noted.  Aorta: The aortic root is abnormal. There is mild dilatation of the ascending aorta.  In comparison to the previous echocardiogram(s): Compared with study dated 5/15/2020,.        CONCLUSIONS:   1. The left ventricular systolic function is normal, with a visually estimated ejection fraction of 55-60%.   2. Spectral Doppler shows a Grade II (pseudonormal pattern) of left ventricular diastolic filling with an elevated left atrial pressure.   3. Mildly enlarged right ventricle.   4. The left atrium is mildly dilated.   5. Right ventricular systolic pressure is within normal limits.   6. Moderate aortic valve regurgitation.       Ejection Fractions:  EF   Date/Time Value  Ref Range Status   03/24/2025 09:25 AM 58 %      Cath:  Cardiac Catheterization Procedure 03/24/2025  Coronary Angiography:  The coronary circulation is left dominant.     Left Main Coronary Artery:  The left main coronary artery is a normal caliber vessel. The left main arises normally from the left coronary sinus of Valsalva and trifurcation. The left main coronary artery showed no significant disease or stenosis greater than 30%.     Left Anterior Descending Coronary Artery Distribution:  The left anterior descending coronary artery is a normal caliber vessel. The LAD arises normally from the left main coronary artery. The LAD demonstrated dense calcification in the proximal to mid segment. The ostial and proximal to mid left anterior descending coronary artery showed 90% stenosis. This lesion was heavily calcified.     Circumflex Coronary Artery Distribution:  The circumflex coronary artery is a large caliber vessel. The circumflex arises normally from the left main coronary artery. The circumflex revealed mild distal atherosclerotic disease. The mid circumflex coronary artery showed 50% stenosis.     Ramus Intermedius:  The ramus intermedius is a medium-sized caliber vessel. The proximal ramus intermedius showed 100% stenosis.  with collaterals.     Right Coronary Artery Distribution:     The right coronary artery is a medium-sized caliber vessel. The RCA showed dense calcification. The entire right coronary artery showed 95% stenosis.        Left Ventriculography:  The size of the left ventricle is normal. The LV ejection fraction was 50 to 55%. All left ventricular regional wall segments contract normally. There is no LV diastolic dysfunction noted.    Chest Imaging:  XR chest 1 view   Final Result   1.  Trace left pneumothorax decreased from the prior examination.   Increased left lower lung atelectasis.        Signed by: Durga Larson 4/25/2025 9:44 AM   Dictation workstation:   [id number]      XR chest  1 view   Final Result   1.  There is at least a small left pneumothorax now present. Patchy   bibasilar opacities remain similar. Continued clinical correlation   and follow-up advised.   2. Postoperative chest with support devices and additional findings   as above.                  MACRO:   Critical Finding:  See findings. Notification was initiated on   4/24/2025 at 9:16 am by  Gian Thomas.  (**-OCF-**) Instructions:        Signed by: Gian Thomas 4/24/2025 9:18 AM   Dictation workstation:   [id number]      XR chest 1 view   Final Result   1.  Improved lower lung atelectasis.        Signed by: Durga Larson 4/23/2025 8:32 AM   Dictation workstation:   [id number]      XR chest 1 view   Final Result   NG/OG tube is coiled in the midesophagus but then continues distally   with the tip terminating in the supradiaphragmatic portion of the   hiatal hernia. Other lines and tubes as above.        Bibasilar atelectasis and pleural effusions.        MACRO:   None.        Signed by: Meet Dee 4/22/2025 3:57 PM   Dictation workstation:   [id number]        Pulmonary Function Testing:   Pulmonary Function Lab - Spirometry Only      03/25/25        History of Present Illness:   Rafi Lisa Jr. is a 74 y.o. male with a PMH significant for CAD (s/p PCI to the LAD, laser arthrectomy to the Lcx on 1/2022), HTN, HLD, COPD (Emphysemea), and nicotine use disorder who presents to [facility] on 4/22/25 for a staged coronary.      The patient was undergoing workup with cardiologist, Dr. James Hunt in workup for his anginal complaints. He ultimately was scheduled for a stress testing; however during his exam, he was referred to the ED and was subsequently admitted for inpatient workup. On 3/24/25, he underwent a LHC with interventional cardiologist, Dr. Tony Oneill and his coronary anatomy revealed diffuse multivessel CAD. Cardiac surgery was subsequently consulted for bypass grafting  evaluation.       Daily Event      04/22/25: Patient arrived to the ICU in critically ill but stable condition. S/p CABG x3. Patient arrived hemodynamically stable without the need for continuous vasopressors. Flowtrack noting decreased hemodynamics, however concerned that this is not accurate as patient is not with signs of decreased perfusion.      Plan to recover in the immediate post-op period and wean mechanical ventilation towards extubation.      - Vital Signs: HR 65, BP 91/59, SpO2 100%   - Hemodynamics: CVP 10, CO 3, CI 1.5, SVR 1725 (consider accuracy of #s)  - Ventilator Settings: PRVC-AC / FiO2 50% / PEEP 8 / RR 16     - Pump time: 99 min / x-clamp 76 min      Intake & Output:   - Initial R Pleural Chest Tube: 120mL   - Initial L Pleural Chest Tube: 100mL   - Initial Mediastinal Chest Tube: 150mL     04/23/2025: No acute events overnight. Patient was extubated yesterday evening without complication. He is sitting up in the chair this morning. He remains on 0.01mcg of norepinephrine. NG tube removed this morning. Patient is reporting moderate-severe pain; will adjust pain regimen. Maintain mediastinal and pleural chest tubes today.     - Current O2 Requirements: 3L NC    Intake & Output:  - Total R Pleural Chest Tube: 280mL   - Total L Pleural Chest Tube: 320mL   - Total Mediastinal Chest Tube: 310mL      04/24/25: No acute events overnight. Sitting OOB to chair this morning.  Requiring 4L NC with moist, non-productive cough.  Vasopressors weaned off overnight. Chest tubes remain in place with serosanguinous drainage, will remove right pleural today.  Start Beta-blocker and diuresis today. CXR with small left pneumothorax.      - Current O2 Requirements: 4L NC / -750ml    Intake & Output:  - Total R Pleural Chest Tube: 70 mL -> Remove today  - Total L Pleural Chest Tube: 220 mL  -> [place]   - Total Mediastinal Chest Tube: 220 mL     04/25/25: Overnight episode of AF RVR, received 2 Amiodarone  Bolus and IV Magnesium 2 G.  Converted to SR this morning.  CXR with improved small left pneumothorax. Intermittent episodes of slight hypotension, will get TTE today to evaluate BiV Function. Ambulating with PT.  Continue aggressive bronchial hygiene and fluid optimization.     - Current O2 Requirements: 3L NC /  ml    Intake & Output:  - Total L Pleural Chest Tube: 170 mL -> Waterseal today, repeat CXR later  Assessment & Plan        Multivessel Coronary Artery Disease  - Patient has remote Hx of PCI to the LAD in 1998   --> most recently laser arterectomy to the Lcx on 1/2022  - S/p CABG x 3 on 4/22/25 with Dr. Juan Luis Kline   --> LIMA-LAD, SVG-Ramus, SVG-OM2  - Continue on Aspirin 81mg and Rosuvastatin 40mg    - Increase metoprolol 25 mg BID  - Left pleural chest tube to waterseal -> Repeat CXR later  - Daily CXR while chest tube intact   - TTE to asses BiV function        Mediastinal Incision  - Dressing C/D/I        Acute Post-Op Pain  - As expected   - Multimodal pain control   --> Scheduled: Acetaminophen 975 mg q 6hr, magnesium oxide 400mg QD, gabapentin 100mg TID, PO methocarbamol 500mg PO Q8h  --> PRN: oxycodone & fentanyl  - Bowel regimen while taking narcotics         Risk for Post-Op Arrhythmia  - Ventricular wires placed  - Discontinue V-wires prior to DC  - Telemetry until discharged        Acute Postoperative Respiratory Insufficiency   - As expected following CABG with post-op atelectasis  - Current O2 Requirements: 3L NC  - Coughing and deep breathing exercises with Incentive spirometry  - Out of bed, early and aggressive mobilization with assistance  - Oxygen as needed, wean to keep saturation above 92%  - ICU intensivist/pulmonologist consulted  - Albuterol nebulizers as needed  - Bronchial Hygiene Ezpap TID        COPD     Nicotine Use Disorder  - Home Medications: Albuterol PRN  - Home O2: N/A  - PFTs completed on 3/25 (see below) show COPD with reversible component   --> FEV1/FVC = 0.52  pre / 0.53 post (FEV1 improved 15% / FVC improved 16%)  - Consider addition of tiotropium if needed        Risk for Fluid Volume Overload     Hyponatremia 2/2 Hypervolemia     CKD: Baseline CRE: ~ 1.6  - Pre-Op Weight: 80 kg      4/23: 85.5 kg     4/24: 86.4 kg -> IV Lasix 20 mg and IV Lasix 40 mg with Metolazone 5 mg x1     4/25: 84.5 kg -> IV Lasix 40 mg x1  - Strict I& Os and daily weights    - Afternoon RFP  - Daily RFP        Acute Post-Op Blood-Loss Anemia  - Pre-Op H/H:  12.8/41.7     4/23: 9/29.7     4/24: 8.8/29.7     4/25: 7.4/24.5 -> 1u RBC  - Monitor chest tubes for post op hemorrhage   - Will start POD 3 Multivitamin/iron tablet   - Daily CBC; Follow up CBC this afternoon         Post-Op Leukocytosis  - Pre-Op WBC: 4.6     4/23: 7.5     4/24: 7.5     4/25: 6.2  - Afebrile  - Post-op ATB completed  - Daily CBC        Essential HTN  - Home Medications: Lisinopril 20mg   -  Hold in the immediate post-op period         Pre-diabetic   - Home Medications: None   - HbA1c: 6.4 --> 6.2  - Goal for BG <150 in the post-operative period  - SSI #2 AC/HS        Dyslipidemia  - Home Medications: Rosuvastatin 40mg, Ezetimibe 10mg  - Continue on statin therapy as above         Hiatal Hernia     GERD  - Home Medications: Pantoprazole 40mg  - Patient has a large, symptomatic hiatal hernia  - Continue on home PPI      Risk for Electrolyte Disturbances  - Optimize electrolytes per [facility] protocol       Bowel Regimen: No post op BM.   - Senna-S BID and miralax daily  - Suppository x1     Prophylaxis  - GI: home PPI  - DVT: Chadd-Hose & switched to SUBQ (CKD)   - MRSA: Negative (MSSA positive) -> Mupirocin BID x 5 days  - PT/OT      Disposition  - CVICU (Step-down)    - Home Health and Healthy at home Ordered  - Cardiology follow up appt requested      Patient seen and plan discussed with Dr. Juan Luis Kline.          Mikayla ROBBINS Head, APRN-CNP         [1] acetaminophen, 975 mg, oral, q6h  albuterol, 2.5 mg, nebulization,  TID  aspirin, 81 mg, oral, Daily  bisacodyl, 10 mg, rectal, Once  ezetimibe, 10 mg, oral, Daily  gabapentin, 100 mg, oral, TID  guaiFENesin, 600 mg, oral, BID  heparin (porcine), 5,000 Units, subcutaneous, q8h  insulin lispro, 0-5 Units, subcutaneous, TID AC  lidocaine, 1 patch, transdermal, q24h  magnesium oxide, 400 mg, oral, Daily  melatonin, 6 mg, oral, Nightly  methocarbamol, 500 mg, oral, q8h BANDAR  metoprolol tartrate, 12.5 mg, oral, Once  metoprolol tartrate, 25 mg, oral, BID  mupirocin, , Topical, BID  oxygen, , inhalation, Continuous - Inhalation  pantoprazole, 40 mg, oral, Daily before breakfast  polyethylene glycol, 17 g, oral, Daily  rosuvastatin, 40 mg, oral, Daily  sennosides-docusate sodium, 2 tablet, oral, BID     [2]    [3] PRN medications: alteplase, amiodarone, calcium carbonate, calcium chloride, calcium chloride, dextrose **OR** glucagon, HYDROmorphone, magnesium sulfate, magnesium sulfate, naloxone, oxyCODONE, oxyCODONE, polyethylene glycol, potassium chloride CR **OR** potassium chloride, potassium chloride CR **OR** potassium chloride

## 2025-04-25 NOTE — PROGRESS NOTES
Therapy Communication Note    Patient Name: Rafi Lisa Jr.  MRN: [id number]  Department: [facility]  Room: [facility]A  Today's Date: 4/25/2025     Discipline: Occupational Therapy    Missed Visit Reason:  Attempted to see patient for OT treatment; patient currently undergoing echo. Will re-attempt as able.    Missed Time: Attempt    Comment:

## 2025-04-25 NOTE — CARE PLAN
Problem: Pain - Adult  Goal: Verbalizes/displays adequate comfort level or baseline comfort level  Outcome: Progressing     Problem: Safety - Adult  Goal: Free from fall injury  Outcome: Progressing     Problem: Discharge Planning  Goal: Discharge to home or other facility with appropriate resources  Outcome: Progressing     Problem: Chronic Conditions and Co-morbidities  Goal: Patient's chronic conditions and co-morbidity symptoms are monitored and maintained or improved  Outcome: Progressing     Problem: Nutrition  Goal: Nutrient intake appropriate for maintaining nutritional needs  Outcome: Progressing     Problem: Pain  Goal: Takes deep breaths with improved pain control throughout the shift  Outcome: Progressing  Goal: Turns in bed with improved pain control throughout the shift  Outcome: Progressing  Goal: Walks with improved pain control throughout the shift  Outcome: Progressing  Goal: Performs ADL's with improved pain control throughout shift  Outcome: Progressing  Goal: Participates in PT with improved pain control throughout the shift  Outcome: Progressing  Goal: Free from opioid side effects throughout the shift  Outcome: Progressing  Goal: Free from acute confusion related to pain meds throughout the shift  Outcome: Progressing     Problem: Fall/Injury  Goal: Not fall by end of shift  Outcome: Progressing  Goal: Be free from injury by end of the shift  Outcome: Progressing  Goal: Verbalize understanding of personal risk factors for fall in the hospital  Outcome: Progressing  Goal: Verbalize understanding of risk factor reduction measures to prevent injury from fall in the home  Outcome: Progressing  Goal: Use assistive devices by end of the shift  Outcome: Progressing  Goal: Pace activities to prevent fatigue by end of the shift  Outcome: Progressing     Problem: Skin  Goal: Decreased wound size/increased tissue granulation at next dressing change  Outcome: Progressing  Goal: Participates in  plan/prevention/treatment measures  Outcome: Progressing  Goal: Prevent/manage excess moisture  Outcome: Progressing  Goal: Prevent/minimize sheer/friction injuries  Outcome: Progressing  Goal: Promote/optimize nutrition  Outcome: Progressing  Goal: Promote skin healing  Outcome: Progressing     Problem: Resident is recovering from cardiovascular surgery  Goal: I will maintain and build strength.  Outcome: Progressing  Goal: I will decrease complications and risks after surgery  Outcome: Progressing

## 2025-04-25 NOTE — PROGRESS NOTES
Cardiac surgery:    Overall, he is progressing very well.  He is out of bed, ambulating in the hallways, tolerating a regular diet, and making adequate urine output.  He did receive 1 unit of packed red blood cells this morning.  His chest tube output throughout the day has been minimal, we will plan for chest tube removal tomorrow morning.  His chest x-ray today shows good aeration of bilateral lungs, no evidence of pneumothorax, no significant pleural effusions.  He still has a retrocardiac air bubble with known large hiatal hernia.  There is no evidence of complication from his hiatal hernia.    Pk Kline MD

## 2025-04-26 ENCOUNTER — APPOINTMENT (OUTPATIENT)
Dept: RADIOLOGY | Facility: HOSPITAL | Age: 75
DRG: 236 | End: 2025-04-26
Payer: COMMERCIAL

## 2025-04-26 ENCOUNTER — HOSPITAL ENCOUNTER (OUTPATIENT)
Dept: CARDIOLOGY | Facility: HOSPITAL | Age: 75
Discharge: HOME | DRG: 236 | End: 2025-04-26
Payer: COMMERCIAL

## 2025-04-26 DIAGNOSIS — I25.10 CAD (CORONARY ARTERY DISEASE): ICD-10-CM

## 2025-04-26 LAB
ABO GROUP (TYPE) IN BLOOD: NORMAL
ALBUMIN SERPL BCP-MCNC: 3.8 G/DL (ref 3.4–5)
ANION GAP SERPL CALC-SCNC: 12 MMOL/L (ref 10–20)
ANTIBODY SCREEN: NORMAL
BLOOD EXPIRATION DATE: NORMAL
BUN SERPL-MCNC: 55 MG/DL (ref 6–23)
CA-I BLD-SCNC: 1.14 MMOL/L (ref 1.1–1.33)
CALCIUM SERPL-MCNC: 9.1 MG/DL (ref 8.6–10.3)
CHLORIDE SERPL-SCNC: 94 MMOL/L (ref 98–107)
CO2 SERPL-SCNC: 31 MMOL/L (ref 21–32)
CREAT SERPL-MCNC: 1.55 MG/DL (ref 0.5–1.3)
DISPENSE STATUS: NORMAL
EGFRCR SERPLBLD CKD-EPI 2021: 46 ML/MIN/1.73M*2
ERYTHROCYTE [DISTWIDTH] IN BLOOD BY AUTOMATED COUNT: 14.5 % (ref 11.5–14.5)
GLUCOSE BLD MANUAL STRIP-MCNC: 102 MG/DL (ref 74–99)
GLUCOSE SERPL-MCNC: 106 MG/DL (ref 74–99)
HCT VFR BLD AUTO: 26 % (ref 41–52)
HGB BLD-MCNC: 8.1 G/DL (ref 13.5–17.5)
MAGNESIUM SERPL-MCNC: 2.32 MG/DL (ref 1.6–2.4)
MCH RBC QN AUTO: 30.1 PG (ref 26–34)
MCHC RBC AUTO-ENTMCNC: 31.2 G/DL (ref 32–36)
MCV RBC AUTO: 97 FL (ref 80–100)
NRBC BLD-RTO: 0 /100 WBCS (ref 0–0)
PHOSPHATE SERPL-MCNC: 3.8 MG/DL (ref 2.5–4.9)
PLATELET # BLD AUTO: 108 X10*3/UL (ref 150–450)
POTASSIUM SERPL-SCNC: 4 MMOL/L (ref 3.5–5.3)
PRODUCT BLOOD TYPE: 6200
PRODUCT CODE: NORMAL
RBC # BLD AUTO: 2.69 X10*6/UL (ref 4.5–5.9)
RH FACTOR (ANTIGEN D): NORMAL
SODIUM SERPL-SCNC: 133 MMOL/L (ref 136–145)
UNIT ABO: NORMAL
UNIT NUMBER: NORMAL
UNIT RH: NORMAL
UNIT VOLUME: 350
WBC # BLD AUTO: 6.1 X10*3/UL (ref 4.4–11.3)
XM INTEP: NORMAL

## 2025-04-26 PROCEDURE — 80069 RENAL FUNCTION PANEL: CPT

## 2025-04-26 PROCEDURE — 2500000004 HC RX 250 GENERAL PHARMACY W/ HCPCS (ALT 636 FOR OP/ED): Mod: JZ

## 2025-04-26 PROCEDURE — 2500000001 HC RX 250 WO HCPCS SELF ADMINISTERED DRUGS (ALT 637 FOR MEDICARE OP)

## 2025-04-26 PROCEDURE — 99291 CRITICAL CARE FIRST HOUR: CPT

## 2025-04-26 PROCEDURE — 86901 BLOOD TYPING SEROLOGIC RH(D): CPT

## 2025-04-26 PROCEDURE — 2500000002 HC RX 250 W HCPCS SELF ADMINISTERED DRUGS (ALT 637 FOR MEDICARE OP, ALT 636 FOR OP/ED): Performed by: NURSE PRACTITIONER

## 2025-04-26 PROCEDURE — 71045 X-RAY EXAM CHEST 1 VIEW: CPT | Performed by: RADIOLOGY

## 2025-04-26 PROCEDURE — 94640 AIRWAY INHALATION TREATMENT: CPT

## 2025-04-26 PROCEDURE — 2500000001 HC RX 250 WO HCPCS SELF ADMINISTERED DRUGS (ALT 637 FOR MEDICARE OP): Performed by: NURSE PRACTITIONER

## 2025-04-26 PROCEDURE — 93010 ELECTROCARDIOGRAM REPORT: CPT | Performed by: INTERNAL MEDICINE

## 2025-04-26 PROCEDURE — 82947 ASSAY GLUCOSE BLOOD QUANT: CPT

## 2025-04-26 PROCEDURE — 82330 ASSAY OF CALCIUM: CPT

## 2025-04-26 PROCEDURE — 83735 ASSAY OF MAGNESIUM: CPT

## 2025-04-26 PROCEDURE — 2500000005 HC RX 250 GENERAL PHARMACY W/O HCPCS

## 2025-04-26 PROCEDURE — 2500000004 HC RX 250 GENERAL PHARMACY W/ HCPCS (ALT 636 FOR OP/ED): Performed by: NURSE PRACTITIONER

## 2025-04-26 PROCEDURE — 2500000005 HC RX 250 GENERAL PHARMACY W/O HCPCS: Performed by: THORACIC SURGERY (CARDIOTHORACIC VASCULAR SURGERY)

## 2025-04-26 PROCEDURE — 85027 COMPLETE CBC AUTOMATED: CPT

## 2025-04-26 PROCEDURE — 99222 1ST HOSP IP/OBS MODERATE 55: CPT

## 2025-04-26 PROCEDURE — 36415 COLL VENOUS BLD VENIPUNCTURE: CPT

## 2025-04-26 PROCEDURE — 2500000002 HC RX 250 W HCPCS SELF ADMINISTERED DRUGS (ALT 637 FOR MEDICARE OP, ALT 636 FOR OP/ED)

## 2025-04-26 PROCEDURE — 2060000001 HC INTERMEDIATE ICU ROOM DAILY

## 2025-04-26 PROCEDURE — 71045 X-RAY EXAM CHEST 1 VIEW: CPT

## 2025-04-26 RX ORDER — FUROSEMIDE 10 MG/ML
20 INJECTION INTRAMUSCULAR; INTRAVENOUS ONCE
Status: COMPLETED | OUTPATIENT
Start: 2025-04-27 | End: 2025-04-27

## 2025-04-26 RX ORDER — ACETAMINOPHEN 500 MG
10 TABLET ORAL NIGHTLY
Status: DISCONTINUED | OUTPATIENT
Start: 2025-04-26 | End: 2025-04-27 | Stop reason: HOSPADM

## 2025-04-26 RX ORDER — OXYCODONE HYDROCHLORIDE 5 MG/1
5 TABLET ORAL EVERY 4 HOURS PRN
Refills: 0 | Status: DISCONTINUED | OUTPATIENT
Start: 2025-04-26 | End: 2025-04-27 | Stop reason: HOSPADM

## 2025-04-26 RX ORDER — TRAZODONE HYDROCHLORIDE 50 MG/1
50 TABLET ORAL NIGHTLY
Status: DISCONTINUED | OUTPATIENT
Start: 2025-04-26 | End: 2025-04-27 | Stop reason: HOSPADM

## 2025-04-26 RX ORDER — AMIODARONE HYDROCHLORIDE 200 MG/1
400 TABLET ORAL 2 TIMES DAILY
Status: DISCONTINUED | OUTPATIENT
Start: 2025-04-26 | End: 2025-04-27 | Stop reason: HOSPADM

## 2025-04-26 RX ORDER — FUROSEMIDE 10 MG/ML
20 INJECTION INTRAMUSCULAR; INTRAVENOUS ONCE
Status: COMPLETED | OUTPATIENT
Start: 2025-04-26 | End: 2025-04-26

## 2025-04-26 RX ORDER — AMIODARONE HYDROCHLORIDE 200 MG/1
200 TABLET ORAL DAILY
Status: DISCONTINUED | OUTPATIENT
Start: 2025-04-30 | End: 2025-04-27 | Stop reason: HOSPADM

## 2025-04-26 RX ORDER — HYDROMORPHONE HYDROCHLORIDE 0.2 MG/ML
0.2 INJECTION INTRAMUSCULAR; INTRAVENOUS; SUBCUTANEOUS EVERY 4 HOURS PRN
Status: DISCONTINUED | OUTPATIENT
Start: 2025-04-26 | End: 2025-04-27 | Stop reason: HOSPADM

## 2025-04-26 RX ORDER — MAGNESIUM SULFATE HEPTAHYDRATE 40 MG/ML
2 INJECTION, SOLUTION INTRAVENOUS ONCE
Status: COMPLETED | OUTPATIENT
Start: 2025-04-26 | End: 2025-04-26

## 2025-04-26 RX ORDER — BISACODYL 10 MG/1
10 SUPPOSITORY RECTAL ONCE AS NEEDED
Status: COMPLETED | OUTPATIENT
Start: 2025-04-26 | End: 2025-04-26

## 2025-04-26 RX ADMIN — ROSUVASTATIN 40 MG: 40 TABLET, FILM COATED ORAL at 08:05

## 2025-04-26 RX ADMIN — AMIODARONE HYDROCHLORIDE 400 MG: 200 TABLET ORAL at 20:58

## 2025-04-26 RX ADMIN — ACETAMINOPHEN 975 MG: 325 TABLET, FILM COATED ORAL at 20:59

## 2025-04-26 RX ADMIN — TRAZODONE HYDROCHLORIDE 50 MG: 50 TABLET ORAL at 20:59

## 2025-04-26 RX ADMIN — MAGNESIUM OXIDE TAB 400 MG (241.3 MG ELEMENTAL MG) 400 MG: 400 (241.3 MG) TAB at 08:05

## 2025-04-26 RX ADMIN — GABAPENTIN 100 MG: 100 CAPSULE ORAL at 14:05

## 2025-04-26 RX ADMIN — AMIODARONE HYDROCHLORIDE 150 MG: 1.5 INJECTION, SOLUTION INTRAVENOUS at 09:06

## 2025-04-26 RX ADMIN — GABAPENTIN 100 MG: 100 CAPSULE ORAL at 20:59

## 2025-04-26 RX ADMIN — ACETAMINOPHEN 975 MG: 325 TABLET, FILM COATED ORAL at 08:13

## 2025-04-26 RX ADMIN — LIDOCAINE 1 PATCH: 4 PATCH TOPICAL at 14:06

## 2025-04-26 RX ADMIN — AMIODARONE HYDROCHLORIDE 400 MG: 200 TABLET ORAL at 09:57

## 2025-04-26 RX ADMIN — ALBUTEROL SULFATE 2.5 MG: 2.5 SOLUTION RESPIRATORY (INHALATION) at 13:52

## 2025-04-26 RX ADMIN — ACETAMINOPHEN 975 MG: 325 TABLET, FILM COATED ORAL at 02:23

## 2025-04-26 RX ADMIN — BISACODYL 10 MG: 10 SUPPOSITORY RECTAL at 16:31

## 2025-04-26 RX ADMIN — EZETIMIBE 10 MG: 10 TABLET ORAL at 08:05

## 2025-04-26 RX ADMIN — ASPIRIN 81 MG CHEWABLE TABLET 81 MG: 81 TABLET CHEWABLE at 08:04

## 2025-04-26 RX ADMIN — Medication 150 MG: at 08:04

## 2025-04-26 RX ADMIN — SENNOSIDES AND DOCUSATE SODIUM 2 TABLET: 50; 8.6 TABLET ORAL at 20:59

## 2025-04-26 RX ADMIN — METHOCARBAMOL 500 MG: 500 TABLET ORAL at 21:00

## 2025-04-26 RX ADMIN — Medication 1 L/MIN: at 08:09

## 2025-04-26 RX ADMIN — METOPROLOL TARTRATE 25 MG: 25 TABLET, FILM COATED ORAL at 08:05

## 2025-04-26 RX ADMIN — SENNOSIDES AND DOCUSATE SODIUM 2 TABLET: 50; 8.6 TABLET ORAL at 08:04

## 2025-04-26 RX ADMIN — OXYCODONE HYDROCHLORIDE 5 MG: 5 TABLET ORAL at 14:09

## 2025-04-26 RX ADMIN — Medication 3 L/MIN: at 20:00

## 2025-04-26 RX ADMIN — METHOCARBAMOL 500 MG: 500 TABLET ORAL at 06:21

## 2025-04-26 RX ADMIN — OXYCODONE HYDROCHLORIDE 5 MG: 5 TABLET ORAL at 08:12

## 2025-04-26 RX ADMIN — POLYETHYLENE GLYCOL 3350 17 G: 17 POWDER, FOR SOLUTION ORAL at 08:04

## 2025-04-26 RX ADMIN — PANTOPRAZOLE SODIUM 40 MG: 40 TABLET, DELAYED RELEASE ORAL at 06:21

## 2025-04-26 RX ADMIN — ACETAMINOPHEN 975 MG: 325 TABLET, FILM COATED ORAL at 14:32

## 2025-04-26 RX ADMIN — FUROSEMIDE 20 MG: 10 INJECTION, SOLUTION INTRAMUSCULAR; INTRAVENOUS at 09:06

## 2025-04-26 RX ADMIN — GUAIFENESIN 600 MG: 600 TABLET, EXTENDED RELEASE ORAL at 20:59

## 2025-04-26 RX ADMIN — METOPROLOL TARTRATE 25 MG: 25 TABLET, FILM COATED ORAL at 20:59

## 2025-04-26 RX ADMIN — GABAPENTIN 100 MG: 100 CAPSULE ORAL at 08:05

## 2025-04-26 RX ADMIN — GUAIFENESIN 600 MG: 600 TABLET, EXTENDED RELEASE ORAL at 08:05

## 2025-04-26 RX ADMIN — HEPARIN SODIUM 5000 UNITS: 5000 INJECTION INTRAVENOUS; SUBCUTANEOUS at 02:23

## 2025-04-26 RX ADMIN — OXYCODONE HYDROCHLORIDE 5 MG: 5 TABLET ORAL at 02:41

## 2025-04-26 RX ADMIN — HEPARIN SODIUM 5000 UNITS: 5000 INJECTION INTRAVENOUS; SUBCUTANEOUS at 09:06

## 2025-04-26 RX ADMIN — MAGNESIUM SULFATE HEPTAHYDRATE 2 G: 40 INJECTION, SOLUTION INTRAVENOUS at 09:56

## 2025-04-26 RX ADMIN — ALBUTEROL SULFATE 2.5 MG: 2.5 SOLUTION RESPIRATORY (INHALATION) at 07:37

## 2025-04-26 RX ADMIN — HEPARIN SODIUM 5000 UNITS: 5000 INJECTION INTRAVENOUS; SUBCUTANEOUS at 16:57

## 2025-04-26 RX ADMIN — Medication 1 TABLET: at 08:05

## 2025-04-26 RX ADMIN — Medication 10 MG: at 20:59

## 2025-04-26 RX ADMIN — MUPIROCIN: 20 OINTMENT TOPICAL at 20:59

## 2025-04-26 RX ADMIN — METHOCARBAMOL 500 MG: 500 TABLET ORAL at 14:05

## 2025-04-26 RX ADMIN — MUPIROCIN: 20 OINTMENT TOPICAL at 08:05

## 2025-04-26 ASSESSMENT — PAIN - FUNCTIONAL ASSESSMENT
PAIN_FUNCTIONAL_ASSESSMENT: 0-10

## 2025-04-26 ASSESSMENT — COGNITIVE AND FUNCTIONAL STATUS - GENERAL
MOBILITY SCORE: 19
STANDING UP FROM CHAIR USING ARMS: A LITTLE
DRESSING REGULAR LOWER BODY CLOTHING: A LITTLE
WALKING IN HOSPITAL ROOM: A LITTLE
DAILY ACTIVITIY SCORE: 20
HELP NEEDED FOR BATHING: A LITTLE
MOVING TO AND FROM BED TO CHAIR: A LITTLE
CLIMB 3 TO 5 STEPS WITH RAILING: A LITTLE
TOILETING: A LITTLE
DRESSING REGULAR UPPER BODY CLOTHING: A LITTLE
TURNING FROM BACK TO SIDE WHILE IN FLAT BAD: A LITTLE

## 2025-04-26 ASSESSMENT — PAIN SCALES - GENERAL
PAINLEVEL_OUTOF10: 7
PAINLEVEL_OUTOF10: 6
PAINLEVEL_OUTOF10: 3
PAINLEVEL_OUTOF10: 2
PAINLEVEL_OUTOF10: 8
PAINLEVEL_OUTOF10: 7
PAINLEVEL_OUTOF10: 0 - NO PAIN
PAINLEVEL_OUTOF10: 0 - NO PAIN
PAINLEVEL_OUTOF10: 3
PAINLEVEL_OUTOF10: 7

## 2025-04-26 ASSESSMENT — PAIN DESCRIPTION - LOCATION
LOCATION: CHEST

## 2025-04-26 ASSESSMENT — PAIN DESCRIPTION - ORIENTATION: ORIENTATION: MID

## 2025-04-26 NOTE — PROGRESS NOTES
Physical Therapy                 Therapy Communication Note    Patient Name: Rafi Lisa Jr.  MRN: [id number]  Department: [facility]  Room: [address] [place], [place] [address]  Today's Date: 4/26/2025     Discipline: Physical Therapy    PT Missed Visit: Yes     Missed Visit Reason: Missed Visit Reason:  (PT tx attempted, however pt sleeping soundly and RN reported that pt recently return to bed following ambulation with nursing. Will re-attempt as pt is appropriate for therapy.)    Missed Time: Attempt

## 2025-04-26 NOTE — CARE PLAN
Problem: Pain - Adult  Goal: Verbalizes/displays adequate comfort level or baseline comfort level  Outcome: Progressing     Problem: Discharge Planning  Goal: Discharge to home or other facility with appropriate resources  Outcome: Progressing     Problem: Chronic Conditions and Co-morbidities  Goal: Patient's chronic conditions and co-morbidity symptoms are monitored and maintained or improved  Outcome: Progressing     Problem: Nutrition  Goal: Nutrient intake appropriate for maintaining nutritional needs  Outcome: Progressing     Problem: Pain  Goal: Performs ADL's with improved pain control throughout shift  Outcome: Progressing  Goal: Participates in PT with improved pain control throughout the shift  Outcome: Progressing     Problem: Fall/Injury  Goal: Verbalize understanding of personal risk factors for fall in the hospital  Outcome: Progressing  Goal: Verbalize understanding of risk factor reduction measures to prevent injury from fall in the home  Outcome: Progressing  Goal: Use assistive devices by end of the shift  Outcome: Progressing  Goal: Pace activities to prevent fatigue by end of the shift  Outcome: Progressing     Problem: Skin  Goal: Promote/optimize nutrition  Outcome: Progressing     Problem: Resident is recovering from cardiovascular surgery  Goal: I will maintain and build strength.  Outcome: Progressing  Goal: I will decrease complications and risks after surgery  Outcome: Progressing   The patient's goals for the shift include      The clinical goals for the shift include pt to remain hemodynamically stable

## 2025-04-26 NOTE — PROGRESS NOTES
Occupational Therapy                 Therapy Communication Note    Patient Name: Rafi Lisa Jr.  MRN: [id number]  Department: [facility]  Room: [address] [place], [place] [address]  Today's Date: 4/26/2025     Discipline: Occupational Therapy    OT Missed Visit: Yes     Missed Visit Reason: Missed Visit Reason: Other (Comment) (OT tx attempted of note, however pt sleeping soundly and RN reported that pt recently return to bed following ambulation with nursing. Will re-attempt as pt is appropriate for therapy.)    Missed Time: Attempt

## 2025-04-26 NOTE — PROGRESS NOTES
Subjective:  Patient seen and examined, he is sitting up in a chair. He denies acute complaints, but endorses hallucinations of his wife next to him.    Physical Exam:  GENERAL: Awake/ alert, cooperative.   HEAD:  Normocephalic, atraumatic.  EYES:  Round and reactive.  ENT:  No nasal discharge, mucous membranes moist and pink.  NECK:  Atraumatic, no meningismus.  CARDIOVASCULAR:  Tachycardia with irregular rhythm, normal rhythm and rate seen later  RESPIRATORY:  Diminished breath sounds  ABDOMEN:  Soft, no tenderness, nondistended.  EXTREMITIES:  No peripheral edema, no calf tenderness.  SKIN:  Sternal incision present, no signs of infection  NEUROLOGICAL:  Nonfocal grossly.    Remainder of objective data including imaging and laboratory findings were all reviewed.    Admission history:   4/26: Left pleural chest tube removed 4/25.  Patient is saturating on 1 L nasal cannula.  Entered A-fib w/RVR in the morning, rhythm corrected following amiodarone bolus.     Assessment and plan:  Rafi Lisa Jr. is a 74 y.o. year old male patient with Past Medical History of atrial fibrillation, CAD, HTN, HLD, COPD, cervical radiculopathy, osteoarthritis of the knees, and anemia, GERD, and large hiatal hernia presenting to the ICU after CABG.       Neurological System:  #Post-op pain  #Hallucinations 2/2 ICU delirium  -Scheduled tylenol, mag-ox, gabapentin and robaxin, Lidoderm  -PRN Fentanyl discontinued per CTS, dilaudid for breakthrough pain  -Baseline mentation AO x3, will reassess daily  -Promote wakefulness and increased light during daytime hours. Melatonin dosage increased, trazodone 50mg started for insomnia.  -Avoid excessive caths/ lines, monitor for worsening delirium    Cardiovascular System:  #Multivessel CAD s/p CABG x 3  #Acute Afib in the setting of post CABG  #Hx atrial fibrillation, HTN, HLD   -Patient entered Afib w/RVR in the morning, corrected after receiving amiodarone bolus per CTS  -Started on PO  amiodarone 400mg BID x 7 days followed by 200mg daily per CTS  -Continue rosuvastatin, ezetimibe  -Continue metoprolol 25mg BID, lisinopril remains held  -TTE from 4/25: LVEF 63%, abnormal septal motion consistent with post-op, mildly elevated RV systolic pressure  -Monitor hemodynamics closely to maintain MAP >65    Respiratory System:  #Post-Op Respiratory Insufficiency  #COPD  #Small R pleural effusion  -Albuterol nebulizer TID  -Continue mucinex, incentive spirometry, and flutter valve  -20mg IV Lasix ordered once, repeat dose ordered for 4/27 morning. Follow up with repeat imaging  -Oxygen as needed, currently on 1L, wean as tolerated    Gastrointestinal System:  #Hiatal Hernia  #GERD  #Constipation - (post surgical, narcotic induced)  - Continue protonix  - Continue regular diet  - Continue bowel regimen, escalate as needed    Endocrine System:  #Hyperglycemia  #Pre-diabetes   - SSI discontinued  -Accu-checks     Renal System:  #CHAPO on CKD  -Cr 1.55 down from 1.61  -20mg Lasix administered, monitor renal function in the setting of additional diuresis  -Trend BMP, monitor UOP, optimize electrolytes     Hematological System:  #Acute Blood Loss Anemia Post-Op    #Thrombocytopenia  #Macrocytic anemia  -monitor CBC, Hgb 8.1 down from 8.7  -  up from 103    Infection Disease:  -Monitor for signs of infection   -Cefazolin prophylaxis complete    Skin/MSK:  -No acute issues    Psych:  -No acute issues    ICU CHECK LIST:   Antimicrobials: -  Oxygen: 1L NC  Drips: None  Fluids: -  Feeding: Regular diet  Sedation/Analgesia: Tylenol, dilaudid, lidocaine patch, trazodone  Prophylaxis: SQH, PPI  Glycemic control: -  Bowel care: Lyudmila-colace   Lines/Tubes: PIV, Saldana  Restraints: -  Dispo: ICU     Code Status: Full     This is a preliminary note written by the resident. Please wait for attending addendum for finalization of note and recommendations.      Kimani Perez MD  PGY-1

## 2025-04-26 NOTE — CARE PLAN
The clinical goals for the shift include Patient will verbalize adequate rest      Problem: Pain - Adult  Goal: Verbalizes/displays adequate comfort level or baseline comfort level  Outcome: Progressing     Problem: Discharge Planning  Goal: Discharge to home or other facility with appropriate resources  Outcome: Progressing     Problem: Chronic Conditions and Co-morbidities  Goal: Patient's chronic conditions and co-morbidity symptoms are monitored and maintained or improved  Outcome: Progressing     Problem: Nutrition  Goal: Nutrient intake appropriate for maintaining nutritional needs  Outcome: Progressing     Problem: Pain  Goal: Performs ADL's with improved pain control throughout shift  Outcome: Progressing  Goal: Participates in PT with improved pain control throughout the shift  Outcome: Progressing     Problem: Fall/Injury  Goal: Verbalize understanding of personal risk factors for fall in the hospital  Outcome: Progressing  Goal: Verbalize understanding of risk factor reduction measures to prevent injury from fall in the home  Outcome: Progressing  Goal: Use assistive devices by end of the shift  Outcome: Progressing  Goal: Pace activities to prevent fatigue by end of the shift  Outcome: Progressing     Problem: Skin  Goal: Promote/optimize nutrition  Outcome: Progressing     Problem: Resident is recovering from cardiovascular surgery  Goal: I will maintain and build strength.  Outcome: Progressing  Goal: I will decrease complications and risks after surgery  Outcome: Progressing     Problem: Safety - Adult  Goal: Free from fall injury  Outcome: Met     Problem: Pain  Goal: Takes deep breaths with improved pain control throughout the shift  Outcome: Met  Goal: Turns in bed with improved pain control throughout the shift  Outcome: Met  Goal: Walks with improved pain control throughout the shift  Outcome: Met  Goal: Free from opioid side effects throughout the shift  Outcome: Met  Goal: Free from acute  confusion related to pain meds throughout the shift  Outcome: Met     Problem: Fall/Injury  Goal: Not fall by end of shift  Outcome: Met  Goal: Be free from injury by end of the shift  Outcome: Met     Problem: Skin  Goal: Decreased wound size/increased tissue granulation at next dressing change  Outcome: Met  Goal: Participates in plan/prevention/treatment measures  Outcome: Met  Goal: Prevent/manage excess moisture  Outcome: Met  Goal: Prevent/minimize sheer/friction injuries  Outcome: Met  Goal: Promote skin healing  Outcome: Met

## 2025-04-26 NOTE — PROGRESS NOTES
[facility]   Cardiothoracic Surgery   Progress Note        Rafi Lisa Jr. is a 74 y.o. male on day 4 of admission presenting with Coronary artery disease.    Subjective     Interval HPI: Seen and assessed patient this morning, sitting OOB in chair. He is in no acute distress, reporting breathing feels improved since yesterday.  Requiring 3L NC.        Objective   Physical Exam  Physical Exam  Vitals and nursing note reviewed.   Constitutional:       General: He is awake. He is not in acute distress.     Appearance: Normal appearance.   HENT:      Head: Normocephalic and atraumatic.      Mouth/Throat:      Lips: Pink.      Mouth: Mucous membranes are moist.   Eyes:      General: Lids are normal.      Pupils: Pupils are equal, round, and reactive to light.   Cardiovascular:      Rate and Rhythm: Tachycardia present. Rhythm irregular.      Pulses: Normal pulses.      Heart sounds: Normal heart sounds. No murmur heard.     Comments: -110s  Pulmonary:      Effort: Pulmonary effort is normal. No respiratory distress.      Breath sounds: Examination of the right-lower field reveals decreased breath sounds. Examination of the left-lower field reveals decreased breath sounds. Decreased breath sounds present. No wheezing.      Comments: 2L NC / IS 1250 mL  Moist Non-productive cough  Chest:      Comments: Midsternal incision, dry open to air    Abdominal:      General: Bowel sounds are normal. There is no distension.      Palpations: Abdomen is soft.      Tenderness: There is no abdominal tenderness.   Genitourinary:     Comments: Saldana catheter  Musculoskeletal:      Cervical back: Normal range of motion and neck supple.      Right lower leg: No edema.      Left lower leg: No edema.   Skin:     General: Skin is warm.      Capillary Refill: Capillary refill takes less than 2 seconds.   Neurological:      General: No focal deficit present.      Mental Status: He is alert and  oriented to person, place, and time. Mental status is at baseline.      Sensory: Sensation is intact.      Motor: Motor function is intact.   Psychiatric:         Attention and Perception: Attention normal.         Speech: Speech normal.         Cognition and Memory: Cognition normal.         Last Recorded Vitals  Vitals:    04/26/25 0737 04/26/25 0800 04/26/25 0809 04/26/25 0900   BP:  123/60  114/65   Pulse:  92  (!) 115   Resp:       Temp:       TempSrc:       SpO2: 95% (!) 89% 92% 92%   Weight:       Height:            Intake/Output last 3 Shifts:  I/O last 3 completed shifts:  In: 1218.3 (14.4 mL/kg) [P.O.:480; I.V.:250.8 (3 mL/kg); Blood:487.5]  Out: 5960 (70.5 mL/kg) [Urine:5800 (1.9 mL/kg/hr); Chest Tube:160]  Weight: 84.5 kg     Inpatient Medications  Scheduled Medications[1]  Continuous medications  Continuous Medications[2]  PRN medications  PRN Medications[3]     LDA:  CVC 04/22/25 Triple lumen Right Internal jugular (Active)   Placement Date/Time: 04/22/25 (c) 0825   Hand Hygiene Performed Prior to CVC Insertion: Yes  Site Prep: Chlorhexidine   Site Prep Agent has Completely Dried Before Insertion: Yes  All 5 Sterile Barriers Used (Gloves, Gown, Cap, Mask, Large Sterile Rowena...   Number of days: 1       Arterial Line 04/22/25 Left Brachial (Active)   Placement Date/Time: 04/22/25 1715   Hand Hygiene Completed: Yes  Size: 20 G  Orientation: Left  Location: Brachial  Placed by: Anders Aaron NP  Insertion attempts: 1  Securement Method: Sutured   Number of days: 0       Urethral Catheter Temperature probe;Non-latex 14 Fr. (Active)   Placement Date/Time: 04/22/25 0817   Placed by: Trang Reyes  Hand Hygiene Completed: Yes  Catheter Type: Temperature probe;Non-latex  Tube Size (Fr.): 14 Fr.  Catheter Balloon Size: 10 mL  Urine Returned: Yes   Number of days: 1       Chest Tube 1 Left Pleural 28 Fr (Active)   Placement Date/Time: 04/22/25 1227   Placed by: DR. JOHNSON  Hand Hygiene Completed: Yes  Tube  Number: 1  Chest Tube Orientation: Left  Chest Tube Location: Pleural  Chest Tube Drain Tube Size (Fr): 28 Fr  Chest Tube Drainage System: (c) Dry seal ches...   Number of days: 0       Chest Tube 2 Mediastinal 28 Fr (Active)   Placement Date/Time: 04/22/25 1228   Placed by: DR. JOHNSON  Hand Hygiene Completed: Yes  Tube Number: 2  Chest Tube Location: Mediastinal  Chest Tube Drain Tube Size (Fr): 28 Fr  Chest Tube Drainage System: (c) Dry seal chest drain   Number of days: 0       Chest Tube 3 Right Pleural 28 Fr (Active)   Placement Date/Time: 04/22/25 1229   Placed by: DR. JOHNSON  Hand Hygiene Completed: Yes  Tube Number: 3  Chest Tube Orientation: Right  Chest Tube Location: Pleural  Chest Tube Drain Tube Size (Fr): 28 Fr  Chest Tube Drainage System: (c) Dry seal kendrick...   Number of days: 0       Relevant Results  Lab Review  Results from last 7 days   Lab Units 04/26/25  0451   WBC AUTO x10*3/uL 6.1   HEMOGLOBIN g/dL 8.1*   HEMATOCRIT % 26.0*   PLATELETS AUTO x10*3/uL 108*     Results from last 7 days   Lab Units 04/26/25  0451   SODIUM mmol/L 133*   POTASSIUM mmol/L 4.0   CHLORIDE mmol/L 94*   CO2 mmol/L 31   BUN mg/dL 55*   CREATININE mg/dL 1.55*   CALCIUM mg/dL 9.1   GLUCOSE mg/dL 106*     Results from last 7 days   Lab Units 04/26/25  0451   MAGNESIUM mg/dL 2.32     Results from last 7 days   Lab Units 04/23/25  0450   POCT PH, ARTERIAL pH 7.40   POCT PCO2, ARTERIAL mm Hg 37*   POCT PO2, ARTERIAL mm Hg 90   POCT HCO3 CALCULATED, ARTERIAL mmol/L 22.9   POCT BASE EXCESS, ARTERIAL mmol/L -1.7         Radiographic Testing  EKG:  ECG 12 lead 03/21/2025        Echo:  Transthoracic Echo (TTE) Complete 03/24/2025  PHYSICIAN INTERPRETATION:  Left Ventricle: The left ventricular systolic function is normal, with a visually estimated ejection fraction of 55-60%. There is mild concentric left ventricular hypertrophy. There are no regional left ventricular wall motion abnormalities. The left ventricular cavity size is  normal. There is mildly increased septal and mildly increased posterior left ventricular wall thickness. There is left ventricular concentric remodeling. Spectral Doppler shows a Grade II (pseudonormal pattern) of left ventricular diastolic filling with an elevated left atrial pressure.  Left Atrium: The left atrium is mildly dilated.  Right Ventricle: The right ventricle is mildly enlarged. There is normal right ventricular global systolic function.  Right Atrium: The right atrium is normal in size.  Aortic Valve: The aortic valve is trileaflet. There is mild aortic valve thickening. The aortic valve dimensionless index is 0.81. There is moderate aortic valve regurgitation. The peak instantaneous gradient of the aortic valve is 7 mmHg. The mean gradient of the aortic valve is 4 mmHg.  Mitral Valve: The mitral valve is mildly thickened. The peak instantaneous gradient of the mitral valve is 2 mmHg. There is mild mitral valve regurgitation.  Tricuspid Valve: The tricuspid valve is structurally normal. There is mild tricuspid regurgitation. The Doppler estimated RVSP is within normal limits at 29.4 mmHg.  Pulmonic Valve: The pulmonic valve is structurally normal. There is mild pulmonic valve regurgitation.  Pericardium: There is no pericardial effusion noted.  Aorta: The aortic root is abnormal. There is mild dilatation of the ascending aorta.  In comparison to the previous echocardiogram(s): Compared with study dated 5/15/2020,.        CONCLUSIONS:   1. The left ventricular systolic function is normal, with a visually estimated ejection fraction of 55-60%.   2. Spectral Doppler shows a Grade II (pseudonormal pattern) of left ventricular diastolic filling with an elevated left atrial pressure.   3. Mildly enlarged right ventricle.   4. The left atrium is mildly dilated.   5. Right ventricular systolic pressure is within normal limits.   6. Moderate aortic valve regurgitation.       Ejection Fractions:  EF   Date/Time  Value Ref Range Status   04/25/2025 01:48 PM 63 %    03/24/2025 09:25 AM 58 %      Cath:  Cardiac Catheterization Procedure 03/24/2025  Coronary Angiography:  The coronary circulation is left dominant.     Left Main Coronary Artery:  The left main coronary artery is a normal caliber vessel. The left main arises normally from the left coronary sinus of Valsalva and trifurcation. The left main coronary artery showed no significant disease or stenosis greater than 30%.     Left Anterior Descending Coronary Artery Distribution:  The left anterior descending coronary artery is a normal caliber vessel. The LAD arises normally from the left main coronary artery. The LAD demonstrated dense calcification in the proximal to mid segment. The ostial and proximal to mid left anterior descending coronary artery showed 90% stenosis. This lesion was heavily calcified.     Circumflex Coronary Artery Distribution:  The circumflex coronary artery is a large caliber vessel. The circumflex arises normally from the left main coronary artery. The circumflex revealed mild distal atherosclerotic disease. The mid circumflex coronary artery showed 50% stenosis.     Ramus Intermedius:  The ramus intermedius is a medium-sized caliber vessel. The proximal ramus intermedius showed 100% stenosis.  with collaterals.     Right Coronary Artery Distribution:     The right coronary artery is a medium-sized caliber vessel. The RCA showed dense calcification. The entire right coronary artery showed 95% stenosis.        Left Ventriculography:  The size of the left ventricle is normal. The LV ejection fraction was 50 to 55%. All left ventricular regional wall segments contract normally. There is no LV diastolic dysfunction noted.    Chest Imaging:  XR chest 1 view   Final Result   1.  Increased congestion and lower lung airspace disease.        Signed by: Durga Larson 4/25/2025 5:08 PM   Dictation workstation:   [id number]      Transthoracic Echo (TTE)  Limited   Final Result      XR chest 1 view   Final Result   1.  Trace left pneumothorax decreased from the prior examination.   Increased left lower lung atelectasis.        Signed by: Durga Larson 4/25/2025 9:44 AM   Dictation workstation:   [id number]      XR chest 1 view   Final Result   1.  There is at least a small left pneumothorax now present. Patchy   bibasilar opacities remain similar. Continued clinical correlation   and follow-up advised.   2. Postoperative chest with support devices and additional findings   as above.                  MACRO:   Critical Finding:  See findings. Notification was initiated on   4/24/2025 at 9:16 am by  Gian Thomas.  (**-OCF-**) Instructions:        Signed by: Gian Thomas 4/24/2025 9:18 AM   Dictation workstation:   [id number]      XR chest 1 view   Final Result   1.  Improved lower lung atelectasis.        Signed by: Durga Larson 4/23/2025 8:32 AM   Dictation workstation:   [id number]      XR chest 1 view   Final Result   NG/OG tube is coiled in the midesophagus but then continues distally   with the tip terminating in the supradiaphragmatic portion of the   hiatal hernia. Other lines and tubes as above.        Bibasilar atelectasis and pleural effusions.        MACRO:   None.        Signed by: Meet Dee 4/22/2025 3:57 PM   Dictation workstation:   [id number]      XR chest 1 view    (Results Pending)     Pulmonary Function Testing:   Pulmonary Function Lab - Spirometry Only      03/25/25        History of Present Illness:   Rafi Lisa  is a 74 y.o. male with a PMH significant for CAD (s/p PCI to the LAD, laser arthrectomy to the Lcx on 1/2022), HTN, HLD, COPD (Emphysemea), and nicotine use disorder who presents to [facility] on 4/22/25 for a staged coronary.      The patient was undergoing workup with cardiologist, Dr. James Hunt in workup for his anginal complaints. He ultimately was scheduled for a stress testing; however  during his exam, he was referred to the ED and was subsequently admitted for inpatient workup. On 3/24/25, he underwent a LHC with interventional cardiologist, Dr. Tony Oneill and his coronary anatomy revealed diffuse multivessel CAD. Cardiac surgery was subsequently consulted for bypass grafting evaluation.       Daily Event      04/22/25: Patient arrived to the ICU in critically ill but stable condition. S/p CABG x3. Patient arrived hemodynamically stable without the need for continuous vasopressors. Flowtrack noting decreased hemodynamics, however concerned that this is not accurate as patient is not with signs of decreased perfusion.      Plan to recover in the immediate post-op period and wean mechanical ventilation towards extubation.      - Vital Signs: HR 65, BP 91/59, SpO2 100%   - Hemodynamics: CVP 10, CO 3, CI 1.5, SVR 1725 (consider accuracy of #s)  - Ventilator Settings: PRVC-AC / FiO2 50% / PEEP 8 / RR 16     - Pump time: 99 min / x-clamp 76 min      Intake & Output:   - Initial R Pleural Chest Tube: 120mL   - Initial L Pleural Chest Tube: 100mL   - Initial Mediastinal Chest Tube: 150mL     04/23/2025: No acute events overnight. Patient was extubated yesterday evening without complication. He is sitting up in the chair this morning. He remains on 0.01mcg of norepinephrine. NG tube removed this morning. Patient is reporting moderate-severe pain; will adjust pain regimen. Maintain mediastinal and pleural chest tubes today.     - Current O2 Requirements: 3L NC    Intake & Output:  - Total R Pleural Chest Tube: 280mL   - Total L Pleural Chest Tube: 320mL   - Total Mediastinal Chest Tube: 310mL      04/24/25: No acute events overnight. Sitting OOB to chair this morning.  Requiring 4L NC with moist, non-productive cough.  Vasopressors weaned off overnight. Chest tubes remain in place with serosanguinous drainage, will remove right pleural today.  Start Beta-blocker and diuresis today. CXR with small  left pneumothorax.      - Current O2 Requirements: 4L NC / -750ml    Intake & Output:  - Total R Pleural Chest Tube: 70 mL -> Remove today  - Total L Pleural Chest Tube: 220 mL  -> [place]   - Total Mediastinal Chest Tube: 220 mL     04/25/25: Overnight episode of AF RVR, received 2 Amiodarone Bolus and IV Magnesium 2 G.  Converted to SR this morning.  CXR with improved small left pneumothorax. Intermittent episodes of slight hypotension, will get TTE today to evaluate BiV Function. Ambulating with PT.  Continue aggressive bronchial hygiene and fluid optimization.     - Current O2 Requirements: 3L NC /  ml    Intake & Output:  - Total L Pleural Chest Tube: 170 mL -> Waterseal today, repeat CXR later    04/26/25: Episode of pAF this morning, 100-110. Improving oxygen requirements, weaned to 2L / IS 1250 mL.  Tolerating ambulating with walker.  New delirium this morning.  Reporting poor sleep overnight, starting trazodone.  Continue bronchial hygiene and Diuresising.     - Current O2 Requirements: 2L NC / IS 1250 ml  Assessment & Plan        Multivessel Coronary Artery Disease  - Patient has remote Hx of PCI to the LAD in 1998   --> most recently laser arterectomy to the Lcx on 1/2022  - S/p CABG x 3 on 4/22/25 with Dr. Juan Luis Kline   --> LIMA-LAD, SVG-Ramus, SVG-OM2  - Continue on Aspirin 81mg and Rosuvastatin 40 mg/Zetia 10 mg    - Continue metoprolol 25 mg BID  - 2 View CXR ordered for tomorrow   - TTE 4/25: LV 63% with normal RV, mildly elevated RVSP        Paroxymal Atrial Fibrillation  - Continue Metoprolol 25 mg BID  - IV Amiodarone Bolus   - Start PO Amiodarone 400 mg BID x 7 days, then 200 mg daily       Delirium  - Rest Protocol   - Start Trazodone 50 mg HS  - Increase melatonin 10 mg HS       Mediastinal Incision  - Dressing C/D/I        Acute Post-Op Pain  - As expected   - Multimodal pain control   --> Scheduled: Acetaminophen 975 mg q 6hr, magnesium oxide 400mg QD, gabapentin 100mg TID,  PO methocarbamol 500mg PO Q8h  --> PRN: oxycodone and dilaudid  - Bowel regimen while taking narcotics         Risk for Post-Op Arrhythmia  - Ventricular wires placed  - Discontinue V-wires prior to DC  - Telemetry until discharged        Acute Postoperative Respiratory Insufficiency   - As expected following CABG with post-op atelectasis  - Current O2 Requirements: 2L NC  - Coughing and deep breathing exercises with Incentive spirometry  - Out of bed, early and aggressive mobilization with assistance  - Oxygen as needed, wean to keep saturation above 92%  - ICU intensivist/pulmonologist consulted  - Albuterol nebulizers as needed  - Bronchial Hygiene Ezpap TID        COPD     Nicotine Use Disorder  - Home Medications: Albuterol PRN  - Home O2: N/A  - PFTs completed on 3/25 (see below) show COPD with reversible component   --> FEV1/FVC = 0.52 pre / 0.53 post (FEV1 improved 15% / FVC improved 16%)  - Consider addition of tiotropium if needed  - Recommend following up with pulmonary OP        Risk for Fluid Volume Overload     Hyponatremia 2/2 Hypervolemia     CKD: Baseline CRE: ~ 1.6  - Pre-Op Weight: 80 kg      4/23: 85.5 kg     4/24: 86.4 kg -> IV Lasix 20 mg and IV Lasix 40 mg with Metolazone 5 mg x1     4/25: 84.5 kg -> IV Lasix 40 mg x1, IV Lasix 20 mg PM     4/26: 82.9 kg -> IV Lasix 20 mg BID  - Strict I& Os and daily weights    - Afternoon RFP  - Daily RFP        Acute Post-Op Blood-Loss Anemia  - Pre-Op H/H:  12.8/41.7     4/23: 9/29.7     4/24: 8.8/29.7     4/25: 7.4/24.5 -> 1u RBC     4/26: 8.1/ 26.0  - Continue Multivitamin/iron tablet   - Daily CBC        Post-Op Leukocytosis  - Pre-Op WBC: 4.6     4/23: 7.5     4/24: 7.5     4/25: 6.2     4/26: 6.1  - Afebrile  - Post-op ATB completed  - Daily CBC        Essential HTN  - Home Medications: Lisinopril 20mg   -  Hold in the immediate post-op period         Pre-diabetic   - Home Medications: None   - HbA1c: 6.4 --> 6.2  - Goal for BG <150 in the  post-operative period        Dyslipidemia  - Home Medications: Rosuvastatin 40mg, Ezetimibe 10mg  - Continue on statin therapy as above         Hiatal Hernia     GERD  - Home Medications: Pantoprazole 40mg  - Patient has a large, symptomatic hiatal hernia  - Continue on home PPI      Risk for Electrolyte Disturbances  - Optimize electrolytes per [facility] protocol       Bowel Regimen: Last BM: 4/25  - Senna-S BID and miralax daily    Prophylaxis  - GI: home PPI  - DVT: Chadd-Hose & switched to SUBQ (CKD)   - MRSA: Negative (MSSA positive) -> Mupirocin BID x 5 days  - PT/OT      Disposition  - CVICU (Step-down)  --> Discharge 1-2 days pending volume optimization and rhythm control    - Home Health and Healthy at home Ordered  - Cardiology and pulmonary follow up appt requested        Patient seen and plan discussed with Dr. Juan Luis Kline.        Mikayla ROBBINS Head, APRN-CNP         [1] acetaminophen, 975 mg, oral, q6h  albuterol, 2.5 mg, nebulization, TID  amiodarone, 150 mg, intravenous, Once  aspirin, 81 mg, oral, Daily  ezetimibe, 10 mg, oral, Daily  gabapentin, 100 mg, oral, TID  guaiFENesin, 600 mg, oral, BID  heparin (porcine), 5,000 Units, subcutaneous, q8h  iron polysaccharides, 150 mg, oral, Daily  lidocaine, 1 patch, transdermal, q24h  magnesium oxide, 400 mg, oral, Daily  melatonin, 6 mg, oral, Nightly  methocarbamol, 500 mg, oral, q8h BANDAR  metoprolol tartrate, 25 mg, oral, BID  multivitamin with minerals, 1 tablet, oral, Daily  mupirocin, , Topical, BID  oxygen, , inhalation, Continuous - Inhalation  pantoprazole, 40 mg, oral, Daily before breakfast  perflutren lipid microspheres, 0.5-10 mL of dilution, intravenous, Once in imaging  perflutren protein A microsphere, 0.5 mL, intravenous, Once in imaging  polyethylene glycol, 17 g, oral, Daily  rosuvastatin, 40 mg, oral, Daily  sennosides-docusate sodium, 2 tablet, oral, BID  sulfur hexafluoride microsphr, 2 mL, intravenous, Once in imaging     [2]    [3] PRN  medications: alteplase, calcium carbonate, calcium chloride, calcium chloride, dextrose **OR** glucagon, HYDROmorphone, magnesium sulfate, magnesium sulfate, naloxone, oxyCODONE, oxyCODONE, polyethylene glycol, potassium chloride CR **OR** potassium chloride, potassium chloride CR **OR** potassium chloride

## 2025-04-27 ENCOUNTER — APPOINTMENT (OUTPATIENT)
Dept: RADIOLOGY | Facility: HOSPITAL | Age: 75
DRG: 236 | End: 2025-04-27
Payer: COMMERCIAL

## 2025-04-27 VITALS
RESPIRATION RATE: 16 BRPM | DIASTOLIC BLOOD PRESSURE: 67 MMHG | BODY MASS INDEX: 24.54 KG/M2 | WEIGHT: 181.2 LBS | OXYGEN SATURATION: 97 % | SYSTOLIC BLOOD PRESSURE: 108 MMHG | HEIGHT: 72 IN | TEMPERATURE: 97.5 F | HEART RATE: 72 BPM

## 2025-04-27 DIAGNOSIS — J41.0 SIMPLE CHRONIC BRONCHITIS (MULTI): Primary | ICD-10-CM

## 2025-04-27 PROBLEM — I25.85 CHRONIC CORONARY MICROVASCULAR DYSFUNCTION: Status: RESOLVED | Noted: 2025-04-22 | Resolved: 2025-04-27

## 2025-04-27 LAB
ALBUMIN SERPL BCP-MCNC: 3.7 G/DL (ref 3.4–5)
ANION GAP SERPL CALC-SCNC: 11 MMOL/L (ref 10–20)
BUN SERPL-MCNC: 53 MG/DL (ref 6–23)
CA-I BLD-SCNC: 1.12 MMOL/L (ref 1.1–1.33)
CALCIUM SERPL-MCNC: 8.9 MG/DL (ref 8.6–10.3)
CHLORIDE SERPL-SCNC: 95 MMOL/L (ref 98–107)
CO2 SERPL-SCNC: 32 MMOL/L (ref 21–32)
CREAT SERPL-MCNC: 1.45 MG/DL (ref 0.5–1.3)
EGFRCR SERPLBLD CKD-EPI 2021: 50 ML/MIN/1.73M*2
ERYTHROCYTE [DISTWIDTH] IN BLOOD BY AUTOMATED COUNT: 14.4 % (ref 11.5–14.5)
GLUCOSE SERPL-MCNC: 120 MG/DL (ref 74–99)
HCT VFR BLD AUTO: 27.5 % (ref 41–52)
HGB BLD-MCNC: 8.8 G/DL (ref 13.5–17.5)
MAGNESIUM SERPL-MCNC: 2.61 MG/DL (ref 1.6–2.4)
MCH RBC QN AUTO: 30.6 PG (ref 26–34)
MCHC RBC AUTO-ENTMCNC: 32 G/DL (ref 32–36)
MCV RBC AUTO: 96 FL (ref 80–100)
NRBC BLD-RTO: 0 /100 WBCS (ref 0–0)
PHOSPHATE SERPL-MCNC: 4 MG/DL (ref 2.5–4.9)
PLATELET # BLD AUTO: 150 X10*3/UL (ref 150–450)
POTASSIUM SERPL-SCNC: 4.2 MMOL/L (ref 3.5–5.3)
RBC # BLD AUTO: 2.88 X10*6/UL (ref 4.5–5.9)
SODIUM SERPL-SCNC: 134 MMOL/L (ref 136–145)
WBC # BLD AUTO: 5.8 X10*3/UL (ref 4.4–11.3)

## 2025-04-27 PROCEDURE — 82330 ASSAY OF CALCIUM: CPT

## 2025-04-27 PROCEDURE — 80069 RENAL FUNCTION PANEL: CPT

## 2025-04-27 PROCEDURE — 94640 AIRWAY INHALATION TREATMENT: CPT

## 2025-04-27 PROCEDURE — 2500000004 HC RX 250 GENERAL PHARMACY W/ HCPCS (ALT 636 FOR OP/ED): Performed by: NURSE PRACTITIONER

## 2025-04-27 PROCEDURE — 2500000001 HC RX 250 WO HCPCS SELF ADMINISTERED DRUGS (ALT 637 FOR MEDICARE OP): Performed by: NURSE PRACTITIONER

## 2025-04-27 PROCEDURE — 2500000005 HC RX 250 GENERAL PHARMACY W/O HCPCS

## 2025-04-27 PROCEDURE — 36415 COLL VENOUS BLD VENIPUNCTURE: CPT

## 2025-04-27 PROCEDURE — 2500000001 HC RX 250 WO HCPCS SELF ADMINISTERED DRUGS (ALT 637 FOR MEDICARE OP)

## 2025-04-27 PROCEDURE — 71046 X-RAY EXAM CHEST 2 VIEWS: CPT

## 2025-04-27 PROCEDURE — 85027 COMPLETE CBC AUTOMATED: CPT

## 2025-04-27 PROCEDURE — 99232 SBSQ HOSP IP/OBS MODERATE 35: CPT

## 2025-04-27 PROCEDURE — 2500000002 HC RX 250 W HCPCS SELF ADMINISTERED DRUGS (ALT 637 FOR MEDICARE OP, ALT 636 FOR OP/ED)

## 2025-04-27 PROCEDURE — 71046 X-RAY EXAM CHEST 2 VIEWS: CPT | Performed by: RADIOLOGY

## 2025-04-27 PROCEDURE — 2500000004 HC RX 250 GENERAL PHARMACY W/ HCPCS (ALT 636 FOR OP/ED): Mod: JZ

## 2025-04-27 PROCEDURE — 2500000004 HC RX 250 GENERAL PHARMACY W/ HCPCS (ALT 636 FOR OP/ED)

## 2025-04-27 PROCEDURE — 2500000005 HC RX 250 GENERAL PHARMACY W/O HCPCS: Performed by: THORACIC SURGERY (CARDIOTHORACIC VASCULAR SURGERY)

## 2025-04-27 PROCEDURE — 2500000002 HC RX 250 W HCPCS SELF ADMINISTERED DRUGS (ALT 637 FOR MEDICARE OP, ALT 636 FOR OP/ED): Performed by: NURSE PRACTITIONER

## 2025-04-27 PROCEDURE — 83735 ASSAY OF MAGNESIUM: CPT

## 2025-04-27 PROCEDURE — 99238 HOSP IP/OBS DSCHRG MGMT 30/<: CPT

## 2025-04-27 RX ORDER — FUROSEMIDE 10 MG/ML
20 INJECTION INTRAMUSCULAR; INTRAVENOUS ONCE
Status: COMPLETED | OUTPATIENT
Start: 2025-04-27 | End: 2025-04-27

## 2025-04-27 RX ORDER — AMIODARONE HYDROCHLORIDE 200 MG/1
TABLET ORAL
Qty: 72 TABLET | Refills: 0 | Status: SHIPPED | OUTPATIENT
Start: 2025-04-27 | End: 2025-06-29

## 2025-04-27 RX ORDER — IPRATROPIUM BROMIDE AND ALBUTEROL SULFATE 2.5; .5 MG/3ML; MG/3ML
3 SOLUTION RESPIRATORY (INHALATION) EVERY 6 HOURS PRN
Qty: 180 ML | Refills: 11 | Status: SHIPPED | OUTPATIENT
Start: 2025-04-27

## 2025-04-27 RX ORDER — MAGNESIUM HYDROXIDE 2400 MG/10ML
10 SUSPENSION ORAL ONCE
Status: COMPLETED | OUTPATIENT
Start: 2025-04-27 | End: 2025-04-27

## 2025-04-27 RX ORDER — EZETIMIBE 10 MG/1
10 TABLET ORAL DAILY
Qty: 30 TABLET | Refills: 0 | Status: SHIPPED | OUTPATIENT
Start: 2025-04-27 | End: 2025-04-30 | Stop reason: SDUPTHER

## 2025-04-27 RX ORDER — METHOCARBAMOL 500 MG/1
500 TABLET, FILM COATED ORAL EVERY 8 HOURS SCHEDULED
Qty: 21 TABLET | Refills: 0 | Status: SHIPPED | OUTPATIENT
Start: 2025-04-27 | End: 2025-04-30 | Stop reason: SDUPTHER

## 2025-04-27 RX ORDER — GABAPENTIN 100 MG/1
100 CAPSULE ORAL 3 TIMES DAILY
Qty: 42 CAPSULE | Refills: 0 | Status: SHIPPED | OUTPATIENT
Start: 2025-04-27 | End: 2025-05-11

## 2025-04-27 RX ORDER — ROSUVASTATIN CALCIUM 40 MG/1
40 TABLET, COATED ORAL DAILY
Qty: 30 TABLET | Refills: 0 | Status: SHIPPED | OUTPATIENT
Start: 2025-04-27 | End: 2025-04-30 | Stop reason: SDUPTHER

## 2025-04-27 RX ORDER — FUROSEMIDE 20 MG/1
20 TABLET ORAL DAILY
Qty: 14 TABLET | Refills: 0 | Status: SHIPPED | OUTPATIENT
Start: 2025-04-27 | End: 2025-05-11

## 2025-04-27 RX ORDER — BISACODYL 10 MG/1
10 SUPPOSITORY RECTAL DAILY
Status: DISCONTINUED | OUTPATIENT
Start: 2025-04-27 | End: 2025-04-27 | Stop reason: HOSPADM

## 2025-04-27 RX ORDER — IRON POLYSACCHARIDE COMPLEX 150 MG
150 CAPSULE ORAL DAILY
Qty: 27 CAPSULE | Refills: 0 | Status: SHIPPED | OUTPATIENT
Start: 2025-04-28 | End: 2025-05-25

## 2025-04-27 RX ORDER — CLOPIDOGREL BISULFATE 75 MG/1
75 TABLET ORAL DAILY
Qty: 30 TABLET | Refills: 0 | Status: SHIPPED | OUTPATIENT
Start: 2025-04-27

## 2025-04-27 RX ORDER — METOPROLOL SUCCINATE 50 MG/1
50 TABLET, EXTENDED RELEASE ORAL DAILY
Qty: 30 TABLET | Refills: 0 | Status: SHIPPED | OUTPATIENT
Start: 2025-04-27 | End: 2025-05-27

## 2025-04-27 RX ORDER — LISINOPRIL 20 MG/1
20 TABLET ORAL DAILY
Qty: 30 TABLET | Refills: 0 | Status: SHIPPED | OUTPATIENT
Start: 2025-04-27

## 2025-04-27 RX ORDER — OXYCODONE HYDROCHLORIDE 5 MG/1
5 TABLET ORAL EVERY 6 HOURS PRN
Qty: 15 TABLET | Refills: 0 | Status: SHIPPED | OUTPATIENT
Start: 2025-04-27 | End: 2025-05-04

## 2025-04-27 RX ADMIN — PANTOPRAZOLE SODIUM 40 MG: 40 TABLET, DELAYED RELEASE ORAL at 06:00

## 2025-04-27 RX ADMIN — ASPIRIN 81 MG CHEWABLE TABLET 81 MG: 81 TABLET CHEWABLE at 09:03

## 2025-04-27 RX ADMIN — ACETAMINOPHEN 975 MG: 325 TABLET, FILM COATED ORAL at 09:03

## 2025-04-27 RX ADMIN — AMIODARONE HYDROCHLORIDE 400 MG: 200 TABLET ORAL at 09:03

## 2025-04-27 RX ADMIN — METHOCARBAMOL 500 MG: 500 TABLET ORAL at 05:59

## 2025-04-27 RX ADMIN — GABAPENTIN 100 MG: 100 CAPSULE ORAL at 09:03

## 2025-04-27 RX ADMIN — Medication 150 MG: at 09:03

## 2025-04-27 RX ADMIN — OXYCODONE HYDROCHLORIDE 5 MG: 5 TABLET ORAL at 09:15

## 2025-04-27 RX ADMIN — ROSUVASTATIN 40 MG: 40 TABLET, FILM COATED ORAL at 09:03

## 2025-04-27 RX ADMIN — Medication 1 TABLET: at 09:03

## 2025-04-27 RX ADMIN — ALBUTEROL SULFATE 2.5 MG: 2.5 SOLUTION RESPIRATORY (INHALATION) at 06:26

## 2025-04-27 RX ADMIN — FUROSEMIDE 20 MG: 10 INJECTION, SOLUTION INTRAMUSCULAR; INTRAVENOUS at 06:00

## 2025-04-27 RX ADMIN — SENNOSIDES AND DOCUSATE SODIUM 2 TABLET: 50; 8.6 TABLET ORAL at 09:03

## 2025-04-27 RX ADMIN — BISACODYL 10 MG: 10 SUPPOSITORY RECTAL at 10:38

## 2025-04-27 RX ADMIN — POLYETHYLENE GLYCOL 3350 17 G: 17 POWDER, FOR SOLUTION ORAL at 09:03

## 2025-04-27 RX ADMIN — HEPARIN SODIUM 5000 UNITS: 5000 INJECTION INTRAVENOUS; SUBCUTANEOUS at 09:03

## 2025-04-27 RX ADMIN — Medication 3 L/MIN: at 08:31

## 2025-04-27 RX ADMIN — MAGNESIUM HYDROXIDE 10 ML: 2400 SUSPENSION ORAL at 10:38

## 2025-04-27 RX ADMIN — MAGNESIUM OXIDE TAB 400 MG (241.3 MG ELEMENTAL MG) 400 MG: 400 (241.3 MG) TAB at 09:04

## 2025-04-27 RX ADMIN — FUROSEMIDE 20 MG: 10 INJECTION, SOLUTION INTRAMUSCULAR; INTRAVENOUS at 10:38

## 2025-04-27 RX ADMIN — EZETIMIBE 10 MG: 10 TABLET ORAL at 09:04

## 2025-04-27 RX ADMIN — HEPARIN SODIUM 5000 UNITS: 5000 INJECTION INTRAVENOUS; SUBCUTANEOUS at 01:26

## 2025-04-27 RX ADMIN — MUPIROCIN: 20 OINTMENT TOPICAL at 09:04

## 2025-04-27 RX ADMIN — METOPROLOL TARTRATE 25 MG: 25 TABLET, FILM COATED ORAL at 09:04

## 2025-04-27 ASSESSMENT — PAIN - FUNCTIONAL ASSESSMENT
PAIN_FUNCTIONAL_ASSESSMENT: 0-10

## 2025-04-27 ASSESSMENT — PAIN SCALES - GENERAL
PAINLEVEL_OUTOF10: 0 - NO PAIN
PAINLEVEL_OUTOF10: 3
PAINLEVEL_OUTOF10: 6
PAINLEVEL_OUTOF10: 0 - NO PAIN

## 2025-04-27 ASSESSMENT — PAIN DESCRIPTION - LOCATION: LOCATION: CHEST

## 2025-04-27 NOTE — PROGRESS NOTES
Subjective:  Patient seen and examined, he is sitting up in a chair. He states that he slept well and is no longer experiencing hallucinations.    Physical Exam:  GENERAL: Awake/ alert, cooperative.   HEAD:  Normocephalic, atraumatic.  EYES:  Round and reactive.  ENT:  No nasal discharge, mucous membranes moist and pink.  NECK:  Atraumatic, no meningismus.  CARDIOVASCULAR:  Normal rate and rhythm  RESPIRATORY:  Normal breath sounds  ABDOMEN:  Soft, no tenderness, nondistended.  EXTREMITIES:  No peripheral edema, no calf tenderness.  SKIN:  Sternal incision present, no signs of infection  NEUROLOGICAL:  Nonfocal grossly.    Remainder of objective data including imaging and laboratory findings were all reviewed.    Admission history:   4/26: Left pleural chest tube removed 4/25.  Patient is saturating on 1 L nasal cannula.  Entered A-fib w/RVR in the morning, rhythm corrected following amiodarone bolus.     4/27: Patient is saturating on 3L nasal cannula, no further episodes of Afib. Continuing amiodarone per CTS team, will require home oxygen at discharge.    Assessment and plan:  Rafi Lisa Jr. is a 74 y.o. year old male patient with Past Medical History of atrial fibrillation, CAD, HTN, HLD, COPD, cervical radiculopathy, osteoarthritis of the knees, and anemia, GERD, and large hiatal hernia presenting to the ICU after CABG.       Neurological System:  #Post-op pain  #Hallucinations, resolved  -Scheduled tylenol, mag-ox, gabapentin and robaxin, Lidoderm  -PRN dilaudid for breakthrough pain  -Baseline mentation AO x3, will reassess daily  -Promote wakefulness and increased light during daytime hours. Continue melatonin and trazodone 50mg for insomnia.  -Avoid excessive caths/ lines, monitor for worsening delirium    Cardiovascular System:  #Multivessel CAD s/p CABG x 3  #Acute Afib in the setting of post CABG  #Hx atrial fibrillation, HTN, HLD   -No further episodes of Afib noted  -Continue PO amiodarone 400mg  BID x 7 days followed by 200mg daily per CTS  -Continue rosuvastatin, ezetimibe  -Continue metoprolol 25mg BID, lisinopril remains held  -TTE from 4/25: LVEF 63%, abnormal septal motion consistent with post-op, mildly elevated RV systolic pressure  -Monitor hemodynamics closely to maintain MAP >65    Respiratory System:  #Post-Op Respiratory Insufficiency  #COPD  #Small R pleural effusion  -Albuterol nebulizer TID  -Continue mucinex, incentive spirometry, and flutter valve  -Additional Lasix doses ordered, repeat CXR shows partial improvement of right-sided pleural effusion  -Oxygen as needed, currently on 3L    Gastrointestinal System:  #Hiatal Hernia  #GERD  #Constipation - (post surgical, narcotic induced)  - Continue protonix  - Continue regular diet  - Continue bowel regimen, Dulcolax PRN added per CTS    Endocrine System:  #Hyperglycemia  #Pre-diabetes   - SSI discontinued  -Accu-checks     Renal System:  #CHAPO on CKD  -Cr 1.45 down from 1.55  -Trend BMP, monitor UOP, optimize electrolytes     Hematological System:  #Acute Blood Loss Anemia Post-Op    #Thrombocytopenia, resolved  #Macrocytic anemia  -monitor CBC, Hgb 8.8 up from 8.1  -  up from 108    Infection Disease:  -Monitor for signs of infection   -Cefazolin prophylaxis complete    Skin/MSK:  -No acute issues    Psych:  -No acute issues    ICU CHECK LIST:   Antimicrobials: -  Oxygen: 3L NC  Drips: None  Fluids: -  Feeding: Regular diet  Sedation/Analgesia: Tylenol, dilaudid, lidocaine patch, trazodone  Prophylaxis: SQH, PPI  Glycemic control: -  Bowel care: Lyudmila-colace, Dulcolax  Lines/Tubes: PIV  Restraints: -  Dispo: ICU     Code Status: Full     This is a preliminary note written by the resident. Please wait for attending addendum for finalization of note and recommendations.      Kimani Perez MD  PGY-1

## 2025-04-27 NOTE — DISCHARGE SUMMARY
Discharge Diagnosis  Coronary artery disease    Issues Requiring Follow-Up  Will need to get lab drawn (BMP) in one week  Post-op visit with Dr. Kline scheduled 5/20/2025    Test Results Pending At Discharge  Pending Labs       No current pending labs.            Hospital Course   Rafi Lisa Jr. is a 74 y.o. male with a PMH significant for CAD (s/p PCI to the LAD, laser arthrectomy to the Lcx on 1/2022), HTN, HLD, COPD (Emphysemea), and nicotine use disorder who presents to [facility] on 4/22/25 for a staged coronary. The patient was undergoing workup with cardiologist, Dr. James Hunt in workup for his anginal complaints. He ultimately was scheduled for a stress testing; however during his exam, he was referred to the ED and was subsequently admitted for inpatient workup. On 3/24/25, he underwent a LHC with interventional cardiologist, Dr. Tony Oneill and his coronary anatomy revealed diffuse multivessel CAD. On 4/22/2025 the patient underwent CABG x3 with Dr. Pk Kline    Pertinent Physical Exam At Time of Discharge  Physical Exam  Vitals and nursing note reviewed.   Constitutional:       General: He is awake. He is not in acute distress.     Appearance: Normal appearance.   HENT:      Head: Normocephalic and atraumatic.   Eyes:      General: Lids are normal.      Extraocular Movements: Extraocular movements intact.      Conjunctiva/sclera: Conjunctivae normal.      Pupils: Pupils are equal, round, and reactive to light.   Cardiovascular:      Rate and Rhythm: Normal rate and regular rhythm.      Pulses: Normal pulses.      Heart sounds: Normal heart sounds. No murmur heard.     No friction rub. No gallop.   Pulmonary:      Effort: Pulmonary effort is normal.      Breath sounds: Normal breath sounds.   Chest:      Comments: Mid sternal incision well approximated  Abdominal:      General: Bowel sounds are normal.      Palpations: Abdomen is soft.      Tenderness:  There is no abdominal tenderness.   Musculoskeletal:      Cervical back: Normal range of motion and neck supple.   Skin:     General: Skin is warm.      Capillary Refill: Capillary refill takes less than 2 seconds.   Neurological:      General: No focal deficit present.      Mental Status: He is alert and oriented to person, place, and time.      Motor: Motor function is intact.      Coordination: Coordination is intact.   Psychiatric:         Attention and Perception: Attention normal.         Speech: Speech normal.         Behavior: Behavior normal. Behavior is cooperative.         Cognition and Memory: Cognition normal.         Home Medications     Medication List      ASK your doctor about these medications     acetaminophen 325 mg tablet; Commonly known as: Tylenol   aspirin 81 mg chewable tablet; Chew 1 tablet (81 mg) once daily.   Centrum Silver 0.4 mg-300 mcg- 250 mcg; Generic drug: multivitamin with   minerals iron-free   clopidogrel 75 mg tablet; Commonly known as: Plavix; Take 1 tablet (75   mg) by mouth once daily. To stop 4/17/25   coenzyme Q-10 100 mg capsule   ezetimibe 10 mg tablet; Commonly known as: Zetia; Take 1 tablet (10 mg)   by mouth once daily.   lisinopril 20 mg tablet; Take 1 tablet (20 mg) by mouth once daily. Stop   4/19/25   metoprolol succinate XL 50 mg 24 hr tablet; Commonly known as:   Toprol-XL; Take 1 tablet (50 mg) by mouth once daily.   nitroglycerin 0.4 mg SL tablet; Commonly known as: Nitrostat; Place 1   tablet (0.4 mg) under the tongue every 5 minutes if needed for chest pain   (FOR UP TO 3 DOSES AS NEEDED FOR CHEST PAIN.CALL 911 IF PAIN PERSISTS.).   omeprazole 40 mg DR capsule; Commonly known as: PriLOSEC   probenecid-colchicine 500-0.5 mg tablet   rosuvastatin 40 mg tablet; Commonly known as: Crestor; Take 1 tablet (40   mg) by mouth once daily.   TURMERIC ORAL       Outpatient Follow-Up  Future Appointments   Date Time Provider Department Center   5/8/2025 11:00 AM PAR  [id number] [facility] NURSE [id number] [place]   5/20/2025  3:00 PM Pk Kline MD [id number] [place]   6/5/2025 10:30 AM James Hunt MD [id number] [place]   12/18/2025  1:00 PM James Hunt MD [id number] [place]       Ama Pennington, APRN-CNP

## 2025-04-27 NOTE — CARE PLAN
Problem: Pain - Adult  Goal: Verbalizes/displays adequate comfort level or baseline comfort level  Outcome: Met     Problem: Discharge Planning  Goal: Discharge to home or other facility with appropriate resources  Outcome: Met     Problem: Chronic Conditions and Co-morbidities  Goal: Patient's chronic conditions and co-morbidity symptoms are monitored and maintained or improved  Outcome: Met     Problem: Nutrition  Goal: Nutrient intake appropriate for maintaining nutritional needs  Outcome: Met     Problem: Pain  Goal: Performs ADL's with improved pain control throughout shift  Outcome: Met  Goal: Participates in PT with improved pain control throughout the shift  Outcome: Met     Problem: Fall/Injury  Goal: Verbalize understanding of personal risk factors for fall in the hospital  Outcome: Met  Goal: Verbalize understanding of risk factor reduction measures to prevent injury from fall in the home  Outcome: Met  Goal: Use assistive devices by end of the shift  Outcome: Met  Goal: Pace activities to prevent fatigue by end of the shift  Outcome: Met     Problem: Skin  Goal: Promote/optimize nutrition  Outcome: Met     Problem: Resident is recovering from cardiovascular surgery  Goal: I will maintain and build strength.  Outcome: Met  Goal: I will decrease complications and risks after surgery  Outcome: Met   The patient's goals for the shift include      The clinical goals for the shift include Patient will remain NSR through shift.    Pt discharged

## 2025-04-27 NOTE — NURSING NOTE
Cardiac and CHF discharge instructions reviewed with patient and his wife.      Discussed wound care to area: No concern for infection present upon visual nursing assessment. Aware to leave KARTHIK and cleanse with mild soap and water daily, pat dry. Discussed monitoring daily weights, BP, and HR. adequate nutrition and hydration intake. Also discussed utilizing incentive spirometer and increasing protein and vitamin C intake. Recommended High Protein Ensure. Encouraged the use of pulse oximeter as well and discussed importance of deep breathing while maintaining an SPO2 level of88-92% per Elena GRAJEDA CNP. Medication assessment performed. States pain currently well controlled with regimen ordered. Advised to call me as needed with questions or concerns. Patient verbalized understanding.     Other Topics reviewed:   Incision care: discussed signs and symptoms of infection  Medication Education : Plavix, BB, ASA, Statin, ACE, lasix. Gabapentin, Amiodarone, oxycodone.    Driving restrictions: No driving for 30 days (or until cleared by Dr. Kline)   Cardiac Rehab: To be arranged by Cardiology once surgeon clears patient from surgery.   Home Health- advised to call me if HC does not call after discharge.  Doctor appointments: Aware of CXR needed prior office visit with surgeon.   Diet: Advised to adhere to a heart healthy diet, regular exercise habits, avoidance of tobacco products, and maintenance of a healthy weight as they are crucial components to heart disease risk reduction.   BP/WT/HR: Daily monitoring of weight and BP. Made aware of when to call.      Epicardial wires removed by Elena TALLEY.  Cardiac Rehab information given to patient.   Recovery after coronary artery bypass surgery  - Heart attack recovery

## 2025-04-27 NOTE — CARE PLAN
The patient's goals for the shift include      The clinical goals for the shift include Patient will remain NSR through shift.

## 2025-04-28 ENCOUNTER — HOME HEALTH ADMISSION (OUTPATIENT)
Dept: HOME HEALTH SERVICES | Facility: HOME HEALTH | Age: 75
End: 2025-04-28
Payer: COMMERCIAL

## 2025-04-28 ENCOUNTER — DOCUMENTATION (OUTPATIENT)
Dept: HOME HEALTH SERVICES | Facility: HOME HEALTH | Age: 75
End: 2025-04-28
Payer: COMMERCIAL

## 2025-04-28 DIAGNOSIS — J43.9 PULMONARY EMPHYSEMA, UNSPECIFIED EMPHYSEMA TYPE (MULTI): Primary | ICD-10-CM

## 2025-04-28 NOTE — HH CARE COORDINATION
Home Care received a Referral for Nursing, Physical Therapy, and Occupational Therapy. We have processed the referral for a Start of Care on 4/29/25.     If you have any questions or concerns, please feel free to contact us at [phone]. Follow the prompts, enter your five digit zip code, and you will be directed to your care team on WEST 3.

## 2025-04-29 ENCOUNTER — PATIENT OUTREACH (OUTPATIENT)
Dept: CARE COORDINATION | Age: 75
End: 2025-04-29
Payer: COMMERCIAL

## 2025-04-29 DIAGNOSIS — R73.03 PREDIABETES: ICD-10-CM

## 2025-04-29 DIAGNOSIS — I48.91 ATRIAL FIBRILLATION, UNSPECIFIED TYPE (MULTI): Primary | ICD-10-CM

## 2025-04-29 DIAGNOSIS — J44.9 CHRONIC OBSTRUCTIVE PULMONARY DISEASE, UNSPECIFIED COPD TYPE (MULTI): ICD-10-CM

## 2025-04-29 LAB
ATRIAL RATE: 68 BPM
DIASTOLIC BLOOD PRESSURE: 61 MMHG
P AXIS: 56 DEGREES
P OFFSET: 165 MS
P ONSET: 130 MS
PR INTERVAL: 168 MS
Q ONSET: 214 MS
QRS COUNT: 11 BEATS
QRS DURATION: 78 MS
QT INTERVAL: 410 MS
QTC CALCULATION(BAZETT): 435 MS
QTC FREDERICIA: 427 MS
R AXIS: -32 DEGREES
SYSTOLIC BLOOD PRESSURE: 114 MMHG
T AXIS: 63 DEGREES
T OFFSET: 419 MS
VENTRICULAR RATE: 68 BPM

## 2025-04-29 SDOH — SOCIAL STABILITY: SOCIAL NETWORK
DO YOU BELONG TO ANY CLUBS OR ORGANIZATIONS SUCH AS CHURCH GROUPS, UNIONS, FRATERNAL OR ATHLETIC GROUPS, OR SCHOOL GROUPS?: NO

## 2025-04-29 SDOH — SOCIAL STABILITY: SOCIAL NETWORK: HOW OFTEN DO YOU ATTEND CHURCH OR RELIGIOUS SERVICES?: NEVER

## 2025-04-29 SDOH — SOCIAL STABILITY: SOCIAL NETWORK: HOW OFTEN DO YOU GET TOGETHER WITH FRIENDS OR RELATIVES?: ONCE A WEEK

## 2025-04-29 SDOH — HEALTH STABILITY: PHYSICAL HEALTH: ON AVERAGE, HOW MANY DAYS PER WEEK DO YOU ENGAGE IN MODERATE TO STRENUOUS EXERCISE (LIKE A BRISK WALK)?: 0 DAYS

## 2025-04-29 SDOH — SOCIAL STABILITY: SOCIAL INSECURITY: ARE YOU MARRIED, WIDOWED, DIVORCED, SEPARATED, NEVER MARRIED, OR LIVING WITH A PARTNER?: MARRIED

## 2025-04-29 SDOH — SOCIAL STABILITY: SOCIAL NETWORK: HOW OFTEN DO YOU ATTEND MEETINGS OF THE CLUBS OR ORGANIZATIONS YOU BELONG TO?: NEVER

## 2025-04-29 SDOH — HEALTH STABILITY: PHYSICAL HEALTH: ON AVERAGE, HOW MANY MINUTES DO YOU ENGAGE IN EXERCISE AT THIS LEVEL?: 0 MIN

## 2025-04-29 SDOH — HEALTH STABILITY: PHYSICAL HEALTH
HOW OFTEN DO YOU NEED TO HAVE SOMEONE HELP YOU WHEN YOU READ INSTRUCTIONS, PAMPHLETS, OR OTHER WRITTEN MATERIAL FROM YOUR DOCTOR OR PHARMACY?: NEVER

## 2025-04-29 SDOH — SOCIAL STABILITY: SOCIAL NETWORK: IN A TYPICAL WEEK, HOW MANY TIMES DO YOU TALK ON THE PHONE WITH FAMILY, FRIENDS, OR NEIGHBORS?: ONCE A WEEK

## 2025-04-29 SDOH — HEALTH STABILITY: MENTAL HEALTH
DO YOU FEEL STRESS - TENSE, RESTLESS, NERVOUS, OR ANXIOUS, OR UNABLE TO SLEEP AT NIGHT BECAUSE YOUR MIND IS TROUBLED ALL THE TIME - THESE DAYS?: ONLY A LITTLE

## 2025-04-29 NOTE — PROGRESS NOTES
Enrollment call, spoke to spouse. They have bp cuff and pulse ox and new Nebs machine. Instructed to log.   O2 at 3L.  HC following. SOC tomorrow.   PCP follow up, she will call this week.   Mid sternal incision.

## 2025-04-30 ENCOUNTER — HOME CARE VISIT (OUTPATIENT)
Dept: HOME HEALTH SERVICES | Facility: HOME HEALTH | Age: 75
End: 2025-04-30
Payer: COMMERCIAL

## 2025-04-30 ENCOUNTER — TELEPHONE (OUTPATIENT)
Dept: CARDIAC SURGERY | Facility: CLINIC | Age: 75
End: 2025-04-30

## 2025-04-30 ENCOUNTER — PATIENT OUTREACH (OUTPATIENT)
Dept: CARE COORDINATION | Age: 75
End: 2025-04-30

## 2025-04-30 ENCOUNTER — TELEMEDICINE (OUTPATIENT)
Dept: PHARMACY | Facility: HOSPITAL | Age: 75
End: 2025-04-30
Payer: COMMERCIAL

## 2025-04-30 ENCOUNTER — DOCUMENTATION (OUTPATIENT)
Dept: CARDIAC SURGERY | Facility: CLINIC | Age: 75
End: 2025-04-30

## 2025-04-30 DIAGNOSIS — J44.9 CHRONIC OBSTRUCTIVE PULMONARY DISEASE, UNSPECIFIED COPD TYPE (MULTI): ICD-10-CM

## 2025-04-30 DIAGNOSIS — I48.20 CHRONIC ATRIAL FIBRILLATION (MULTI): ICD-10-CM

## 2025-04-30 DIAGNOSIS — E78.2 MIXED HYPERLIPIDEMIA: ICD-10-CM

## 2025-04-30 DIAGNOSIS — E78.5 HYPERLIPIDEMIA, UNSPECIFIED HYPERLIPIDEMIA TYPE: ICD-10-CM

## 2025-04-30 DIAGNOSIS — J42 CHRONIC BRONCHITIS, UNSPECIFIED CHRONIC BRONCHITIS TYPE (MULTI): ICD-10-CM

## 2025-04-30 DIAGNOSIS — I48.91 ATRIAL FIBRILLATION, UNSPECIFIED TYPE (MULTI): Primary | ICD-10-CM

## 2025-04-30 DIAGNOSIS — Z95.1 S/P CABG X 3: ICD-10-CM

## 2025-04-30 DIAGNOSIS — R73.03 PREDIABETES: ICD-10-CM

## 2025-04-30 DIAGNOSIS — R07.9 CHEST PAIN, UNSPECIFIED TYPE: ICD-10-CM

## 2025-04-30 DIAGNOSIS — I10 ESSENTIAL HYPERTENSION, BENIGN: ICD-10-CM

## 2025-04-30 DIAGNOSIS — I48.91 ATRIAL FIBRILLATION, UNSPECIFIED TYPE (MULTI): ICD-10-CM

## 2025-04-30 DIAGNOSIS — Z95.5 S/P CORONARY ARTERY STENT PLACEMENT: ICD-10-CM

## 2025-04-30 LAB
ATRIAL RATE: 115 BPM
DIASTOLIC BLOOD PRESSURE: 60 MMHG
Q ONSET: 213 MS
QRS COUNT: 19 BEATS
QRS DURATION: 88 MS
QT INTERVAL: 342 MS
QTC CALCULATION(BAZETT): 475 MS
QTC FREDERICIA: 426 MS
R AXIS: 102 DEGREES
SYSTOLIC BLOOD PRESSURE: 123 MMHG
T AXIS: 72 DEGREES
T OFFSET: 384 MS
VENTRICULAR RATE: 116 BPM

## 2025-04-30 PROCEDURE — G0299 HHS/HOSPICE OF RN EA 15 MIN: HCPCS

## 2025-04-30 PROCEDURE — 169592 NO-PAY CLAIM PROCEDURE

## 2025-04-30 PROCEDURE — 93005 ELECTROCARDIOGRAM TRACING: CPT

## 2025-04-30 PROCEDURE — G0151 HHCP-SERV OF PT,EA 15 MIN: HCPCS

## 2025-04-30 RX ORDER — EZETIMIBE 10 MG/1
10 TABLET ORAL DAILY
Qty: 90 TABLET | Refills: 3 | Status: SHIPPED | OUTPATIENT
Start: 2025-04-30

## 2025-04-30 RX ORDER — AZITHROMYCIN 500 MG/1
500 TABLET, FILM COATED ORAL DAILY
Qty: 3 TABLET | Refills: 0 | Status: SHIPPED | OUTPATIENT
Start: 2025-04-30 | End: 2025-05-02 | Stop reason: SDUPTHER

## 2025-04-30 RX ORDER — ROSUVASTATIN CALCIUM 40 MG/1
40 TABLET, COATED ORAL DAILY
Qty: 90 TABLET | Refills: 3 | Status: SHIPPED | OUTPATIENT
Start: 2025-04-30

## 2025-04-30 RX ORDER — PREDNISONE 20 MG/1
40 TABLET ORAL DAILY
Qty: 10 TABLET | Refills: 0 | Status: SHIPPED | OUTPATIENT
Start: 2025-04-30 | End: 2025-05-02 | Stop reason: SDUPTHER

## 2025-04-30 RX ORDER — METHOCARBAMOL 500 MG/1
500 TABLET, FILM COATED ORAL EVERY 8 HOURS SCHEDULED
Qty: 21 TABLET | Refills: 0 | Status: SHIPPED | OUTPATIENT
Start: 2025-04-30 | End: 2025-05-07

## 2025-04-30 ASSESSMENT — ENCOUNTER SYMPTOMS
LOWEST PAIN SEVERITY IN PAST 24 HOURS: 3/10
PAIN: 1
PERSON REPORTING PAIN: PATIENT
OCCASIONAL FEELINGS OF UNSTEADINESS: 0
PAIN LOCATION: CHEST
HIGHEST PAIN SEVERITY IN PAST 24 HOURS: 7/10

## 2025-04-30 ASSESSMENT — ACTIVITIES OF DAILY LIVING (ADL): AMBULATION ASSISTANCE ON FLAT SURFACES: 1

## 2025-04-30 NOTE — PROGRESS NOTES
"Daily Call Note:   0800 - Initial HHVC completed via PHONE conference with pt, Kim WADE CNP, Wing Palencia PharmD and this RN.    Provider reviewed the following:  *Pt's birthday - states he feels like he \"needs to slow down.\" C/o feeling winded when he gets to whatever location he is headed to (BR, DR, etc).  *C/o occ dizziness - could be medication related - parameters set for BP meds - hold lisinopril and metroprolol is SBP <100 or DBP <60  *Incision - no pain, feels like skin is \"cracking.\" Instructed no lotion for now.  *Grafting sites - occ \"tender\" using complression stockings - enc to avoid the grafting sites.  *Pulmonology appt scheduled for 6/2 at 1000.  *Pt agreeable to Masimo  *Using his IS and nebulizers; agreeable to Rescue kit.  *All meds reviewed.  *Using ribaxin and oxycodone for pain. Enc use of the splinting pillow.  *Reviewed OTC meds and instrcuted to take CoenzymeQ in the afternoon since it can lower BP.    Next HHVC scheduled for 5/7 at 0800.    Pt Education: POC  Barriers: none  Topics for Daily Review: Initial HHVC  Pt demonstrates clear understanding: Yes    Daily Weight:  There were no vitals filed for this visit.   Last 3 Weights:  Wt Readings from Last 7 Encounters:   04/27/25 82.2 kg (181 lb 3.2 oz)   04/08/25 80 kg (176 lb 5.9 oz)   04/02/25 81.9 kg (180 lb 9.6 oz)   03/21/25 80.7 kg (178 lb)   12/19/24 82.3 kg (181 lb 6.4 oz)   12/21/23 85.2 kg (187 lb 12.8 oz)   10/20/23 83.3 kg (183 lb 9.6 oz)       Masimo Device: No   Masimo Clinical Impression: na    Virtual Visits--Scheduled (Most Recent Date at Top)  Follow up Appointments  Recent Visits  No visits were found meeting these conditions.  Showing recent visits within past 30 days and meeting all other requirements  Future Appointments  No visits were found meeting these conditions.  Showing future appointments within next 90 days and meeting all other requirements       Frequency of RN Calls & Virtual Visits per Team " Agreement: Healthy at Home Frequency: Daily    Medication issues Addressed (what was done): all meds reviewed    Follow up appointments scheduled by [facility] Staff: Pulmonology 6/2 at 1000  Referrals made by [facility] staff: none

## 2025-04-30 NOTE — PROGRESS NOTES
Healthy at Home [facility]    Rafi Lisa Jr. is a 75 y.o. male was referred to Clinical Pharmacy Team to complete a post-discharge medication optimization and monitoring visit.  The patient was referred for multiple concerns while in [facility]. Today was our initial visit.     Admission Date: 4/22  Discharge Date: 4/27    Referring Provider: Kim Alfonso APRN*  PCP: Magno Babb MD - last visit: 3/14      Subjective   Allergies[1]    [facility] - [place], [place] - [address] [place] [address]  [address] [facility] [address]  Phone: [phone] Fax: [phone]    [id number] [place], [place] - [address] [facility] AT [address] [address]  Phone: [phone] Fax: [phone]    [facility]  [address] [address]  Phone: [phone] Fax: [phone]    [id number] [place], [place] - [address] [facility]  [address] [facility] [address]  Phone: [phone] Fax: [phone]      Medication System Management:  Affordability/Accessibility: no issues  Adherence/Organization: no concern      Social History     Social History Narrative    Not on file        Notable Medication changes following discharge:  Start: amiodarone, iron   Stop: n/a  Change: n/a    HPI      Review of Systems        Objective     There were no vitals taken for this visit.   BP Readings from Last 4 Encounters:   04/27/25 108/67   04/08/25 132/78   04/02/25 104/67   03/25/25 110/65      There were no vitals filed for this visit.     LAB  Lab Results   Component Value Date    BILITOT 0.5 03/25/2025    CALCIUM 8.9 04/27/2025    CO2 32 04/27/2025    CL 95 (L) 04/27/2025    CREATININE 1.45 (H) 04/27/2025    GLUCOSE 120 (H) 04/27/2025    ALKPHOS 70 03/25/2025    K 4.2 04/27/2025    PROT 7.0 03/25/2025     (L) 04/27/2025    AST 28 03/25/2025    ALT 20 03/25/2025    BUN 53 (H) 04/27/2025    ANIONGAP 11 04/27/2025    MG 2.61 (H)  04/27/2025    PHOS 4.0 04/27/2025    ALBUMIN 3.7 04/27/2025     Lab Results   Component Value Date    TRIG 75 03/22/2025    CHOL 91 03/22/2025    LDLCALC 39 03/22/2025    HDL 36.6 03/22/2025     Lab Results   Component Value Date    HGBA1C 6.2 (H) 03/25/2025         Current Outpatient Medications   Medication Instructions    acetaminophen (TYLENOL) 650 mg, Every 6 hours PRN    amiodarone (Pacerone) 200 mg tablet Take 2 tablets (400 mg) by mouth 2 times a day for 3 days, THEN 1 tablet (200 mg) once daily.    aspirin 81 mg, oral, Daily    clopidogrel (PLAVIX) 75 mg, oral, Daily, To stop 4/17/25    coenzyme Q-10 100 mg, 2 times daily    ezetimibe (ZETIA) 10 mg, oral, Daily    furosemide (LASIX) 20 mg, oral, Daily    gabapentin (NEURONTIN) 100 mg, oral, 3 times daily    ipratropium-albuteroL (Duo-Neb) 0.5-2.5 mg/3 mL nebulizer solution 3 mL, nebulization, Every 6 hours PRN    iron polysaccharides (NU-IRON,NIFEREX) 150 mg, oral, Daily    lisinopril 20 mg, oral, Daily, Stop 4/19/25    methocarbamol (ROBAXIN) 500 mg, oral, Every 8 hours scheduled    metoprolol succinate XL (TOPROL-XL) 50 mg, oral, Daily    multivitamin with minerals iron-free (Centrum Silver) 1 tablet, oral, Daily    omeprazole (PRILOSEC) 40 mg, Daily before breakfast    oxyCODONE (ROXICODONE) 5 mg, oral, Every 6 hours PRN    oxygen (O2) 3 L/min, inhalation, Continuous    probenecid-colchicine 500-0.5 mg tablet 1 tablet, 2 times daily    rosuvastatin (CRESTOR) 40 mg, oral, Daily    TURMERIC ORAL 1,500 mg, 2 times daily        HISTORICAL PHARMACOTHERAPY      DRUG INTERACTIONS  None at time of review      Assessment/Plan   Problem List Items Addressed This Visit       Atrial fibrillation (Multi)    COPD (chronic obstructive pulmonary disease) (Multi)    Prediabetes     -admitted to [facility] for planned CABG x3  -endorsing SOB with exertion (short distances)   -had a dizzy spell yesterday getting up to go to the bathroom   -denies chest pain   -has as home BP  cuff, pulse ox and nebulizer machine   -per wife, spO2 running mid 90's at all times  -on 3L NC continuously (new from this admission)   -most recent A1c 6.2% (3/25/25) --> discussed that this result means he is technically pre-diabetic; he has not been on any medications for DM in the past but will discuss with PCP   -no longer smoking     Vitals yesterday:  /65, repeat 97/71 in the afternoon   HR 96    Medications Changes/Concerns:  Afib  Continue amiodarone 400 mg BID x 3 days followed by 200 mg daily   S/p watchman - previously on Eliquis   CAD   Continue DAPT with aspirin 81 mg daily + plavix 75 mg daily   Continue ezetimibe 10 mg daily and rosuvastatin 40 mg daily   Continue lisinopril 20 mg daily and metoprolol succinate ER 50 mg daily   Given hold parameters for SBP <100 or DBP <60   Also on Lasix 20 mg daily   COPD   No current medications - will defer discussion to next visit   Has Dorinda PRN   Will send rescue kit for future exacerbation       Refills Needed:  -none needed today       Plan/To Do:  -upcoming appt with PCP tomorrow, 5/1  -send rescue kit to [place] to be delivered by medic   -nursing to help him schedule with pulm   -dispatch medic to set up patient on masimo   -advised to call [facility] with any low BP readings/dizzy episodes   -continue to log daily vitals   -nursing will continue daily calls      [facility] PAP Status:  -n/a   -on all generic medications currently       Time Spent with Patient and [facility] Team - Kim Alfonso NP and Poonam HAMEED RN via phone call: 30 minutes     Follow Up: 1 week    Continue all meds under the continuation of care with the referring provider and clinical pharmacy team.    Wing Palencia, Rene     Verbal consent to manage patient's drug therapy was obtained from the patient. They were informed they may decline to participate or withdraw from participation in pharmacy services at any time.          [1]   Allergies  Allergen Reactions    Sulfa (Sulfonamide  Antibiotics) Hives     Stated at age 13    Adhesive Tape-Silicones Rash

## 2025-04-30 NOTE — HOME HEALTH
patient seen for pt marcella today .  he went to the hospital due to chest pain and had to have by pass x 3 surgery .  prior to this he walked with no device , indep with dressing and bathing and driving .  lives with his wife in a home where full bath and bedroom are on the second floor and there is no bath on the first floor .  they do have 3 small dogs that they put away during the visit .  he is new to O2  at 3 L .

## 2025-05-01 ENCOUNTER — PATIENT OUTREACH (OUTPATIENT)
Dept: CARE COORDINATION | Age: 75
End: 2025-05-01
Payer: COMMERCIAL

## 2025-05-01 VITALS
OXYGEN SATURATION: 100 % | HEART RATE: 82 BPM | RESPIRATION RATE: 18 BRPM | TEMPERATURE: 97.1 F | DIASTOLIC BLOOD PRESSURE: 60 MMHG | SYSTOLIC BLOOD PRESSURE: 114 MMHG

## 2025-05-01 SDOH — HEALTH STABILITY: MENTAL HEALTH: SMOKING IN HOME: 0

## 2025-05-01 SDOH — ECONOMIC STABILITY: HOUSING INSECURITY: EVIDENCE OF SMOKING MATERIAL: 0

## 2025-05-01 ASSESSMENT — ENCOUNTER SYMPTOMS
PAIN LOCATION: CHEST
SPUTUM COLOR: YELLOW
LOWER EXTREMITY EDEMA: 1
PERSON REPORTING PAIN: PATIENT
SHORTNESS OF BREATH: 1
LIMITED RANGE OF MOTION: 1
APPETITE LEVEL: GOOD
SPUTUM PRODUCTION: 1
PAIN LOCATION - PAIN QUALITY: ACHE
CHANGE IN APPETITE: UNCHANGED
SPUTUM AMOUNT: MODERATE
PAIN LOCATION - PAIN SEVERITY: 2/10
COUGH CHARACTERISTICS: PRODUCTIVE
PAIN SEVERITY GOAL: 0/10
MUSCLE WEAKNESS: 1
COUGH: 1
HIGHEST PAIN SEVERITY IN PAST 24 HOURS: 2/10
DYSPNEA ACTIVITY LEVEL: AFTER AMBULATING 10 - 20 FT
PAIN: 1
SPUTUM CONSISTENCY: THICK
LOWEST PAIN SEVERITY IN PAST 24 HOURS: 0/10

## 2025-05-01 ASSESSMENT — ACTIVITIES OF DAILY LIVING (ADL)
ENTERING_EXITING_HOME: STAND BY ASSIST
AMBULATION ASSISTANCE: STAND BY ASSIST
CURRENT_FUNCTION: STAND BY ASSIST
OASIS_M1830: 03

## 2025-05-02 ENCOUNTER — DOCUMENTATION (OUTPATIENT)
Dept: CARE COORDINATION | Age: 75
End: 2025-05-02
Payer: COMMERCIAL

## 2025-05-02 ENCOUNTER — PATIENT OUTREACH (OUTPATIENT)
Dept: CARE COORDINATION | Age: 75
End: 2025-05-02
Payer: COMMERCIAL

## 2025-05-02 ENCOUNTER — PHARMACY VISIT (OUTPATIENT)
Dept: PHARMACY | Facility: CLINIC | Age: 75
End: 2025-05-02
Payer: COMMERCIAL

## 2025-05-02 ENCOUNTER — HOME CARE VISIT (OUTPATIENT)
Dept: HOME HEALTH SERVICES | Facility: HOME HEALTH | Age: 75
End: 2025-05-02
Payer: COMMERCIAL

## 2025-05-02 VITALS — TEMPERATURE: 98 F

## 2025-05-02 PROCEDURE — G0152 HHCP-SERV OF OT,EA 15 MIN: HCPCS

## 2025-05-02 PROCEDURE — RXMED WILLOW AMBULATORY MEDICATION CHARGE

## 2025-05-02 RX ORDER — PREDNISONE 20 MG/1
40 TABLET ORAL DAILY
Qty: 10 TABLET | Refills: 0 | Status: SHIPPED | OUTPATIENT
Start: 2025-05-02

## 2025-05-02 RX ORDER — AZITHROMYCIN 500 MG/1
500 TABLET, FILM COATED ORAL DAILY
Qty: 3 TABLET | Refills: 0 | Status: SHIPPED | OUTPATIENT
Start: 2025-05-02

## 2025-05-02 SDOH — ECONOMIC STABILITY: HOUSING INSECURITY: EVIDENCE OF SMOKING MATERIAL: 0

## 2025-05-02 SDOH — HEALTH STABILITY: MENTAL HEALTH: SMOKING IN HOME: 0

## 2025-05-02 ASSESSMENT — ACTIVITIES OF DAILY LIVING (ADL)
BATHING_CURRENT_FUNCTION: MINIMUM ASSIST
TOILETING: CONTACT GUARD ASSIST
DRESSING_LB_CURRENT_FUNCTION: MODERATE ASSIST
LIGHT HOUSEKEEPING: DEPENDENT
TOILETING: 1
HOUSEKEEPING ASSESSED: 1
WASHING_LB_CURRENT_FUNCTION: MODERATE ASSIST
WASHING_UPB_CURRENT_FUNCTION: MINIMUM ASSIST
DRESSING_LB_CURRENT_FUNCTION: MINIMUM ASSIST
BATHING ASSESSED: 1
PREPARING MEALS: NEEDS ASSISTANCE
BATHING_CURRENT_FUNCTION: MODERATE ASSIST

## 2025-05-02 ASSESSMENT — PAIN SCALES - PAIN ASSESSMENT IN ADVANCED DEMENTIA (PAINAD)
FACIALEXPRESSION: 0
CONSOLABILITY: 0
FACIALEXPRESSION: 0 - SMILING OR INEXPRESSIVE.
NEGVOCALIZATION: 1
BREATHING: 0
TOTALSCORE: 1
NEGVOCALIZATION: 1 - OCCASIONAL MOAN OR GROAN. LOW-LEVEL SPEECH WITH A NEGATIVE OR DISAPPROVING QUALITY.
CONSOLABILITY: 0 - NO NEED TO CONSOLE.
BODYLANGUAGE: 0
BODYLANGUAGE: 0 - RELAXED.

## 2025-05-02 ASSESSMENT — ENCOUNTER SYMPTOMS
PERSON REPORTING PAIN: PATIENT
PAIN: 1

## 2025-05-02 NOTE — PROGRESS NOTES
Visited Mr Sloan for Recue kit & education. Nikko set up on cell phone. Vitals stable no complaints. RN notified

## 2025-05-02 NOTE — PROGRESS NOTES
Daily Call Note:     0830 - Daily call completed with pt.  He states he is doing ok today.  Some TOWNSEND noted on the phone - he had been up ambulating without his O2; though he states that his breathing is better today. Encouraged to keep the O2 on for now; await the Masimo monitoring which will assist us in weaning his oxygen. He verbalized understanding.  Updated pt about his pulmonary appt and sent message to Bee - marcell nurse - at the pt's request that the pulm appt is already scheduled.    Next [facility] is 5/7 at 0800.  No other questions or concerns.    Pt Education: POC  Barriers: none  Topics for Daily Review: daily call; pulm appt; Trev; sx;   Pt demonstrates clear understanding: Yes    Daily Weight:  There were no vitals filed for this visit.   Last 3 Weights:  Wt Readings from Last 7 Encounters:   04/27/25 82.2 kg (181 lb 3.2 oz)   04/08/25 80 kg (176 lb 5.9 oz)   04/02/25 81.9 kg (180 lb 9.6 oz)   03/21/25 80.7 kg (178 lb)   12/19/24 82.3 kg (181 lb 6.4 oz)   12/21/23 85.2 kg (187 lb 12.8 oz)   10/20/23 83.3 kg (183 lb 9.6 oz)       Masimo Device: No   Masimo Clinical Impression: Masimo being set up today       Virtual Visits--Scheduled (Most Recent Date at Top)  Follow up Appointments  Recent Visits  No visits were found meeting these conditions.  Showing recent visits within past 30 days and meeting all other requirements  Future Appointments  No visits were found meeting these conditions.  Showing future appointments within next 90 days and meeting all other requirements       Frequency of RN Calls & Virtual Visits per Team Agreement: Healthy at Home Frequency: Daily    Medication issues Addressed (what was done): none    Follow up appointments scheduled by [facility] Staff: none  Referrals made by [facility] staff: none

## 2025-05-03 ENCOUNTER — PATIENT OUTREACH (OUTPATIENT)
Dept: CARE COORDINATION | Age: 75
End: 2025-05-03
Payer: COMMERCIAL

## 2025-05-03 VITALS — OXYGEN SATURATION: 95 % | RESPIRATION RATE: 26 BRPM | HEART RATE: 90 BPM

## 2025-05-03 NOTE — PROGRESS NOTES
Daily Call Note:     Daily call completed with the patient  States he is doing okay  He still has a productive cough at times  He is currently on RA , vitals HR 90, RR 26, and 95%   He wears 2 L at night, but trying to go without  during the day   He hasn't smoked since being being discharged from hospital  Has nicotine patches and lozenges  No assistance needed during the call  No questions or concerns        Pt Education:   Barriers:   Topics for Daily Review:   Pt demonstrates clear understanding:     Daily Weight:  There were no vitals filed for this visit.   Last 3 Weights:  Wt Readings from Last 7 Encounters:   04/27/25 82.2 kg (181 lb 3.2 oz)   04/08/25 80 kg (176 lb 5.9 oz)   04/02/25 81.9 kg (180 lb 9.6 oz)   03/21/25 80.7 kg (178 lb)   12/19/24 82.3 kg (181 lb 6.4 oz)   12/21/23 85.2 kg (187 lb 12.8 oz)   10/20/23 83.3 kg (183 lb 9.6 oz)       Masimo Device:   Masimo Clinical Impression:     Virtual Visits--Scheduled (Most Recent Date at Top)  Follow up Appointments  Recent Visits  No visits were found meeting these conditions.  Showing recent visits within past 30 days and meeting all other requirements  Future Appointments  No visits were found meeting these conditions.  Showing future appointments within next 90 days and meeting all other requirements       Frequency of RN Calls & Virtual Visits per Team Agreement:     Medication issues Addressed (what was done):     Follow up appointments scheduled by [facility] Staff:   Referrals made by [facility] staff:

## 2025-05-04 ENCOUNTER — PATIENT OUTREACH (OUTPATIENT)
Dept: CARE COORDINATION | Age: 75
End: 2025-05-04
Payer: COMMERCIAL

## 2025-05-04 VITALS — HEART RATE: 76 BPM | RESPIRATION RATE: 22 BRPM | OXYGEN SATURATION: 96 %

## 2025-05-05 ENCOUNTER — PATIENT OUTREACH (OUTPATIENT)
Dept: CARE COORDINATION | Age: 75
End: 2025-05-05
Payer: COMMERCIAL

## 2025-05-05 ENCOUNTER — HOME CARE VISIT (OUTPATIENT)
Dept: HOME HEALTH SERVICES | Facility: HOME HEALTH | Age: 75
End: 2025-05-05
Payer: COMMERCIAL

## 2025-05-05 PROCEDURE — G0151 HHCP-SERV OF PT,EA 15 MIN: HCPCS

## 2025-05-05 SDOH — HEALTH STABILITY: MENTAL HEALTH: SMOKING IN HOME: 0

## 2025-05-05 SDOH — ECONOMIC STABILITY: HOUSING INSECURITY: EVIDENCE OF SMOKING MATERIAL: 0

## 2025-05-05 SDOH — ECONOMIC STABILITY: HOUSING INSECURITY: HOME SAFETY: INSTRUCTED PATIENT TO HAVE SMOKE DETECTORS CHECKED FOR SAFETY / FUNCTION

## 2025-05-05 SDOH — HEALTH STABILITY: PHYSICAL HEALTH: EXERCISE TYPE: STANDING EXS X 15 REPS

## 2025-05-05 ASSESSMENT — ACTIVITIES OF DAILY LIVING (ADL): AMBULATION ASSISTANCE ON FLAT SURFACES: 1

## 2025-05-05 ASSESSMENT — ENCOUNTER SYMPTOMS
OCCASIONAL FEELINGS OF UNSTEADINESS: 0
DENIES PAIN: 1
PERSON REPORTING PAIN: PATIENT

## 2025-05-05 NOTE — HOME HEALTH
patient seen for PT dc today per his request as he is doing well .  he denies any patient re his hep or activity. patient given standing exs today

## 2025-05-05 NOTE — Clinical Note
PT discharged today per patients request as he is functioning independently .  standing exs issued and reviewed with patient

## 2025-05-05 NOTE — PROGRESS NOTES
Daily call completed patient states he has been doing well he is wearing o2 overnight and PRN during the day tolerating o2 treatments. Getting around well he has pt scheduled today. No medication needs questions and concerns addressed. Upcomming appointments reviewed

## 2025-05-06 ENCOUNTER — PATIENT OUTREACH (OUTPATIENT)
Dept: CARE COORDINATION | Age: 75
End: 2025-05-06
Payer: COMMERCIAL

## 2025-05-06 VITALS — HEART RATE: 79 BPM | RESPIRATION RATE: 21 BRPM | OXYGEN SATURATION: 97 %

## 2025-05-06 VITALS
BODY MASS INDEX: 24.05 KG/M2 | OXYGEN SATURATION: 96 % | SYSTOLIC BLOOD PRESSURE: 127 MMHG | DIASTOLIC BLOOD PRESSURE: 82 MMHG | HEART RATE: 69 BPM | WEIGHT: 177.4 LBS

## 2025-05-06 NOTE — PROGRESS NOTES
Daily Call Note:     0820 - Daily call completed with pt.  He states that he is feeling good today - almost back to normal.  Slept well last night, though c/o his shoulders still bothering him (due to positioning during surgery). Used his heart pillow to assist with positioning in bed and he found that helpful.  Reminded pt of lab draw needed.  Reviewed Masimo monitoring - numbers have been steady and WNL.   He states that he is using his IS during commercials while he watches TV.   /82   Pulse 69   Wt 80.5 kg (177 lb 6.4 oz)   SpO2 96%   BMI 24.05 kg/m²     Reminded pt of next [facility] 5/7 at 0800.  No other questions or concerns at this time.    Pt Education: POC  Barriers: none  Topics for Daily Review: daily call - sleep; VS;   Pt demonstrates clear understanding: Yes    Daily Weight:  There were no vitals filed for this visit.   Last 3 Weights:  Wt Readings from Last 7 Encounters:   04/27/25 82.2 kg (181 lb 3.2 oz)   04/08/25 80 kg (176 lb 5.9 oz)   04/02/25 81.9 kg (180 lb 9.6 oz)   03/21/25 80.7 kg (178 lb)   12/19/24 82.3 kg (181 lb 6.4 oz)   12/21/23 85.2 kg (187 lb 12.8 oz)   10/20/23 83.3 kg (183 lb 9.6 oz)       Masimo Device: Yes   Masimo Clinical Impression: good - wearing O2 only at night now.    Virtual Visits--Scheduled (Most Recent Date at Top)  Follow up Appointments  Recent Visits  No visits were found meeting these conditions.  Showing recent visits within past 30 days and meeting all other requirements  Future Appointments  No visits were found meeting these conditions.  Showing future appointments within next 90 days and meeting all other requirements       Frequency of RN Calls & Virtual Visits per Team Agreement: Healthy at Home Frequency: Daily    Medication issues Addressed (what was done): none    Follow up appointments scheduled by [facility] Staff: none  Referrals made by [facility] staff: none

## 2025-05-07 ENCOUNTER — APPOINTMENT (OUTPATIENT)
Dept: PHARMACY | Facility: HOSPITAL | Age: 75
End: 2025-05-07
Payer: COMMERCIAL

## 2025-05-07 ENCOUNTER — PATIENT OUTREACH (OUTPATIENT)
Dept: CARE COORDINATION | Age: 75
End: 2025-05-07

## 2025-05-07 ENCOUNTER — APPOINTMENT (OUTPATIENT)
Dept: CARE COORDINATION | Age: 75
End: 2025-05-07
Payer: COMMERCIAL

## 2025-05-07 ENCOUNTER — HOME CARE VISIT (OUTPATIENT)
Dept: HOME HEALTH SERVICES | Facility: HOME HEALTH | Age: 75
End: 2025-05-07
Payer: COMMERCIAL

## 2025-05-07 VITALS
SYSTOLIC BLOOD PRESSURE: 108 MMHG | TEMPERATURE: 97.8 F | HEART RATE: 79 BPM | DIASTOLIC BLOOD PRESSURE: 62 MMHG | RESPIRATION RATE: 18 BRPM | OXYGEN SATURATION: 95 %

## 2025-05-07 VITALS — WEIGHT: 179 LBS | BODY MASS INDEX: 24.27 KG/M2

## 2025-05-07 DIAGNOSIS — I48.91 ATRIAL FIBRILLATION, UNSPECIFIED TYPE (MULTI): Primary | ICD-10-CM

## 2025-05-07 DIAGNOSIS — I48.91 ATRIAL FIBRILLATION, UNSPECIFIED TYPE (MULTI): ICD-10-CM

## 2025-05-07 DIAGNOSIS — F51.01 PRIMARY INSOMNIA: ICD-10-CM

## 2025-05-07 DIAGNOSIS — J44.9 CHRONIC OBSTRUCTIVE PULMONARY DISEASE, UNSPECIFIED COPD TYPE (MULTI): ICD-10-CM

## 2025-05-07 DIAGNOSIS — I10 ESSENTIAL HYPERTENSION, BENIGN: ICD-10-CM

## 2025-05-07 DIAGNOSIS — R73.03 PREDIABETES: ICD-10-CM

## 2025-05-07 PROCEDURE — G0299 HHS/HOSPICE OF RN EA 15 MIN: HCPCS

## 2025-05-07 RX ORDER — ALBUTEROL SULFATE 90 UG/1
2 INHALANT RESPIRATORY (INHALATION) EVERY 4 HOURS PRN
Qty: 8 G | Refills: 11 | Status: SHIPPED | OUTPATIENT
Start: 2025-05-07 | End: 2026-05-07

## 2025-05-07 RX ORDER — FLUTICASONE FUROATE, UMECLIDINIUM BROMIDE AND VILANTEROL TRIFENATATE 100; 62.5; 25 UG/1; UG/1; UG/1
1 POWDER RESPIRATORY (INHALATION) DAILY
Qty: 60 EACH | Refills: 2 | Status: SHIPPED | OUTPATIENT
Start: 2025-05-07

## 2025-05-07 SDOH — HEALTH STABILITY: MENTAL HEALTH: SMOKING IN HOME: 0

## 2025-05-07 SDOH — ECONOMIC STABILITY: GENERAL

## 2025-05-07 SDOH — ECONOMIC STABILITY: HOUSING INSECURITY: EVIDENCE OF SMOKING MATERIAL: 0

## 2025-05-07 ASSESSMENT — ENCOUNTER SYMPTOMS
PERSON REPORTING PAIN: PATIENT
SHORTNESS OF BREATH: 1
DYSPNEA ACTIVITY LEVEL: AFTER AMBULATING 10 - 20 FT
APPETITE LEVEL: GOOD
DENIES PAIN: 1
CHANGE IN APPETITE: UNCHANGED
MUSCLE WEAKNESS: 1

## 2025-05-07 ASSESSMENT — ACTIVITIES OF DAILY LIVING (ADL)
AMBULATION ASSISTANCE: STAND BY ASSIST
CURRENT_FUNCTION: ONE PERSON
MONEY MANAGEMENT (EXPENSES/BILLS): INDEPENDENT

## 2025-05-07 NOTE — PROGRESS NOTES
[facility]: spoke to patient, he reports feeling pretty good, still a little tired but well. Reports not sleeping well, but not too new for him.   He saw PCP the other day and states everything looks good.   He has follow up with Pulm.   On 2L since seeing PCP.  Provider advised taking O2 off at rest during day. He states he has been doing that, room air during day and 2L HS.   Nikko reviewed, no lows.   States incision site is fine but itchy.   Cardiac surgery tomorrow, virtual.   Leg sites he states a little sore, but no problems.   Cough, but improving. Using inhalers and nebs 3x/day.  Reports dizziness a couple times. Didn't last long, didn't feel faint, just maybe walked too fast. States he is doing well and forgets to slow down.   PAP discussed for stiolto. He has Trelegy already and doesn't have medicare part D. Discussed maintenance inhaler.   Sleep, he states melatonin doesn't really work. Trazadone ordered.   Labs, he is going 5/8.  Reports weight. Denies swelling.   HC following. Dc'd PT yesterday.   Rescue kit in home.  Next [facility] scheduled.

## 2025-05-07 NOTE — PROGRESS NOTES
[facility]    Rafi Lisa Jr. is a 75 y.o. male was referred to Clinical Pharmacy Team to complete a post-discharge medication optimization and monitoring visit.  The patient was referred for multiple concerns while in [facility]. Today was our second visit.       Referring Provider: Kim Alfonso APRN*  PCP: Magno Babb MD - last visit: 5/1      Subjective   Allergies[1]    [facility] - [place], [place] - [facility] [facility]  [address] [address]  Phone: [phone] Fax: [phone]    [id number] - [place], [place] - [address] AT [address] [address]  Phone: [phone] Fax: [phone]     [facility]  [address]  [facility] [address]  Phone: [phone] Fax: [phone]    [id number] - [place], [place] - [address]  [address] [address]  Phone: [phone] Fax: [phone]     [facility]  [address]  [facility] [address]  Phone: [phone] Fax: [phone]     [facility]  [address]  [facility] [address]  Phone: [phone] Fax: [phone]      Medication System Management:  Affordability/Accessibility: no issues  Adherence/Organization: no concern      Social History     Social History Narrative    Not on file          HPI      Review of Systems        Objective     There were no vitals taken for this visit.   BP Readings from Last 4 Encounters:   05/06/25 127/82   04/30/25 114/60   04/27/25 108/67   04/08/25 132/78      There were no vitals filed for this visit.     LAB  Lab Results   Component Value Date    BILITOT 0.5 03/25/2025    CALCIUM 8.9 04/27/2025    CO2 32 04/27/2025    CL 95 (L) 04/27/2025    CREATININE 1.45 (H) 04/27/2025    GLUCOSE 120 (H) 04/27/2025    ALKPHOS 70 03/25/2025    K 4.2 04/27/2025    PROT 7.0 03/25/2025     (L) 04/27/2025    AST 28 03/25/2025    ALT 20  03/25/2025    BUN 53 (H) 04/27/2025    ANIONGAP 11 04/27/2025    MG 2.61 (H) 04/27/2025    PHOS 4.0 04/27/2025    ALBUMIN 3.7 04/27/2025     Lab Results   Component Value Date    TRIG 75 03/22/2025    CHOL 91 03/22/2025    LDLCALC 39 03/22/2025    HDL 36.6 03/22/2025     Lab Results   Component Value Date    HGBA1C 6.2 (H) 03/25/2025         Current Outpatient Medications   Medication Instructions    acetaminophen (TYLENOL) 650 mg, Every 6 hours PRN    amiodarone (Pacerone) 200 mg tablet Take 2 tablets (400 mg) by mouth 2 times a day for 3 days, THEN 1 tablet (200 mg) once daily.    aspirin 81 mg, oral, Daily    azithromycin (Zithromax) 500 mg tablet *FOR EMERGENCY USE ONLY*  Take 1 tablet (500mg) by mouth daily for 3 days    clopidogrel (PLAVIX) 75 mg, oral, Daily, To stop 4/17/25    coenzyme Q-10 100 mg, 2 times daily    ezetimibe (ZETIA) 10 mg, oral, Daily    furosemide (LASIX) 20 mg, oral, Daily    gabapentin (NEURONTIN) 100 mg, oral, 3 times daily    ipratropium-albuteroL (Duo-Neb) 0.5-2.5 mg/3 mL nebulizer solution 3 mL, nebulization, Every 6 hours PRN    iron polysaccharides (NU-IRON,NIFEREX) 150 mg, oral, Daily    lisinopril 20 mg, oral, Daily, Stop 4/19/25    methocarbamol (ROBAXIN) 500 mg, oral, Every 8 hours scheduled    metoprolol succinate XL (TOPROL-XL) 50 mg, oral, Daily    multivitamin with minerals iron-free (Centrum Silver) 1 tablet, oral, Daily    omeprazole (PRILOSEC) 40 mg, Daily before breakfast    oxygen (O2) 3 L/min, inhalation, Continuous    predniSONE (Deltasone) 20 mg tablet *FOR EMERGENCY USE ONLY*  Take 2 tablets (40mg) by mouth once daily for 5 days    probenecid-colchicine 500-0.5 mg tablet 1 tablet, 2 times daily    rosuvastatin (CRESTOR) 40 mg, oral, Daily    TURMERIC ORAL 1,500 mg, 2 times daily          Assessment/Plan   Problem List Items Addressed This Visit       Atrial fibrillation (Multi)    COPD (chronic obstructive pulmonary disease) (Multi)    Prediabetes     -admitted to   [facility] for planned CABG x3  -doing pretty well other than not being able to sleep well  -saw PCP on 5/1 --> told patient to start weaning O2 and follow up with pulm; no med changes made   -he is now down to 2L NC just at night (from 3L continuously)  -on Masimo --> spO2 maintaining in the 90s   -incision and graft sites healing well  -only complaint he has currently is the nerve pain in his shoulders   -using nebulizer 2-3x per day and albuterol HFA in between if needed  -he reports he was on Trelegy in the past through his wife's insurance  -no longer smoking     Weight 179 lbs     Medications Changes/Concerns:  Afib  Continue amiodarone 400 mg BID x 3 days followed by 200 mg daily   S/p watchman - previously on Eliquis   CAD   Continue DAPT with aspirin 81 mg daily + plavix 75 mg daily   Continue ezetimibe 10 mg daily and rosuvastatin 40 mg daily   Continue lisinopril 20 mg daily and metoprolol succinate ER 50 mg daily   Given hold parameters for SBP <100 or DBP <60   Also on Lasix 20 mg daily   COPD   Patient was previously on Trelegy in the past --> will restart Trelegy 100-62.5-25 mcg 1 puff once daily (triple maintenance therapy)  Discussed that since he does not have Part D coverage, we would have to apply to  program if needed however he reports his joint income is over the income limit for most programs   Has Duonebs PRN and albuterol HFA PRN rescue   Rescue kit delivered 5/2    -start 5 day trial of trazodone 50 mg nightly PRN for sleep    Refills Needed:  -none needed today       Plan/To Do:  -continue to wean oxygen as long as spO2 remains >92%  -appt with cardiac surgery tomorrow, 5/8  -send Trelegy and albuterol through [facility]   -continue to log daily vitals   -nursing will continue daily calls       PAP Status:  -n/a   -on all generic medications currently       Time Spent with Patient and [facility] Team - Kim Alfonos NP and Rena RN via phone call: >30 minutes     Follow Up: 1  week    Continue all meds under the continuation of care with the referring provider and clinical pharmacy team.    Wing Palencia, PharmD     Verbal consent to manage patient's drug therapy was obtained from the patient. They were informed they may decline to participate or withdraw from participation in pharmacy services at any time.           [1]   Allergies  Allergen Reactions    Sulfa (Sulfonamide Antibiotics) Hives     Stated at age 13    Adhesive Tape-Silicones Rash

## 2025-05-08 ENCOUNTER — PATIENT OUTREACH (OUTPATIENT)
Dept: CARE COORDINATION | Age: 75
End: 2025-05-08
Payer: COMMERCIAL

## 2025-05-08 ENCOUNTER — TELEMEDICINE CLINICAL SUPPORT (OUTPATIENT)
Dept: CARDIAC SURGERY | Facility: CLINIC | Age: 75
End: 2025-05-08
Payer: COMMERCIAL

## 2025-05-08 VITALS — BODY MASS INDEX: 24.27 KG/M2 | WEIGHT: 179 LBS

## 2025-05-08 RX ORDER — TRAZODONE HYDROCHLORIDE 50 MG/1
50 TABLET ORAL NIGHTLY PRN
Qty: 5 TABLET | Refills: 0 | Status: SHIPPED | OUTPATIENT
Start: 2025-05-08 | End: 2025-05-13

## 2025-05-08 NOTE — PROGRESS NOTES
Daily Call Note:     Daily call completed with the patient  States he is doing okay  He is getting labs drawn 5/10  Provided pt the hours and address  He didn't check his BP yet  His wt.  179 lbs  No add'l questions or concerns during the call     Pt Education:   Barriers:   Topics for Daily Review:  Pt demonstrates clear understanding:     Daily Weight:  Vitals:    05/08/25 0806   Weight: 81.2 kg (179 lb)      Last 3 Weights:  Wt Readings from Last 7 Encounters:   05/08/25 81.2 kg (179 lb)   05/07/25 81.2 kg (179 lb)   05/06/25 80.5 kg (177 lb 6.4 oz)   04/27/25 82.2 kg (181 lb 3.2 oz)   04/08/25 80 kg (176 lb 5.9 oz)   04/02/25 81.9 kg (180 lb 9.6 oz)   03/21/25 80.7 kg (178 lb)       Masimo Device:    Masimo Clinical Impression:     Virtual Visits--Scheduled (Most Recent Date at Top)  Follow up Appointments  Recent Visits  No visits were found meeting these conditions.  Showing recent visits within past 30 days and meeting all other requirements  Future Appointments  No visits were found meeting these conditions.  Showing future appointments within next 90 days and meeting all other requirements       Frequency of RN Calls & Virtual Visits per Team Agreement:     Medication issues Addressed (what was done):     Follow up appointments scheduled by [facility] Staff:   Referrals made by [facility] staff:

## 2025-05-09 ENCOUNTER — PATIENT OUTREACH (OUTPATIENT)
Dept: CARE COORDINATION | Age: 75
End: 2025-05-09
Payer: COMMERCIAL

## 2025-05-09 VITALS
SYSTOLIC BLOOD PRESSURE: 114 MMHG | HEART RATE: 70 BPM | BODY MASS INDEX: 24.19 KG/M2 | DIASTOLIC BLOOD PRESSURE: 73 MMHG | OXYGEN SATURATION: 97 % | WEIGHT: 178.4 LBS

## 2025-05-09 NOTE — PROGRESS NOTES
Daily Call Note:     0805 - Daily call completed with pt.  He states he is feeling good today.  Had virtual visit with cardiologist's nurse, Bee, yesterday - states that the visit went well; he still can't drive but is permitted to go out as long as he wears a mask.  Pt states that the positive visit was such a relief that he slept all afternoon yesterday.  Denies pain and SOB.   States that his incision is clean and dry.  /73   Pulse 70   Wt 80.9 kg (178 lb 6.4 oz)   SpO2 97%   BMI 24.19 kg/m²   He has no questions or concerns.  Reminded pt of next [facility] 5/14 at 0800.    Pt Education: POC  Barriers: none  Topics for Daily Review: daily call; feels good; healing  Pt demonstrates clear understanding: Yes    Daily Weight:  Vitals:    05/09/25 0811   Weight: 80.9 kg (178 lb 6.4 oz)      Last 3 Weights:  Wt Readings from Last 7 Encounters:   05/09/25 80.9 kg (178 lb 6.4 oz)   05/08/25 81.2 kg (179 lb)   05/07/25 81.2 kg (179 lb)   05/06/25 80.5 kg (177 lb 6.4 oz)   04/27/25 82.2 kg (181 lb 3.2 oz)   04/08/25 80 kg (176 lb 5.9 oz)   04/02/25 81.9 kg (180 lb 9.6 oz)       Masimo Device: Yes   Masimo Clinical Impression: WNL; using O2 only at HS    Virtual Visits--Scheduled (Most Recent Date at Top)  Follow up Appointments  Recent Visits  No visits were found meeting these conditions.  Showing recent visits within past 30 days and meeting all other requirements  Future Appointments  No visits were found meeting these conditions.  Showing future appointments within next 90 days and meeting all other requirements       Frequency of RN Calls & Virtual Visits per Team Agreement: Healthy at Home Frequency: Daily    Medication issues Addressed (what was done): none    Follow up appointments scheduled by [facility] Staff: none  Referrals made by [facility] staff: none

## 2025-05-09 NOTE — PROGRESS NOTES
4524 - Outgoing call to the pt as Nikko was not transmitting.  Trouble shooting completed.  Appears that the battery is dead - he will replace.

## 2025-05-09 NOTE — PROGRESS NOTES
Department of Medicine  Division of Pulmonary, Critical Care, and Sleep Medicine  Consultation  [facility] - [address], [address]    I was asked by Head, MARY Grover, to evaluate Mr. Rafi BEGUM Sloan Adhikari for dyspnea. I have independently interviewed and examined the patient in the office and reviewed available records.    Physician HPI:  The patient is a 75-year-old male who presents today to establish care in our pulmonology office referred by his primary care physician for shortness of breath.  The patient underwent pulmonary function testing in March 2025 which showed an FEV1 to FVC ratio of 52% of predicted.  This demonstrates significant airway obstruction.  His FEV1 postbronchodilator was 2.25 L.  His prebronchodilator FEV1 was 1.87 L.  This demonstrates rather remarkable improvement in FEV1 after bronchodilator administration.  Of note, body plethysmography was not performed during the PFT.    The patient underwent a CT scan in March 2025 which showed severe centrilobular emphysema in the upper lobes along with the development of parenchymal destruction throughout the course of the lung and developing bibasilar fibrotic changes.  There is no evidence of traction bronchiectasis.  Reticular opacities noted. RLL posterior subpleural density.     The density appears to be groundglass.  We will send notify lung nodule testing.  It is approximately 1.1 cm.  The patient may need a PET scan if any B symptoms.        Review of Systems   All other review of systems are negative and/or non-contributory.      Immunizations:  Immunization History   Administered Date(s) Administered    Flu vaccine, trivalent, preservative free, HIGH-DOSE, age 65y+ (Fluzone) 09/08/2020, 09/04/2022    Moderna COVID-19 vaccine, 12 years and older (50mcg/0.5mL)(Spikevax) 03/20/2025    Moderna SARS-CoV-2 Vaccination 02/26/2021, 03/26/2021, 10/30/2021, 04/03/2022    Pneumococcal conjugate vaccine, 13-valent (PREVNAR 13) 09/05/2021     SARS-CoV-2, Unspecified 09/30/2022, 09/29/2023       Past Medical History:  Medical History[1]    Past Surgical History:  Surgical History[2]    Family History:  Family History[3]    Social History:  Social History[4]     Occupational & Environmental History:       Medications:  Current Outpatient Medications   Medication Instructions    acetaminophen (TYLENOL) 650 mg, Every 6 hours PRN    albuterol (Ventolin HFA) 90 mcg/actuation inhaler 2 puffs, inhalation, Every 4 hours PRN    aspirin 81 mg, oral, Daily    clopidogrel (PLAVIX) 75 mg, oral, Daily    coenzyme Q-10 100 mg, 2 times daily    ezetimibe (ZETIA) 10 mg, oral, Daily    fluticasone-umeclidin-vilanter (Trelegy Ellipta) 100-62.5-25 mcg blister with device 1 puff, inhalation, Daily    ipratropium-albuteroL (Duo-Neb) 0.5-2.5 mg/3 mL nebulizer solution 3 mL, nebulization, Every 6 hours PRN    lisinopril 20 mg, oral, Daily, Stop 4/19/25    metoprolol succinate XL (TOPROL-XL) 50 mg, oral, Daily    multivitamin with minerals iron-free (Centrum Silver) 1 tablet, Daily    omeprazole (PRILOSEC) 40 mg, Daily before breakfast    oxygen (O2) 3 L/min, inhalation, Continuous    predniSONE (Deltasone) 20 mg tablet *FOR EMERGENCY USE ONLY*  Take 2 tablets (40mg) by mouth once daily for 5 days    probenecid-colchicine 500-0.5 mg tablet 1 tablet, 2 times daily    rosuvastatin (CRESTOR) 40 mg, oral, Daily    traZODone (DESYREL) 50 mg, oral, Nightly PRN    TURMERIC ORAL 1,500 mg, 2 times daily        Drug Allergies/Intolerances:  RX Allergies[5]         Visit Vitals  Smoking Status Former        Physical Exam     Pulmonary Function Test Results       Chest Radiograph     XR chest 2 views 05/20/2025    Narrative  Interpreted By:  Uli Castillo,  STUDY:  XR CHEST 2 VIEWS;  5/20/2025 2:26 pm    INDICATION:  Signs/Symptoms:S/P CABg X3.    ,Z95.1 Presence of aortocoronary bypass graft    COMPARISON:  04/27/2025    ACCESSION NUMBER(S):  [id number]    ORDERING CLINICIAN:  CLARA  "RUSHING    FINDINGS:          CARDIOMEDIASTINAL SILHOUETTE:  Cardiomediastinal silhouette is normal in size and configuration.    LUNGS:  There is mild bibasilar atelectatic changes. There is no  consolidation or effusion.    ABDOMEN:  There is a large hiatal hernia. Partially visualized distended large  bowel loops in the abdomen    BONES:  No acute osseous changes.    Impression  1.  Mild bibasilar atelectasis  2. Large hiatal hernia.  3. Distended large bowel loops in the upper abdomen. Consider  dedicated views of the entire abdomen for complete evaluation        MACRO:  None    Signed by: Uli Castillo 5/21/2025 6:38 PM  Dictation workstation:   [id number]      Echocardiogram     No results found for this or any previous visit from the past 365 days.       Chest CT Scan     No results found for this or any previous visit from the past 365 days.       Laboratory Studies     Lab Results   Component Value Date    WBC 5.8 04/27/2025    HGB 8.8 (L) 04/27/2025    HCT 27.5 (L) 04/27/2025    MCV 96 04/27/2025     04/27/2025      Lab Results   Component Value Date    GLUCOSE 120 (H) 04/27/2025    CALCIUM 8.9 04/27/2025     (L) 04/27/2025    K 4.2 04/27/2025    CO2 32 04/27/2025    CL 95 (L) 04/27/2025    BUN 53 (H) 04/27/2025    CREATININE 1.45 (H) 04/27/2025      Lab Results   Component Value Date    ALT 20 03/25/2025    AST 28 03/25/2025    ALKPHOS 70 03/25/2025    BILITOT 0.5 03/25/2025        Protime   Date/Time Value Ref Range Status   04/22/2025 02:20 PM 12.7 (H) 9.8 - 12.4 seconds Final     INR   Date/Time Value Ref Range Status   04/22/2025 02:20 PM 1.1 0.9 - 1.1 Final       No results found for: \"ICIGE\", \"IGE\", \"ICA04\", \"ASPFU\", \"IGG\", \"IGA\", \"IGM\"    Sputum Culture     No results found for: \"AFBCX\"   No results found for: \"RESPCULTCYFI\"    Susceptibility data from last 90 days.  Collected Specimen Info Organism   04/08/25 Swab from Anterior Nares Methicillin Susceptible Staphylococcus aureus " ([facility])           Assessment and Plan / Recommendations     Problem List and Orders  Problem List Items Addressed This Visit           ICD-10-CM    COPD (chronic obstructive pulmonary disease) (Multi) - Primary J44.9     Other Visit Diagnoses         Codes      S/P CABG x 3     Z95.1      Chronic cough     R05.3      Pulmonary fibrosis (Multi)     J84.10            Assessment and Plan / Recommendations:  Problem List Items Addressed This Visit    None      -Nodify lung nodule testing  -C/w current inhaler therapy  -Patient is wearing 2 LPM at night  -Has nebulizer at home and using Trelegy  -Plan for repeat CT without contrast, high resolution in Sept  -F/U end of September      Yunior Miranda DO  06/02/2025         [1]   Past Medical History:  Diagnosis Date    Abnormal findings on diagnostic imaging of other specified body structures     Abnormal CT of the chest    Arthritis     COPD (chronic obstructive pulmonary disease) (Multi)     Coronary artery disease     GERD (gastroesophageal reflux disease)     HL (hearing loss)     Hyperlipidemia     Hypertension     Joint pain     Nephrolithiasis     Personal history of other medical treatment     History of echocardiogram    Personal history of other medical treatment     History of nuclear stress test   [2]   Past Surgical History:  Procedure Laterality Date    BUNIONECTOMY      CARDIAC CATHETERIZATION N/A 03/24/2025    Procedure: Left Heart Cath;  Surgeon: Tony Oneill MD;  Location: [facility];  Service: Cardiovascular;  Laterality: N/A;    COLONOSCOPY      CORONARY ARTERY BYPASS GRAFT      CYSTOSCOPY      LASIK      LITHOTRIPSY      MR HEAD ANGIO WO IV CONTRAST  12/17/2023    MR HEAD ANGIO WO IV CONTRAST 12/17/2023 [facility]    MR HEAD ANGIO WO IV CONTRAST  12/18/2023    MR HEAD ANGIO WO IV CONTRAST    MR NECK ANGIO WO IV CONTRAST  12/17/2023    MR NECK ANGIO WO IV CONTRAST 12/17/2023 [facility]    MR NECK ANGIO WO IV CONTRAST  12/18/2023    MR NECK  ANGIO WO IV CONTRAST    ORIF WRIST FRACTURE      OTHER SURGICAL HISTORY  2020    Radiofrequency ablation    OTHER SURGICAL HISTORY  2022    Cardiac catheterization with stent placement    OTHER SURGICAL HISTORY  2022    Cardiac catheterization    OTHER SURGICAL HISTORY  2020    Atrial appendage closure    OTHER SURGICAL HISTORY  2020    Esophagogastroduodenoscopy    OTHER SURGICAL HISTORY  2020    Colonoscopy    OTHER SURGICAL HISTORY  2020    Cardiac catheterization with stent placement    OTHER SURGICAL HISTORY  2020    Tonsillectomy    OTHER SURGICAL HISTORY  2020    Bunionectomy    OTHER SURGICAL HISTORY  2020    Lithotripsy    TONSILLECTOMY      UPPER GASTROINTESTINAL ENDOSCOPY     [3]   Family History  Problem Relation Name Age of Onset    No Known Problems Mother      Other (coronary thrombosis) Father           in 1970s @63   [4]   Social History  Tobacco Use    Smoking status: Former     Current packs/day: 1.00     Average packs/day: 1 pack/day for 64.0 years (64.0 ttl pk-yrs)     Types: Cigarettes    Smokeless tobacco: Former   Substance Use Topics    Alcohol use: Never     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

## 2025-05-09 NOTE — PROGRESS NOTES
Virtual visit with Rafi Lisa Jr. this morning, wife present for visit.  Pt states that he's progressing well at home.  He denies chest discomfort, dyspnea, palpitations, orthopnea.     S/p CABG x 3 4/22 with Dr. Kline.      ROS:     Tristin: On interview, patient is alert, conversant, in no acute distress, appears to be in good spirits.   Respiratory: denies sob. He states that he uses oxygen only at night as needed. He has an appt with Pulmonologist in the next couple of weeks.   Surgical incision: KARTHIK, well approximated, C/D/I. Sternal stability, denies pain, MITT. Denies fever, chills or night sweats.   Medications: Taking all his medications as prescribed.   Homecare: weekly visits thus far.   Physical activity: pt states that his walking longer distances weather permitting.   On average: 125/70, HR73, wt is stable: His edema has resolved with furosemide.  Questions: Discussed cardiac rehab, driving privileges and dietary guidelines.     Patient denies any concern at this time.     Patient encouraged to call me with any questions/concerns or if needs arise. Patient verbalized understanding.         Bee Flores RN

## 2025-05-10 ENCOUNTER — PATIENT OUTREACH (OUTPATIENT)
Dept: CARE COORDINATION | Age: 75
End: 2025-05-10
Payer: COMMERCIAL

## 2025-05-10 ENCOUNTER — LAB (OUTPATIENT)
Dept: LAB | Facility: HOSPITAL | Age: 75
End: 2025-05-10
Payer: COMMERCIAL

## 2025-05-10 VITALS
SYSTOLIC BLOOD PRESSURE: 111 MMHG | RESPIRATION RATE: 25 BRPM | DIASTOLIC BLOOD PRESSURE: 75 MMHG | OXYGEN SATURATION: 99 % | HEART RATE: 77 BPM

## 2025-05-10 DIAGNOSIS — Z01.818 ENCOUNTER FOR OTHER PREPROCEDURAL EXAMINATION: Primary | ICD-10-CM

## 2025-05-10 NOTE — PROGRESS NOTES
Daily Call Note: Daily call completed - the patient stated that he is doing very well and has no issues to report. He does not have chest pain or discomfort. He does not have peripheral edema. He uses supplemental oxygen 2L/NC at night and is on room air during the day. His current SpO2 is s 97% on RA. He experiences SOB with over exertion but it is relieved by resting for several minutes. He does not have a cough, and has not needed to use a rescue inhaler. He has not yet received the Albuterol and Trelegy ordered on 5/7 which will be sent via mail. He was educated on Amiodarone and metoprolol - what they are used for and potential adverse effects and what to report to MD - he was advised to check his BP if he experienced dizziness, weakness, imbalance, etc and to report to providers.     Pt demonstrates clear understanding: Yes    Daily Weight:  There were no vitals filed for this visit.   Last 3 Weights:  Wt Readings from Last 7 Encounters:   05/09/25 80.9 kg (178 lb 6.4 oz)   05/08/25 81.2 kg (179 lb)   05/07/25 81.2 kg (179 lb)   05/06/25 80.5 kg (177 lb 6.4 oz)   04/27/25 82.2 kg (181 lb 3.2 oz)   04/08/25 80 kg (176 lb 5.9 oz)   04/02/25 81.9 kg (180 lb 9.6 oz)       Virtual Visits--Scheduled (Most Recent Date at Top)  Follow up Appointments  Recent Visits  No visits were found meeting these conditions.  Showing recent visits within past 30 days and meeting all other requirements  Future Appointments  No visits were found meeting these conditions.  Showing future appointments within next 90 days and meeting all other requirements       Frequency of RN Calls & Virtual Visits per Team Agreement: Healthy at Home Frequency: Daily    Medication issues Addressed (what was done): understanding medication adverse effects

## 2025-05-10 NOTE — PROGRESS NOTES
Daily Call Note:     Patient states he is doing well today.     /75  HR 77   R 25  Oxygen 99  room air -Masimo Monitoring     Patient states he is only on oxygen as needed.      Labs obtained labs today.     Patient has no questions, concerns, or needs at this time.          Pt Education:   Barriers:   Topics for Daily Review:   Pt demonstrates clear understanding:     Daily Weight:  There were no vitals filed for this visit.   Last 3 Weights:  Wt Readings from Last 7 Encounters:   05/09/25 80.9 kg (178 lb 6.4 oz)   05/08/25 81.2 kg (179 lb)   05/07/25 81.2 kg (179 lb)   05/06/25 80.5 kg (177 lb 6.4 oz)   04/27/25 82.2 kg (181 lb 3.2 oz)   04/08/25 80 kg (176 lb 5.9 oz)   04/02/25 81.9 kg (180 lb 9.6 oz)       Masimo Device:    Masimo Clinical Impression:     Virtual Visits--Scheduled (Most Recent Date at Top)  Follow up Appointments  Recent Visits  No visits were found meeting these conditions.  Showing recent visits within past 30 days and meeting all other requirements  Future Appointments  No visits were found meeting these conditions.  Showing future appointments within next 90 days and meeting all other requirements       Frequency of RN Calls & Virtual Visits per Team Agreement:     Medication issues Addressed (what was done):     Follow up appointments scheduled by [facility] Staff:   Referrals made by [facility] staff:

## 2025-05-11 ENCOUNTER — PATIENT OUTREACH (OUTPATIENT)
Dept: CARE COORDINATION | Age: 75
End: 2025-05-11
Payer: COMMERCIAL

## 2025-05-11 NOTE — PROGRESS NOTES
0338- Patient Maso data not transmitting. Phoned patient. Will attempt to troubleshoot on cell phone but asked to end call for now. Will call back in 5-10 minutes if vital signs do not return to monitor.     0342- vitals returned to Masimo monitor.

## 2025-05-12 ENCOUNTER — PATIENT OUTREACH (OUTPATIENT)
Dept: CARE COORDINATION | Age: 75
End: 2025-05-12
Payer: COMMERCIAL

## 2025-05-12 VITALS — BODY MASS INDEX: 24 KG/M2 | WEIGHT: 177 LBS | SYSTOLIC BLOOD PRESSURE: 148 MMHG | DIASTOLIC BLOOD PRESSURE: 85 MMHG

## 2025-05-12 NOTE — PROGRESS NOTES
Daily Call Note:   Feel pretty good today  2l nc worn hs  Room air currently  Occ cough with no sputum  Bp 148/85 0542 will retake  177 lbs  One week of 200 mg amiodarone left, will need a refill  Ran out of 50 mg metoprolol succinate xl  but currently taking one half of a 100 mg  metoprolol succinate that he had until he can get a refill  Pt denies any pain or needs at this time.  Pt encouraged to call in for any needs    Pt Education: yes bp goals and when to retake bp    Barriers:no no  Topics for Daily Review: medications  Pt demonstrates clear understanding: Yes    Daily Weight:  There were no vitals filed for this visit.   Last 3 Weights:  Wt Readings from Last 7 Encounters:   05/09/25 80.9 kg (178 lb 6.4 oz)   05/08/25 81.2 kg (179 lb)   05/07/25 81.2 kg (179 lb)   05/06/25 80.5 kg (177 lb 6.4 oz)   04/27/25 82.2 kg (181 lb 3.2 oz)   04/08/25 80 kg (176 lb 5.9 oz)   04/02/25 81.9 kg (180 lb 9.6 oz)       Masimo Device: Yes   Masimo Clinical Impression:     Virtual Visits--Scheduled (Most Recent Date at Top)  Follow up Appointments  Recent Visits  No visits were found meeting these conditions.  Showing recent visits within past 30 days and meeting all other requirements  Future Appointments  No visits were found meeting these conditions.  Showing future appointments within next 90 days and meeting all other requirements       Frequency of RN Calls & Virtual Visits per Team Agreement: Healthy at Home Frequency: Daily    Medication issues Addressed (what was done): pt needs refill for amiodarone    Follow up appointments scheduled by [facility] Staff: n  Referrals made by [facility] staff: jeremias

## 2025-05-13 ENCOUNTER — PATIENT OUTREACH (OUTPATIENT)
Dept: CARE COORDINATION | Age: 75
End: 2025-05-13
Payer: COMMERCIAL

## 2025-05-13 VITALS
RESPIRATION RATE: 21 BRPM | DIASTOLIC BLOOD PRESSURE: 83 MMHG | OXYGEN SATURATION: 98 % | SYSTOLIC BLOOD PRESSURE: 129 MMHG | HEART RATE: 73 BPM

## 2025-05-13 DIAGNOSIS — I48.91 ATRIAL FIBRILLATION, UNSPECIFIED TYPE (MULTI): Primary | ICD-10-CM

## 2025-05-13 DIAGNOSIS — I10 ESSENTIAL HYPERTENSION, BENIGN: ICD-10-CM

## 2025-05-13 DIAGNOSIS — J44.9 CHRONIC OBSTRUCTIVE PULMONARY DISEASE, UNSPECIFIED COPD TYPE (MULTI): ICD-10-CM

## 2025-05-13 RX ORDER — IBUPROFEN 200 MG
1 TABLET ORAL
COMMUNITY
Start: 2025-05-01 | End: 2025-05-31

## 2025-05-13 NOTE — PROGRESS NOTES
[place] not transmitting.  Spoke w pt.  No concerns voiced.  Pt is out of [place] wristband.  Will inform oncoming [facility] shift.

## 2025-05-13 NOTE — PROGRESS NOTES
Daily call completed patient states that he is doing OK still having some SOB with exertion but otherwise OK He is on Masimo Currently HR 77 Resp 21 Pox 98 % BP before medications today 129/83 [facility] tomorrow @ 0800 no medication  needs at this time questions and concerns addressed

## 2025-05-14 ENCOUNTER — APPOINTMENT (OUTPATIENT)
Dept: PHARMACY | Facility: HOSPITAL | Age: 75
End: 2025-05-14
Payer: COMMERCIAL

## 2025-05-14 ENCOUNTER — PATIENT OUTREACH (OUTPATIENT)
Dept: CARE COORDINATION | Age: 75
End: 2025-05-14

## 2025-05-14 ENCOUNTER — HOME CARE VISIT (OUTPATIENT)
Dept: HOME HEALTH SERVICES | Facility: HOME HEALTH | Age: 75
End: 2025-05-14
Payer: COMMERCIAL

## 2025-05-14 ENCOUNTER — APPOINTMENT (OUTPATIENT)
Dept: CARE COORDINATION | Age: 75
End: 2025-05-14
Payer: COMMERCIAL

## 2025-05-14 VITALS
OXYGEN SATURATION: 95 % | BODY MASS INDEX: 24.19 KG/M2 | HEART RATE: 82 BPM | WEIGHT: 178.4 LBS | DIASTOLIC BLOOD PRESSURE: 83 MMHG | SYSTOLIC BLOOD PRESSURE: 144 MMHG

## 2025-05-14 DIAGNOSIS — I48.91 ATRIAL FIBRILLATION, UNSPECIFIED TYPE (MULTI): ICD-10-CM

## 2025-05-14 DIAGNOSIS — R73.03 PREDIABETES: ICD-10-CM

## 2025-05-14 DIAGNOSIS — I10 ESSENTIAL HYPERTENSION, BENIGN: ICD-10-CM

## 2025-05-14 DIAGNOSIS — J44.9 CHRONIC OBSTRUCTIVE PULMONARY DISEASE, UNSPECIFIED COPD TYPE (MULTI): ICD-10-CM

## 2025-05-14 DIAGNOSIS — I48.20 CHRONIC ATRIAL FIBRILLATION (MULTI): ICD-10-CM

## 2025-05-14 PROCEDURE — G0299 HHS/HOSPICE OF RN EA 15 MIN: HCPCS

## 2025-05-14 RX ORDER — METOPROLOL SUCCINATE 50 MG/1
50 TABLET, EXTENDED RELEASE ORAL DAILY
Qty: 90 TABLET | Refills: 3 | Status: SHIPPED | OUTPATIENT
Start: 2025-05-14

## 2025-05-14 NOTE — PROGRESS NOTES
[facility]    Rafi Lisa Jr. is a 75 y.o. male was referred to Clinical Pharmacy Team to complete a post-discharge medication optimization and monitoring visit.  The patient was referred for multiple concerns while in [facility]. Today was our third visit.       Referring Provider: Kim Alfonso APRN*  PCP: Magno Babb MD - last visit: 5/1      Subjective   Allergies[1]    [facility] - [place], [place] - [address] [address] [facility]  [address] [address]  Phone: [phone] Fax: [phone]    [id number] - [place], [place] - [address] AT [address] [address]  Phone: [phone] Fax: [phone]     [facility]  [address]  [facility] [address]  Phone: [phone] Fax: [phone]    [id number] - [place], [place] - [address]  [address] [address]  Phone: [phone] Fax: [phone]    [facility]  [address]  [facility] [address]  Phone: [phone] Fax: [phone]     [facility]  [address]  [facility] [address]  Phone: [phone] Fax: [phone]      Medication System Management:  Affordability/Accessibility: no issues  Adherence/Organization: no concern      Social History     Social History Narrative    Not on file          HPI      Review of Systems        Objective     There were no vitals taken for this visit.   BP Readings from Last 4 Encounters:   05/13/25 129/83   05/12/25 148/85   05/10/25 111/75   05/09/25 114/73      There were no vitals filed for this visit.     LAB  Lab Results   Component Value Date    BILITOT 0.5 03/25/2025    CALCIUM 8.9 04/27/2025    CO2 32 04/27/2025    CL 95 (L) 04/27/2025    CREATININE 1.45 (H) 04/27/2025    GLUCOSE 120 (H) 04/27/2025    ALKPHOS 70 03/25/2025    K 4.2 04/27/2025    PROT 7.0 03/25/2025     (L) 04/27/2025    AST 28 03/25/2025    ALT 20  03/25/2025    BUN 53 (H) 04/27/2025    ANIONGAP 11 04/27/2025    MG 2.61 (H) 04/27/2025    PHOS 4.0 04/27/2025    ALBUMIN 3.7 04/27/2025     Lab Results   Component Value Date    TRIG 75 03/22/2025    CHOL 91 03/22/2025    LDLCALC 39 03/22/2025    HDL 36.6 03/22/2025     Lab Results   Component Value Date    HGBA1C 6.2 (H) 03/25/2025         Current Outpatient Medications   Medication Instructions    acetaminophen (TYLENOL) 650 mg, Every 6 hours PRN    albuterol (Ventolin HFA) 90 mcg/actuation inhaler 2 puffs, inhalation, Every 4 hours PRN    amiodarone (Pacerone) 200 mg tablet Take 2 tablets (400 mg) by mouth 2 times a day for 3 days, THEN 1 tablet (200 mg) once daily.    aspirin 81 mg, oral, Daily    azithromycin (Zithromax) 500 mg tablet *FOR EMERGENCY USE ONLY*  Take 1 tablet (500mg) by mouth daily for 3 days    clopidogrel (PLAVIX) 75 mg, oral, Daily, To stop 4/17/25    coenzyme Q-10 100 mg, 2 times daily    ezetimibe (ZETIA) 10 mg, oral, Daily    fluticasone-umeclidin-vilanter (Trelegy Ellipta) 100-62.5-25 mcg blister with device 1 puff, inhalation, Daily    furosemide (LASIX) 20 mg, oral, Daily    gabapentin (NEURONTIN) 100 mg, oral, 3 times daily    ipratropium-albuteroL (Duo-Neb) 0.5-2.5 mg/3 mL nebulizer solution 3 mL, nebulization, Every 6 hours PRN    iron polysaccharides (NU-IRON,NIFEREX) 150 mg, oral, Daily    lisinopril 20 mg, oral, Daily, Stop 4/19/25    methocarbamol (ROBAXIN) 500 mg, oral, Every 8 hours scheduled    metoprolol succinate XL (TOPROL-XL) 50 mg, oral, Daily    multivitamin with minerals iron-free (Centrum Silver) 1 tablet, oral, Daily    nicotine (Nicoderm CQ) 21 mg/24 hr patch 1 patch, Daily RT    omeprazole (PRILOSEC) 40 mg, Daily before breakfast    oxygen (O2) 3 L/min, inhalation, Continuous    predniSONE (Deltasone) 20 mg tablet *FOR EMERGENCY USE ONLY*  Take 2 tablets (40mg) by mouth once daily for 5 days    probenecid-colchicine 500-0.5 mg tablet 1 tablet, 2 times daily     rosuvastatin (CRESTOR) 40 mg, oral, Daily    traZODone (DESYREL) 50 mg, oral, Nightly PRN    TURMERIC ORAL 1,500 mg, 2 times daily          Assessment/Plan   Problem List Items Addressed This Visit       Atrial fibrillation (Multi)    COPD (chronic obstructive pulmonary disease) (Multi)    Essential hypertension, benign    Prediabetes     -doing pretty well today   -pain is continuing to improve overall   -wearing 2L NC mostly just at night (sometimes puts it on during the day with activity)  -using nebulizer 2-3x per day   -no longer on Masimo   -incision and graft sites healing well  -denies dizziness or palpitations   -confirmed he received both Trelegy and albuterol HFA but has not started either   -no longer smoking     /83  HR 82  SpO2 95% (room air)  Weight 178.4 lbs     Medications Changes/Concerns:  Afib  Continue amiodarone 200 mg daily   S/p watchman - previously on Eliquis   CAD   Continue DAPT with aspirin 81 mg daily + plavix 75 mg daily   Continue ezetimibe 10 mg daily and rosuvastatin 40 mg daily   Continue lisinopril 20 mg daily and metoprolol succinate ER 50 mg daily   Given hold parameters for SBP <100 or DBP <60   Also on Lasix 20 mg daily   COPD   Patient was previously on Trelegy in the past --> will restart Trelegy 100-62.5-25 mcg 1 puff once daily (triple maintenance therapy)  Discussed that since he does not have Part D coverage, we would have to apply to  program if needed however he reports his joint income is over the income limit for most programs   Has Duonebs PRN and albuterol HFA PRN rescue   Rescue kit delivered 5/2      Refills Needed:  -metoprolol       Plan/To Do:  -continue to wean oxygen as long as spO2 remains > 92%   -appt with cardiac surgery 5/20  -will send medic to  masimo   -continue to log daily vitals   -nursing will decrease calls to Wed/Fri       PAP Status:  -n/a, not needed       Time Spent with Patient and [facility] Team - Kim Alfonso NP  and Hoa BEGUM RN via phone call: 15 minutes     Follow Up: 1 week     Continue all meds under the continuation of care with the referring provider and clinical pharmacy team.    Wing Palencia, PharmD     Verbal consent to manage patient's drug therapy was obtained from the patient. They were informed they may decline to participate or withdraw from participation in pharmacy services at any time.           [1]   Allergies  Allergen Reactions    Sulfa (Sulfonamide Antibiotics) Hives     Stated at age 13    Adhesive Tape-Silicones Rash

## 2025-05-14 NOTE — PROGRESS NOTES
Daily Call Note:     [facility] call with the patient, America Wing, and the Nurse  Pt states he feels pretty good  Still has some aches and pains primarily at night  States his breathing has been good  Pt uses oxygen PRN during the day,  and puts it on at night  Feels that he is healing pretty well  Pt states he hasn't started the Trelegy   Explained when to use it to the pt   /83, wt. 178.4 lbs, pox 95%, HR 82  Pt states he no longer has batteries for Masimo, okay to be discharged from device  Changed call frequency to W/F 0800  Next [facility] 5/21 @ 0800              Pt Education:   Barriers:   Topics for Daily Review:   Pt demonstrates clear understanding:     Daily Weight:  There were no vitals filed for this visit.   Last 3 Weights:  Wt Readings from Last 7 Encounters:   05/12/25 80.3 kg (177 lb)   05/09/25 80.9 kg (178 lb 6.4 oz)   05/08/25 81.2 kg (179 lb)   05/07/25 81.2 kg (179 lb)   05/06/25 80.5 kg (177 lb 6.4 oz)   04/27/25 82.2 kg (181 lb 3.2 oz)   04/08/25 80 kg (176 lb 5.9 oz)       Masimo Device:   Masimo Clinical Impression:     Virtual Visits--Scheduled (Most Recent Date at Top)  Follow up Appointments  Recent Visits  No visits were found meeting these conditions.  Showing recent visits within past 30 days and meeting all other requirements  Future Appointments  No visits were found meeting these conditions.  Showing future appointments within next 90 days and meeting all other requirements       Frequency of RN Calls & Virtual Visits per Team Agreement:     Medication issues Addressed (what was done):     Follow up appointments scheduled by [facility] Staff:   Referrals made by [facility] staff:

## 2025-05-15 VITALS
RESPIRATION RATE: 18 BRPM | SYSTOLIC BLOOD PRESSURE: 122 MMHG | OXYGEN SATURATION: 94 % | TEMPERATURE: 97.8 F | HEART RATE: 94 BPM | DIASTOLIC BLOOD PRESSURE: 64 MMHG

## 2025-05-15 PROBLEM — N52.9 ERECTILE DYSFUNCTION: Status: RESOLVED | Noted: 2018-05-14 | Resolved: 2025-05-15

## 2025-05-15 SDOH — ECONOMIC STABILITY: GENERAL

## 2025-05-15 SDOH — HEALTH STABILITY: MENTAL HEALTH: SMOKING IN HOME: 0

## 2025-05-15 SDOH — ECONOMIC STABILITY: HOUSING INSECURITY: EVIDENCE OF SMOKING MATERIAL: 0

## 2025-05-15 ASSESSMENT — ENCOUNTER SYMPTOMS
SHORTNESS OF BREATH: 1
CHANGE IN APPETITE: UNCHANGED
DYSPNEA ACTIVITY LEVEL: AFTER AMBULATING 10 - 20 FT
DENIES PAIN: 1
PERSON REPORTING PAIN: PATIENT
APPETITE LEVEL: GOOD

## 2025-05-15 ASSESSMENT — ACTIVITIES OF DAILY LIVING (ADL)
AMBULATION ASSISTANCE: STAND BY ASSIST
MONEY MANAGEMENT (EXPENSES/BILLS): INDEPENDENT
CURRENT_FUNCTION: STAND BY ASSIST

## 2025-05-16 ENCOUNTER — PATIENT OUTREACH (OUTPATIENT)
Dept: CARE COORDINATION | Age: 75
End: 2025-05-16
Payer: COMMERCIAL

## 2025-05-16 NOTE — PROGRESS NOTES
Daily Call Note:     0820 - Daily call completed with pt.  Hasn't yet gotten out of bed so no VS to report.  He did c/o occasionally feeling dizzy but then realized it was because he'd taken too much metoprolol.  Confirmed that it is OK to cut this tab in half as long as the tab is scored.  Next [facility] is 5/21 at 0800.  No other questions or concerns at this time.    Pt Education: POC  Barriers: none  Topics for Daily Review: dizziness; metoprolol XL  Pt demonstrates clear understanding: Yes    Daily Weight:  There were no vitals filed for this visit.   Last 3 Weights:  Wt Readings from Last 7 Encounters:   05/14/25 80.9 kg (178 lb 6.4 oz)   05/12/25 80.3 kg (177 lb)   05/09/25 80.9 kg (178 lb 6.4 oz)   05/08/25 81.2 kg (179 lb)   05/07/25 81.2 kg (179 lb)   05/06/25 80.5 kg (177 lb 6.4 oz)   04/27/25 82.2 kg (181 lb 3.2 oz)       Masimo Device: No   Masimo Clinical Impression: n/a    Virtual Visits--Scheduled (Most Recent Date at Top)  Follow up Appointments  Recent Visits  No visits were found meeting these conditions.  Showing recent visits within past 30 days and meeting all other requirements  Future Appointments  No visits were found meeting these conditions.  Showing future appointments within next 90 days and meeting all other requirements       Frequency of RN Calls & Virtual Visits per Team Agreement: Healthy at Home Frequency: W/F    Medication issues Addressed (what was done): none    Follow up appointments scheduled by [facility] Staff: none  Referrals made by [facility] staff: none

## 2025-05-19 ENCOUNTER — TELEPHONE (OUTPATIENT)
Dept: CARDIAC SURGERY | Facility: CLINIC | Age: 75
End: 2025-05-19
Payer: MEDICARE

## 2025-05-19 NOTE — TELEPHONE ENCOUNTER
Voicemail left for patient regarding his upcoming post op appointment. Patient reminded that he needs to have his chest x ray  prior to his 3 pm appointment. Address for the main radiology department and office was left on voicemail.  Patient advised that he should arrive 1 hr prior, as radiology is a walk in clinic only. Office number left should patient have any further questions.

## 2025-05-20 ENCOUNTER — HOSPITAL ENCOUNTER (OUTPATIENT)
Dept: RADIOLOGY | Facility: HOSPITAL | Age: 75
Discharge: HOME | End: 2025-05-20
Payer: COMMERCIAL

## 2025-05-20 ENCOUNTER — OFFICE VISIT (OUTPATIENT)
Dept: CARDIAC SURGERY | Facility: CLINIC | Age: 75
End: 2025-05-20
Payer: COMMERCIAL

## 2025-05-20 VITALS
DIASTOLIC BLOOD PRESSURE: 70 MMHG | SYSTOLIC BLOOD PRESSURE: 103 MMHG | OXYGEN SATURATION: 95 % | BODY MASS INDEX: 24.27 KG/M2 | HEART RATE: 64 BPM | WEIGHT: 179.2 LBS | HEIGHT: 72 IN | TEMPERATURE: 97.5 F

## 2025-05-20 DIAGNOSIS — I25.10 CORONARY ARTERY DISEASE INVOLVING NATIVE CORONARY ARTERY OF NATIVE HEART WITHOUT ANGINA PECTORIS: ICD-10-CM

## 2025-05-20 DIAGNOSIS — Z95.1 S/P CABG X 3: ICD-10-CM

## 2025-05-20 DIAGNOSIS — Z95.1 S/P CABG (CORONARY ARTERY BYPASS GRAFT): Primary | ICD-10-CM

## 2025-05-20 PROCEDURE — 1111F DSCHRG MED/CURRENT MED MERGE: CPT | Performed by: THORACIC SURGERY (CARDIOTHORACIC VASCULAR SURGERY)

## 2025-05-20 PROCEDURE — 71046 X-RAY EXAM CHEST 2 VIEWS: CPT

## 2025-05-20 PROCEDURE — 1126F AMNT PAIN NOTED NONE PRSNT: CPT | Performed by: THORACIC SURGERY (CARDIOTHORACIC VASCULAR SURGERY)

## 2025-05-20 PROCEDURE — 99211 OFF/OP EST MAY X REQ PHY/QHP: CPT | Performed by: THORACIC SURGERY (CARDIOTHORACIC VASCULAR SURGERY)

## 2025-05-20 PROCEDURE — 3074F SYST BP LT 130 MM HG: CPT | Performed by: THORACIC SURGERY (CARDIOTHORACIC VASCULAR SURGERY)

## 2025-05-20 PROCEDURE — 3078F DIAST BP <80 MM HG: CPT | Performed by: THORACIC SURGERY (CARDIOTHORACIC VASCULAR SURGERY)

## 2025-05-20 PROCEDURE — 71046 X-RAY EXAM CHEST 2 VIEWS: CPT | Performed by: RADIOLOGY

## 2025-05-20 PROCEDURE — 1159F MED LIST DOCD IN RCRD: CPT | Performed by: THORACIC SURGERY (CARDIOTHORACIC VASCULAR SURGERY)

## 2025-05-20 RX ORDER — CLOPIDOGREL BISULFATE 75 MG/1
75 TABLET ORAL DAILY
Qty: 30 TABLET | Refills: 11 | Status: SHIPPED | OUTPATIENT
Start: 2025-05-20 | End: 2025-05-23 | Stop reason: DRUGHIGH

## 2025-05-20 ASSESSMENT — ENCOUNTER SYMPTOMS
OCCASIONAL FEELINGS OF UNSTEADINESS: 1
DEPRESSION: 1
LOSS OF SENSATION IN FEET: 0

## 2025-05-20 ASSESSMENT — PAIN SCALES - GENERAL: PAINLEVEL_OUTOF10: 0-NO PAIN

## 2025-05-20 NOTE — PROGRESS NOTES
Chief Complaint  POST OP Evaluation    HPI:   Mr. Rafi Lisa Jr. is a 75 y.o. male, who presents for post-operative evaluation.   He is now 1 month out from his operation, and is recovering nicely.  He has resumed normal activities and his appetite is returned to normal.  He has no chest pain, no shortness of breath and denies palpitations, dizziness, or syncope. He has some orthostasis, if gets up too fast.      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    Drug use: Not Currently    Sexual activity: Defer   Other Topics Concern    Not on file   Social History Narrative    Not on file     Social Drivers of Health     Financial Resource Strain: Low Risk  (4/22/2025)    Overall Financial Resource Strain (CARDIA)     Difficulty of Paying Living Expenses: Not hard at all   Food Insecurity: No Food Insecurity (4/22/2025)    Hunger Vital Sign     Worried About Running Out of Food in the Last Year: Never true     Ran Out of Food in the Last Year: Never true   Transportation Needs: No Transportation Needs (4/30/2025)    OASIS : Transportation     Lack of Transportation (Medical): No     Lack of Transportation (Non-Medical): No     Patient Unable or Declines to Respond: No   Physical Activity: Inactive (4/29/2025)    Exercise Vital Sign     Days of Exercise per Week: 0 days     Minutes of Exercise per Session: 0 min   Stress: No Stress Concern Present (4/29/2025)    [place]  [employer] - Occupational Stress Questionnaire     Feeling of Stress : Only a little   Social Connections: Feeling Socially Integrated (4/30/2025)    OASIS : Social Isolation     Frequency of experiencing loneliness or isolation: Never   Recent Concern: Social Connections - Socially Isolated (4/29/2025)    Social Connection and Isolation Panel [NHANES]     Frequency of Communication with Friends and Family: Once a week     Frequency of Social Gatherings with Friends and Family: Once a week     Attends Mandaen Services: Never     Active Member of Clubs or Organizations: No     Attends Club or Organization Meetings: Never     Marital Status:    Intimate Partner Violence: Not At Risk (4/22/2025)    Humiliation, Afraid, Rape, and Kick questionnaire     Fear of Current or Ex-Partner: No     Emotionally Abused: No     Physically Abused: No     Sexually Abused: No   Housing Stability: Low Risk  (4/22/2025)    Housing Stability Vital Sign     Unable to Pay for Housing in the Last Year: No     Number of Times Moved in the Last Year: 0     Homeless in the Last Year: No       RX Allergies[4]    Encounter Medications[5]    Physical Exam  Constitutional:       Appearance: Normal appearance.   HENT:      Head: Normocephalic.   Eyes:      General: No scleral icterus.     Pupils: Pupils are equal, round, and reactive to light.   Cardiovascular:      Rate and Rhythm: Normal rate and regular rhythm.      Heart sounds: No murmur heard.  Pulmonary:      Effort: Pulmonary effort is normal.      Breath sounds: Normal breath sounds.   Abdominal:      General: Abdomen is flat.      Palpations: Abdomen is soft.   Musculoskeletal:         General: Normal range of motion.      Cervical back: Normal range of motion.   Skin:     General: Skin is warm and dry.   Neurological:      Mental Status: He is alert and oriented to person, place, and time.         Encounter Date: 04/22/25   Electrocardiogram 12-lead PRN ACS  symptoms   Result Value    Systolic blood pressure 94    Diastolic blood pressure 53    Ventricular Rate 62    Atrial Rate 62    NH Interval 192    QRS Duration 80    QT Interval 446    QTC Calculation(Bazett) 452    P Axis 61    R Axis 255    T Axis 55    QRS Count 10    Q Onset 215    P Onset 119    P Offset 181    T Offset 438    QTC Fredericia 451    Narrative    Normal sinus rhythm  Right superior axis deviation  Possible Right ventricular hypertrophy  LOW VOLTAGE LIMB LEADS  Abnormal ECG  When compared with ECG of 22-APR-2025 14:12, (unconfirmed)  No significant change was found  Confirmed by Lopez Castelan ([id number]) on 4/23/2025 3:52:41 PM     CXR 5/20/25:  Sternal wires in place and intact in midline.  NO effusion, no Ptx.  Large hiatal hernia retro-cardiac.     Assessment and Plan:    Mr. Rafi Lisa Jr. is a 75 y.o. male, who is recovering well after surgery.  He underwent CABG x 3 with LIMA to LAD and SVG sequenced to RI and OM2.  I have released them from sternal precautions and referred them for cardiopulmonary rehab.  I have encouraged them to slowly return to normal activities, but refrain from heavy lifting for a full 12 weeks after surgery.  He will continue cardiovascular management with their cardiologist.  I am always happy to see them for any reason, but have released them from the practice.              [1]   Past Medical History:  Diagnosis Date    Abnormal findings on diagnostic imaging of other specified body structures     Abnormal CT of the chest    Arthritis     COPD (chronic obstructive pulmonary disease) (Multi)     Coronary artery disease     GERD (gastroesophageal reflux disease)     HL (hearing loss)     Hyperlipidemia     Hypertension     Joint pain     Nephrolithiasis     Personal history of other medical treatment     History of echocardiogram    Personal history of other medical treatment     History of nuclear stress test   [2]   Past Surgical History:  Procedure Laterality  Date    BUNIONECTOMY      CARDIAC CATHETERIZATION N/A 2025    Procedure: Left Heart Cath;  Surgeon: Tony Oneill MD;  Location: [facility] Cardiac Cath Lab;  Service: Cardiovascular;  Laterality: N/A;    COLONOSCOPY      CORONARY ARTERY BYPASS GRAFT      CYSTOSCOPY      LASIK      LITHOTRIPSY      MR HEAD ANGIO WO IV CONTRAST  2023    MR HEAD ANGIO WO IV CONTRAST 2023 [facility] MRI    MR HEAD ANGIO WO IV CONTRAST  2023    MR HEAD ANGIO WO IV CONTRAST    MR NECK ANGIO WO IV CONTRAST  2023    MR NECK ANGIO WO IV CONTRAST 2023 [facility] MRI    MR NECK ANGIO WO IV CONTRAST  2023    MR NECK ANGIO WO IV CONTRAST    ORIF WRIST FRACTURE      OTHER SURGICAL HISTORY  2020    Radiofrequency ablation    OTHER SURGICAL HISTORY  2022    Cardiac catheterization with stent placement    OTHER SURGICAL HISTORY  2022    Cardiac catheterization    OTHER SURGICAL HISTORY  2020    Atrial appendage closure    OTHER SURGICAL HISTORY  2020    Esophagogastroduodenoscopy    OTHER SURGICAL HISTORY  2020    Colonoscopy    OTHER SURGICAL HISTORY  2020    Cardiac catheterization with stent placement    OTHER SURGICAL HISTORY  2020    Tonsillectomy    OTHER SURGICAL HISTORY  2020    Bunionectomy    OTHER SURGICAL HISTORY  2020    Lithotripsy    TONSILLECTOMY      UPPER GASTROINTESTINAL ENDOSCOPY     [3]   Family History  Problem Relation Name Age of Onset    No Known Problems Mother      Other (coronary thrombosis) Father           in 1970s @63   [4]   Allergies  Allergen Reactions    Sulfa (Sulfonamide Antibiotics) Hives     Stated at age 13    Adhesive Tape-Silicones Rash   [5]   Outpatient Encounter Medications as of 2025   Medication Sig Dispense Refill    acetaminophen (Tylenol) 325 mg tablet Take 2 tablets (650 mg) by mouth every 6 hours if needed for mild pain (1 - 3).      albuterol (Ventolin HFA) 90 mcg/actuation inhaler Inhale 2 puffs  every 4 hours if needed for wheezing or shortness of breath. 8 g 11    amiodarone (Pacerone) 200 mg tablet Take 2 tablets (400 mg) by mouth 2 times a day for 3 days, THEN 1 tablet (200 mg) once daily. 72 tablet 0    aspirin 81 mg chewable tablet Chew 1 tablet (81 mg) once daily.      clopidogrel (Plavix) 75 mg tablet Take 1 tablet (75 mg) by mouth once daily. To stop 4/17/25 30 tablet 0    coenzyme Q-10 100 mg capsule Take 1 capsule (100 mg) by mouth 2 times a day. Takes two capsules in am and one capsule in pm      ezetimibe (Zetia) 10 mg tablet Take 1 tablet (10 mg) by mouth once daily. 90 tablet 3    fluticasone-umeclidin-vilanter (Trelegy Ellipta) 100-62.5-25 mcg blister with device Inhale 1 puff once daily. 60 each 2    ipratropium-albuteroL (Duo-Neb) 0.5-2.5 mg/3 mL nebulizer solution Take 3 mL by nebulization every 6 hours if needed for wheezing. 180 mL 11    lisinopril 20 mg tablet Take 1 tablet (20 mg) by mouth once daily. Stop 4/19/25 30 tablet 0    metoprolol succinate XL (Toprol-XL) 50 mg 24 hr tablet Take 1 tablet (50 mg) by mouth once daily. 90 tablet 3    multivitamin with minerals iron-free (Centrum Silver) Take 1 tablet by mouth once daily.      nicotine (Nicoderm CQ) 21 mg/24 hr patch Place 1 patch on the skin once daily.      omeprazole (PriLOSEC) 40 mg DR capsule Take 1 capsule (40 mg) by mouth once daily in the morning. Take before meals.      oxygen (O2) gas therapy Inhale 3 L/min continuously. (Patient taking differently: Inhale 3 L/min at 180,000 mL/hr once daily at bedtime.)      probenecid-colchicine 500-0.5 mg tablet Take 1 tablet by mouth twice a day.      rosuvastatin (Crestor) 40 mg tablet Take 1 tablet (40 mg) by mouth once daily. 90 tablet 3    traZODone (Desyrel) 50 mg tablet Take 1 tablet (50 mg) by mouth as needed at bedtime for sleep for up to 5 days. 5 tablet 0    TURMERIC ORAL Take 1,500 mg by mouth 2 times a day.      azithromycin (Zithromax) 500 mg tablet *FOR EMERGENCY USE  ONLY*  Take 1 tablet (500mg) by mouth daily for 3 days (Patient not taking: Reported on 5/20/2025) 3 tablet 0    furosemide (Lasix) 20 mg tablet Take 1 tablet (20 mg) by mouth once daily for 14 days. 14 tablet 0    gabapentin (Neurontin) 100 mg capsule Take 1 capsule (100 mg) by mouth 3 times a day for 14 days. 42 capsule 0    iron polysaccharides (Nu-Iron,Niferex) 150 mg iron capsule Take 1 capsule (150 mg) by mouth once daily for 27 doses. (Patient not taking: Reported on 5/20/2025) 27 capsule 0    methocarbamol (Robaxin) 500 mg tablet Take 1 tablet (500 mg) by mouth every 8 hours for 7 days. 21 tablet 0    predniSONE (Deltasone) 20 mg tablet *FOR EMERGENCY USE ONLY*  Take 2 tablets (40mg) by mouth once daily for 5 days (Patient not taking: Reported on 5/20/2025) 10 tablet 0    [DISCONTINUED] metoprolol succinate XL (Toprol-XL) 50 mg 24 hr tablet Take 1 tablet (50 mg) by mouth once daily. 30 tablet 0     No facility-administered encounter medications on file as of 5/20/2025.

## 2025-05-20 NOTE — LETTER
May 20, 2025     James Hunt MD  [address]  [facility] [address]    Patient: Rafi Lisa Jr.   YOB: 1950   Date of Visit: 5/20/2025       Dear Dr. James Hunt MD:    Thank you for referring Rafi Lisa to me for evaluation. Below are my notes for this consultation.  If you have questions, please do not hesitate to call me. I look forward to following your patient along with you.       Sincerely,     Pk Kline MD      CC: Magno Babb MD  ______________________________________________________________________________________    Chief Complaint  POST OP Evaluation    HPI:   Mr. Rafi Lisa Jr. is a 75 y.o. male, who presents for post-operative evaluation.   He is now 1 month out from his operation, and is recovering nicely.  He has resumed normal activities and his appetite is returned to normal.  He has no chest pain, no shortness of breath and denies palpitations, dizziness, or syncope. He has some orthostasis, if gets up too fast.      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   • Drug use: Not Currently   • Sexual activity: Defer   Other Topics Concern   • Not on file   Social History Narrative   • Not on file     Social Drivers of Health     Financial Resource Strain: Low Risk  (4/22/2025)    Overall Financial Resource Strain (CARDIA)    • Difficulty of Paying Living Expenses: Not hard at all   Food Insecurity: No Food Insecurity  (4/22/2025)    Hunger Vital Sign    • Worried About Running Out of Food in the Last Year: Never true    • Ran Out of Food in the Last Year: Never true   Transportation Needs: No Transportation Needs (4/30/2025)    OASIS : Transportation    • Lack of Transportation (Medical): No    • Lack of Transportation (Non-Medical): No    • Patient Unable or Declines to Respond: No   Physical Activity: Inactive (4/29/2025)    Exercise Vital Sign    • Days of Exercise per Week: 0 days    • Minutes of Exercise per Session: 0 min   Stress: No Stress Concern Present (4/29/2025)    [employer] - Occupational Stress Questionnaire    • Feeling of Stress : Only a little   Social Connections: Feeling Socially Integrated (4/30/2025)    OASIS : Social Isolation    • Frequency of experiencing loneliness or isolation: Never   Recent Concern: Social Connections - Socially Isolated (4/29/2025)    Social Connection and Isolation Panel [NHANES]    • Frequency of Communication with Friends and Family: Once a week    • Frequency of Social Gatherings with Friends and Family: Once a week    • Attends [employer] Services: Never    • Active Member of Clubs or Organizations: No    • Attends Club or Organization Meetings: Never    • Marital Status:    Intimate Partner Violence: Not At Risk (4/22/2025)    Humiliation, Afraid, Rape, and Kick questionnaire    • Fear of Current or Ex-Partner: No    • Emotionally Abused: No    • Physically Abused: No    • Sexually Abused: No   Housing Stability: Low Risk  (4/22/2025)    Housing Stability Vital Sign    • Unable to Pay for Housing in the Last Year: No    • Number of Times Moved in the Last Year: 0    • Homeless in the Last Year: No       RX Allergies[4]    Encounter Medications[5]    Physical Exam  Constitutional:       Appearance: Normal appearance.   HENT:      Head: Normocephalic.   Eyes:      General: No scleral icterus.     Pupils: Pupils are equal, round, and  reactive to light.   Cardiovascular:      Rate and Rhythm: Normal rate and regular rhythm.      Heart sounds: No murmur heard.  Pulmonary:      Effort: Pulmonary effort is normal.      Breath sounds: Normal breath sounds.   Abdominal:      General: Abdomen is flat.      Palpations: Abdomen is soft.   Musculoskeletal:         General: Normal range of motion.      Cervical back: Normal range of motion.   Skin:     General: Skin is warm and dry.   Neurological:      Mental Status: He is alert and oriented to person, place, and time.         Encounter Date: 04/22/25   Electrocardiogram 12-lead PRN ACS symptoms   Result Value    Systolic blood pressure 94    Diastolic blood pressure 53    Ventricular Rate 62    Atrial Rate 62    CT Interval 192    QRS Duration 80    QT Interval 446    QTC Calculation(Bazett) 452    P Axis 61    R Axis 255    T Axis 55    QRS Count 10    Q Onset 215    P Onset 119    P Offset 181    T Offset 438    QTC Fredericia 451    Narrative    Normal sinus rhythm  Right superior axis deviation  Possible Right ventricular hypertrophy  LOW VOLTAGE LIMB LEADS  Abnormal ECG  When compared with ECG of 22-APR-2025 14:12, (unconfirmed)  No significant change was found  Confirmed by Lopez Castelan ([id number]) on 4/23/2025 3:52:41 PM     CXR 5/20/25:  Sternal wires in place and intact in midline.  NO effusion, no Ptx.  Large hiatal hernia retro-cardiac.     Assessment and Plan:    Mr. Rafi Lisa Jr. is a 75 y.o. male, who is recovering well after surgery.  He underwent CABG x 3 with LIMA to LAD and SVG sequenced to RI and OM2.  I have released them from sternal precautions and referred them for cardiopulmonary rehab.  I have encouraged them to slowly return to normal activities, but refrain from heavy lifting for a full 12 weeks after surgery.  He will continue cardiovascular management with their cardiologist.  I am always happy to see them for any reason, but have released them from the practice.                 [1]  Past Medical History:  Diagnosis Date   • Abnormal findings on diagnostic imaging of other specified body structures     Abnormal CT of the chest   • Arthritis    • COPD (chronic obstructive pulmonary disease) (Multi)    • Coronary artery disease    • GERD (gastroesophageal reflux disease)    • HL (hearing loss)    • Hyperlipidemia    • Hypertension    • Joint pain    • Nephrolithiasis    • Personal history of other medical treatment     History of echocardiogram   • Personal history of other medical treatment     History of nuclear stress test   [2]  Past Surgical History:  Procedure Laterality Date   • BUNIONECTOMY     • CARDIAC CATHETERIZATION N/A 03/24/2025    Procedure: Left Heart Cath;  Surgeon: Tony Oneill MD;  Location: [facility] Cardiac Cath Lab;  Service: Cardiovascular;  Laterality: N/A;   • COLONOSCOPY     • CORONARY ARTERY BYPASS GRAFT     • CYSTOSCOPY     • LASIK     • LITHOTRIPSY     • MR HEAD ANGIO WO IV CONTRAST  12/17/2023    MR HEAD ANGIO WO IV CONTRAST 12/17/2023 [facility] MRI   • MR HEAD ANGIO WO IV CONTRAST  12/18/2023    MR HEAD ANGIO WO IV CONTRAST   • MR NECK ANGIO WO IV CONTRAST  12/17/2023    MR NECK ANGIO WO IV CONTRAST 12/17/2023 [facility] MRI   • MR NECK ANGIO WO IV CONTRAST  12/18/2023    MR NECK ANGIO WO IV CONTRAST   • ORIF WRIST FRACTURE     • OTHER SURGICAL HISTORY  01/30/2020    Radiofrequency ablation   • OTHER SURGICAL HISTORY  01/20/2022    Cardiac catheterization with stent placement   • OTHER SURGICAL HISTORY  01/20/2022    Cardiac catheterization   • OTHER SURGICAL HISTORY  06/04/2020    Atrial appendage closure   • OTHER SURGICAL HISTORY  06/04/2020    Esophagogastroduodenoscopy   • OTHER SURGICAL HISTORY  06/04/2020    Colonoscopy   • OTHER SURGICAL HISTORY  06/04/2020    Cardiac catheterization with stent placement   • OTHER SURGICAL HISTORY  06/04/2020    Tonsillectomy   • OTHER SURGICAL HISTORY  06/04/2020    Bunionectomy   • OTHER SURGICAL HISTORY   2020    Lithotripsy   • TONSILLECTOMY     • UPPER GASTROINTESTINAL ENDOSCOPY     [3]  Family History  Problem Relation Name Age of Onset   • No Known Problems Mother     • Other (coronary thrombosis) Father           in 1970s @63   [4]  Allergies  Allergen Reactions   • Sulfa (Sulfonamide Antibiotics) Hives     Stated at age 13   • Adhesive Tape-Silicones Rash   [5]  Outpatient Encounter Medications as of 2025   Medication Sig Dispense Refill   • acetaminophen (Tylenol) 325 mg tablet Take 2 tablets (650 mg) by mouth every 6 hours if needed for mild pain (1 - 3).     • albuterol (Ventolin HFA) 90 mcg/actuation inhaler Inhale 2 puffs every 4 hours if needed for wheezing or shortness of breath. 8 g 11   • amiodarone (Pacerone) 200 mg tablet Take 2 tablets (400 mg) by mouth 2 times a day for 3 days, THEN 1 tablet (200 mg) once daily. 72 tablet 0   • aspirin 81 mg chewable tablet Chew 1 tablet (81 mg) once daily.     • clopidogrel (Plavix) 75 mg tablet Take 1 tablet (75 mg) by mouth once daily. To stop 25 30 tablet 0   • coenzyme Q-10 100 mg capsule Take 1 capsule (100 mg) by mouth 2 times a day. Takes two capsules in am and one capsule in pm     • ezetimibe (Zetia) 10 mg tablet Take 1 tablet (10 mg) by mouth once daily. 90 tablet 3   • fluticasone-umeclidin-vilanter (Trelegy Ellipta) 100-62.5-25 mcg blister with device Inhale 1 puff once daily. 60 each 2   • ipratropium-albuteroL (Duo-Neb) 0.5-2.5 mg/3 mL nebulizer solution Take 3 mL by nebulization every 6 hours if needed for wheezing. 180 mL 11   • lisinopril 20 mg tablet Take 1 tablet (20 mg) by mouth once daily. Stop 25 30 tablet 0   • metoprolol succinate XL (Toprol-XL) 50 mg 24 hr tablet Take 1 tablet (50 mg) by mouth once daily. 90 tablet 3   • multivitamin with minerals iron-free (Centrum Silver) Take 1 tablet by mouth once daily.     • nicotine (Nicoderm CQ) 21 mg/24 hr patch Place 1 patch on the skin once daily.     • omeprazole  (PriLOSEC) 40 mg DR capsule Take 1 capsule (40 mg) by mouth once daily in the morning. Take before meals.     • oxygen (O2) gas therapy Inhale 3 L/min continuously. (Patient taking differently: Inhale 3 L/min at 180,000 mL/hr once daily at bedtime.)     • probenecid-colchicine 500-0.5 mg tablet Take 1 tablet by mouth twice a day.     • rosuvastatin (Crestor) 40 mg tablet Take 1 tablet (40 mg) by mouth once daily. 90 tablet 3   • traZODone (Desyrel) 50 mg tablet Take 1 tablet (50 mg) by mouth as needed at bedtime for sleep for up to 5 days. 5 tablet 0   • TURMERIC ORAL Take 1,500 mg by mouth 2 times a day.     • azithromycin (Zithromax) 500 mg tablet *FOR EMERGENCY USE ONLY*  Take 1 tablet (500mg) by mouth daily for 3 days (Patient not taking: Reported on 5/20/2025) 3 tablet 0   • furosemide (Lasix) 20 mg tablet Take 1 tablet (20 mg) by mouth once daily for 14 days. 14 tablet 0   • gabapentin (Neurontin) 100 mg capsule Take 1 capsule (100 mg) by mouth 3 times a day for 14 days. 42 capsule 0   • iron polysaccharides (Nu-Iron,Niferex) 150 mg iron capsule Take 1 capsule (150 mg) by mouth once daily for 27 doses. (Patient not taking: Reported on 5/20/2025) 27 capsule 0   • methocarbamol (Robaxin) 500 mg tablet Take 1 tablet (500 mg) by mouth every 8 hours for 7 days. 21 tablet 0   • predniSONE (Deltasone) 20 mg tablet *FOR EMERGENCY USE ONLY*  Take 2 tablets (40mg) by mouth once daily for 5 days (Patient not taking: Reported on 5/20/2025) 10 tablet 0   • [DISCONTINUED] metoprolol succinate XL (Toprol-XL) 50 mg 24 hr tablet Take 1 tablet (50 mg) by mouth once daily. 30 tablet 0     No facility-administered encounter medications on file as of 5/20/2025.

## 2025-05-21 ENCOUNTER — APPOINTMENT (OUTPATIENT)
Dept: CARE COORDINATION | Age: 75
End: 2025-05-21
Payer: COMMERCIAL

## 2025-05-21 ENCOUNTER — PATIENT OUTREACH (OUTPATIENT)
Dept: CARE COORDINATION | Age: 75
End: 2025-05-21

## 2025-05-21 ENCOUNTER — HOME CARE VISIT (OUTPATIENT)
Dept: HOME HEALTH SERVICES | Facility: HOME HEALTH | Age: 75
End: 2025-05-21
Payer: COMMERCIAL

## 2025-05-21 ENCOUNTER — APPOINTMENT (OUTPATIENT)
Dept: PHARMACY | Facility: HOSPITAL | Age: 75
End: 2025-05-21
Payer: COMMERCIAL

## 2025-05-21 VITALS — SYSTOLIC BLOOD PRESSURE: 120 MMHG | BODY MASS INDEX: 24.25 KG/M2 | WEIGHT: 178.8 LBS | DIASTOLIC BLOOD PRESSURE: 64 MMHG

## 2025-05-21 VITALS
RESPIRATION RATE: 20 BRPM | HEART RATE: 80 BPM | OXYGEN SATURATION: 98 % | DIASTOLIC BLOOD PRESSURE: 64 MMHG | SYSTOLIC BLOOD PRESSURE: 120 MMHG | TEMPERATURE: 98 F

## 2025-05-21 DIAGNOSIS — J44.9 CHRONIC OBSTRUCTIVE PULMONARY DISEASE, UNSPECIFIED COPD TYPE (MULTI): ICD-10-CM

## 2025-05-21 DIAGNOSIS — I48.91 ATRIAL FIBRILLATION, UNSPECIFIED TYPE (MULTI): ICD-10-CM

## 2025-05-21 DIAGNOSIS — I10 ESSENTIAL HYPERTENSION, BENIGN: ICD-10-CM

## 2025-05-21 PROCEDURE — G0299 HHS/HOSPICE OF RN EA 15 MIN: HCPCS

## 2025-05-21 SDOH — HEALTH STABILITY: MENTAL HEALTH: SMOKING IN HOME: 0

## 2025-05-21 SDOH — ECONOMIC STABILITY: HOUSING INSECURITY: EVIDENCE OF SMOKING MATERIAL: 0

## 2025-05-21 ASSESSMENT — ACTIVITIES OF DAILY LIVING (ADL)
HOME_HEALTH_OASIS: 01
OASIS_M1830: 01

## 2025-05-21 ASSESSMENT — ENCOUNTER SYMPTOMS
PERSON REPORTING PAIN: PATIENT
HIGHEST PAIN SEVERITY IN PAST 24 HOURS: 0/10
DENIES PAIN: 1

## 2025-05-21 NOTE — PROGRESS NOTES
[facility]    Rafi Lisa Jr. is a 75 y.o. male was referred to Clinical Pharmacy Team to complete a post-discharge medication optimization and monitoring visit.  The patient was referred for multiple concerns while in [facility]. Today was our fourth visit.       Referring Provider: Kim Alfonso APRN*  PCP: Magno Babb MD - last visit: 5/1      Subjective   Allergies[1]    [facility] - [place], [place] - [facility] [facility]  [address] [facility] [address] [facility] [address]  Phone: [phone] Fax: [phone]    [id number] - [place], [place] - [address] [facility] AT [address] [address]  Phone: [phone] Fax: [phone]     [facility]  [address]  [facility] [address]  Phone: [phone] Fax: [phone]    [id number] - [place], [place] - [address]  [address] [facility] [address]  Phone: [phone] Fax: [phone]    [facility]  [address]  [facility] [address]  Phone: [phone] Fax: [phone]     [facility]  [address]  [facility] [address]  Phone: [phone] Fax: [phone]      Medication System Management:  Affordability/Accessibility: no issues  Adherence/Organization: no concerns      Social History     Social History Narrative    Not on file          HPI      Review of Systems        Objective     There were no vitals taken for this visit.   BP Readings from Last 4 Encounters:   05/20/25 103/70   05/14/25 122/64   05/14/25 144/83   05/13/25 129/83      There were no vitals filed for this visit.     LAB  Lab Results   Component Value Date    BILITOT 0.5 03/25/2025    CALCIUM 8.9 04/27/2025    CO2 32 04/27/2025    CL 95 (L) 04/27/2025    CREATININE 1.45 (H) 04/27/2025    GLUCOSE 120 (H) 04/27/2025    ALKPHOS 70 03/25/2025    K 4.2 04/27/2025    PROT 7.0 03/25/2025     (L) 04/27/2025    AST 28 03/25/2025    ALT 20  03/25/2025    BUN 53 (H) 04/27/2025    ANIONGAP 11 04/27/2025    MG 2.61 (H) 04/27/2025    PHOS 4.0 04/27/2025    ALBUMIN 3.7 04/27/2025     Lab Results   Component Value Date    TRIG 75 03/22/2025    CHOL 91 03/22/2025    LDLCALC 39 03/22/2025    HDL 36.6 03/22/2025     Lab Results   Component Value Date    HGBA1C 6.2 (H) 03/25/2025         Current Outpatient Medications   Medication Instructions    acetaminophen (TYLENOL) 650 mg, Every 6 hours PRN    albuterol (Ventolin HFA) 90 mcg/actuation inhaler 2 puffs, inhalation, Every 4 hours PRN    aspirin 81 mg, oral, Daily    clopidogrel (PLAVIX) 75 mg, oral, Daily, To stop 4/17/25    coenzyme Q-10 100 mg, 2 times daily    ezetimibe (ZETIA) 10 mg, oral, Daily    fluticasone-umeclidin-vilanter (Trelegy Ellipta) 100-62.5-25 mcg blister with device 1 puff, inhalation, Daily    ipratropium-albuteroL (Duo-Neb) 0.5-2.5 mg/3 mL nebulizer solution 3 mL, nebulization, Every 6 hours PRN    lisinopril 20 mg, oral, Daily, Stop 4/19/25    metoprolol succinate XL (TOPROL-XL) 50 mg, oral, Daily    multivitamin with minerals iron-free (Centrum Silver) 1 tablet, Daily    nicotine (Nicoderm CQ) 21 mg/24 hr patch 1 patch, Daily RT    omeprazole (PRILOSEC) 40 mg, Daily before breakfast    oxygen (O2) 3 L/min, inhalation, Continuous    predniSONE (Deltasone) 20 mg tablet *FOR EMERGENCY USE ONLY*  Take 2 tablets (40mg) by mouth once daily for 5 days    probenecid-colchicine 500-0.5 mg tablet 1 tablet, 2 times daily    rosuvastatin (CRESTOR) 40 mg, oral, Daily    traZODone (DESYREL) 50 mg, oral, Nightly PRN    TURMERIC ORAL 1,500 mg, 2 times daily          Assessment/Plan   Problem List Items Addressed This Visit       Atrial fibrillation (Multi)    COPD (chronic obstructive pulmonary disease) (Multi)    Essential hypertension, benign     -feeling good today   -saw cardiac surgery 5/20 --> referred to cardiac rehab, told to refrain from heavy lifting x 12 weeks  -still wearing 2L NC at night  and as needed during the day with activity   -breathing/SOB improving   -drove yesterday with no issues  -incision and graft sites continuing to heal   -no pain, just some soreness by armpit area still    /64  Weight 178.8 lbs       Medications Changes/Concerns:  Afib  Amiodarone discontinued by cardiac surgery on 5/20  S/p watchman - previously on Eliquis   CAD   Continue DAPT with aspirin 81 mg daily + plavix 75 mg daily   Continue ezetimibe 10 mg daily and rosuvastatin 40 mg daily   Continue lisinopril 20 mg daily and metoprolol succinate ER 50 mg daily   Given hold parameters for SBP <100 or DBP <60   Also on Lasix 20 mg daily   COPD   Continue Trelegy 100-62.5-25 mcg 1 puff once daily (triple maintenance therapy)  Rinse mouth out after each use   Has Duonebs PRN and albuterol HFA PRN rescue   Rescue kit delivered 5/2      Refills Needed:  -none needed today      Plan/To Do:  -continue oxygen at night and as needed during the day as long as spO2 remains > 92%   -continue to log daily vitals   -upcoming appt with pulm on 6/2  -patient will graduate from the program today!        PAP Status:  -n/a, not needed       Time Spent with Patient and [facility] Team - Kim Alfonso NP and AMMY Fofana via phone call: 10 minutes     Follow Up: n/a    Continue all meds under the continuation of care with the referring provider and clinical pharmacy team.    Wing Palencia, Rene     Verbal consent to manage patient's drug therapy was obtained from the patient. They were informed they may decline to participate or withdraw from participation in pharmacy services at any time.           [1]   Allergies  Allergen Reactions    Sulfa (Sulfonamide Antibiotics) Hives     Stated at age 13    Adhesive Tape-Silicones Rash

## 2025-05-21 NOTE — PROGRESS NOTES
"Daily Call Note:   [facility] with America Luz NP and [facility]  Pt feeling really good  Appt 5/20 with  Dr Kline . Pt cleared for driving.     Current vs   120/64  178.8 lbs    O2 at hs and prn  Pt feels his breathing is pretty good during the day on RA.  Denies TOWNSEND  Chest \"looks like it is healing good\". No pain. Some soreness posterior pecks and axillary area.  Graph sites appear \"good\"    June 2 pulm appointment    Pt denies any rx needs  Pt to cont o2 hs and prn  Pt has started trilogy every day  Pt feels comfortable graduating today  Pt encouraged to call  in with any questions or needs.    Pt Education: cont to monitor wounds  Barriers: na  Topics for Daily Review: every day wts  Pt demonstrates clear understanding: Yes    Daily Weight:  There were no vitals filed for this visit.   Last 3 Weights:  Wt Readings from Last 7 Encounters:   05/20/25 81.3 kg (179 lb 3.2 oz)   05/14/25 80.9 kg (178 lb 6.4 oz)   05/12/25 80.3 kg (177 lb)   05/09/25 80.9 kg (178 lb 6.4 oz)   05/08/25 81.2 kg (179 lb)   05/07/25 81.2 kg (179 lb)   05/06/25 80.5 kg (177 lb 6.4 oz)       Masimo Device:    Masimo Clinical Impression:     Virtual Visits--Scheduled (Most Recent Date at Top)  Follow up Appointments  Recent Visits  No visits were found meeting these conditions.  Showing recent visits within past 30 days and meeting all other requirements  Future Appointments  No visits were found meeting these conditions.  Showing future appointments within next 90 days and meeting all other requirements       Frequency of RN Calls & Virtual Visits per Team Agreement: [facility] at Home Frequency: graduation    Medication issues Addressed (what was done): na    Follow up appointments scheduled by [facility] Staff: na  Referrals made by [facility] staff: na        "

## 2025-05-23 DIAGNOSIS — I25.10 CORONARY ARTERY DISEASE INVOLVING NATIVE CORONARY ARTERY OF NATIVE HEART WITHOUT ANGINA PECTORIS: ICD-10-CM

## 2025-05-23 RX ORDER — CLOPIDOGREL BISULFATE 75 MG/1
75 TABLET ORAL DAILY
Qty: 30 TABLET | Refills: 11 | Status: SHIPPED | OUTPATIENT
Start: 2025-05-23

## 2025-06-01 DIAGNOSIS — I10 ESSENTIAL HYPERTENSION, BENIGN: ICD-10-CM

## 2025-06-02 ENCOUNTER — APPOINTMENT (OUTPATIENT)
Facility: CLINIC | Age: 75
End: 2025-06-02
Payer: COMMERCIAL

## 2025-06-02 VITALS
SYSTOLIC BLOOD PRESSURE: 124 MMHG | WEIGHT: 181.8 LBS | OXYGEN SATURATION: 95 % | TEMPERATURE: 97.7 F | DIASTOLIC BLOOD PRESSURE: 71 MMHG | RESPIRATION RATE: 18 BRPM | HEIGHT: 72 IN | HEART RATE: 59 BPM | BODY MASS INDEX: 24.62 KG/M2

## 2025-06-02 DIAGNOSIS — J42 CHRONIC BRONCHITIS, UNSPECIFIED CHRONIC BRONCHITIS TYPE (MULTI): Primary | ICD-10-CM

## 2025-06-02 DIAGNOSIS — R05.3 CHRONIC COUGH: ICD-10-CM

## 2025-06-02 DIAGNOSIS — Z95.1 S/P CABG X 3: ICD-10-CM

## 2025-06-02 DIAGNOSIS — J43.2 CENTRILOBULAR EMPHYSEMA (MULTI): ICD-10-CM

## 2025-06-02 DIAGNOSIS — J84.10 PULMONARY FIBROSIS (MULTI): ICD-10-CM

## 2025-06-02 PROCEDURE — 3078F DIAST BP <80 MM HG: CPT | Performed by: INTERNAL MEDICINE

## 2025-06-02 PROCEDURE — 99215 OFFICE O/P EST HI 40 MIN: CPT | Performed by: INTERNAL MEDICINE

## 2025-06-02 PROCEDURE — 3074F SYST BP LT 130 MM HG: CPT | Performed by: INTERNAL MEDICINE

## 2025-06-02 PROCEDURE — 1159F MED LIST DOCD IN RCRD: CPT | Performed by: INTERNAL MEDICINE

## 2025-06-02 ASSESSMENT — COLUMBIA-SUICIDE SEVERITY RATING SCALE - C-SSRS
6. HAVE YOU EVER DONE ANYTHING, STARTED TO DO ANYTHING, OR PREPARED TO DO ANYTHING TO END YOUR LIFE?: NO
1. IN THE PAST MONTH, HAVE YOU WISHED YOU WERE DEAD OR WISHED YOU COULD GO TO SLEEP AND NOT WAKE UP?: NO
2. HAVE YOU ACTUALLY HAD ANY THOUGHTS OF KILLING YOURSELF?: NO

## 2025-06-02 ASSESSMENT — PATIENT HEALTH QUESTIONNAIRE - PHQ9
2. FEELING DOWN, DEPRESSED OR HOPELESS: NOT AT ALL
SUM OF ALL RESPONSES TO PHQ9 QUESTIONS 1 AND 2: 0
1. LITTLE INTEREST OR PLEASURE IN DOING THINGS: NOT AT ALL

## 2025-06-02 ASSESSMENT — COPD QUESTIONNAIRES
QUESTION5_HOMEACTIVITIES: 2
QUESTION4_WALKINCLINE: 3
QUESTION7_SLEEPQUALITY: 0 - I SLEEP SOUNDLY
QUESTION8_ENERGYLEVEL: 3
QUESTION1_COUGHFREQUENCY: 2
QUESTION6_LEAVINGHOUSE: 0 - I AM CONFIDENT LEAVING MY HOME DESPITE MY LUNG CONDITION
QUESTION3_CHESTTIGHTNESS: 0 - MY CHEST DOES NOT FEEL TIGHT AT ALL
CAT_TOTALSCORE: 13
QUESTION2_CHESTPHLEGM: 3

## 2025-06-04 RX ORDER — LISINOPRIL 10 MG/1
10 TABLET ORAL DAILY
Qty: 90 TABLET | Refills: 3 | OUTPATIENT
Start: 2025-06-04

## 2025-06-05 ENCOUNTER — OFFICE VISIT (OUTPATIENT)
Dept: CARDIOLOGY | Facility: CLINIC | Age: 75
End: 2025-06-05
Payer: MEDICARE

## 2025-06-05 VITALS
OXYGEN SATURATION: 96 % | SYSTOLIC BLOOD PRESSURE: 140 MMHG | WEIGHT: 179.6 LBS | BODY MASS INDEX: 24.33 KG/M2 | DIASTOLIC BLOOD PRESSURE: 82 MMHG | HEIGHT: 72 IN | HEART RATE: 70 BPM

## 2025-06-05 DIAGNOSIS — I10 ESSENTIAL HYPERTENSION, BENIGN: ICD-10-CM

## 2025-06-05 DIAGNOSIS — I25.10 CORONARY ARTERY DISEASE, UNSPECIFIED VESSEL OR LESION TYPE, UNSPECIFIED WHETHER ANGINA PRESENT, UNSPECIFIED WHETHER NATIVE OR TRANSPLANTED HEART: ICD-10-CM

## 2025-06-05 DIAGNOSIS — I48.20 CHRONIC ATRIAL FIBRILLATION (MULTI): ICD-10-CM

## 2025-06-05 DIAGNOSIS — E78.5 DYSLIPIDEMIA: ICD-10-CM

## 2025-06-05 DIAGNOSIS — I25.10 CORONARY ARTERY DISEASE INVOLVING NATIVE CORONARY ARTERY OF NATIVE HEART WITHOUT ANGINA PECTORIS: Primary | ICD-10-CM

## 2025-06-05 DIAGNOSIS — F17.200 SMOKER: ICD-10-CM

## 2025-06-05 DIAGNOSIS — E78.2 MIXED HYPERLIPIDEMIA: ICD-10-CM

## 2025-06-05 PROCEDURE — 1159F MED LIST DOCD IN RCRD: CPT | Performed by: INTERNAL MEDICINE

## 2025-06-05 PROCEDURE — 99212 OFFICE O/P EST SF 10 MIN: CPT | Performed by: MARRIAGE & FAMILY THERAPIST

## 2025-06-05 PROCEDURE — 93005 ELECTROCARDIOGRAM TRACING: CPT | Performed by: INTERNAL MEDICINE

## 2025-06-05 PROCEDURE — 93010 ELECTROCARDIOGRAM REPORT: CPT | Performed by: INTERNAL MEDICINE

## 2025-06-05 PROCEDURE — 99215 OFFICE O/P EST HI 40 MIN: CPT | Performed by: INTERNAL MEDICINE

## 2025-06-05 PROCEDURE — 3079F DIAST BP 80-89 MM HG: CPT | Performed by: INTERNAL MEDICINE

## 2025-06-05 PROCEDURE — 3077F SYST BP >= 140 MM HG: CPT | Performed by: INTERNAL MEDICINE

## 2025-06-05 RX ORDER — LISINOPRIL 20 MG/1
20 TABLET ORAL DAILY
Qty: 90 TABLET | Refills: 3 | Status: SHIPPED | OUTPATIENT
Start: 2025-06-05 | End: 2026-06-05

## 2025-06-05 RX ORDER — EZETIMIBE 10 MG/1
10 TABLET ORAL DAILY
Qty: 90 TABLET | Refills: 3 | Status: SHIPPED | OUTPATIENT
Start: 2025-06-05 | End: 2026-06-05

## 2025-06-05 RX ORDER — CLOPIDOGREL BISULFATE 75 MG/1
75 TABLET ORAL DAILY
Qty: 90 TABLET | Refills: 3 | Status: SHIPPED | OUTPATIENT
Start: 2025-06-05 | End: 2026-06-05

## 2025-06-05 RX ORDER — ROSUVASTATIN CALCIUM 40 MG/1
40 TABLET, COATED ORAL DAILY
Qty: 90 TABLET | Refills: 3 | Status: SHIPPED | OUTPATIENT
Start: 2025-06-05 | End: 2026-06-05

## 2025-06-05 RX ORDER — NICOTINE 7MG/24HR
1 PATCH, TRANSDERMAL 24 HOURS TRANSDERMAL EVERY 24 HOURS
Qty: 30 PATCH | Refills: 0 | Status: SHIPPED | OUTPATIENT
Start: 2025-07-03 | End: 2025-08-02

## 2025-06-05 RX ORDER — IBUPROFEN 200 MG
1 TABLET ORAL EVERY 24 HOURS
Qty: 30 PATCH | Refills: 0 | Status: SHIPPED | OUTPATIENT
Start: 2025-06-05 | End: 2025-07-05

## 2025-06-05 RX ORDER — METOPROLOL SUCCINATE 50 MG/1
50 TABLET, EXTENDED RELEASE ORAL DAILY
Qty: 90 TABLET | Refills: 3 | Status: SHIPPED | OUTPATIENT
Start: 2025-06-05

## 2025-06-05 NOTE — LETTER
"June 8, 2025     Magno Babb MD  [address] [facility] [address]    Patient: Rafi Lisa Jr.   YOB: 1950   Date of Visit: 6/5/2025       Dear Dr. Magno Babb MD:    Thank you for referring Rafi Lisa to me for evaluation. Below are my notes for this consultation.  If you have questions, please do not hesitate to call me. I look forward to following your patient along with you.       Sincerely,     James Hunt MD      CC: Pk Kline MD  ______________________________________________________________________________________    PCP: Magno Babb MD      Subjective  History of Present Illness  Rafi Lisa Jr. is a 75 year old male with coronary artery disease who presents for follow-up after coronary artery bypass grafting.    He underwent coronary artery bypass grafting on April 22, 2025, due to severe blockage in the left anterior descending artery and \"crystallization\" of a stent. Underwent LIMA-LAD, VG-RI/OM2 4/22/25.  Post-surgery, he is \"still alive and well\" and have been cleared to start driving as of last month. He continues to use oxygen at night and as needed during the day.    Current medications include aspirin, Plavix, Zetia 10 mg, lisinopril 20 mg once daily, and rosuvastatin.     Medical history includes coronary artery disease with multiple stents, the first placed in 1998, a laser-assisted PCI in January 2022, and a Watchman placement in May 2020. He had atrial fibrillation treated with radiofrequency ablation in December 2019 and chronic kidney disease with a creatinine level of 1.6. He also have a history of hepatitis C and a gastrointestinal bleed, status post clipping with push endoscopy.    He has a history of smoking but quit in March 2025, a month before surgery, and have been using nicotine patches since. 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 He reports losing weight and are focusing on a " heart-healthy diet, reducing red meat intake and increasing consumption of chicken and fish.    No chest pain, shortness of breath, or palpitations. Blood pressure was high during the visit but lower in a previous visit.    Review of Systems:  Otherwise, limited cardiovascular review of systems is negative.    Past Medical History:  He has a past medical history of Abnormal findings on diagnostic imaging of other specified body structures, Arthritis, COPD (chronic obstructive pulmonary disease) (Multi), Coronary artery disease, GERD (gastroesophageal reflux disease), HL (hearing loss), Hyperlipidemia, Hypertension, Joint pain, Nephrolithiasis, Personal history of other medical treatment, and Personal history of other medical treatment.    Surgical History:   He has a past surgical history that includes Other surgical history (01/30/2020); Other surgical history (01/20/2022); Other surgical history (01/20/2022); Other surgical history (06/04/2020); Other surgical history (06/04/2020); Other surgical history (06/04/2020); Other surgical history (06/04/2020); Other surgical history (06/04/2020); Other surgical history (06/04/2020); Other surgical history (06/04/2020); MR angio head wo IV contrast (12/17/2023); MR angio neck wo IV contrast (12/17/2023); MR angio head wo IV contrast (12/18/2023); MR angio neck wo IV contrast (12/18/2023); Cardiac catheterization (N/A, 03/24/2025); Tonsillectomy; Upper gastrointestinal endoscopy; Colonoscopy; ORIF wrist fracture; LASIK; Cystoscopy; Lithotripsy; Bunionectomy; and Coronary artery bypass graft.    Family History:   Family History[1]Family History[2]    Social History:   Tobacco Use: Medium Risk (6/5/2025)    Patient History    • Smoking Tobacco Use: Former    • Smokeless Tobacco Use: Former    • Passive Exposure: Not on file       Outpatient Medications:  Current Medications[3]     Allergies:  Sulfa (sulfonamide antibiotics) and Adhesive tape-silicones       Objective  Vital  Signs:  /82 (BP Location: Left arm, Patient Position: Sitting, BP Cuff Size: Adult)   Pulse 70   Ht 1.829 m (6')   Wt 81.5 kg (179 lb 9.6 oz)   SpO2 96%   BMI 24.36 kg/m²     Physical Exam:  General: no acute distress  HEENT: EOMI  Lungs: Good air movement bilaterally with no wheezing/crackles/rhonchi  Cardiovascular: Regular rhythm without murmurs/rubs/gallops; JVP is normal  No carotid bruits present  Abdomen: Soft  Extremities: 1+ distal pulses BLE.  Neurologic: Alert and oriented x3.    Pertinent Recent Cardiovascular Studies (personally reviewed):  Results  LABS  Creatinine: 1.6  LDL: 39 (03/2025)    DIAGNOSTIC  Echocardiogram: Normal ejection fraction 4/25      Laboratory values:  CMP:  Recent Labs     04/27/25  0602 04/26/25  0451 04/25/25  1424 04/25/25  0434 04/24/25  0519 04/23/25  0319 04/22/25  1420 04/22/25  1223 03/25/25  0720 01/03/22  0744 05/15/20  0424   * 133* 136 135* 135* 138 141  --  139   < > 139   K 4.2 4.0 3.9 4.3 4.5 4.8 4.8  --  4.6   < > 4.5   CL 95* 94* 97* 99 103 108* 111*  --  107   < > 105   CO2 32 31 28 30 26 24 25  --  25   < > 24   ANIONGAP 11 12 15 10 11 11 10  --  12   < > 15   BUN 53* 55* 56* 49* 25* 24* 24*  --  26*   < > 24*   CREATININE 1.45* 1.55* 1.61* 1.66* 1.43* 1.31* 1.32*  --  1.18   < > 1.26   EGFR 50* 46* 44* 43* 51* 57* 56*  --  64   < >  --    MG 2.61* 2.32  --  2.69* 2.34 2.50* 2.95* 3.37*  --   --  1.98    < > = values in this interval not displayed.     Recent Labs     04/27/25  0602 04/26/25  0451 04/25/25  1424 04/25/25  0434 04/24/25  0519 04/22/25  1420 03/25/25  0720 03/24/25  0640 03/23/25  0705 03/21/25  1721 01/05/22  0631 01/04/22  1132   ALBUMIN 3.7 3.8 4.3 3.3* 3.3*   < > 3.8 3.9 3.6 4.7   < > 4.5   ALKPHOS  --   --   --   --   --   --  70 71 67 89  --  89   ALT  --   --   --   --   --   --  20 18 16 18  --  20   AST  --   --   --   --   --   --  28 27 26 32  --  27   BILITOT  --   --   --   --   --   --  0.5 0.5 0.4 0.7  --  0.6    <  > = values in this interval not displayed.     CBC:  Recent Labs     04/27/25  0602 04/26/25  0451 04/25/25  1424 04/25/25  0434 04/24/25  0519 04/23/25  0319 04/22/25  1420 04/22/25  1223 03/25/25  0720   WBC 5.8 6.1 7.8 6.2 7.5 7.5 10.2  --  4.6   HGB 8.8* 8.1* 8.7* 7.4* 8.8* 9.0* 10.3*  --  12.8*   HCT 27.5* 26.0* 27.6* 24.5* 29.7* 29.7* 33.5*  --  41.7    108* 103* 89* 97* 119* 133* 93* 165   MCV 96 97 98 101* 102* 100 99  --  99     COAG:   Recent Labs     04/22/25  1420 04/22/25  1223 04/08/25  0923 03/25/25  1248 01/03/22  0744 05/15/20  0424 05/11/20  1350 03/04/20  1159 12/31/19  0457   INR 1.1 1.7*  --  1.0 1.0 1.1 1.3* 1.4* 1.1   FIBRINOGEN 219 212 389  --   --   --   --   --   --      ABO:   Recent Labs     04/26/25  1008   ABO AB     HEME/ENDO:  Recent Labs     03/25/25  1248 03/25/25  0720 01/17/24  1258 12/05/23  1246 01/03/22  0744   FERRITIN  --  32 74 11*  --    IRONSAT  --   --  21*  --   --    TSH  --  3.65  --   --   --    HGBA1C 6.2*  --   --   --  6.4*      CARDIAC:   Recent Labs     03/21/25  2137 03/21/25  1721 12/28/19  1626   TROPHS 6 6  --    BNP  --   --  268*     Recent Labs     03/22/25  0635 09/22/23  1241 02/09/22  1133   CHOL 91 141 137   HDL 36.6 45.6 41.0   TRIG 75 90 104     Lab Results   Component Value Date    LDLCALC 39 03/22/2025     MICRO:   Susceptibility data from last 90 days.  Collected Specimen Info Organism   04/08/25 Swab from Anterior Nares Methicillin Susceptible Staphylococcus aureus (MSSA)        I have personally reviewed most recent PCP, cardiology, vascular, and/or podiatry documentation.      Assessment/Plan  Assessment & Plan  Coronary artery disease with history of multiple stents and recent coronary artery bypass grafting (CABG)  Post-CABG recovery with activity restrictions. Normal ejection fraction, no significant aortic stenosis. Optimal LDL levels. Smoking cessation achieved. Consider Leqvio if lipoprotein(a) elevated.  - Continue Plavix for one  year post-bypass, refill prescription through [facility] for one year.  - Order lipoprotein(a) test at next lab visit.  - Consider Leqvio if lipoprotein(a) is elevated.  - Continue Zetia 10 mg daily, lisinopril 20 mg daily, rosuvastatin 40 mg daily.  - Maintain activity restrictions: no lifting over 30 pounds, no cutting grass, no swimming.  - Continue oxygen therapy at night and as needed.    Hyperlipidemia  LDL at goal. Consider lipoprotein(a) testing due to early coronary artery disease. Leqvio may allow discontinuation of current therapy. Injectable medications declined.  - Check lipoprotein(a) levels at next lab draw.  - Consider Leqvio if lipoprotein(a) is elevated.  - Continue Zetia 10 mg daily and rosuvastatin 40 mg daily.    Hypertension  Blood pressure well-controlled. Medication adherence confirmed.  - Continue current antihypertensive regimen.    Atrial fibrillation, status post radiofrequency ablation    Chronic kidney disease, unspecified stage  Stable creatinine level of 1.6 mg/dL.    Hepatitis C, status post GI bleed with clipping    Oxygen dependence  Requires supplemental oxygen at night and as needed. Oxygen saturation 96%.    Tobacco use disorder, in remission  Smoking cessation achieved. Spouse continues to smoke.  - Encourage continued smoking cessation.    As stated above, I have personally reviewed and interpreted the following labs:  LDL 39 in 3/25 - continue current meds; Lp(a) to be sent  Cr 1.45 - monitor  Hgb 8.8 post op - monitor    As stated above, I have personally reviewed and interpreted the following vascular studies:  Echo 4/25 - Normal ejection fraction  LHC 3/25: LAD proximal ISR with extrinsic Ca++. Cx stent patent with mild ISR. RCA nondominant.    Schedule for appt in 1 year with preceding FLP and Lp(a).       This medical note was created with the assistance of artificial intelligence (AI) for documentation purposes. The content has been reviewed and confirmed by the [facility]  provider for accuracy and completeness. Patient consented to the use of audio recording and use of AI during their visit.       James Hunt MD, FACC, FSCAI, RPVI  Co-Director, Vascular Center, and  Co-Director, Pulmonary Embolism Response Team,  [facility] [place]                           Associate Professor of Medicine,                                                                [facility] [employer]             [1]  Family History  Problem Relation Name Age of Onset   • No Known Problems Mother     • Other (coronary thrombosis) Father           in 1970s @63   [2]  Family History  Problem Relation Name Age of Onset   • No Known Problems Mother     • Other (coronary thrombosis) Father           in 1970s @63   [3]    Current Outpatient Medications:   •  acetaminophen (Tylenol) 325 mg tablet, Take 2 tablets (650 mg) by mouth every 6 hours if needed for mild pain (1 - 3)., Disp: , Rfl:   •  albuterol (Ventolin HFA) 90 mcg/actuation inhaler, Inhale 2 puffs every 4 hours if needed for wheezing or shortness of breath., Disp: 8 g, Rfl: 11  •  aspirin 81 mg chewable tablet, Chew 1 tablet (81 mg) once daily., Disp: , Rfl:   •  clopidogrel (Plavix) 75 mg tablet, Take 1 tablet (75 mg) by mouth once daily., Disp: 30 tablet, Rfl: 11  •  coenzyme Q-10 100 mg capsule, Take 1 capsule (100 mg) by mouth 2 times a day. Takes two capsules in am and one capsule in pm, Disp: , Rfl:   •  ezetimibe (Zetia) 10 mg tablet, Take 1 tablet (10 mg) by mouth once daily., Disp: 90 tablet, Rfl: 3  •  fluticasone-umeclidin-vilanter (Trelegy Ellipta) 100-62.5-25 mcg blister with device, Inhale 1 puff once daily., Disp: 60 each, Rfl: 2  •  ipratropium-albuteroL (Duo-Neb) 0.5-2.5 mg/3 mL nebulizer solution, Take 3 mL by nebulization every 6 hours if needed for wheezing., Disp: 180 mL, Rfl: 11  •  lisinopril 20 mg tablet, Take 1 tablet (20 mg) by mouth once daily. Stop 25,  Disp: 30 tablet, Rfl: 0  •  metoprolol succinate XL (Toprol-XL) 50 mg 24 hr tablet, Take 1 tablet (50 mg) by mouth once daily., Disp: 90 tablet, Rfl: 3  •  multivitamin with minerals iron-free (Centrum Silver), Take 1 tablet by mouth once daily., Disp: , Rfl:   •  nicotine (Nicoderm CQ) 21 mg/24 hr patch, Place 1 patch on the skin once daily., Disp: , Rfl:   •  omeprazole (PriLOSEC) 40 mg DR capsule, Take 1 capsule (40 mg) by mouth once daily in the morning. Take before meals., Disp: , Rfl:   •  oxygen (O2) gas therapy, Inhale 3 L/min continuously., Disp: , Rfl:   •  probenecid-colchicine 500-0.5 mg tablet, Take 1 tablet by mouth twice a day., Disp: , Rfl:   •  rosuvastatin (Crestor) 40 mg tablet, Take 1 tablet (40 mg) by mouth once daily., Disp: 90 tablet, Rfl: 3  •  traZODone (Desyrel) 50 mg tablet, Take 1 tablet (50 mg) by mouth as needed at bedtime for sleep for up to 5 days., Disp: 5 tablet, Rfl: 0  •  TURMERIC ORAL, Take 1,500 mg by mouth 2 times a day., Disp: , Rfl:   •  predniSONE (Deltasone) 20 mg tablet, *FOR EMERGENCY USE ONLY*  Take 2 tablets (40mg) by mouth once daily for 5 days, Disp: 10 tablet, Rfl: 0       [1]  Family History  Problem Relation Name Age of Onset   • No Known Problems Mother     • Other (coronary thrombosis) Father           in 1970s @63   [2]  Family History  Problem Relation Name Age of Onset   • No Known Problems Mother     • Other (coronary thrombosis) Father           in 1970s @63   [3]    Current Outpatient Medications:   •  acetaminophen (Tylenol) 325 mg tablet, Take 2 tablets (650 mg) by mouth every 6 hours if needed for mild pain (1 - 3)., Disp: , Rfl:   •  albuterol (Ventolin HFA) 90 mcg/actuation inhaler, Inhale 2 puffs every 4 hours if needed for wheezing or shortness of breath., Disp: 8 g, Rfl: 11  •  aspirin 81 mg chewable tablet, Chew 1 tablet (81 mg) once daily., Disp: , Rfl:   •  clopidogrel (Plavix) 75 mg tablet, Take 1 tablet (75 mg) by mouth once daily.,  Disp: 30 tablet, Rfl: 11  •  coenzyme Q-10 100 mg capsule, Take 1 capsule (100 mg) by mouth 2 times a day. Takes two capsules in am and one capsule in pm, Disp: , Rfl:   •  ezetimibe (Zetia) 10 mg tablet, Take 1 tablet (10 mg) by mouth once daily., Disp: 90 tablet, Rfl: 3  •  fluticasone-umeclidin-vilanter (Trelegy Ellipta) 100-62.5-25 mcg blister with device, Inhale 1 puff once daily., Disp: 60 each, Rfl: 2  •  ipratropium-albuteroL (Duo-Neb) 0.5-2.5 mg/3 mL nebulizer solution, Take 3 mL by nebulization every 6 hours if needed for wheezing., Disp: 180 mL, Rfl: 11  •  lisinopril 20 mg tablet, Take 1 tablet (20 mg) by mouth once daily. Stop 4/19/25, Disp: 30 tablet, Rfl: 0  •  metoprolol succinate XL (Toprol-XL) 50 mg 24 hr tablet, Take 1 tablet (50 mg) by mouth once daily., Disp: 90 tablet, Rfl: 3  •  multivitamin with minerals iron-free (Centrum Silver), Take 1 tablet by mouth once daily., Disp: , Rfl:   •  nicotine (Nicoderm CQ) 21 mg/24 hr patch, Place 1 patch on the skin once daily., Disp: , Rfl:   •  omeprazole (PriLOSEC) 40 mg DR capsule, Take 1 capsule (40 mg) by mouth once daily in the morning. Take before meals., Disp: , Rfl:   •  oxygen (O2) gas therapy, Inhale 3 L/min continuously., Disp: , Rfl:   •  probenecid-colchicine 500-0.5 mg tablet, Take 1 tablet by mouth twice a day., Disp: , Rfl:   •  rosuvastatin (Crestor) 40 mg tablet, Take 1 tablet (40 mg) by mouth once daily., Disp: 90 tablet, Rfl: 3  •  traZODone (Desyrel) 50 mg tablet, Take 1 tablet (50 mg) by mouth as needed at bedtime for sleep for up to 5 days., Disp: 5 tablet, Rfl: 0  •  TURMERIC ORAL, Take 1,500 mg by mouth 2 times a day., Disp: , Rfl:   •  predniSONE (Deltasone) 20 mg tablet, *FOR EMERGENCY USE ONLY*  Take 2 tablets (40mg) by mouth once daily for 5 days, Disp: 10 tablet, Rfl: 0

## 2025-06-05 NOTE — PATIENT INSTRUCTIONS
VISIT SUMMARY:  You had a follow-up appointment today after your coronary artery bypass grafting surgery. You are recovering well and have been cleared to start driving. Your current medications were reviewed, and you will continue with your current regimen. You also discussed switching your medication refills to [facility]. Your blood pressure was high today but has been lower in previous visits. You are doing well with your heart-healthy diet and smoking cessation.    YOUR PLAN:  -CORONARY ARTERY DISEASE WITH HISTORY OF MULTIPLE STENTS AND RECENT CORONARY ARTERY BYPASS GRAFTING (CABG): Coronary artery disease is a condition where the blood vessels supplying the heart are narrowed or blocked. You are recovering well from your recent bypass surgery. Continue taking Plavix for one year, and we will refill your prescription through [facility]. We will check your lipoprotein(a) levels at your next lab visit and consider a new medication, Leqvio, if needed. Continue taking Zetia, lisinopril, and rosuvastatin as prescribed. Maintain your activity restrictions and continue using oxygen therapy at night and as needed.    -HYPERLIPIDEMIA: Hyperlipidemia means having high levels of fats in your blood. Your LDL levels are at the goal. We will check your lipoprotein(a) levels at your next lab visit and consider Leqvio if they are elevated. Continue taking Zetia and rosuvastatin as prescribed.    -HYPERTENSION: Hypertension is high blood pressure. Your blood pressure is well-controlled with your current medications. Continue taking your antihypertensive medications as prescribed.    -ATRIAL FIBRILLATION, STATUS POST RADIOFREQUENCY ABLATION: Atrial fibrillation is an irregular and often rapid heart rate. You have had a procedure to correct this, and no new issues were noted today.    -CHRONIC KIDNEY DISEASE, UNSPECIFIED STAGE: Chronic kidney disease means your kidneys are not working as well as they should. Your creatinine level is stable at  1.6 mg/dL, and no changes are needed at this time.    -HEPATITIS C, STATUS POST GI BLEED WITH CLIPPING: Hepatitis C is a liver infection caused by the hepatitis C virus. You have had a gastrointestinal bleed in the past that was treated successfully, and no new issues were noted today.    -OXYGEN DEPENDENCE: You need supplemental oxygen at night and as needed during the day. Your oxygen saturation is 96%, which is good. Continue using your oxygen as prescribed.    -TOBACCO USE DISORDER, IN REMISSION: You have successfully quit smoking, which is excellent for your heart health. Continue to avoid smoking, even though your spouse continues to smoke.    INSTRUCTIONS:  Please continue with your current medications and activity restrictions. We will check your lipoprotein(a) levels at your next lab visit. If you need any medication refills, they will now be processed through [facility]. Continue using oxygen therapy as needed and maintain your heart-healthy diet. Follow up with us as scheduled for your next appointment.

## 2025-06-05 NOTE — PROGRESS NOTES
"PCP: Magno Babb MD      Subjective   History of Present Illness  Rafi Lisa Jr. is a 75 year old male with coronary artery disease who presents for follow-up after coronary artery bypass grafting.    He underwent coronary artery bypass grafting on April 22, 2025, due to severe blockage in the left anterior descending artery and \"crystallization\" of a stent. Underwent LIMA-LAD, VG-RI/OM2 4/22/25.  Post-surgery, he is \"still alive and well\" and have been cleared to start driving as of last month. He continues to use oxygen at night and as needed during the day.    Current medications include aspirin, Plavix, Zetia 10 mg, lisinopril 20 mg once daily, and rosuvastatin.     Medical history includes coronary artery disease with multiple stents, the first placed in 1998, a laser-assisted PCI in January 2022, and a Watchman placement in May 2020. He had atrial fibrillation treated with radiofrequency ablation in December 2019 and chronic kidney disease with a creatinine level of 1.6. He also have a history of hepatitis C and a gastrointestinal bleed, status post clipping with push endoscopy.    He has a history of smoking but quit in March 2025, a month before surgery, and have been using nicotine patches since. 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 He reports losing weight and are focusing on a heart-healthy diet, reducing red meat intake and increasing consumption of chicken and fish.    No chest pain, shortness of breath, or palpitations. Blood pressure was high during the visit but lower in a previous visit.    Review of Systems:  Otherwise, limited cardiovascular review of systems is negative.    Past Medical History:  He has a past medical history of Abnormal findings on diagnostic imaging of other specified body structures, Arthritis, COPD (chronic obstructive pulmonary disease) (Multi), Coronary artery disease, GERD (gastroesophageal reflux disease), HL " (hearing loss), Hyperlipidemia, Hypertension, Joint pain, Nephrolithiasis, Personal history of other medical treatment, and Personal history of other medical treatment.    Surgical History:   He has a past surgical history that includes Other surgical history (01/30/2020); Other surgical history (01/20/2022); Other surgical history (01/20/2022); Other surgical history (06/04/2020); Other surgical history (06/04/2020); Other surgical history (06/04/2020); Other surgical history (06/04/2020); Other surgical history (06/04/2020); Other surgical history (06/04/2020); Other surgical history (06/04/2020); MR angio head wo IV contrast (12/17/2023); MR angio neck wo IV contrast (12/17/2023); MR angio head wo IV contrast (12/18/2023); MR angio neck wo IV contrast (12/18/2023); Cardiac catheterization (N/A, 03/24/2025); Tonsillectomy; Upper gastrointestinal endoscopy; Colonoscopy; ORIF wrist fracture; LASIK; Cystoscopy; Lithotripsy; Bunionectomy; and Coronary artery bypass graft.    Family History:   Family History[1]Family History[2]    Social History:   Tobacco Use: Medium Risk (6/5/2025)    Patient History     Smoking Tobacco Use: Former     Smokeless Tobacco Use: Former     Passive Exposure: Not on file       Outpatient Medications:  Current Medications[3]     Allergies:  Sulfa (sulfonamide antibiotics) and Adhesive tape-silicones       Objective   Vital Signs:  /82 (BP Location: Left arm, Patient Position: Sitting, BP Cuff Size: Adult)   Pulse 70   Ht 1.829 m (6')   Wt 81.5 kg (179 lb 9.6 oz)   SpO2 96%   BMI 24.36 kg/m²     Physical Exam:  General: no acute distress  HEENT: EOMI  Lungs: Good air movement bilaterally with no wheezing/crackles/rhonchi  Cardiovascular: Regular rhythm without murmurs/rubs/gallops; JVP is normal  No carotid bruits present  Abdomen: Soft  Extremities: 1+ distal pulses BLE.  Neurologic: Alert and oriented x3.    Pertinent Recent Cardiovascular Studies (personally  reviewed):  Results  LABS  Creatinine: 1.6  LDL: 39 (03/2025)    DIAGNOSTIC  Echocardiogram: Normal ejection fraction 4/25      Laboratory values:  CMP:  Recent Labs     04/27/25  0602 04/26/25  0451 04/25/25  1424 04/25/25  0434 04/24/25  0519 04/23/25  0319 04/22/25  1420 04/22/25  1223 03/25/25  0720 01/03/22  0744 05/15/20  0424   * 133* 136 135* 135* 138 141  --  139   < > 139   K 4.2 4.0 3.9 4.3 4.5 4.8 4.8  --  4.6   < > 4.5   CL 95* 94* 97* 99 103 108* 111*  --  107   < > 105   CO2 32 31 28 30 26 24 25  --  25   < > 24   ANIONGAP 11 12 15 10 11 11 10  --  12   < > 15   BUN 53* 55* 56* 49* 25* 24* 24*  --  26*   < > 24*   CREATININE 1.45* 1.55* 1.61* 1.66* 1.43* 1.31* 1.32*  --  1.18   < > 1.26   EGFR 50* 46* 44* 43* 51* 57* 56*  --  64   < >  --    MG 2.61* 2.32  --  2.69* 2.34 2.50* 2.95* 3.37*  --   --  1.98    < > = values in this interval not displayed.     Recent Labs     04/27/25  0602 04/26/25  0451 04/25/25  1424 04/25/25  0434 04/24/25  0519 04/22/25  1420 03/25/25  0720 03/24/25  0640 03/23/25  0705 03/21/25  1721 01/05/22  0631 01/04/22  1132   ALBUMIN 3.7 3.8 4.3 3.3* 3.3*   < > 3.8 3.9 3.6 4.7   < > 4.5   ALKPHOS  --   --   --   --   --   --  70 71 67 89  --  89   ALT  --   --   --   --   --   --  20 18 16 18  --  20   AST  --   --   --   --   --   --  28 27 26 32  --  27   BILITOT  --   --   --   --   --   --  0.5 0.5 0.4 0.7  --  0.6    < > = values in this interval not displayed.     CBC:  Recent Labs     04/27/25  0602 04/26/25  0451 04/25/25  1424 04/25/25  0434 04/24/25  0519 04/23/25  0319 04/22/25  1420 04/22/25  1223 03/25/25  0720   WBC 5.8 6.1 7.8 6.2 7.5 7.5 10.2  --  4.6   HGB 8.8* 8.1* 8.7* 7.4* 8.8* 9.0* 10.3*  --  12.8*   HCT 27.5* 26.0* 27.6* 24.5* 29.7* 29.7* 33.5*  --  41.7    108* 103* 89* 97* 119* 133* 93* 165   MCV 96 97 98 101* 102* 100 99  --  99     COAG:   Recent Labs     04/22/25  1420 04/22/25  1223 04/08/25  0923 03/25/25  1248 01/03/22  0744  05/15/20  0424 05/11/20  1350 03/04/20  1159 12/31/19  0457   INR 1.1 1.7*  --  1.0 1.0 1.1 1.3* 1.4* 1.1   FIBRINOGEN 219 212 389  --   --   --   --   --   --      ABO:   Recent Labs     04/26/25  1008   ABO AB     HEME/ENDO:  Recent Labs     03/25/25  1248 03/25/25  0720 01/17/24  1258 12/05/23  1246 01/03/22  0744   FERRITIN  --  32 74 11*  --    IRONSAT  --   --  21*  --   --    TSH  --  3.65  --   --   --    HGBA1C 6.2*  --   --   --  6.4*      CARDIAC:   Recent Labs     03/21/25  2137 03/21/25  1721 12/28/19  1626   TROPHS 6 6  --    BNP  --   --  268*     Recent Labs     03/22/25  0635 09/22/23  1241 02/09/22  1133   CHOL 91 141 137   HDL 36.6 45.6 41.0   TRIG 75 90 104     Lab Results   Component Value Date    LDLCALC 39 03/22/2025     MICRO:   Susceptibility data from last 90 days.  Collected Specimen Info Organism   04/08/25 Swab from Anterior Nares Methicillin Susceptible Staphylococcus aureus (MSSA)        I have personally reviewed most recent PCP, cardiology, vascular, and/or podiatry documentation.      Assessment/Plan   Assessment & Plan  Coronary artery disease with history of multiple stents and recent coronary artery bypass grafting (CABG)  Post-CABG recovery with activity restrictions. Normal ejection fraction, no significant aortic stenosis. Optimal LDL levels. Smoking cessation achieved. Consider Leqvio if lipoprotein(a) elevated.  - Continue Plavix for one year post-bypass, refill prescription through [facility] for one year.  - Order lipoprotein(a) test at next lab visit.  - Consider Leqvio if lipoprotein(a) is elevated.  - Continue Zetia 10 mg daily, lisinopril 20 mg daily, rosuvastatin 40 mg daily.  - Maintain activity restrictions: no lifting over 30 pounds, no cutting grass, no swimming.  - Continue oxygen therapy at night and as needed.    Hyperlipidemia  LDL at goal. Consider lipoprotein(a) testing due to early coronary artery disease. Leqvio may allow discontinuation of current therapy.  Injectable medications declined.  - Check lipoprotein(a) levels at next lab draw.  - Consider Leqvio if lipoprotein(a) is elevated.  - Continue Zetia 10 mg daily and rosuvastatin 40 mg daily.    Hypertension  Blood pressure well-controlled. Medication adherence confirmed.  - Continue current antihypertensive regimen.    Atrial fibrillation, status post radiofrequency ablation    Chronic kidney disease, unspecified stage  Stable creatinine level of 1.6 mg/dL.    Hepatitis C, status post GI bleed with clipping    Oxygen dependence  Requires supplemental oxygen at night and as needed. Oxygen saturation 96%.    Tobacco use disorder, in remission  Smoking cessation achieved. Spouse continues to smoke.  - Encourage continued smoking cessation.    As stated above, I have personally reviewed and interpreted the following labs:  LDL 39 in 3/25 - continue current meds; Lp(a) to be sent  Cr 1.45 - monitor  Hgb 8.8 post op - monitor    As stated above, I have personally reviewed and interpreted the following vascular studies:  Echo 4/25 - Normal ejection fraction  LHC 3/25: LAD proximal ISR with extrinsic Ca++. Cx stent patent with mild ISR. RCA nondominant.    Schedule for appt in 1 year with preceding FLP and Lp(a).       This medical note was created with the assistance of artificial intelligence (AI) for documentation purposes. The content has been reviewed and confirmed by the healthcare provider for accuracy and completeness. Patient consented to the use of audio recording and use of AI during their visit.       James Hunt MD, FACC, FSCAI, RPVI  Co-Director, Vascular Center, and  Co-Director, Pulmonary Embolism Response Team,  [facility] [place]                           Associate Professor of Medicine,                                                                [employer]             [1]   Family History  Problem Relation Name Age of Onset     No Known Problems Mother      Other (coronary thrombosis) Father           in 1970s @63   [2]   Family History  Problem Relation Name Age of Onset    No Known Problems Mother      Other (coronary thrombosis) Father           in 1970s @63   [3]   Current Outpatient Medications:     acetaminophen (Tylenol) 325 mg tablet, Take 2 tablets (650 mg) by mouth every 6 hours if needed for mild pain (1 - 3)., Disp: , Rfl:     albuterol (Ventolin HFA) 90 mcg/actuation inhaler, Inhale 2 puffs every 4 hours if needed for wheezing or shortness of breath., Disp: 8 g, Rfl: 11    aspirin 81 mg chewable tablet, Chew 1 tablet (81 mg) once daily., Disp: , Rfl:     clopidogrel (Plavix) 75 mg tablet, Take 1 tablet (75 mg) by mouth once daily., Disp: 30 tablet, Rfl: 11    coenzyme Q-10 100 mg capsule, Take 1 capsule (100 mg) by mouth 2 times a day. Takes two capsules in am and one capsule in pm, Disp: , Rfl:     ezetimibe (Zetia) 10 mg tablet, Take 1 tablet (10 mg) by mouth once daily., Disp: 90 tablet, Rfl: 3    fluticasone-umeclidin-vilanter (Trelegy Ellipta) 100-62.5-25 mcg blister with device, Inhale 1 puff once daily., Disp: 60 each, Rfl: 2    ipratropium-albuteroL (Duo-Neb) 0.5-2.5 mg/3 mL nebulizer solution, Take 3 mL by nebulization every 6 hours if needed for wheezing., Disp: 180 mL, Rfl: 11    lisinopril 20 mg tablet, Take 1 tablet (20 mg) by mouth once daily. Stop 25, Disp: 30 tablet, Rfl: 0    metoprolol succinate XL (Toprol-XL) 50 mg 24 hr tablet, Take 1 tablet (50 mg) by mouth once daily., Disp: 90 tablet, Rfl: 3    multivitamin with minerals iron-free (Centrum Silver), Take 1 tablet by mouth once daily., Disp: , Rfl:     nicotine (Nicoderm CQ) 21 mg/24 hr patch, Place 1 patch on the skin once daily., Disp: , Rfl:     omeprazole (PriLOSEC) 40 mg DR capsule, Take 1 capsule (40 mg) by mouth once daily in the morning. Take before meals., Disp: , Rfl:     oxygen (O2) gas therapy, Inhale 3 L/min continuously., Disp: ,  Rfl:     probenecid-colchicine 500-0.5 mg tablet, Take 1 tablet by mouth twice a day., Disp: , Rfl:     rosuvastatin (Crestor) 40 mg tablet, Take 1 tablet (40 mg) by mouth once daily., Disp: 90 tablet, Rfl: 3    traZODone (Desyrel) 50 mg tablet, Take 1 tablet (50 mg) by mouth as needed at bedtime for sleep for up to 5 days., Disp: 5 tablet, Rfl: 0    TURMERIC ORAL, Take 1,500 mg by mouth 2 times a day., Disp: , Rfl:     predniSONE (Deltasone) 20 mg tablet, *FOR EMERGENCY USE ONLY*  Take 2 tablets (40mg) by mouth once daily for 5 days, Disp: 10 tablet, Rfl: 0

## 2025-06-08 LAB
ATRIAL RATE: 63 BPM
P AXIS: 49 DEGREES
P OFFSET: 176 MS
P ONSET: 114 MS
PR INTERVAL: 196 MS
Q ONSET: 212 MS
QRS COUNT: 10 BEATS
QRS DURATION: 90 MS
QT INTERVAL: 400 MS
QTC CALCULATION(BAZETT): 409 MS
QTC FREDERICIA: 406 MS
R AXIS: -39 DEGREES
T AXIS: 56 DEGREES
T OFFSET: 412 MS
VENTRICULAR RATE: 63 BPM

## 2025-06-12 ENCOUNTER — CLINICAL SUPPORT (OUTPATIENT)
Dept: CARDIAC REHAB | Facility: HOSPITAL | Age: 75
End: 2025-06-12
Payer: COMMERCIAL

## 2025-06-12 VITALS — HEIGHT: 72 IN | WEIGHT: 179 LBS | BODY MASS INDEX: 24.24 KG/M2

## 2025-06-12 DIAGNOSIS — Z95.1 S/P CABG (CORONARY ARTERY BYPASS GRAFT): ICD-10-CM

## 2025-06-12 DIAGNOSIS — E78.2 MIXED HYPERLIPIDEMIA: ICD-10-CM

## 2025-06-12 ASSESSMENT — DUKE ACTIVITY SCORE INDEX (DASI)
CAN YOU PARTICIPATE IN STRENOUS SPORTS LIKE SWIMMING, SINGLES TENNIS, FOOTBALL, BASKETBALL, OR SKIING: NO
CAN YOU DO HEAVY WORK AROUND THE HOUSE LIKE SCRUBBING FLOORS OR LIFTING AND MOVING HEAVY FURNITURE: NO
CAN YOU RUN A SHORT DISTANCE: NO
CAN YOU PARTICIPATE IN MODERATE RECREATIONAL ACTIVITIES LIKE GOLF, BOWLING, DANCING, DOUBLES TENNIS OR THROWING A BASEBALL OR FOOTBALL: NO
CAN YOU WALK INDOORS, SUCH AS AROUND YOUR HOUSE: YES
CAN YOU DO YARD WORK LIKE RAKING LEAVES, WEEDING OR PUSHING A MOWER: NO
DASI METS SCORE: 5.1
CAN YOU DO LIGHT WORK AROUND THE HOUSE LIKE DUSTING OR WASHING DISHES: YES
CAN YOU DO MODERATE WORK AROUND THE HOUSE LIKE VACUUMING, SWEEPING FLOORS OR CARRYING GROCERIES: YES
CAN YOU WALK A BLOCK OR TWO ON LEVEL GROUND: YES
CAN YOU TAKE CARE OF YOURSELF (EAT, DRESS, BATHE, OR USE TOILET): YES
CAN YOU CLIMB A FLIGHT OF STAIRS OR WALK UP A HILL: YES
CAN YOU HAVE SEXUAL RELATIONS: NO
TOTAL_SCORE: 18.95

## 2025-06-12 NOTE — PROGRESS NOTES
Cardiac Rehabilitation Individual Treatment Plan  -  Initial Treatment Plan    Name: Rafi Lisa Jr.  Medical Record Number: [id number]  YOB: 1950  Age: 75 y.o.    Today’s Date: 6/12/2025  Primary Care Physician: Magno Babb MD  Referring Physician: Dr. James Hunt MD -Cardiology,  Pk Kline MD- Surgeon  Program Location: [facility] CARDIAC REHAB     General  Primary Diagnosis: CABG  1. S/P CABG (coronary artery bypass graft)  Referral to Cardiac Rehab         Onset/Date of Diagnosis: 4/22/2025     CR/W[place]/VR Session # attended:  Initial Assessment, not yet started program.    AACVPR Risk Stratification: Low     Falls Risk:   MODERATE      Psychosocial Assessment     Pre PH-Q 9 survey score: Survey given. Pending completion and return from patient.  Post PH-Q 9 survey score: To be done at discharge.    Sent PH-Q 9 to MD if score > 20: Survey not yet completed.      Pt reported/currently experiencing stress: Yes  Patient uses stress management skills: No   History of: no history of anxiety or depression  Currently seeing a mental health provider: No, Denies  Social Support: Yes, Whom:  Wife  Quality of Life Survey: SF-36   SF-36 Pre Post   Physical Functioning Score TBD TBD   Role Limits-Physical Score TBD TBD   General Health Score TBD TBD     Knowledge Assessment/Survey  Pre survey score: Survey given. Pending completion and return from patient.  Post survey score: To be done at discharge.    Learning Assessment:  Learning assessment/barriers: None, Denies  Preferred learning method: Auditory, Visual, and Writing handout  Barriers: None, Denies  Comments: Readiness to Learn:  Ready and Willing to learn    Stages of Change:  Preparation    Psychosocial Plan    Goal Status: Initial Assessment; goals not yet started    Psychosocial Goals:   Maintain or improve PHQ-9 Survey score by completion of program.  Maintain or improve Post SF-36 Quality of Life Survey by completion of  "program.    Psychosocial Interventions/Education:   TO BE COMPLETED DURING CARDIAC REHAB PROGRAM     AMB CR/SD/VR Psychosocial  -  Initial Assessment:  Program not yet started      Nutrition Assessment:    Hyperlipidemia: Yes     Lipids:   Lab Results   Component Value Date    CHOL 91 03/22/2025    HDL 36.6 03/22/2025    LDLF 77 09/22/2023    TRIG 75 03/22/2025       Current Dietary Guidelines: Pt states he is currently following a REGULAR diet  Barriers to dietary change: States, \"Difficulty with eating late at night/cravings\"    Diet Habit Survey: [place] Dietary Assessment.  Pre survey score: Initial survey given. Pending completion and return from patient.  Post survey score: To be done at discharge.    Diabetes Assessment    Lab Results   Component Value Date    HGBA1C 6.2 (H) 03/25/2025       History of Diabetes: No    Weight Management  Interview 6/12/25 (over phone)  Height: 182.9 cm (6')  Weight: 81.2 kg (179 lb) (stated per pt during initial phone interview)  BMI (Calculated): 24.27    PRE- Wt:    TBD               PRE-BMI:  TBD  PRE Waist Cir:  TBD upon completion first session       POST- Wt:    TBD             POST- BMI:  TBD  POST Waist Cir:  To be done at discharge    Nutrition Plan    Goal Status: Initial Assessment; goals not yet started    Nutrition Goals:   Achieve a score of 80% or greater on Post [place] Dietary Assessment.  Attend at least 50% or more of Education classes provided by completion of program.  Meet with Dietitian for Outpatient Nutrition Therapy by completion of program.    Nutrition Interventions/Education:   AHA handout \"What is Angina\"? And \"What is Cardiac Rehabilitation\"?  Lipid profile reviewed during initial phone interview.  Provide referral for Outpatient Nutrition Therapy with Dietitian on first day.     AMB CR/SD/VR Nutrition  -   Initial Assessment:   Program not yet started      Exercise Assessment    Current Home Exercise:  None prior to program start  Mode: " "SEDENTARY  Frequency: 0 days/week  Duration: 0 min/day    Exercise Prescription     Exercise Prescription based on: Duke Activity Status Index (DASI) and Health History    DASI Score: 18.95   MET Score: 5.1 (score divided by 2 = 2.5)     Frequency:  3 days/week   Mode: Treadmill, NuStep /NuStep (T), Airdyne, Arm Ergometer, Recumbent Bike   Duration: 30-45 total aerobic minutes   Intensity: RPE 11-15  Target HR:  Rest +30, To be calculated after 6-12 attended sessions  MET Level: 3.7 Max starting    Patient wears supplemental O2: Yes   O2 Flow Rate: 2 to 4 L/min, nasal cannula continuous as needed for shortness of breath  SpO2 Range: 91 to 100 %     Modality Workload METs Duration (minutes)   1   Pre-Exercise /Rest -- -- 5 :00   2   NuStep 4000 34 gomez, load 2  2.1 10 :00   3   NuStep T5 14 gomez, load 2  2.1 10 :00   4   Treadmill Walk 0.6 mph, 0% incline  1.5 10 :00   5   Airdyne 0.2 load, 10-15 Rpm  1.4 10 :00   6   Arm Ergometer 14 gomez, level 1 1.9 10 :00   7   Recumbent Bike 1 Load, 45 Rpm 1.3 10 :00   8   Post-Exercise /Rest -- -- 5 :00     Resistance Training: No   Home Exercise Prescription given: To be given prior to discharge from program.    Exercise Plan    Goal Status: Initial Assessment; goals not yet started    Exercise Goals:   150 min/week of moderate intensity aerobic exercise by completion of program.  Exercise Frequency 5-7 days a week by completion of program.    Exercise Progression:    TO INCREASE MET LEVEL BY 5-10% EACH WEEK  TO INCREASE TOTAL EXERCISE DURATION TO 30-45 MINS  Pt will not walk on treadmill for safety, has Lt knee dislocation Hx with \"Knee giving out at times\" stated by patient    Exercise Interventions/Education:   TO BE COMPLETED DURING CARDIAC REHAB PROGRAM    [facility] AMB CR/MO/VR Exercise  -  Initial Assessment:   Program not yet started        Other Core Components/Risk Factor Assessment:    Medication adherence    Current Medications:   Acetamnophen (Tylenol) 650mg Every 6 " hrs as needed (Pain 1-3), Albuterol (Ventolin HFA) 90mcg/act 2 puffs inhaler Every 4 hrs as needed (Shortness of breath), Aspirin 81mg Daily, Clopidogrel 75mg Daily, Ezetimibe (Zetia) 10mg Daily, Fluticasone-umeclidin-vilanter (Trelegy Ellipta) 100-62.5-25mcg Blister Inhalation Daily, Ipratropium-Albuterol (DuoNeb) 0.5-25mg/3ml Nebulizer every 6 hrs as needed (wheezing), Lisinopril 20mg Daily, Metoprolol Succinate XL (Toprol XL) 50mg Daily, MultiVitamin w/Minerals 1 tab Twice a Day, Nicoderm CQ 14mg Transderm patch Daily, Nicoderm CQ 7mg Transderm patch Daily (21mg total dose daily), Omeprazole 40 Daily, Probenid-Colchicine 500-0.5mg Twice a day, Rosuvastatin 40mg Daily, Tumeric OTC 1500mg Twice a Day, Co-enzyme Q-10 OTC 100mg Twice a Day, Oxygen 2-4 l/min nasal cannula    ALLERGIES:  Sulfa : Hives  Adhesive Tape-Silicones : Rash                 Medication compliance: Yes   Uses pill box/organizer: Yes    Carries medication list: Yes     Blood Pressure Management  History of Hypertension: Yes   Medication Changes: No   Resting BP:  There were no vitals taken for this visit.   Last recorded Vitals documented at cardiology office visit 6/5/25    BP   140 /82    Heart Failure Management  Hx of Heart Failure: No  Most recent EF:   63% (TTE 4/25/25)      Smoking/Tobacco Assessment  Tobacco Use History[1]  Quit Date:  4/22/25 Verified w/pt         Recent Quit: < 6 months      # cigarettes smoked per day: Hx- 1 ppd  x 64 yrs  Other forms of tobacco:  No, Denies          Anyone in the home smoke: Yes, Wife      Other Core Component Plan    Goal Status: Initial Assessment; goals not yet started    Other Core Component Goals:   Medication compliance established during initial phone interview.  Pt carries updated medication list with them at all times  To remain tobacco free while enrolled in program  Resting BP <130/80 by completion of program    Other Core Component Interventions/Education:   Patient given AHA educational  "handout \"How Do I Manage My Medications\"?  Resting BP readings taken pre and post exercise at each session.  Patient given \"How to Be a Quitter\" Smoking cessation booklet.  Ask if pt remains tobacco free weekly.     AMB CR/MA/VR Other Core Component  -  Initial Assessment:  Program not yet started      Individual Patient Goals:    Establish exercise routine 3-5 days/week, 30-60 min/day by completion of program.  Goal Status: Initial Assessment; goals not yet started    Meet with Dietitian for Outpatient Nutrition Therapy by completion of program.  Goal Status: Initial Assessment; goals not yet started    To have Decreased shortness of breath with ADL's while walking longer distances and grocery shopping by completion of program.  Goal Status: Initial Assessment; goals not yet started      Staff Comments:  Scheduled to begin program.    Rehab Staff Signature: Arlin Moyer RN    PHYSICIAN REVIEW AND RESPONSE:  Please check appropriate boxes and sign     __X_ The participant may enroll in the Cardiac Rehabilitation Program with the above individualized treatment plan (ITP)  ____ Defer exercise guidelines and outcomes per department policy and protocol  ____ Make the following changes to the ITP/Exercise Prescription:         [1]   Social History  Tobacco Use   Smoking Status Former    Current packs/day: 1.00    Average packs/day: 1 pack/day for 64.0 years (64.0 ttl pk-yrs)    Types: Cigarettes   Smokeless Tobacco Former     "

## 2025-06-13 DIAGNOSIS — F17.200 SMOKER: ICD-10-CM

## 2025-06-13 DIAGNOSIS — I48.91 ATRIAL FIBRILLATION, UNSPECIFIED TYPE (MULTI): Primary | ICD-10-CM

## 2025-06-16 DIAGNOSIS — I10 ESSENTIAL HYPERTENSION, BENIGN: ICD-10-CM

## 2025-06-17 ENCOUNTER — APPOINTMENT (OUTPATIENT)
Dept: CARDIAC REHAB | Facility: HOSPITAL | Age: 75
End: 2025-06-17
Payer: COMMERCIAL

## 2025-06-21 RX ORDER — LISINOPRIL 20 MG/1
20 TABLET ORAL DAILY
Qty: 90 TABLET | Refills: 3 | Status: SHIPPED | OUTPATIENT
Start: 2025-06-21 | End: 2026-06-21

## 2025-06-21 RX ORDER — IBUPROFEN 200 MG
1 TABLET ORAL EVERY 24 HOURS
Qty: 30 PATCH | Refills: 0 | Status: SHIPPED | OUTPATIENT
Start: 2025-06-21 | End: 2025-07-21

## 2025-06-23 ENCOUNTER — CLINICAL SUPPORT (OUTPATIENT)
Dept: CARDIAC REHAB | Facility: HOSPITAL | Age: 75
End: 2025-06-23
Payer: COMMERCIAL

## 2025-06-23 VITALS
OXYGEN SATURATION: 97 % | DIASTOLIC BLOOD PRESSURE: 67 MMHG | HEIGHT: 72 IN | SYSTOLIC BLOOD PRESSURE: 123 MMHG | WEIGHT: 186.6 LBS | BODY MASS INDEX: 25.27 KG/M2

## 2025-06-23 DIAGNOSIS — Z95.1 S/P CABG (CORONARY ARTERY BYPASS GRAFT): ICD-10-CM

## 2025-06-23 PROCEDURE — 93798 PHYS/QHP OP CAR RHAB W/ECG: CPT | Performed by: INTERNAL MEDICINE

## 2025-06-24 RX ORDER — AMIODARONE HYDROCHLORIDE 200 MG/1
200 TABLET ORAL DAILY
Qty: 30 TABLET | Refills: 0 | Status: SHIPPED | OUTPATIENT
Start: 2025-06-24

## 2025-06-24 RX ORDER — FUROSEMIDE 20 MG/1
20 TABLET ORAL DAILY
Qty: 30 TABLET | Refills: 0 | Status: SHIPPED | OUTPATIENT
Start: 2025-06-24 | End: 2025-07-24

## 2025-06-25 ENCOUNTER — CLINICAL SUPPORT (OUTPATIENT)
Dept: CARDIAC REHAB | Facility: HOSPITAL | Age: 75
End: 2025-06-25
Payer: COMMERCIAL

## 2025-06-25 DIAGNOSIS — Z95.1 S/P CABG (CORONARY ARTERY BYPASS GRAFT): ICD-10-CM

## 2025-06-25 PROCEDURE — 93798 PHYS/QHP OP CAR RHAB W/ECG: CPT | Performed by: INTERNAL MEDICINE

## 2025-06-27 ENCOUNTER — TELEPHONE (OUTPATIENT)
Dept: CARDIOLOGY | Facility: CLINIC | Age: 75
End: 2025-06-27
Payer: COMMERCIAL

## 2025-06-27 ENCOUNTER — CLINICAL SUPPORT (OUTPATIENT)
Dept: CARDIAC REHAB | Facility: HOSPITAL | Age: 75
End: 2025-06-27
Payer: COMMERCIAL

## 2025-06-27 DIAGNOSIS — Z95.1 S/P CABG (CORONARY ARTERY BYPASS GRAFT): ICD-10-CM

## 2025-06-27 PROCEDURE — 93798 PHYS/QHP OP CAR RHAB W/ECG: CPT | Performed by: INTERNAL MEDICINE

## 2025-06-27 NOTE — TELEPHONE ENCOUNTER
Dr. Kline got a drug interaction faxed to him from the patients pharmacy stated that Amiodarone interacts with Probenecid/Colch that was just prescribed by his PCP. Dr. Kline reviewed the patients reason for being on Amiodarone and his last visit with the patient. Per Dr. Kline the patient no longer needs to take Amiodarone and discontinued the medication. The fax was sent back to the pharmacy to discontinue it. I left a voicemail for the patient that the medication has been discontinued by Dr. Kline and he no longer needs to take it. I left the office number and asked for a return call to make sure he got the voicemail.

## 2025-06-30 ENCOUNTER — CLINICAL SUPPORT (OUTPATIENT)
Dept: CARDIAC REHAB | Facility: HOSPITAL | Age: 75
End: 2025-06-30
Payer: COMMERCIAL

## 2025-06-30 DIAGNOSIS — Z95.1 S/P CABG (CORONARY ARTERY BYPASS GRAFT): ICD-10-CM

## 2025-06-30 PROCEDURE — 93798 PHYS/QHP OP CAR RHAB W/ECG: CPT | Performed by: INTERNAL MEDICINE

## 2025-07-02 ENCOUNTER — CLINICAL SUPPORT (OUTPATIENT)
Dept: CARDIAC REHAB | Facility: HOSPITAL | Age: 75
End: 2025-07-02
Payer: MEDICARE

## 2025-07-02 DIAGNOSIS — Z95.1 S/P CABG (CORONARY ARTERY BYPASS GRAFT): ICD-10-CM

## 2025-07-02 PROCEDURE — 93798 PHYS/QHP OP CAR RHAB W/ECG: CPT | Performed by: INTERNAL MEDICINE

## 2025-07-09 ENCOUNTER — CLINICAL SUPPORT (OUTPATIENT)
Dept: CARDIAC REHAB | Facility: HOSPITAL | Age: 75
End: 2025-07-09
Payer: COMMERCIAL

## 2025-07-09 DIAGNOSIS — Z95.1 S/P CABG (CORONARY ARTERY BYPASS GRAFT): ICD-10-CM

## 2025-07-09 PROCEDURE — 93798 PHYS/QHP OP CAR RHAB W/ECG: CPT | Performed by: INTERNAL MEDICINE

## 2025-07-11 ENCOUNTER — CLINICAL SUPPORT (OUTPATIENT)
Dept: CARDIAC REHAB | Facility: HOSPITAL | Age: 75
End: 2025-07-11
Payer: COMMERCIAL

## 2025-07-11 DIAGNOSIS — Z95.1 S/P CABG (CORONARY ARTERY BYPASS GRAFT): ICD-10-CM

## 2025-07-11 PROCEDURE — 93798 PHYS/QHP OP CAR RHAB W/ECG: CPT | Performed by: INTERNAL MEDICINE

## 2025-07-14 ENCOUNTER — APPOINTMENT (OUTPATIENT)
Dept: CARDIAC REHAB | Facility: HOSPITAL | Age: 75
End: 2025-07-14
Payer: COMMERCIAL

## 2025-07-16 ENCOUNTER — CLINICAL SUPPORT (OUTPATIENT)
Dept: CARDIAC REHAB | Facility: HOSPITAL | Age: 75
End: 2025-07-16
Payer: COMMERCIAL

## 2025-07-16 DIAGNOSIS — Z95.1 S/P CABG (CORONARY ARTERY BYPASS GRAFT): ICD-10-CM

## 2025-07-16 PROCEDURE — 93798 PHYS/QHP OP CAR RHAB W/ECG: CPT | Performed by: INTERNAL MEDICINE

## 2025-07-16 NOTE — PROGRESS NOTES
Cardiac Rehabilitation Individual Treatment Plan  -  30 Day Reassessment 7/21/2025    Name: Rafi Lisa Jr.  Medical Record Number: [id number]  YOB: 1950  Age: 75 y.o.    Today’s Date: 6/20/2025, (7/16/2025 Original Interview date)  Primary Care Physician: Magno Babb MD  Referring Physician: Dr. James Hunt MD -Cardiology,  James Hunt MD- Surgeon  Program Location: [facility] CARDIAC REHAB     General  Primary Diagnosis: CABG  1. S/P CABG (coronary artery bypass graft)  Follow Up In Cardiac Rehab         Onset/Date of Diagnosis: 4/22/2025     CR/AZ/VR Session 7  attended:  Consistent attendance    AACVPR Risk Stratification:   Low    Falls Risk:   MODERATE  Patient instructed to attach safety clip to waist band while on treadmill the first day of class and at each session.    Psychosocial Assessment     Pre PH-Q 9 survey score: 2  Post PH-Q 9 survey score: To be done at discharge.    Sent PH-Q 9 to MD if score > 20: Less than 20 not sent to MD      Pt reported/currently experiencing stress: Yes  Patient uses stress management skills: No   History of: no history of anxiety or depression  Currently seeing a mental health provider: No, Denies  Social Support: Yes, Whom:  Wife  Quality of Life Survey: SF-36   SF-36 Pre Post   Physical Functioning Score 31.78 TBD   Role Limits-Physical Score 34.81 TBD   General Health Score 52.93 TBD     Knowledge Assessment/Survey  Pre survey score: 14/15  Post survey score: To be done at discharge.    Learning Assessment:  Learning assessment/barriers: None, Denies  Preferred learning method: Auditory, Visual, and Writing handout  Barriers: None, Denies  Comments: Readiness to Learn:  Ready and Willing to learn    Stages of Change:  Action     Psychosocial Plan    Goal Status: In progress     Psychosocial Goals:   Maintain or improve PHQ-9 Survey score by completion of program.  Maintain or improve Post SF-36 Quality of Life Survey by completion of  "program.    Psychosocial Interventions/Education:   PH Q-9 reviewed with patient and the Out patient counseling resource list provided.      AMB CR/UT/VR Psychosocial  -  30 Day Reassessment 7/21/2025      Nutrition Assessment:    Hyperlipidemia: Yes     Lipids:   Lab Results   Component Value Date    CHOL 91 03/22/2025    HDL 36.6 03/22/2025    LDLF 77 09/22/2023    TRIG 75 03/22/2025       Current Dietary Guidelines: Pt states he is currently following a REGULAR diet  Barriers to dietary change: States, \"Difficulty with eating late at night/cravings\"    Diet Habit Survey: [place] Dietary Assessment.  Pre survey score: 58%  Post survey score: To be done at discharge.    Diabetes Assessment    Lab Results   Component Value Date    HGBA1C 6.2 (H) 03/25/2025       History of Diabetes: No    Weight Management  Interview 6/12/25 (over phone)       PRE- Wt:   40.5 inches              PRE-BMI:  25.3  PRE Waist Cir: 40.5 inches        POST- Wt:    TBD             POST- BMI:  TBD  POST Waist Cir:  To be done at discharge    Nutrition Plan    Goal Status: In progress    Nutrition Goals:   Achieve a score of 80% or greater on Post [place] Dietary Assessment.  Attend at least 50% or more of Education classes provided by completion of program.  Meet with Dietitian for Outpatient Nutrition Therapy by completion of program.    Nutrition Interventions/Education:   AHA handout \"What is Angina\"? And \"What is Cardiac Rehabilitation\"?  Lipid profile reviewed during initial phone interview.  Provide referral for Outpatient Nutrition Therapy with Dietitian on first day.       AMB CR/UT/VR Nutrition  -   30 Day Reassessment 7/21/2025    Exercise Assessment    Current Home Exercise:  None prior to program start  Mode: SEDENTARY  Frequency: 0 days/week  Duration: 0 min/day    Exercise Prescription     Exercise Prescription based on: Duke Activity Status Index (DASI) and Health History    DASI Score:     MET Score:   (score divided by " "2 = 2.5)     Frequency:  3 days/week   Mode: Treadmill, NuStep /NuStep (T), Airdyne, Arm Ergometer, Recumbent Bike   Duration: 33 total aerobic minutes   Intensity: RPE 11-15  Target HR:  Rest +30, To be calculated after 6-12 attended sessions  MET Level: 3.7 Max starting    Patient wears supplemental O2: Yes   O2 Flow Rate: 2 to 4 L/min, nasal cannula continuous as needed for shortness of breath  SpO2 Range: 91 to 100 %     Modality Workload METs Duration (minutes)   1   Pre-Exercise /Rest -- -- 5 :00   2   NuStep 4000 38 gomez, load 2  2.2 11 :00   3   NuStep T5 18 gomez, load 2  2.2 11 :00   4   Treadmill Walk 1.0 mph, 0% incline  1.8 11 :00   5   Airdyne 0.2 load, 10-15 Rpm  1.4 11 :00   6   Arm Ergometer 16 ogmez, level 1 1.9 11 :00   7   Recumbent Bike 1 Load, 45 Rpm 1.3 11 :00   8   Post-Exercise /Rest -- -- 5 :00     Resistance Training: No   Home Exercise Prescription given: To be given prior to discharge from program.    Exercise Plan    Goal Status: In progress    Exercise Goals:   150 min/week of moderate intensity aerobic exercise by completion of program.  Exercise Frequency 5-7 days a week by completion of program.    Exercise Progression:    Maintained exercise duration at 33 minutes.  No change to exercise intensity but reviewed, PT HR/RPE appropriate  Pt will not walk on treadmill for safety, has Lt knee dislocation Hx with \"Knee giving out at times\" stated by patient    Exercise Interventions/Education:   Pt attended Benefits of Exercise education      AMB CR/IA/VR Exercise  -  30 Day Reassessment 7/21/2025  Goal is 40% or Greater .        Other Core Components/Risk Factor Assessment:    Medication adherence    Current Medications:   Acetamnophen (Tylenol) 650mg Every 6 hrs as needed (Pain 1-3), Albuterol (Ventolin HFA) 90mcg/act 2 puffs inhaler Every 4 hrs as needed (Shortness of breath), Aspirin 81mg Daily, Clopidogrel 75mg Daily, Ezetimibe (Zetia) 10mg Daily, Fluticasone-umeclidin-vilanter " "(Trelegy Ellipta) 100-62.5-25mcg Blister Inhalation Daily, Ipratropium-Albuterol (DuoNeb) 0.5-25mg/3ml Nebulizer every 6 hrs as needed (wheezing), Lisinopril 20mg Daily, Metoprolol Succinate XL (Toprol XL) 50mg Daily, MultiVitamin w/Minerals 1 tab Twice a Day, Nicoderm CQ 14mg Transderm patch Daily, Nicoderm CQ 7mg Transderm patch Daily (21mg total dose daily), Omeprazole 40 Daily, Probenid-Colchicine 500-0.5mg Twice a day, Rosuvastatin 40mg Daily, Tumeric OTC 1500mg Twice a Day, Co-enzyme Q-10 OTC 100mg Twice a Day, Oxygen 2-4 l/min nasal cannula    ALLERGIES:  Sulfa : Hives  Adhesive Tape-Silicones : Rash                 Medication compliance: Yes   Uses pill box/organizer: Yes    Carries medication list: Yes     Blood Pressure Management  History of Hypertension: Yes   Medication Changes: No   Resting BP:  There were no vitals taken for this visit.   Last recorded Vitals documented at cardiology office visit 6/5/25    BP   140 /82    Heart Failure Management  Hx of Heart Failure: No  Most recent EF:   63% (TTE 4/25/25)      Smoking/Tobacco Assessment  Tobacco Use History[1]  Quit Date:  4/22/25 Verified w/pt         Recent Quit: < 6 months      # cigarettes smoked per day: Hx- 1 ppd  x 64 yrs  Other forms of tobacco:  No, Denies          Anyone in the home smoke: Yes, Wife      Other Core Component Plan    Goal Status: Initial Assessment; goals not yet started    Other Core Component Goals:   Medication compliance established during initial phone interview.  Pt carries updated medication list with them at all times  To remain tobacco free while enrolled in program  Resting BP <130/80 by completion of program    Other Core Component Interventions/Education:   Patient given AHA educational handout \"How Do I Manage My Medications\"?  Resting BP readings taken pre and post exercise at each session.  Patient given \"How to Be a Quitter\" Smoking cessation booklet.  Ask if pt remains tobacco free weekly.  Patient attended " Education Lecture on Cardiac Medications provided by pharmacists.     AMB CR/IA/VR Other Core Component  -  30 Day Reassessment 7/21/2025      Cardiac Related ER Visits: 0                    Hospital Admits: 0     Education classes:   Cardiac medications(Pharmacy) 6/25/25, Heart Failure 7/2/25, Benefits of Exercise 7/9/25, Diabetes and Cardiac Nutrition(Dietitian) 7/16/25    Individual Patient Goals:    Establish exercise routine 3-5 days/week, 30-60 min/day by completion of program.  Goal Status: Initial Assessment; goals not yet started    Meet with Dietitian for Outpatient Nutrition Therapy by completion of program.  Goal Status: Initial Assessment; goals not yet started    To have Decreased shortness of breath with ADL's while walking longer distances and grocery shopping by completion of program.  Goal Status: Initial Assessment; goals not yet started      Staff Comments:  Patient has done well in program so far. They continue to show regular attendance and ability to handle increased workloads. No issues or complaints to note. Will continue to monitor and progress as they can tolerate.        Rehab Staff Signature: Caitlyn Dowd RN    PHYSICIAN REVIEW AND RESPONSE:  Please check appropriate boxes and sign     __X_ The participant may enroll in the Cardiac Rehabilitation Program with the above UPDATED individualized treatment plan (ITP)  ____ Make the following changes to the ITP/Exercise Prescription:           [1]   Social History  Tobacco Use   Smoking Status Former    Current packs/day: 1.00    Average packs/day: 1 pack/day for 64.0 years (64.0 ttl pk-yrs)    Types: Cigarettes   Smokeless Tobacco Former

## 2025-07-18 ENCOUNTER — CLINICAL SUPPORT (OUTPATIENT)
Dept: CARDIAC REHAB | Facility: HOSPITAL | Age: 75
End: 2025-07-18
Payer: COMMERCIAL

## 2025-07-18 DIAGNOSIS — Z95.1 S/P CABG (CORONARY ARTERY BYPASS GRAFT): ICD-10-CM

## 2025-07-18 PROCEDURE — 93798 PHYS/QHP OP CAR RHAB W/ECG: CPT | Performed by: INTERNAL MEDICINE

## 2025-07-18 NOTE — PROGRESS NOTES
Cardiac Rehabilitation Individual Treatment Plan  -  30 Day Reassessment 7/21/2025    Name: Rafi Lisa Jr.  Medical Record Number: [id number]  YOB: 1950  Age: 75 y.o.    Today’s Date: 6/20/2025, (7/18/2025 Original Interview date)  Primary Care Physician: Magno Babb MD  Referring Physician: Dr. James Hunt MD -Cardiology,  James Hunt MD- Surgeon  Program Location: [facility] CARDIAC REHAB     General  Primary Diagnosis: CABG  1. S/P CABG (coronary artery bypass graft)  Follow Up In Cardiac Rehab         Onset/Date of Diagnosis: 4/22/2025     CR/W[place]/VR Session 7  attended:  Consistent attendance    AACVPR Risk Stratification:   Low    Falls Risk:   MODERATE  Patient instructed to attach safety clip to waist band while on treadmill the first day of class and at each session.    Psychosocial Assessment     Pre PH-Q 9 survey score: 2  Post PH-Q 9 survey score: To be done at discharge.    Sent PH-Q 9 to MD if score > 20: Less than 20 not sent to MD      Pt reported/currently experiencing stress: Yes  Patient uses stress management skills: No   History of: no history of anxiety or depression  Currently seeing a mental health provider: No, Denies  Social Support: Yes, Whom:  Wife  Quality of Life Survey: SF-36   SF-36 Pre Post   Physical Functioning Score 31.78 TBD   Role Limits-Physical Score 34.81 TBD   General Health Score 52.93 TBD     Knowledge Assessment/Survey  Pre survey score: 14/15  Post survey score: To be done at discharge.    Learning Assessment:  Learning assessment/barriers: None, Denies  Preferred learning method: Auditory, Visual, and Writing handout  Barriers: None, Denies  Comments: Readiness to Learn:  Ready and Willing to learn    Stages of Change:  Action     Psychosocial Plan    Goal Status: In progress     Psychosocial Goals:   Maintain or improve PHQ-9 Survey score by completion of program.  Maintain or improve Post SF-36 Quality of Life Survey by completion of  "program.    Psychosocial Interventions/Education:   PH Q-9 reviewed with patient and the Out patient counseling resource list provided.      AMB CR/DC/VR Psychosocial  -  30 Day Reassessment 7/21/2025      Nutrition Assessment:    Hyperlipidemia: Yes     Lipids:   Lab Results   Component Value Date    CHOL 91 03/22/2025    HDL 36.6 03/22/2025    LDLF 77 09/22/2023    TRIG 75 03/22/2025       Current Dietary Guidelines: Pt states he is currently following a REGULAR diet  Barriers to dietary change: States, \"Difficulty with eating late at night/cravings\"    Diet Habit Survey: [place] Dietary Assessment.  Pre survey score: 58%  Post survey score: To be done at discharge.    Diabetes Assessment    Lab Results   Component Value Date    HGBA1C 6.2 (H) 03/25/2025       History of Diabetes: No    Weight Management  Interview 6/12/25 (over phone)       PRE- Wt:   40.5 inches              PRE-BMI:  25.3  PRE Waist Cir: 40.5 inches        POST- Wt:    TBD             POST- BMI:  TBD  POST Waist Cir:  To be done at discharge    Nutrition Plan    Goal Status: In progress    Nutrition Goals:   Achieve a score of 80% or greater on Post [place] Dietary Assessment.  Attend at least 50% or more of Education classes provided by completion of program.  Meet with Dietitian for Outpatient Nutrition Therapy by completion of program.    Nutrition Interventions/Education:   AHA handout \"What is Angina\"? And \"What is Cardiac Rehabilitation\"?  Lipid profile reviewed during initial phone interview.  Provide referral for Outpatient Nutrition Therapy with Dietitian on first day.       AMB CR/DC/VR Nutrition  -   30 Day Reassessment 7/21/2025    Exercise Assessment    Current Home Exercise:  None prior to program start  Mode: SEDENTARY  Frequency: 0 days/week  Duration: 0 min/day    Exercise Prescription     Exercise Prescription based on: Duke Activity Status Index (DASI) and Health History    DASI Score:     MET Score:   (score divided by " "2 = 2.5)     Frequency:  3 days/week   Mode: Treadmill, NuStep /NuStep (T), Airdyne, Arm Ergometer, Recumbent Bike   Duration: 33 total aerobic minutes   Intensity: RPE 11-15  Target HR:  Rest +30, To be calculated after 6-12 attended sessions  MET Level: 3.7 Max starting    Patient wears supplemental O2: Yes   O2 Flow Rate: 2 to 4 L/min, nasal cannula continuous as needed for shortness of breath  SpO2 Range: 91 to 100 %     Modality Workload METs Duration (minutes)   1   Pre-Exercise /Rest -- -- 5 :00   2   NuStep 4000 38 gomez, load 2  2.2 11 :00   3   NuStep T5 18 gomez, load 2  2.2 11 :00   4   Treadmill Walk 1.0 mph, 0% incline  1.8 11 :00   5   Airdyne 0.2 load, 10-15 Rpm  1.4 11 :00   6   Arm Ergometer 16 gomez, level 1 1.9 11 :00   7   Recumbent Bike 1 Load, 45 Rpm 1.3 11 :00   8   Post-Exercise /Rest -- -- 5 :00     Resistance Training: No   Home Exercise Prescription given: To be given prior to discharge from program.    Exercise Plan    Goal Status: In progress    Exercise Goals:   150 min/week of moderate intensity aerobic exercise by completion of program.  Exercise Frequency 5-7 days a week by completion of program.    Exercise Progression:    Maintained exercise duration at 33 minutes.  No change to exercise intensity but reviewed, PT HR/RPE appropriate  Pt will not walk on treadmill for safety, has Lt knee dislocation Hx with \"Knee giving out at times\" stated by patient    Exercise Interventions/Education:   Pt attended Benefits of Exercise education      AMB CR/FL/VR Exercise  -  30 Day Reassessment 7/21/2025  Goal is 40% or Greater .        Other Core Components/Risk Factor Assessment:    Medication adherence    Current Medications:   Acetamnophen (Tylenol) 650mg Every 6 hrs as needed (Pain 1-3), Albuterol (Ventolin HFA) 90mcg/act 2 puffs inhaler Every 4 hrs as needed (Shortness of breath), Aspirin 81mg Daily, Clopidogrel 75mg Daily, Ezetimibe (Zetia) 10mg Daily, Fluticasone-umeclidin-vilanter " "(Trelegy Ellipta) 100-62.5-25mcg Blister Inhalation Daily, Ipratropium-Albuterol (DuoNeb) 0.5-25mg/3ml Nebulizer every 6 hrs as needed (wheezing), Lisinopril 20mg Daily, Metoprolol Succinate XL (Toprol XL) 50mg Daily, MultiVitamin w/Minerals 1 tab Twice a Day, Nicoderm CQ 14mg Transderm patch Daily, Nicoderm CQ 7mg Transderm patch Daily (21mg total dose daily), Omeprazole 40 Daily, Probenid-Colchicine 500-0.5mg Twice a day, Rosuvastatin 40mg Daily, Tumeric OTC 1500mg Twice a Day, Co-enzyme Q-10 OTC 100mg Twice a Day, Oxygen 2-4 l/min nasal cannula    ALLERGIES:  Sulfa : Hives  Adhesive Tape-Silicones : Rash                 Medication compliance: Yes   Uses pill box/organizer: Yes    Carries medication list: Yes     Blood Pressure Management  History of Hypertension: Yes   Medication Changes: No   Resting BP:  There were no vitals taken for this visit.   Last recorded Vitals documented at cardiology office visit 6/5/25    BP   140 /82    Heart Failure Management  Hx of Heart Failure: No  Most recent EF:   63% (TTE 4/25/25)      Smoking/Tobacco Assessment  Tobacco Use History[1]  Quit Date:  4/22/25 Verified w/pt         Recent Quit: < 6 months      # cigarettes smoked per day: Hx- 1 ppd  x 64 yrs  Other forms of tobacco:  No, Denies          Anyone in the home smoke: Yes, Wife      Other Core Component Plan    Goal Status: Initial Assessment; goals not yet started    Other Core Component Goals:   Medication compliance established during initial phone interview.  Pt carries updated medication list with them at all times  To remain tobacco free while enrolled in program  Resting BP <130/80 by completion of program    Other Core Component Interventions/Education:   Patient given AHA educational handout \"How Do I Manage My Medications\"?  Resting BP readings taken pre and post exercise at each session.  Patient given \"How to Be a Quitter\" Smoking cessation booklet.  Ask if pt remains tobacco free weekly.  Patient attended " Education Lecture on Cardiac Medications provided by pharmacists.     AMB CR/VA/VR Other Core Component  -  30 Day Reassessment 7/21/2025      Cardiac Related ER Visits: 0                    Hospital Admits: 0     Education classes:   Cardiac medications(Pharmacy) 6/25/25, Heart Failure 7/2/25, Benefits of Exercise 7/9/25, Diabetes and Cardiac Nutrition(Dietitian) 7/16/25    Individual Patient Goals:    Establish exercise routine 3-5 days/week, 30-60 min/day by completion of program.  Goal Status: Initial Assessment; goals not yet started    Meet with Dietitian for Outpatient Nutrition Therapy by completion of program.  Goal Status: Initial Assessment; goals not yet started    To have Decreased shortness of breath with ADL's while walking longer distances and grocery shopping by completion of program.  Goal Status: Initial Assessment; goals not yet started      Staff Comments:  Patient has done well in program so far. They continue to show regular attendance and ability to handle increased workloads. No issues or complaints to note. Will continue to monitor and progress as they can tolerate.        Rehab Staff Signature: Arlin Moyer RN    PHYSICIAN REVIEW AND RESPONSE:  Please check appropriate boxes and sign     __X_ The participant may continue in the Cardiac Rehabilitation Program with the above individualized treatment plan (ITP)  ____ Make the following changes to the ITP/Exercise Prescription:             [1]   Social History  Tobacco Use   Smoking Status Former    Current packs/day: 1.00    Average packs/day: 1 pack/day for 64.0 years (64.0 ttl pk-yrs)    Types: Cigarettes   Smokeless Tobacco Former

## 2025-07-21 ENCOUNTER — CLINICAL SUPPORT (OUTPATIENT)
Dept: CARDIAC REHAB | Facility: HOSPITAL | Age: 75
End: 2025-07-21
Payer: COMMERCIAL

## 2025-07-21 DIAGNOSIS — Z95.1 S/P CABG (CORONARY ARTERY BYPASS GRAFT): ICD-10-CM

## 2025-07-23 ENCOUNTER — CLINICAL SUPPORT (OUTPATIENT)
Dept: CARDIAC REHAB | Facility: HOSPITAL | Age: 75
End: 2025-07-23
Payer: COMMERCIAL

## 2025-07-23 DIAGNOSIS — Z95.1 S/P CABG (CORONARY ARTERY BYPASS GRAFT): ICD-10-CM

## 2025-07-23 PROCEDURE — 93798 PHYS/QHP OP CAR RHAB W/ECG: CPT | Performed by: INTERNAL MEDICINE

## 2025-07-25 ENCOUNTER — CLINICAL SUPPORT (OUTPATIENT)
Dept: CARDIAC REHAB | Facility: HOSPITAL | Age: 75
End: 2025-07-25
Payer: COMMERCIAL

## 2025-07-25 DIAGNOSIS — Z95.1 S/P CABG (CORONARY ARTERY BYPASS GRAFT): ICD-10-CM

## 2025-07-25 PROCEDURE — 93798 PHYS/QHP OP CAR RHAB W/ECG: CPT | Performed by: INTERNAL MEDICINE

## 2025-07-28 ENCOUNTER — CLINICAL SUPPORT (OUTPATIENT)
Dept: CARDIAC REHAB | Facility: HOSPITAL | Age: 75
End: 2025-07-28
Payer: COMMERCIAL

## 2025-07-28 DIAGNOSIS — Z95.1 S/P CABG (CORONARY ARTERY BYPASS GRAFT): ICD-10-CM

## 2025-07-28 PROCEDURE — 93798 PHYS/QHP OP CAR RHAB W/ECG: CPT | Performed by: INTERNAL MEDICINE

## 2025-07-30 ENCOUNTER — CLINICAL SUPPORT (OUTPATIENT)
Dept: CARDIAC REHAB | Facility: HOSPITAL | Age: 75
End: 2025-07-30
Payer: COMMERCIAL

## 2025-07-30 DIAGNOSIS — Z95.1 S/P CABG (CORONARY ARTERY BYPASS GRAFT): ICD-10-CM

## 2025-07-30 PROCEDURE — 93798 PHYS/QHP OP CAR RHAB W/ECG: CPT | Performed by: INTERNAL MEDICINE

## 2025-08-01 ENCOUNTER — CLINICAL SUPPORT (OUTPATIENT)
Dept: CARDIAC REHAB | Facility: HOSPITAL | Age: 75
End: 2025-08-01
Payer: COMMERCIAL

## 2025-08-01 DIAGNOSIS — Z95.1 S/P CABG (CORONARY ARTERY BYPASS GRAFT): ICD-10-CM

## 2025-08-01 PROCEDURE — 93798 PHYS/QHP OP CAR RHAB W/ECG: CPT | Performed by: INTERNAL MEDICINE

## 2025-08-04 ENCOUNTER — CLINICAL SUPPORT (OUTPATIENT)
Dept: CARDIAC REHAB | Facility: HOSPITAL | Age: 75
End: 2025-08-04
Payer: COMMERCIAL

## 2025-08-04 DIAGNOSIS — Z95.1 S/P CABG (CORONARY ARTERY BYPASS GRAFT): ICD-10-CM

## 2025-08-04 PROCEDURE — 93798 PHYS/QHP OP CAR RHAB W/ECG: CPT | Performed by: INTERNAL MEDICINE

## 2025-08-06 ENCOUNTER — CLINICAL SUPPORT (OUTPATIENT)
Dept: CARDIAC REHAB | Facility: HOSPITAL | Age: 75
End: 2025-08-06
Payer: COMMERCIAL

## 2025-08-06 DIAGNOSIS — Z95.1 S/P CABG (CORONARY ARTERY BYPASS GRAFT): ICD-10-CM

## 2025-08-06 PROCEDURE — 93798 PHYS/QHP OP CAR RHAB W/ECG: CPT | Performed by: INTERNAL MEDICINE

## 2025-08-06 NOTE — PROGRESS NOTES
Cardiac Rehabilitation Individual Treatment Plan  -  60 Day Reassessment  8/15/2025    Name: Rafi Lisa Jr.  Medical Record Number: [id number]  YOB: 1950  Age: 75 y.o.    Today’s Date: 8/6/2025   Primary Care Physician: Magno Babb MD  Referring Physician: Dr. James Hunt MD -Cardiology,  James Hunt MD- Surgeon  Program Location: [facility]     General  Primary Diagnosis: CABG  1. S/P CABG (coronary artery bypass graft)  Follow Up In Cardiac Rehab         Onset/Date of Diagnosis: 4/22/2025     CR/AK/VR Session #  attended:  16,   Patient shows Consistent attendance    AACVPR Risk Stratification:   Low    Falls Risk:   MODERATE  Patient instructed to attach safety clip to waist band while on treadmill the first day of class and at each session.    Psychosocial Assessment     Pre PH-Q 9 survey score: 2  Post PH-Q 9 survey score: To be done at discharge.    Sent PH-Q 9 to MD if score > 20: Less than 20 not sent to MD      Pt reported/currently experiencing stress: Yes  Patient uses stress management skills: No   History of: no history of anxiety or depression, pt denies  Currently seeing a mental health provider: No, Denies  Social Support: Yes, Whom:  Wife  Quality of Life Survey: SF-36   SF-36 Pre Post   Physical Functioning Score 31.78 TBD   Role Limits-Physical Score 34.81 TBD   General Health Score 52.93 TBD     Knowledge Assessment/Survey  Pre survey score: 14/15  Post survey score: To be done at discharge.    Learning Assessment:  Learning assessment/barriers: None, Denies  Preferred learning method: Auditory, Visual, and Writing handout  Barriers: None, Denies  Comments: Readiness to Learn:  Ready and Willing to learn    Stages of Change:  Action     Psychosocial Plan    Goal Status: In progress     Psychosocial Goals:   Maintain or improve PHQ-9 Survey score by completion of program.  Maintain or improve Post SF-36 Quality of Life Survey by completion of program.    Psychosocial  "Interventions/Education:   Pre PHQ-9 reviewed with patient.   Provided Out patient counseling resource list handout.      AMB CR/[place]/VR Psychosocial  -  60 Day Reassessment  8/15/25      Nutrition Assessment:    Hyperlipidemia: Yes     Lipids:   Lab Results   Component Value Date    CHOL 91 03/22/2025    HDL 36.6 03/22/2025    LDLF 77 09/22/2023    TRIG 75 03/22/2025       Current Dietary Guidelines: Pt states he is currently following a REGULAR diet  Barriers to dietary change: States, \"Difficulty with eating late at night/cravings\"    Diet Habit Survey: [place] Dietary Assessment.  Pre survey score: 58%  Post survey score: To be done at discharge.    Diabetes Assessment    Lab Results   Component Value Date    HGBA1C 6.2 (H) 03/25/2025       History of Diabetes: No    Weight Management  Interview 6/12/25 (over phone)    PRE- Wt:   40.5 inches        Ht:  72 in.      PRE-BMI:  25.3    PRE Waist Cir: 40.5 inches        POST- Wt:    TBD             POST- BMI:  TBD  POST Waist Cir:  To be done at discharge    Nutrition Plan    Goal Status: In progress    Nutrition Goals:   Achieve a score of 80% or greater on Post [place] Dietary Assessment.  Attend at least 50% or more of Education classes provided by completion of program.  Meet with Dietitian for Outpatient Nutrition Therapy by completion of program.    Nutrition Interventions/Education:   AHA handout \"What is Angina\"? And \"What is Cardiac Rehabilitation\"?  Lipid profile reviewed during initial phone interview.  Provided referral for Outpatient Nutrition Therapy with Dietitian on first day.     AMB CR/OH/VR Nutrition  -   60 Day Reassessment  8/15/25  Attended Education lectures on Diabetes and Cardiac Nutrition (Dietitian)      Exercise Assessment    Current Home Exercise:  None prior to program start  Mode: SEDENTARY  Frequency: 0 days/week  Duration: 0 min/day    Exercise Prescription     Exercise Prescription based on: Duke Activity Status Index (DASI) and " "Health History    DASI Score:   See paper chart  MET Score:   (score divided by 2 = 2.5)     Frequency:  3 days/week   Mode: Treadmill, NuStep /NuStep (T), Airdyne, Arm Ergometer, Recumbent Bike   Duration: 33 total aerobic minutes   Intensity: RPE 11-15  Target HR:  Rest +30, To be calculated after 6-12 attended sessions  MET Level: 3.7 Max starting    Patient wears supplemental O2: Yes   O2 Flow Rate: 2 to 4 L/min, nasal cannula continuous as needed for shortness of breath  SpO2 Range: 91 to 100 %     Modality Workload METs Duration (minutes)   1   Pre-Exercise /Rest -- -- 5 :00   2   NuStep 4000 50 gomez, load 3  2.4 12 :00   3   NuStep T5 30 gomez, load 3  2.4 12 :00   4   Treadmill Walk 1.0 mph, 0% incline  1.8 12 :00   5   Airdyne 0.6 load, 20-25 Rpm  2.0 12 :00   6   Arm Ergometer 18 gomez, level 2 1.9 12 :00   7   Recumbent Bike 1 Load, 45 Rpm 1.3 12 :00   8   Upright Bike 40 Rpm 2.0 12 :00   9   Post-Exercise /Rest -- -- 5 :00     Resistance Training: No   Home Exercise Prescription given: To be given prior to discharge from program.    Exercise Plan    Goal Status: In progress    Exercise Goals:   150 min/week of moderate intensity aerobic exercise by completion of program.  Exercise Frequency 5-7 days a week by completion of program.    Exercise Progression:    Maintained exercise duration at 36 minutes.  Increased exercise intensity on NS based on patient's  HR/BP/RPE    Pt will not walk on treadmill for safety, has Lt knee dislocation Hx with \"Knee giving out at times\" stated by patient    Exercise Interventions/Education:   Pt attended Benefits of Exercise education     [facility] AMB CR/ME/VR Exercise  -  60 Day Reassessment  8/15/25  Goal is 40% or Greater. NuStep will be used for measurement of increased progress during program.        Other Core Components/Risk Factor Assessment:    Medication adherence    Current Medications:   Acetamnophen (Tylenol) 650mg Every 6 hrs as needed (Pain 1-3), Albuterol " (Ventolin HFA) 90mcg/act 2 puffs inhaler Every 4 hrs as needed (Shortness of breath), Aspirin 81mg Daily, Clopidogrel 75mg Daily, Ezetimibe (Zetia) 10mg Daily, Fluticasone-umeclidin-vilanter (Trelegy Ellipta) 100-62.5-25mcg Blister Inhalation Daily, Ipratropium-Albuterol (DuoNeb) 0.5-25mg/3ml Nebulizer every 6 hrs as needed (wheezing), Lisinopril 20mg Daily, Metoprolol Succinate XL (Toprol XL) 50mg Daily, MultiVitamin w/Minerals 1 tab Twice a Day, Nicoderm CQ 14mg Transderm patch Daily, Nicoderm CQ 7mg Transderm patch Daily (21mg total dose daily), Omeprazole 40 Daily, Probenid-Colchicine 500-0.5mg Twice a day, Rosuvastatin 40mg Daily, Tumeric OTC 1500mg Twice a Day, Co-enzyme Q-10 OTC 100mg Twice a Day, Oxygen 2-4 l/min nasal cannula inhalation continuously (as needed)    ALLERGIES:  Sulfa : Hives  Adhesive Tape-Silicones : Rash                 Medication compliance: Yes   Uses pill box/organizer: Yes    Carries medication list: Yes     Blood Pressure Management  History of Hypertension: Yes   Medication Changes: No   Resting BP:  There were no vitals taken for this visit.   Last recorded Vitals documented at cardiology office visit 6/5/25    BP   140 /82                                                                       (See session reports for  ALL  V. S.  Documented)    Heart Failure Management  Hx of Heart Failure: No  Most recent EF:   63% (TTE 4/25/25)      Smoking/Tobacco Assessment  Tobacco Use History[1]  Quit Date:  4/22/25 Verified w/pt         Recent Quit: < 6 months      # cigarettes smoked per day: Hx- 1 ppd  x 64 yrs  Other forms of tobacco:  No, Denies          Anyone in the home smoke: Yes, Wife      Other Core Component Plan    Goal Status: In progress    Other Core Component Goals:   Medication compliance established during initial phone interview.  Pt carries updated medication list with them at all times  To remain tobacco free while enrolled in program  Resting BP <130/80 by completion of  "program    Other Core Component Interventions/Education:   Patient given [facility] educational handout \"How Do I Manage My Medications\"?  Resting BP readings taken pre and post exercise at each session.  Patient given \"How to Be a Quitter\" Smoking cessation booklet.  Ask if pt remains tobacco free weekly.  Patient attended Education Lecture on Cardiac Medications provided by pharmacists.     AMB CR/AL/VR Other Core Component  -  60 Day Reassessment  8/15/25  Patient reported returning to smoking cigarettes. Provided Smoking cessation [facility] handouts. Tobacco Cessation referral requested.      Cardiac Related ER Visits: 0                    Hospital Admits: 0     Educational classes:   Cardiac medications(Pharmacy) 6/25/25, Heart Failure 7/2/25, Benefits of Exercise 7/9/25, Diabetes and Cardiac Nutrition(Dietitian) 7/16/25    Individual Patient Goals:    Establish exercise routine 3-5 days/week, 30-60 min/day by completion of program.  Goal Status: In progress    Meet with Dietitian for Outpatient Nutrition Therapy by completion of program.  Goal Status: In progress    To have Decreased shortness of breath with ADL's while walking longer distances and grocery shopping by completion of program.  Goal Status: In Progress      Staff Comments:  Patient has done well in program so far. He continues to show regular attendance and ability to handle increased workloads. No issues or complaints to note. Will continue to monitor and progress as he can tolerate.        Rehab Staff Signature: Arlin Moyer RN    PHYSICIAN REVIEW AND RESPONSE:  Please check appropriate boxes and sign     __X_ The participant may continue in the Cardiac Rehabilitation Program with the above individualized treatment plan (ITP)  ____ Make the following changes to the ITP/Exercise Prescription:               [1]   Social History  Tobacco Use   Smoking Status Former    Current packs/day: 1.00    Average packs/day: 1 pack/day for 64.0 years (64.0 ttl pk-yrs) "    Types: Cigarettes   Smokeless Tobacco Former

## 2025-08-11 ENCOUNTER — CLINICAL SUPPORT (OUTPATIENT)
Dept: CARDIAC REHAB | Facility: HOSPITAL | Age: 75
End: 2025-08-11
Payer: COMMERCIAL

## 2025-08-11 DIAGNOSIS — Z95.1 S/P CABG (CORONARY ARTERY BYPASS GRAFT): ICD-10-CM

## 2025-08-11 PROCEDURE — 93798 PHYS/QHP OP CAR RHAB W/ECG: CPT | Performed by: INTERNAL MEDICINE

## 2025-08-13 ENCOUNTER — CLINICAL SUPPORT (OUTPATIENT)
Dept: CARDIAC REHAB | Facility: HOSPITAL | Age: 75
End: 2025-08-13
Payer: COMMERCIAL

## 2025-08-13 DIAGNOSIS — Z95.1 S/P CABG (CORONARY ARTERY BYPASS GRAFT): ICD-10-CM

## 2025-08-13 PROCEDURE — 93798 PHYS/QHP OP CAR RHAB W/ECG: CPT | Performed by: INTERNAL MEDICINE

## 2025-08-15 ENCOUNTER — CLINICAL SUPPORT (OUTPATIENT)
Dept: CARDIAC REHAB | Facility: HOSPITAL | Age: 75
End: 2025-08-15
Payer: COMMERCIAL

## 2025-08-15 DIAGNOSIS — Z95.1 S/P CABG (CORONARY ARTERY BYPASS GRAFT): ICD-10-CM

## 2025-08-15 PROCEDURE — 93798 PHYS/QHP OP CAR RHAB W/ECG: CPT | Performed by: INTERNAL MEDICINE

## 2025-08-18 ENCOUNTER — CLINICAL SUPPORT (OUTPATIENT)
Dept: CARDIAC REHAB | Facility: HOSPITAL | Age: 75
End: 2025-08-18
Payer: COMMERCIAL

## 2025-08-18 DIAGNOSIS — Z95.1 S/P CABG (CORONARY ARTERY BYPASS GRAFT): ICD-10-CM

## 2025-08-18 PROCEDURE — 93798 PHYS/QHP OP CAR RHAB W/ECG: CPT | Performed by: INTERNAL MEDICINE

## 2025-08-20 ENCOUNTER — CLINICAL SUPPORT (OUTPATIENT)
Dept: CARDIAC REHAB | Facility: HOSPITAL | Age: 75
End: 2025-08-20
Payer: COMMERCIAL

## 2025-08-20 DIAGNOSIS — Z95.1 S/P CABG (CORONARY ARTERY BYPASS GRAFT): ICD-10-CM

## 2025-08-20 PROCEDURE — 93798 PHYS/QHP OP CAR RHAB W/ECG: CPT | Performed by: INTERNAL MEDICINE

## 2025-08-22 ENCOUNTER — CLINICAL SUPPORT (OUTPATIENT)
Dept: CARDIAC REHAB | Facility: HOSPITAL | Age: 75
End: 2025-08-22
Payer: COMMERCIAL

## 2025-08-22 DIAGNOSIS — Z95.1 S/P CABG (CORONARY ARTERY BYPASS GRAFT): ICD-10-CM

## 2025-08-22 PROCEDURE — 93798 PHYS/QHP OP CAR RHAB W/ECG: CPT | Performed by: INTERNAL MEDICINE

## 2025-08-25 ENCOUNTER — CLINICAL SUPPORT (OUTPATIENT)
Dept: CARDIAC REHAB | Facility: HOSPITAL | Age: 75
End: 2025-08-25
Payer: COMMERCIAL

## 2025-08-25 DIAGNOSIS — Z95.1 S/P CABG (CORONARY ARTERY BYPASS GRAFT): ICD-10-CM

## 2025-08-25 PROCEDURE — 93798 PHYS/QHP OP CAR RHAB W/ECG: CPT | Performed by: INTERNAL MEDICINE

## 2025-08-27 ENCOUNTER — CLINICAL SUPPORT (OUTPATIENT)
Dept: CARDIAC REHAB | Facility: HOSPITAL | Age: 75
End: 2025-08-27
Payer: COMMERCIAL

## 2025-08-27 DIAGNOSIS — Z95.1 S/P CABG (CORONARY ARTERY BYPASS GRAFT): ICD-10-CM

## 2025-08-27 PROCEDURE — 93798 PHYS/QHP OP CAR RHAB W/ECG: CPT | Performed by: INTERNAL MEDICINE

## 2025-08-29 ENCOUNTER — APPOINTMENT (OUTPATIENT)
Dept: CARDIAC REHAB | Facility: HOSPITAL | Age: 75
End: 2025-08-29
Payer: COMMERCIAL

## 2025-09-02 ENCOUNTER — HOSPITAL ENCOUNTER (OUTPATIENT)
Dept: RADIOLOGY | Facility: CLINIC | Age: 75
Discharge: HOME | End: 2025-09-02
Payer: COMMERCIAL

## 2025-09-02 DIAGNOSIS — R05.3 CHRONIC COUGH: ICD-10-CM

## 2025-09-02 DIAGNOSIS — Z95.1 S/P CABG X 3: ICD-10-CM

## 2025-09-02 DIAGNOSIS — J84.10 PULMONARY FIBROSIS (MULTI): ICD-10-CM

## 2025-09-02 DIAGNOSIS — J43.2 CENTRILOBULAR EMPHYSEMA (MULTI): ICD-10-CM

## 2025-09-02 DIAGNOSIS — J42 CHRONIC BRONCHITIS, UNSPECIFIED CHRONIC BRONCHITIS TYPE (MULTI): ICD-10-CM

## 2025-09-02 PROCEDURE — 71250 CT THORAX DX C-: CPT

## 2025-09-03 ENCOUNTER — CLINICAL SUPPORT (OUTPATIENT)
Dept: CARDIAC REHAB | Facility: HOSPITAL | Age: 75
End: 2025-09-03
Payer: COMMERCIAL

## 2025-09-03 DIAGNOSIS — Z95.1 S/P CABG (CORONARY ARTERY BYPASS GRAFT): ICD-10-CM

## 2025-09-03 PROCEDURE — 93798 PHYS/QHP OP CAR RHAB W/ECG: CPT | Performed by: INTERNAL MEDICINE

## 2025-09-05 ENCOUNTER — CLINICAL SUPPORT (OUTPATIENT)
Dept: CARDIAC REHAB | Facility: HOSPITAL | Age: 75
End: 2025-09-05
Payer: COMMERCIAL

## 2025-09-05 DIAGNOSIS — Z95.1 S/P CABG (CORONARY ARTERY BYPASS GRAFT): ICD-10-CM

## 2025-09-05 PROCEDURE — 93798 PHYS/QHP OP CAR RHAB W/ECG: CPT | Performed by: INTERNAL MEDICINE

## 2025-09-08 ENCOUNTER — APPOINTMENT (OUTPATIENT)
Facility: CLINIC | Age: 75
End: 2025-09-08
Payer: MEDICARE

## 2025-09-24 ENCOUNTER — APPOINTMENT (OUTPATIENT)
Dept: CARDIAC REHAB | Facility: HOSPITAL | Age: 75
End: 2025-09-24
Payer: COMMERCIAL

## 2025-09-26 ENCOUNTER — APPOINTMENT (OUTPATIENT)
Dept: CARDIAC REHAB | Facility: HOSPITAL | Age: 75
End: 2025-09-26
Payer: COMMERCIAL

## 2025-09-29 ENCOUNTER — APPOINTMENT (OUTPATIENT)
Dept: CARDIAC REHAB | Facility: HOSPITAL | Age: 75
End: 2025-09-29
Payer: COMMERCIAL

## 2025-12-18 ENCOUNTER — APPOINTMENT (OUTPATIENT)
Dept: CARDIOLOGY | Facility: CLINIC | Age: 75
End: 2025-12-18
Payer: MEDICARE

## (undated) DEVICE — TRAY, SURESTEP, URINE METER, 14FR, SILICONE

## (undated) DEVICE — DRESSING, TRANSPARENT, TEGADERM, 4 X 4-3/4 IN

## (undated) DEVICE — SUTURE, PROLENE, 7-0, 30 IN, BV1, DA, BLUE

## (undated) DEVICE — ELECTRODE, MULTIFUNCTION, QUICK-COMBO, EDGE SYSTEM, 2 FT

## (undated) DEVICE — SENSOR, FLOTRAC, 84IN 213CM

## (undated) DEVICE — CASSETTE, BLOOD, PLEGIC SET

## (undated) DEVICE — SUTURE, VICRYL 2-0, TAPER POINT, CT-1 UNDYED 27 INCH

## (undated) DEVICE — SUTURE, PROLENE, 6-0, 30 IN, C-1, CV-11, BLUE

## (undated) DEVICE — KIT, EXTENSION LINE, PRESSURE, RETROGRADE

## (undated) DEVICE — CONNECTOR, 1/2 X 1/2, STRAIGHT, STERILE

## (undated) DEVICE — DRESSING, TRANSPARENT, TEGADERM, 4 X 10 IN

## (undated) DEVICE — GEL, ECOVUE, ULTRASOUND, 20GRAM, STERILE

## (undated) DEVICE — EXTENSION SET, IV, SOFT, 20 IN

## (undated) DEVICE — TUBING PACK, OXYGENATOR, ADULT

## (undated) DEVICE — CANNULA, MULTIPLE PERFUSION SET, 15"

## (undated) DEVICE — CANNULA, AORTIC, ROOT, STANDARD, FLANGE, RADIOPAQUE TIP, W/FLOW-GUARD, 9 FR X 14 CM

## (undated) DEVICE — HANDLE COVER, LIGHT, RIGID, DISP, LF

## (undated) DEVICE — APPLICATOR, CHLORAPREP, W/ORANGE TINT, 26ML

## (undated) DEVICE — BANDAGE, ELASTIC, ACE, ACE, DOUBLE LENGTH, 6 X 550 IN, LF

## (undated) DEVICE — SUTURE, ETHIBOND, XTRA, 30 IN, 0, CT-1, GREEN

## (undated) DEVICE — CANNULA, RETROGRADE, 15FR, W/AUTO INFLATE

## (undated) DEVICE — HEMOSTAT, ABSORABABLE, SURGICEL SNOW, 2 X 4, LF

## (undated) DEVICE — SUTURE, VICRYL, 4-0, 18 IN, PS2, UNDYED

## (undated) DEVICE — CATHETER, DIAGNOSTIC, DXTERITY, 5FR PIG145, 110CM.6 SH

## (undated) DEVICE — ELECTROSURGICAL, CLEANER, LECTROBRASIVE

## (undated) DEVICE — GUIDEWIRE, INQWIRE, 3MM J, .035 X 210CM, FIXED

## (undated) DEVICE — SPONGE, LAP, XRAY DECT, 18IN X 18IN, W/LOOP, STERILE

## (undated) DEVICE — SUTURE, NUROLON, 0, 18 IN, CT1, DETACHABLE, MULTIPACK, BLACK

## (undated) DEVICE — INSERT, CLAMP, SURGICAL, SOFT/TRACTION, STEALTH, 5 MM

## (undated) DEVICE — DRAIN, CHANNEL, BLAKE, HUBLESS, ROUND, 28FR

## (undated) DEVICE — CANNULA, EOPA 20F W/O GUIDEWIRE

## (undated) DEVICE — DEPRESSOR, TONGUE, 6 IN, WOOD, STERILE, LF

## (undated) DEVICE — OXYGENATOR FX 25, W/HR, ARTERIAL FILTER

## (undated) DEVICE — KIT, TOURNIQUET, 7"

## (undated) DEVICE — CATHETER KIT, CENTRAL VENOUS, MULTI-LUMEN, 7 FR, 16 CM

## (undated) DEVICE — SOLUTION, INJECTION, USP, SODIUM CHLORIDE 0.9%, .9, NACL, 1000 ML, BAG

## (undated) DEVICE — SOLUTION, IRRIGATION, USP, SODIUM CHLORIDE 0.9%, 3000 ML, BAG

## (undated) DEVICE — REAGENT, GEM HEMOCHRON 100, GACT+

## (undated) DEVICE — SUTURE, PROLENE 4-0, TAPER POINT, SH-1 BLUE 30 INCH

## (undated) DEVICE — SUTURE, PROLENE, 4-0, 36 IN, RB1, DA, BLUE

## (undated) DEVICE — DRESSING, GAUZE, SPONGE, 12 PLY, CURITY, 4 X 4 IN, RIGID TRAY, STERILE

## (undated) DEVICE — SHEATH, GLIDESHEATH, SLENDER, 6FR 10CM

## (undated) DEVICE — BLADE, SAW STERNUM, STERILE

## (undated) DEVICE — SHUNT, SENSOR

## (undated) DEVICE — DRAPE, SHEET, CARDIOVASCULAR, ANTIMICROBIAL, W/ANESTHESIA SCREEN, IOBAN 2, STERI DRAPE, 107 X 133 IN, DISPOSABLE, FABRIC, BLUE, STERILE

## (undated) DEVICE — TR BAND, RADIAL COMPRESSION, STANDARD, 24CM

## (undated) DEVICE — SUTURE, STEEL, 7, 18 IN, CCS, SILVER

## (undated) DEVICE — CONNECTOR, 3/8 X 1/4, STRAIGHT, STERILE

## (undated) DEVICE — CATHETER, OPTITORQUE, 5FR, TIG, 1H/100CM

## (undated) DEVICE — TUBING, CLEAR N-COND, 5MM X 10, LF

## (undated) DEVICE — Device

## (undated) DEVICE — PADS, STOCKERT MOUNTING F/LOW LEVEL SENSOR II

## (undated) DEVICE — CANNULA, VESSEL, ONE WAY FLOW VALVE, BLUNT TIP, 3 MM X 6.4 CM

## (undated) DEVICE — TUBE SET, PNEUMOLAR HEATED, SMOKE EVACU, HIGH-FLOW

## (undated) DEVICE — PUNCH, AORTIC 4MM, BULLET TIP

## (undated) DEVICE — BLOWER MISTER KIT, CLEARVIEW, MALLEABLE SHAFT, W/TUBING, 16.5 CM

## (undated) DEVICE — CANNULA, VENOUS 2 STAGE 32/40

## (undated) DEVICE — CLIP, LIGATING, W/ADHESIVE, WIDE SLOT, SMALL, TITANIUM

## (undated) DEVICE — KNIFE, MICRO, 15 DEG BLADE AND TIP, DISP

## (undated) DEVICE — RETRACTOR, SUTURE, HOLDING, INSERT, OCTOBASE, DISPOSABLE

## (undated) DEVICE — SOLUTION, INJECTION, USP, PLASMALYTE A, PH 7.4, TYPE 1, 1000 ML, BAG

## (undated) DEVICE — SPONGE GAUZE, XRAY SC+RFID, 8X4 16 PLY, STERILE

## (undated) DEVICE — SPONGE, HEMOSTATIC, GELATIN, SURGIFOAM, 8 X 12.5 CM X 10 MM

## (undated) DEVICE — SPONGE, HEMOSTATIC, CELLULOSE, SURGICEL, 4 X 8 IN

## (undated) DEVICE — SENSOR, OXYGEN, CEREBRAL, SOMASENSOR, ADULT

## (undated) DEVICE — KIT, FAST START

## (undated) DEVICE — FILTER, IV, BLOOD, MICROAGGREGATE, 40 MIC, RBC TRANSFUSION

## (undated) DEVICE — COLLECTION UNIT, DRAINAGE, THORACIC, SINGLE TUBE, DRY SUCTION, ATS COMPATIBLE, OASIS 3600, LF

## (undated) DEVICE — SUTURE, VICRYL, 0, 18 IN, CT-1, UNDYED

## (undated) DEVICE — SYRINGE, 20 CC, LUER LOCK

## (undated) DEVICE — ADAPTER, CARDIOPLEGIA, VENTING, Y, 19.1 CM

## (undated) DEVICE — BAG, DECANTER

## (undated) DEVICE — SUTURE, PROLENE, 5-0, 36 IN, RB1, DA, BLUE

## (undated) DEVICE — PACING CABLE, EXTENSION, 12 FT BEIGE, DISPOSABLE

## (undated) DEVICE — SUTURE, PROLENE, 3-0, 36 IN, SH, DA, BLUE

## (undated) DEVICE — CATHETER KIT, RADIAL ARTLINE, 20G X 1 3/4

## (undated) DEVICE — DRAPE, INSTRUMENT, W/POUCH, STERI DRAPE, 7 X 11 IN, DISPOSABLE, STERILE